# Patient Record
Sex: FEMALE | Race: WHITE | NOT HISPANIC OR LATINO | Employment: OTHER | URBAN - METROPOLITAN AREA
[De-identification: names, ages, dates, MRNs, and addresses within clinical notes are randomized per-mention and may not be internally consistent; named-entity substitution may affect disease eponyms.]

---

## 2017-01-03 ENCOUNTER — LAB (OUTPATIENT)
Dept: LAB | Facility: HOSPITAL | Age: 67
End: 2017-01-03

## 2017-01-03 ENCOUNTER — TRANSCRIBE ORDERS (OUTPATIENT)
Dept: ADMINISTRATIVE | Facility: HOSPITAL | Age: 67
End: 2017-01-03

## 2017-01-03 DIAGNOSIS — E78.00 PURE HYPERCHOLESTEROLEMIA: ICD-10-CM

## 2017-01-03 DIAGNOSIS — E11.9 DIABETES MELLITUS WITHOUT COMPLICATION (HCC): ICD-10-CM

## 2017-01-03 DIAGNOSIS — I10 ESSENTIAL HYPERTENSION, MALIGNANT: ICD-10-CM

## 2017-01-03 DIAGNOSIS — I10 ESSENTIAL HYPERTENSION, MALIGNANT: Primary | ICD-10-CM

## 2017-01-03 LAB
ALBUMIN SERPL-MCNC: 4.1 G/DL (ref 3.5–5.2)
ALBUMIN UR-MCNC: 18.7 MG/L
ALBUMIN/GLOB SERPL: 1.3 G/DL
ALP SERPL-CCNC: 89 U/L (ref 39–117)
ALT SERPL W P-5'-P-CCNC: 15 U/L (ref 1–33)
ANION GAP SERPL CALCULATED.3IONS-SCNC: 14.3 MMOL/L
AST SERPL-CCNC: 12 U/L (ref 1–32)
BACTERIA UR QL AUTO: ABNORMAL /HPF
BILIRUB SERPL-MCNC: 0.3 MG/DL (ref 0.1–1.2)
BILIRUB UR QL STRIP: NEGATIVE
BUN BLD-MCNC: 17 MG/DL (ref 8–23)
BUN/CREAT SERPL: 21.5 (ref 7–25)
CALCIUM SPEC-SCNC: 9.2 MG/DL (ref 8.6–10.5)
CHLORIDE SERPL-SCNC: 101 MMOL/L (ref 98–107)
CHOLEST SERPL-MCNC: 259 MG/DL (ref 0–200)
CLARITY UR: ABNORMAL
CO2 SERPL-SCNC: 22.7 MMOL/L (ref 22–29)
COLOR UR: YELLOW
CREAT BLD-MCNC: 0.79 MG/DL (ref 0.57–1)
GFR SERPL CREATININE-BSD FRML MDRD: 73 ML/MIN/1.73
GLOBULIN UR ELPH-MCNC: 3.2 GM/DL
GLUCOSE BLD-MCNC: 285 MG/DL (ref 65–99)
GLUCOSE UR STRIP-MCNC: ABNORMAL MG/DL
HBA1C MFR BLD: 11.54 % (ref 4.8–5.6)
HDLC SERPL-MCNC: 60 MG/DL (ref 40–60)
HGB UR QL STRIP.AUTO: NEGATIVE
HYALINE CASTS UR QL AUTO: ABNORMAL /LPF
KETONES UR QL STRIP: NEGATIVE
LDLC SERPL CALC-MCNC: 175 MG/DL (ref 0–100)
LDLC/HDLC SERPL: 2.92 {RATIO}
LEUKOCYTE ESTERASE UR QL STRIP.AUTO: ABNORMAL
MUCOUS THREADS URNS QL MICRO: ABNORMAL /HPF
NITRITE UR QL STRIP: NEGATIVE
PH UR STRIP.AUTO: 5.5 [PH] (ref 5–8)
POTASSIUM BLD-SCNC: 4.1 MMOL/L (ref 3.5–5.2)
PROT SERPL-MCNC: 7.3 G/DL (ref 6–8.5)
PROT UR QL STRIP: ABNORMAL
RBC # UR: ABNORMAL /HPF
REF LAB TEST METHOD: ABNORMAL
SODIUM BLD-SCNC: 138 MMOL/L (ref 136–145)
SP GR UR STRIP: >=1.03 (ref 1–1.03)
SQUAMOUS #/AREA URNS HPF: ABNORMAL /HPF
TRIGL SERPL-MCNC: 118 MG/DL (ref 0–150)
UROBILINOGEN UR QL STRIP: ABNORMAL
VLDLC SERPL-MCNC: 23.6 MG/DL (ref 5–40)
WBC UR QL AUTO: ABNORMAL /HPF

## 2017-01-03 PROCEDURE — 36415 COLL VENOUS BLD VENIPUNCTURE: CPT

## 2017-01-03 PROCEDURE — 80061 LIPID PANEL: CPT | Performed by: INTERNAL MEDICINE

## 2017-01-03 PROCEDURE — 82043 UR ALBUMIN QUANTITATIVE: CPT | Performed by: INTERNAL MEDICINE

## 2017-01-03 PROCEDURE — 81001 URINALYSIS AUTO W/SCOPE: CPT | Performed by: INTERNAL MEDICINE

## 2017-01-03 PROCEDURE — 83036 HEMOGLOBIN GLYCOSYLATED A1C: CPT | Performed by: INTERNAL MEDICINE

## 2017-01-03 PROCEDURE — 80053 COMPREHEN METABOLIC PANEL: CPT | Performed by: INTERNAL MEDICINE

## 2017-04-11 ENCOUNTER — LAB (OUTPATIENT)
Dept: LAB | Facility: HOSPITAL | Age: 67
End: 2017-04-11

## 2017-04-11 ENCOUNTER — TRANSCRIBE ORDERS (OUTPATIENT)
Dept: ADMINISTRATIVE | Facility: HOSPITAL | Age: 67
End: 2017-04-11

## 2017-04-11 DIAGNOSIS — Z00.00 ROUTINE GENERAL MEDICAL EXAMINATION AT A HEALTH CARE FACILITY: ICD-10-CM

## 2017-04-11 DIAGNOSIS — E11.9 DIABETES MELLITUS WITHOUT COMPLICATION (HCC): ICD-10-CM

## 2017-04-11 DIAGNOSIS — E78.00 PURE HYPERCHOLESTEROLEMIA: ICD-10-CM

## 2017-04-11 DIAGNOSIS — I10 ESSENTIAL HYPERTENSION, MALIGNANT: ICD-10-CM

## 2017-04-11 DIAGNOSIS — Z00.00 ROUTINE GENERAL MEDICAL EXAMINATION AT A HEALTH CARE FACILITY: Primary | ICD-10-CM

## 2017-04-11 LAB
ALBUMIN SERPL-MCNC: 4 G/DL (ref 3.5–5.2)
ALBUMIN/GLOB SERPL: 1.2 G/DL
ALP SERPL-CCNC: 77 U/L (ref 39–117)
ALT SERPL W P-5'-P-CCNC: 13 U/L (ref 1–33)
ANION GAP SERPL CALCULATED.3IONS-SCNC: 13.7 MMOL/L
AST SERPL-CCNC: 12 U/L (ref 1–32)
BACTERIA UR QL AUTO: ABNORMAL /HPF
BILIRUB SERPL-MCNC: 0.5 MG/DL (ref 0.1–1.2)
BILIRUB UR QL STRIP: NEGATIVE
BUN BLD-MCNC: 12 MG/DL (ref 8–23)
BUN/CREAT SERPL: 15.2 (ref 7–25)
CALCIUM SPEC-SCNC: 9.5 MG/DL (ref 8.6–10.5)
CHLORIDE SERPL-SCNC: 102 MMOL/L (ref 98–107)
CHOLEST SERPL-MCNC: 172 MG/DL (ref 0–200)
CLARITY UR: CLEAR
CO2 SERPL-SCNC: 22.3 MMOL/L (ref 22–29)
COLOR UR: YELLOW
CREAT BLD-MCNC: 0.79 MG/DL (ref 0.57–1)
DEPRECATED RDW RBC AUTO: 49.3 FL (ref 37–54)
EOSINOPHIL # BLD MANUAL: 0.12 10*3/MM3 (ref 0–0.7)
EOSINOPHIL NFR BLD MANUAL: 2 % (ref 0.3–6.2)
ERYTHROCYTE [DISTWIDTH] IN BLOOD BY AUTOMATED COUNT: 14.8 % (ref 11.7–13)
GFR SERPL CREATININE-BSD FRML MDRD: 73 ML/MIN/1.73
GLOBULIN UR ELPH-MCNC: 3.3 GM/DL
GLUCOSE BLD-MCNC: 327 MG/DL (ref 65–99)
GLUCOSE UR STRIP-MCNC: ABNORMAL MG/DL
HBA1C MFR BLD: 12.11 % (ref 4.8–5.6)
HCT VFR BLD AUTO: 42.4 % (ref 35.6–45.5)
HDLC SERPL-MCNC: 61 MG/DL (ref 40–60)
HGB BLD-MCNC: 13.6 G/DL (ref 11.9–15.5)
HGB UR QL STRIP.AUTO: NEGATIVE
HYALINE CASTS UR QL AUTO: ABNORMAL /LPF
KETONES UR QL STRIP: NEGATIVE
LDLC SERPL CALC-MCNC: 90 MG/DL (ref 0–100)
LDLC/HDLC SERPL: 1.47 {RATIO}
LEUKOCYTE ESTERASE UR QL STRIP.AUTO: NEGATIVE
LYMPHOCYTES # BLD MANUAL: 2.13 10*3/MM3 (ref 0.9–4.8)
LYMPHOCYTES NFR BLD MANUAL: 37 % (ref 19.6–45.3)
LYMPHOCYTES NFR BLD MANUAL: 7 % (ref 5–12)
MCH RBC QN AUTO: 29.1 PG (ref 26.9–32)
MCHC RBC AUTO-ENTMCNC: 32.1 G/DL (ref 32.4–36.3)
MCV RBC AUTO: 90.8 FL (ref 80.5–98.2)
MONOCYTES # BLD AUTO: 0.4 10*3/MM3 (ref 0.2–1.2)
NEUTROPHILS # BLD AUTO: 3.11 10*3/MM3 (ref 1.9–8.1)
NEUTROPHILS NFR BLD MANUAL: 54 % (ref 42.7–76)
NITRITE UR QL STRIP: NEGATIVE
PH UR STRIP.AUTO: 5.5 [PH] (ref 5–8)
PLAT MORPH BLD: NORMAL
PLATELET # BLD AUTO: 251 10*3/MM3 (ref 140–500)
PMV BLD AUTO: 11.6 FL (ref 6–12)
POTASSIUM BLD-SCNC: 4.3 MMOL/L (ref 3.5–5.2)
PROT SERPL-MCNC: 7.3 G/DL (ref 6–8.5)
PROT UR QL STRIP: ABNORMAL
RBC # BLD AUTO: 4.67 10*6/MM3 (ref 3.9–5.2)
RBC # UR: ABNORMAL /HPF
RBC MORPH BLD: NORMAL
REF LAB TEST METHOD: ABNORMAL
SODIUM BLD-SCNC: 138 MMOL/L (ref 136–145)
SP GR UR STRIP: >=1.03 (ref 1–1.03)
SQUAMOUS #/AREA URNS HPF: ABNORMAL /HPF
TRIGL SERPL-MCNC: 106 MG/DL (ref 0–150)
UROBILINOGEN UR QL STRIP: ABNORMAL
VLDLC SERPL-MCNC: 21.2 MG/DL (ref 5–40)
WBC MORPH BLD: NORMAL
WBC NRBC COR # BLD: 5.75 10*3/MM3 (ref 4.5–10.7)
WBC UR QL AUTO: ABNORMAL /HPF

## 2017-04-11 PROCEDURE — 81001 URINALYSIS AUTO W/SCOPE: CPT

## 2017-04-11 PROCEDURE — 36415 COLL VENOUS BLD VENIPUNCTURE: CPT

## 2017-04-11 PROCEDURE — 85007 BL SMEAR W/DIFF WBC COUNT: CPT

## 2017-04-11 PROCEDURE — 83036 HEMOGLOBIN GLYCOSYLATED A1C: CPT

## 2017-04-11 PROCEDURE — 80061 LIPID PANEL: CPT

## 2017-04-11 PROCEDURE — 85027 COMPLETE CBC AUTOMATED: CPT

## 2017-04-11 PROCEDURE — 80053 COMPREHEN METABOLIC PANEL: CPT

## 2017-04-28 ENCOUNTER — OFFICE VISIT (OUTPATIENT)
Dept: ENDOCRINOLOGY | Age: 67
End: 2017-04-28

## 2017-04-28 VITALS
SYSTOLIC BLOOD PRESSURE: 130 MMHG | RESPIRATION RATE: 16 BRPM | BODY MASS INDEX: 30.86 KG/M2 | HEIGHT: 67 IN | WEIGHT: 196.6 LBS | DIASTOLIC BLOOD PRESSURE: 84 MMHG

## 2017-04-28 DIAGNOSIS — E11.9 TYPE 2 DIABETES MELLITUS WITHOUT COMPLICATION, WITH LONG-TERM CURRENT USE OF INSULIN (HCC): Primary | ICD-10-CM

## 2017-04-28 DIAGNOSIS — Z79.4 TYPE 2 DIABETES MELLITUS WITHOUT COMPLICATION, WITH LONG-TERM CURRENT USE OF INSULIN (HCC): Primary | ICD-10-CM

## 2017-04-28 DIAGNOSIS — E55.9 VITAMIN D DEFICIENCY: ICD-10-CM

## 2017-04-28 DIAGNOSIS — E55.9 VITAMIN D DEFICIENCY: Primary | ICD-10-CM

## 2017-04-28 DIAGNOSIS — E78.5 DYSLIPIDEMIA: ICD-10-CM

## 2017-04-28 DIAGNOSIS — I10 ESSENTIAL HYPERTENSION: ICD-10-CM

## 2017-04-28 DIAGNOSIS — E03.9 PRIMARY HYPOTHYROIDISM: ICD-10-CM

## 2017-04-28 PROCEDURE — 99205 OFFICE O/P NEW HI 60 MIN: CPT | Performed by: INTERNAL MEDICINE

## 2017-04-28 RX ORDER — LANCETS
EACH MISCELLANEOUS
Qty: 60 EACH | Refills: 5 | Status: SHIPPED | OUTPATIENT
Start: 2017-04-28 | End: 2017-05-02

## 2017-04-28 RX ORDER — INSULIN GLARGINE 300 U/ML
75 INJECTION, SOLUTION SUBCUTANEOUS DAILY
Qty: 6 PEN | Refills: 5 | Status: SHIPPED | OUTPATIENT
Start: 2017-04-28 | End: 2017-06-27 | Stop reason: SDUPTHER

## 2017-04-28 RX ORDER — DAPAGLIFLOZIN 10 MG/1
TABLET, FILM COATED ORAL
Qty: 30 TABLET | Refills: 5 | Status: SHIPPED | OUTPATIENT
Start: 2017-04-28 | End: 2017-06-27 | Stop reason: SDUPTHER

## 2017-04-28 RX ORDER — BLOOD SUGAR DIAGNOSTIC
STRIP MISCELLANEOUS
Qty: 100 EACH | Refills: 12 | Status: SHIPPED | OUTPATIENT
Start: 2017-04-28 | End: 2017-05-02 | Stop reason: SDUPTHER

## 2017-04-28 RX ORDER — ATORVASTATIN CALCIUM 20 MG/1
20 TABLET, FILM COATED ORAL DAILY
COMMUNITY
End: 2017-05-08

## 2017-04-28 RX ORDER — EXENATIDE 2 MG/.65ML
2 INJECTION, SUSPENSION, EXTENDED RELEASE SUBCUTANEOUS WEEKLY
Qty: 4 EACH | Refills: 5 | Status: SHIPPED | OUTPATIENT
Start: 2017-04-28 | End: 2017-06-27 | Stop reason: SDUPTHER

## 2017-04-28 NOTE — PROGRESS NOTES
Subjective   Mayra Hirsch is a 66 y.o. female seen as a new patient for DM2, hypothyroidism. She is not checking her BG. She states that she is not taking any medication for diabetes. She was given metformin but each time she took it she had vomiting. She was on invokana but her insurance would not cover it. She denies any problems or concerns.     History of Present Illness this is a 66-year-old female known patient with type II diabetes hypothyroidism and hypertension with dyslipidemia.  She has been unable to tolerate metformin and couple other choices for her therapy was not under her formulary.  She is not doing any self blood glucose monitoring testing.  She denies having had any serious medical issues for which to go to the emergency room or hospital.       No Known Allergies    Current Outpatient Prescriptions:   •  atorvastatin (LIPITOR) 20 MG tablet, Take 20 mg by mouth Daily., Disp: , Rfl:   •  FLUoxetine HCl (PROZAC PO), Take 40 mg by mouth., Disp: , Rfl:   •  Levothyroxine Sodium (SYNTHROID PO), Take 200 mcg by mouth., Disp: , Rfl:   •  LISINOPRIL PO, Take 20 mg by mouth., Disp: , Rfl:       The following portions of the patient's history were reviewed and updated as appropriate: allergies, current medications, past family history, past medical history, past social history, past surgical history and problem list.    Review of Systems   Constitutional: Positive for fatigue.   HENT: Negative.    Eyes: Negative.    Respiratory: Negative.    Cardiovascular: Negative.    Gastrointestinal: Negative.    Endocrine: Negative.    Genitourinary: Negative.    Musculoskeletal: Negative.    Skin: Negative.    Allergic/Immunologic: Negative.    Neurological: Negative.    Hematological: Negative.    Psychiatric/Behavioral: Negative.        Objective   Physical Exam   Constitutional: She is oriented to person, place, and time. She appears well-developed and well-nourished. No distress.   HENT:   Head: Normocephalic.    Right Ear: External ear normal.   Left Ear: External ear normal.   Nose: Nose normal.   Mouth/Throat: Oropharynx is clear and moist. No oropharyngeal exudate.   Eyes: Conjunctivae and EOM are normal. Pupils are equal, round, and reactive to light. Right eye exhibits no discharge. Left eye exhibits no discharge. No scleral icterus.   Neck: Normal range of motion. Neck supple. No JVD present. No tracheal deviation present. No thyromegaly present.   Cardiovascular: Normal rate, regular rhythm, normal heart sounds and intact distal pulses.  Exam reveals no gallop and no friction rub.    No murmur heard.  Pulmonary/Chest: Effort normal and breath sounds normal. No stridor. No respiratory distress. She has no wheezes. She has no rales. She exhibits no tenderness.   Abdominal: Soft. Bowel sounds are normal. She exhibits no distension and no mass. There is no tenderness. There is no rebound and no guarding. No hernia.   Musculoskeletal: Normal range of motion. She exhibits no edema, tenderness or deformity.   Lymphadenopathy:     She has no cervical adenopathy.   Neurological: She is alert and oriented to person, place, and time. She has normal reflexes. She displays normal reflexes. No cranial nerve deficit. She exhibits normal muscle tone. Coordination normal.   Skin: Skin is warm and dry. No rash noted. She is not diaphoretic. No erythema. No pallor.   Psychiatric: She has a normal mood and affect. Her behavior is normal. Judgment and thought content normal.   Nursing note and vitals reviewed.        Assessment/Plan   Diagnoses and all orders for this visit:    Type 2 diabetes mellitus without complication, with long-term current use of insulin  -     T4 & TSH ([facility])  -     Uric Acid  -     Vitamin D 25 Hydroxy  -     Comprehensive Metabolic Panel  -     C-Peptide  -     Hemoglobin A1c  -     Lipid Panel  -     MicroAlbumin, Urine, Random  -     ACTH  -     Cortisol  -     T3, Free  -     T4, Free  -      Thyroglobulin With Anti-TG  -     T3, Free; Future  -     T4 & TSH ([facility]); Future  -     T4, Free; Future  -     Uric Acid; Future  -     Vitamin D 25 Hydroxy; Future  -     Comprehensive Metabolic Panel; Future  -     C-Peptide; Future  -     Hemoglobin A1c; Future  -     Lipid Panel; Future  -     MicroAlbumin, Urine, Random; Future    Dyslipidemia  -     T4 & TSH ([facility])  -     Uric Acid  -     Vitamin D 25 Hydroxy  -     Comprehensive Metabolic Panel  -     C-Peptide  -     Hemoglobin A1c  -     Lipid Panel  -     MicroAlbumin, Urine, Random  -     ACTH  -     Cortisol  -     T3, Free  -     T4, Free  -     Thyroglobulin With Anti-TG  -     T3, Free; Future  -     T4 & TSH ([facility]); Future  -     T4, Free; Future  -     Uric Acid; Future  -     Vitamin D 25 Hydroxy; Future  -     Comprehensive Metabolic Panel; Future  -     C-Peptide; Future  -     Hemoglobin A1c; Future  -     Lipid Panel; Future  -     MicroAlbumin, Urine, Random; Future    Essential hypertension  -     T4 & TSH ([facility])  -     Uric Acid  -     Vitamin D 25 Hydroxy  -     Comprehensive Metabolic Panel  -     C-Peptide  -     Hemoglobin A1c  -     Lipid Panel  -     MicroAlbumin, Urine, Random  -     ACTH  -     Cortisol  -     T3, Free  -     T4, Free  -     Thyroglobulin With Anti-TG  -     T3, Free; Future  -     T4 & TSH ([facility]); Future  -     T4, Free; Future  -     Uric Acid; Future  -     Vitamin D 25 Hydroxy; Future  -     Comprehensive Metabolic Panel; Future  -     C-Peptide; Future  -     Hemoglobin A1c; Future  -     Lipid Panel; Future  -     MicroAlbumin, Urine, Random; Future    Primary hypothyroidism  -     T4 & TSH ([facility])  -     Uric Acid  -     Vitamin D 25 Hydroxy  -     Comprehensive Metabolic Panel  -     C-Peptide  -     Hemoglobin A1c  -     Lipid Panel  -     MicroAlbumin, Urine, Random  -     ACTH  -     Cortisol  -     T3, Free  -     T4, Free  -     Thyroglobulin With Anti-TG  -     T3, Free; Future  -      T4 & TSH ([facility]); Future  -     T4, Free; Future  -     Uric Acid; Future  -     Vitamin D 25 Hydroxy; Future  -     Comprehensive Metabolic Panel; Future  -     C-Peptide; Future  -     Hemoglobin A1c; Future  -     Lipid Panel; Future  -     MicroAlbumin, Urine, Random; Future    Vitamin D deficiency  -     T4 & TSH ([facility])  -     Uric Acid  -     Vitamin D 25 Hydroxy  -     Comprehensive Metabolic Panel  -     C-Peptide  -     Hemoglobin A1c  -     Lipid Panel  -     MicroAlbumin, Urine, Random  -     ACTH  -     Cortisol  -     T3, Free  -     T4, Free  -     Thyroglobulin With Anti-TG  -     T3, Free; Future  -     T4 & TSH ([facility]); Future  -     T4, Free; Future  -     Uric Acid; Future  -     Vitamin D 25 Hydroxy; Future  -     Comprehensive Metabolic Panel; Future  -     C-Peptide; Future  -     Hemoglobin A1c; Future  -     Lipid Panel; Future  -     MicroAlbumin, Urine, Random; Future    Other orders  -     FARXIGA 10 MG tablet; 1 tablet by mouth every morning  -     TOUJEO SOLOSTAR 300 UNIT/ML solution pen-injector; Inject 75 Units under the skin Daily.  -     BYDUREON 2 MG pen-injector; Inject 2 mg under the skin 1 (One) Time Per Week.  -     ONETOUCH VERIO test strip; Use as instructed  -     Lancets (ONETOUCH ULTRASOFT) lancets; PRN  -     Insulin Pen Needle (CAREFINE PEN NEEDLES) 32G X 6 MM misc; Euros for daily insulin injection             in summary I saw and examined this 66-year-old female for above-mentioned problems.  I reviewed her laboratory evaluation of 04/11/2017 where her fasting blood sugar was 327 and a hemoglobin A1c was 12.11.  At this point am going to go ahead and start her on Bydureon 2 mg injection once weekly as well as Farxiga 10 mg every morning and Toujeo 50 units every morning.  I also instructed her to check her blood glucose every morning and if her blood sugar is greater than 120 at 2 units to her existing dose of Toujeo we will go ahead and obtain a comprehensive  laboratory evaluation and once the results come back we will go ahead and call for any other modification or new medication or additional evaluation.  This office visit including patient counseling which was more than 50% of the time including teaching injections of insulin as well as Bydureon exceeded 60 minutes.  She will see Ms. Nuria Ochoa in 4 months or sooner if needed with laboratory evaluation prior to her office visit..

## 2017-04-29 LAB — 25(OH)D3+25(OH)D2 SERPL-MCNC: 16.8 NG/ML (ref 30–100)

## 2017-05-01 RX ORDER — ERGOCALCIFEROL 1.25 MG/1
50000 CAPSULE ORAL 2 TIMES WEEKLY
Qty: 26 CAPSULE | Refills: 3 | Status: SHIPPED | OUTPATIENT
Start: 2017-05-01 | End: 2018-03-12 | Stop reason: SDUPTHER

## 2017-05-02 DIAGNOSIS — Z79.4 TYPE 2 DIABETES MELLITUS WITHOUT COMPLICATION, WITH LONG-TERM CURRENT USE OF INSULIN (HCC): Primary | ICD-10-CM

## 2017-05-02 DIAGNOSIS — E11.9 TYPE 2 DIABETES MELLITUS WITHOUT COMPLICATION, WITH LONG-TERM CURRENT USE OF INSULIN (HCC): Primary | ICD-10-CM

## 2017-05-02 RX ORDER — BLOOD SUGAR DIAGNOSTIC
STRIP MISCELLANEOUS
Qty: 100 EACH | Refills: 3 | Status: SHIPPED | OUTPATIENT
Start: 2017-05-02 | End: 2018-03-15 | Stop reason: SDUPTHER

## 2017-05-06 LAB
ACTH PLAS-MCNC: 18.4 PG/ML (ref 7.2–63.3)
ALBUMIN SERPL-MCNC: 4.6 G/DL (ref 3.5–5.2)
ALBUMIN/GLOB SERPL: 1.2 G/DL
ALP SERPL-CCNC: 105 U/L (ref 39–117)
ALT SERPL-CCNC: 17 U/L (ref 1–33)
AST SERPL-CCNC: 15 U/L (ref 1–32)
BILIRUB SERPL-MCNC: 0.5 MG/DL (ref 0.1–1.2)
BUN SERPL-MCNC: 18 MG/DL (ref 8–23)
BUN/CREAT SERPL: 22 (ref 7–25)
C PEPTIDE SERPL-MCNC: 4 NG/ML (ref 1.1–4.4)
CALCIUM SERPL-MCNC: 10.6 MG/DL (ref 8.6–10.5)
CHLORIDE SERPL-SCNC: 95 MMOL/L (ref 98–107)
CHOLEST SERPL-MCNC: 240 MG/DL (ref 0–200)
CO2 SERPL-SCNC: 22.6 MMOL/L (ref 22–29)
CORTIS SERPL-MCNC: 18.4 UG/DL
CREAT SERPL-MCNC: 0.82 MG/DL (ref 0.57–1)
GLOBULIN SER CALC-MCNC: 3.7 GM/DL
GLUCOSE SERPL-MCNC: 404 MG/DL (ref 65–99)
HBA1C MFR BLD: 11.9 % (ref 4.8–5.6)
HDLC SERPL-MCNC: 69 MG/DL (ref 40–60)
LDLC SERPL CALC-MCNC: 149 MG/DL (ref 0–100)
MICROALBUMIN UR-MCNC: 92.1 UG/ML
POTASSIUM SERPL-SCNC: 5.2 MMOL/L (ref 3.5–5.2)
PROT SERPL-MCNC: 8.3 G/DL (ref 6–8.5)
SODIUM SERPL-SCNC: 138 MMOL/L (ref 136–145)
T3FREE SERPL-MCNC: 2.3 PG/ML (ref 2–4.4)
T4 FREE SERPL-MCNC: 1.1 NG/DL (ref 0.93–1.7)
T4 SERPL-MCNC: 6.76 MCG/DL (ref 4.5–11.7)
THYROGLOB AB SERPL-ACNC: 1.1 IU/ML (ref 0–0.9)
THYROGLOB SERPL-MCNC: <2 NG/ML
TRIGL SERPL-MCNC: 109 MG/DL (ref 0–150)
TSH SERPL DL<=0.005 MIU/L-ACNC: 15.88 MIU/ML (ref 0.27–4.2)
URATE SERPL-MCNC: 4.6 MG/DL (ref 2.4–5.7)
VLDLC SERPL CALC-MCNC: 21.8 MG/DL (ref 5–40)

## 2017-05-08 RX ORDER — ATORVASTATIN CALCIUM 80 MG/1
80 TABLET, FILM COATED ORAL DAILY
Qty: 30 TABLET | Refills: 11 | Status: SHIPPED | OUTPATIENT
Start: 2017-05-08 | End: 2017-06-27 | Stop reason: SDUPTHER

## 2017-05-08 RX ORDER — EZETIMIBE 10 MG/1
10 TABLET ORAL DAILY
Qty: 30 TABLET | Refills: 11 | Status: SHIPPED | OUTPATIENT
Start: 2017-05-08 | End: 2017-06-27 | Stop reason: SDUPTHER

## 2017-05-08 RX ORDER — LEVOTHYROXINE SODIUM 125 MCG
250 TABLET ORAL DAILY
Qty: 60 TABLET | Refills: 5 | Status: SHIPPED | OUTPATIENT
Start: 2017-05-08 | End: 2017-06-27 | Stop reason: SDUPTHER

## 2017-06-27 RX ORDER — EZETIMIBE 10 MG/1
10 TABLET ORAL DAILY
Qty: 90 TABLET | Refills: 2 | Status: SHIPPED | OUTPATIENT
Start: 2017-06-27 | End: 2018-03-09 | Stop reason: SDUPTHER

## 2017-06-27 RX ORDER — LEVOTHYROXINE SODIUM 0.12 MG/1
250 TABLET ORAL DAILY
Qty: 180 TABLET | Refills: 2 | Status: SHIPPED | OUTPATIENT
Start: 2017-06-27 | End: 2018-03-09 | Stop reason: SDUPTHER

## 2017-06-27 RX ORDER — EXENATIDE 2 MG/.65ML
2 INJECTION, SUSPENSION, EXTENDED RELEASE SUBCUTANEOUS WEEKLY
Qty: 12 EACH | Refills: 2 | Status: SHIPPED | OUTPATIENT
Start: 2017-06-27 | End: 2018-02-18 | Stop reason: SDUPTHER

## 2017-06-27 RX ORDER — INSULIN GLARGINE 300 U/ML
75 INJECTION, SOLUTION SUBCUTANEOUS DAILY
Qty: 15 PEN | Refills: 2 | Status: SHIPPED | OUTPATIENT
Start: 2017-06-27 | End: 2017-09-19 | Stop reason: SDUPTHER

## 2017-06-27 RX ORDER — DAPAGLIFLOZIN 10 MG/1
TABLET, FILM COATED ORAL
Qty: 90 TABLET | Refills: 2 | Status: SHIPPED | OUTPATIENT
Start: 2017-06-27 | End: 2018-03-09 | Stop reason: SDUPTHER

## 2017-06-27 RX ORDER — ATORVASTATIN CALCIUM 80 MG/1
80 TABLET, FILM COATED ORAL DAILY
Qty: 90 TABLET | Refills: 2 | Status: SHIPPED | OUTPATIENT
Start: 2017-06-27 | End: 2018-03-09 | Stop reason: SDUPTHER

## 2017-08-23 ENCOUNTER — RESULTS ENCOUNTER (OUTPATIENT)
Dept: ENDOCRINOLOGY | Age: 67
End: 2017-08-23

## 2017-08-23 DIAGNOSIS — E78.5 DYSLIPIDEMIA: ICD-10-CM

## 2017-08-23 DIAGNOSIS — Z79.4 TYPE 2 DIABETES MELLITUS WITHOUT COMPLICATION, WITH LONG-TERM CURRENT USE OF INSULIN (HCC): ICD-10-CM

## 2017-08-23 DIAGNOSIS — E03.9 PRIMARY HYPOTHYROIDISM: ICD-10-CM

## 2017-08-23 DIAGNOSIS — E55.9 VITAMIN D DEFICIENCY: ICD-10-CM

## 2017-08-23 DIAGNOSIS — E11.9 TYPE 2 DIABETES MELLITUS WITHOUT COMPLICATION, WITH LONG-TERM CURRENT USE OF INSULIN (HCC): ICD-10-CM

## 2017-08-23 DIAGNOSIS — I10 ESSENTIAL HYPERTENSION: ICD-10-CM

## 2017-08-28 DIAGNOSIS — E55.9 VITAMIN D DEFICIENCY: ICD-10-CM

## 2017-08-28 DIAGNOSIS — E03.9 PRIMARY HYPOTHYROIDISM: Primary | ICD-10-CM

## 2017-08-28 DIAGNOSIS — E11.9 TYPE 2 DIABETES MELLITUS WITHOUT COMPLICATION, WITH LONG-TERM CURRENT USE OF INSULIN (HCC): ICD-10-CM

## 2017-08-28 DIAGNOSIS — Z79.4 TYPE 2 DIABETES MELLITUS WITHOUT COMPLICATION, WITH LONG-TERM CURRENT USE OF INSULIN (HCC): ICD-10-CM

## 2017-09-05 ENCOUNTER — LAB (OUTPATIENT)
Dept: ENDOCRINOLOGY | Age: 67
End: 2017-09-05

## 2017-09-05 DIAGNOSIS — Z79.4 TYPE 2 DIABETES MELLITUS WITHOUT COMPLICATION, WITH LONG-TERM CURRENT USE OF INSULIN (HCC): ICD-10-CM

## 2017-09-05 DIAGNOSIS — E78.5 DYSLIPIDEMIA: ICD-10-CM

## 2017-09-05 DIAGNOSIS — E11.9 TYPE 2 DIABETES MELLITUS WITHOUT COMPLICATION, WITH LONG-TERM CURRENT USE OF INSULIN (HCC): ICD-10-CM

## 2017-09-05 DIAGNOSIS — I10 ESSENTIAL HYPERTENSION: ICD-10-CM

## 2017-09-05 DIAGNOSIS — E55.9 VITAMIN D DEFICIENCY: ICD-10-CM

## 2017-09-05 DIAGNOSIS — E03.9 PRIMARY HYPOTHYROIDISM: ICD-10-CM

## 2017-09-07 LAB
25(OH)D3+25(OH)D2 SERPL-MCNC: 30.2 NG/ML (ref 30–100)
ALBUMIN SERPL-MCNC: 4.2 G/DL (ref 3.5–5.2)
ALBUMIN/GLOB SERPL: 1.4 G/DL
ALP SERPL-CCNC: 94 U/L (ref 39–117)
ALT SERPL-CCNC: 14 U/L (ref 1–33)
AST SERPL-CCNC: 15 U/L (ref 1–32)
BILIRUB SERPL-MCNC: 0.3 MG/DL (ref 0.1–1.2)
BUN SERPL-MCNC: 13 MG/DL (ref 8–23)
BUN/CREAT SERPL: 16.7 (ref 7–25)
C PEPTIDE SERPL-MCNC: 4.5 NG/ML (ref 1.1–4.4)
CALCIUM SERPL-MCNC: 10 MG/DL (ref 8.6–10.5)
CHLORIDE SERPL-SCNC: 108 MMOL/L (ref 98–107)
CHOLEST SERPL-MCNC: 140 MG/DL (ref 0–200)
CO2 SERPL-SCNC: 20.4 MMOL/L (ref 22–29)
CREAT SERPL-MCNC: 0.78 MG/DL (ref 0.57–1)
FT4I SERPL CALC-MCNC: 2.8 (ref 1.2–4.9)
GLOBULIN SER CALC-MCNC: 3 GM/DL
GLUCOSE SERPL-MCNC: 141 MG/DL (ref 65–99)
HBA1C MFR BLD: 6.1 % (ref 4.8–5.6)
HDLC SERPL-MCNC: 58 MG/DL (ref 40–60)
LDLC SERPL CALC-MCNC: 58 MG/DL (ref 0–100)
POTASSIUM SERPL-SCNC: 4.1 MMOL/L (ref 3.5–5.2)
PROT SERPL-MCNC: 7.2 G/DL (ref 6–8.5)
SODIUM SERPL-SCNC: 145 MMOL/L (ref 136–145)
T3FREE SERPL-MCNC: 3.4 PG/ML (ref 2–4.4)
T3RU NFR SERPL: 30 % (ref 24–39)
T4 FREE SERPL-MCNC: 1.59 NG/DL (ref 0.93–1.7)
T4 SERPL-MCNC: 9.2 UG/DL (ref 4.5–12)
THYROGLOB AB SERPL-ACNC: <1 IU/ML
THYROGLOB SERPL-MCNC: 0.6 NG/ML
THYROGLOB SERPL-MCNC: NORMAL NG/ML
TRIGL SERPL-MCNC: 120 MG/DL (ref 0–150)
TSH SERPL DL<=0.005 MIU/L-ACNC: 0.32 UIU/ML (ref 0.45–4.5)
UNABLE TO VOID: NORMAL
VLDLC SERPL CALC-MCNC: 24 MG/DL (ref 5–40)

## 2017-09-19 ENCOUNTER — OFFICE VISIT (OUTPATIENT)
Dept: ENDOCRINOLOGY | Age: 67
End: 2017-09-19

## 2017-09-19 VITALS
SYSTOLIC BLOOD PRESSURE: 122 MMHG | WEIGHT: 205.4 LBS | BODY MASS INDEX: 31.13 KG/M2 | HEIGHT: 68 IN | DIASTOLIC BLOOD PRESSURE: 72 MMHG

## 2017-09-19 DIAGNOSIS — E11.9 TYPE 2 DIABETES MELLITUS WITHOUT COMPLICATION, WITH LONG-TERM CURRENT USE OF INSULIN (HCC): Primary | ICD-10-CM

## 2017-09-19 DIAGNOSIS — E78.5 DYSLIPIDEMIA: ICD-10-CM

## 2017-09-19 DIAGNOSIS — E55.9 VITAMIN D DEFICIENCY: ICD-10-CM

## 2017-09-19 DIAGNOSIS — I10 ESSENTIAL HYPERTENSION: ICD-10-CM

## 2017-09-19 DIAGNOSIS — E03.9 PRIMARY HYPOTHYROIDISM: ICD-10-CM

## 2017-09-19 DIAGNOSIS — Z79.4 TYPE 2 DIABETES MELLITUS WITHOUT COMPLICATION, WITH LONG-TERM CURRENT USE OF INSULIN (HCC): Primary | ICD-10-CM

## 2017-09-19 PROCEDURE — 99214 OFFICE O/P EST MOD 30 MIN: CPT | Performed by: NURSE PRACTITIONER

## 2017-09-19 RX ORDER — INSULIN GLARGINE 300 U/ML
40 INJECTION, SOLUTION SUBCUTANEOUS DAILY
Qty: 3 PEN | Refills: 0
Start: 2017-09-19 | End: 2018-03-09 | Stop reason: SDUPTHER

## 2017-09-19 NOTE — PROGRESS NOTES
"Carly Hirsch is a 66 y.o. female is here today for follow-up.  Chief Complaint   Patient presents with   • Diabetes     recent labs, testing BG 1 time daily, pt did not bring meter   • Hyperlipidemia     pt is experiencing diarrhea   • Hypertension   • Hypothyroidism   • Vitamin D Deficiency     /72  Ht 68\" (172.7 cm)  Wt 205 lb 6.4 oz (93.2 kg)  BMI 31.23 kg/m2  Current Outpatient Prescriptions on File Prior to Visit   Medication Sig   • atorvastatin (LIPITOR) 80 MG tablet Take 1 tablet by mouth Daily.   • BYDUREON 2 MG pen-injector Inject 2 mg under the skin 1 (One) Time Per Week.   • ergocalciferol (DRISDOL) 00729 UNITS capsule Take 1 capsule by mouth 2 (Two) Times a Week.   • ezetimibe (ZETIA) 10 MG tablet Take 1 tablet by mouth Daily.   • FARXIGA 10 MG tablet 1 tablet by mouth every morning   • FLUoxetine HCl (PROZAC PO) Take 40 mg by mouth.   • Insulin Pen Needle (CAREFINE PEN NEEDLES) 32G X 6 MM misc Euros for daily insulin injection   • levothyroxine (SYNTHROID) 125 MCG tablet Take 2 tablets by mouth Daily. On empty stomach   • LISINOPRIL PO Take 20 mg by mouth Daily.   • ONETOUCH DELICA LANCETS FINE misc Use once a day   • ONETOUCH VERIO test strip Use once a day   • TOUJEO SOLOSTAR 300 UNIT/ML solution pen-injector Inject 75 Units under the skin Daily. (Patient taking differently: Inject 46 Units under the skin Daily.)     No current facility-administered medications on file prior to visit.      Family History   Problem Relation Age of Onset   • Coronary artery disease Mother    • Alzheimer's disease Mother    • Coronary artery disease Father    • Cancer Father    • Thyroid disease Sister      Social History   Substance Use Topics   • Smoking status: Never Smoker   • Smokeless tobacco: None   • Alcohol use No     No Known Allergies      History of Present Illness  Encounter Diagnoses   Name Primary?   • Type 2 diabetes mellitus without complication, with long-term current use of " insulin Yes   • Primary hypothyroidism    • Dyslipidemia    • Vitamin D deficiency    • Essential hypertension      This is a 66-year-old female patient here today for routine follow-up visit.  She is being seen for the above mentioned problems.  She did not bring her blood glucose meter in to today's visit.  She states she is having some issues with low blood sugars so she has cut back on her insulin to 46 units.  She states you came back she still is having some low blood sugars.  She feels much better since her diabetes is under better control now.  She states she has more energy and is exercising and watching her diet.  She is also avoiding fast food and eating more vegetables.  She is concerned with some weight gain that she has experienced  The following portions of the patient's history were reviewed and updated as appropriate: allergies, current medications, past family history, past medical history, past social history, past surgical history and problem list.    Review of Systems   Constitutional: Negative for fatigue.   HENT: Negative for trouble swallowing.    Eyes: Negative for visual disturbance.   Respiratory: Negative for shortness of breath.    Cardiovascular: Negative for leg swelling.   Endocrine: Negative for polyuria.   Skin: Negative for wound.   Neurological: Negative for numbness.       Objective   Physical Exam   Constitutional: She is oriented to person, place, and time. She appears well-developed and well-nourished. No distress.   HENT:   Head: Normocephalic.   Right Ear: External ear normal.   Left Ear: External ear normal.   Nose: Nose normal.   Mouth/Throat: Oropharynx is clear and moist. No oropharyngeal exudate.   Eyes: Conjunctivae and EOM are normal. Pupils are equal, round, and reactive to light. Right eye exhibits no discharge. Left eye exhibits no discharge. No scleral icterus.   Neck: Normal range of motion. Neck supple. No JVD present. No tracheal deviation present. No  thyromegaly present.   Cardiovascular: Normal rate, regular rhythm, normal heart sounds and intact distal pulses.  Exam reveals no gallop and no friction rub.    No murmur heard.  Pulmonary/Chest: Effort normal and breath sounds normal. No stridor. No respiratory distress. She has no wheezes. She has no rales. She exhibits no tenderness.   Abdominal: Soft. Bowel sounds are normal. She exhibits no distension and no mass. There is no tenderness. There is no rebound and no guarding. No hernia.   Musculoskeletal: Normal range of motion. She exhibits no edema, tenderness or deformity.   Lymphadenopathy:     She has no cervical adenopathy.   Neurological: She is alert and oriented to person, place, and time. She has normal reflexes. She displays normal reflexes. No cranial nerve deficit. She exhibits normal muscle tone. Coordination normal.   Skin: Skin is warm and dry. No rash noted. She is not diaphoretic. No erythema. No pallor.   Psychiatric: She has a normal mood and affect. Her behavior is normal. Judgment and thought content normal.   Nursing note and vitals reviewed.    Results for orders placed or performed in visit on 09/05/17   Comprehensive Metabolic Panel   Result Value Ref Range    Glucose 141 (H) 65 - 99 mg/dL    BUN 13 8 - 23 mg/dL    Creatinine 0.78 0.57 - 1.00 mg/dL    eGFR Non African Am 74 >60 mL/min/1.73    eGFR African Am 90 >60 mL/min/1.73    BUN/Creatinine Ratio 16.7 7.0 - 25.0    Sodium 145 136 - 145 mmol/L    Potassium 4.1 3.5 - 5.2 mmol/L    Chloride 108 (H) 98 - 107 mmol/L    Total CO2 20.4 (L) 22.0 - 29.0 mmol/L    Calcium 10.0 8.6 - 10.5 mg/dL    Total Protein 7.2 6.0 - 8.5 g/dL    Albumin 4.20 3.50 - 5.20 g/dL    Globulin 3.0 gm/dL    A/G Ratio 1.4 g/dL    Total Bilirubin 0.3 0.1 - 1.2 mg/dL    Alkaline Phosphatase 94 39 - 117 U/L    AST (SGOT) 15 1 - 32 U/L    ALT (SGPT) 14 1 - 33 U/L   Lipid Panel   Result Value Ref Range    Total Cholesterol 140 0 - 200 mg/dL    Triglycerides 120 0 - 150  mg/dL    HDL Cholesterol 58 40 - 60 mg/dL    VLDL Cholesterol 24 5 - 40 mg/dL    LDL Cholesterol  58 0 - 100 mg/dL   Vitamin D 25 Hydroxy   Result Value Ref Range    25 Hydroxy, Vitamin D 30.2 30.0 - 100.0 ng/mL   Hemoglobin A1c   Result Value Ref Range    Hemoglobin A1C 6.10 (H) 4.80 - 5.60 %   C-Peptide   Result Value Ref Range    C-Peptide 4.5 (H) 1.1 - 4.4 ng/mL   T3, Free   Result Value Ref Range    T3, Free 3.4 2.0 - 4.4 pg/mL   T4, Free   Result Value Ref Range    Free T4 1.59 0.93 - 1.70 ng/dL   Thyroid Panel With TSH   Result Value Ref Range    TSH 0.321 (L) 0.450 - 4.500 uIU/mL    T4, Total 9.2 4.5 - 12.0 ug/dL    T3 Uptake 30 24 - 39 %    Free Thyroxine Index 2.8 1.2 - 4.9   Comprehensive Thyroglobulin   Result Value Ref Range    Thyroglobulin Ab <1.0 IU/mL    Thyroglobulin 0.6 ng/mL    Thyroglobulin (TG-TAMY) Comment ng/mL   Unable To Void   Result Value Ref Range    Unable to Void Comment          Assessment/Plan   Problems Addressed this Visit        Cardiovascular and Mediastinum    Essential hypertension       Digestive    Vitamin D deficiency       Endocrine    Primary hypothyroidism    Type 2 diabetes mellitus without complication, with long-term current use of insulin - Primary    Relevant Medications    TOUJEO SOLOSTAR 300 UNIT/ML solution pen-injector       Other    Dyslipidemia          In summary, patient was seen and examined.  Her hemoglobin A1c has improved significantly from when it was over 11.  Her thyroid is now under good control and her thyroid hormones are stable.  Her blood pressure is satisfactory.  She has gained some weight is result in her diabetes under better control.  I decreased her insulin to 40 units once daily.  We will continue to decrease her insulin based on blood sugar readings.  She states her goal is to get off these medications however her go needs to be keep her diabetes under control.  She will follow-up in 4 months with labs prior.  Her labs will be faxed over to  her primary care provider because she has a follow-up appointment next month.

## 2018-02-20 RX ORDER — EXENATIDE 2 MG/.65ML
INJECTION, SUSPENSION, EXTENDED RELEASE SUBCUTANEOUS
Qty: 12 EACH | Refills: 0 | Status: SHIPPED | OUTPATIENT
Start: 2018-02-20 | End: 2018-03-12 | Stop reason: SINTOL

## 2018-02-26 ENCOUNTER — LAB (OUTPATIENT)
Dept: ENDOCRINOLOGY | Age: 68
End: 2018-02-26

## 2018-02-26 DIAGNOSIS — Z79.4 TYPE 2 DIABETES MELLITUS WITHOUT COMPLICATION, WITH LONG-TERM CURRENT USE OF INSULIN (HCC): ICD-10-CM

## 2018-02-26 DIAGNOSIS — E55.9 VITAMIN D DEFICIENCY: ICD-10-CM

## 2018-02-26 DIAGNOSIS — E03.9 PRIMARY HYPOTHYROIDISM: Primary | ICD-10-CM

## 2018-02-26 DIAGNOSIS — E11.9 TYPE 2 DIABETES MELLITUS WITHOUT COMPLICATION, WITH LONG-TERM CURRENT USE OF INSULIN (HCC): ICD-10-CM

## 2018-02-26 DIAGNOSIS — E03.9 PRIMARY HYPOTHYROIDISM: ICD-10-CM

## 2018-03-02 LAB
25(OH)D3+25(OH)D2 SERPL-MCNC: 29.3 NG/ML (ref 30–100)
ALBUMIN SERPL-MCNC: 4.1 G/DL (ref 3.5–5.2)
ALBUMIN/GLOB SERPL: 1.5 G/DL
ALP SERPL-CCNC: 118 U/L (ref 39–117)
ALT SERPL-CCNC: 20 U/L (ref 1–33)
AST SERPL-CCNC: 12 U/L (ref 1–32)
BILIRUB SERPL-MCNC: 0.5 MG/DL (ref 0.1–1.2)
BUN SERPL-MCNC: 15 MG/DL (ref 8–23)
BUN/CREAT SERPL: 21.4 (ref 7–25)
C PEPTIDE SERPL-MCNC: 2.8 NG/ML (ref 1.1–4.4)
CALCIUM SERPL-MCNC: 9.2 MG/DL (ref 8.6–10.5)
CHLORIDE SERPL-SCNC: 103 MMOL/L (ref 98–107)
CHOLEST SERPL-MCNC: 120 MG/DL (ref 0–200)
CO2 SERPL-SCNC: 21.2 MMOL/L (ref 22–29)
CREAT SERPL-MCNC: 0.7 MG/DL (ref 0.57–1)
FT4I SERPL CALC-MCNC: 2.3 (ref 1.2–4.9)
GFR SERPLBLD CREATININE-BSD FMLA CKD-EPI: 101 ML/MIN/1.73
GFR SERPLBLD CREATININE-BSD FMLA CKD-EPI: 83 ML/MIN/1.73
GLOBULIN SER CALC-MCNC: 2.7 GM/DL
GLUCOSE SERPL-MCNC: 113 MG/DL (ref 65–99)
HBA1C MFR BLD: 7.03 % (ref 4.8–5.6)
HDLC SERPL-MCNC: 69 MG/DL (ref 40–60)
INTERPRETATION: NORMAL
LDLC SERPL CALC-MCNC: 36 MG/DL (ref 0–100)
Lab: NORMAL
MICROALBUMIN UR-MCNC: 214.6 UG/ML
POTASSIUM SERPL-SCNC: 4.2 MMOL/L (ref 3.5–5.2)
PROT SERPL-MCNC: 6.8 G/DL (ref 6–8.5)
SODIUM SERPL-SCNC: 141 MMOL/L (ref 136–145)
T3FREE SERPL-MCNC: 2.5 PG/ML (ref 2–4.4)
T3RU NFR SERPL: 28 % (ref 24–39)
T4 FREE SERPL-MCNC: 1.42 NG/DL (ref 0.93–1.7)
T4 SERPL-MCNC: 8.3 UG/DL (ref 4.5–12)
THYROGLOB AB SERPL-ACNC: <1 IU/ML
THYROGLOB SERPL-MCNC: 1.4 NG/ML
THYROGLOB SERPL-MCNC: NORMAL NG/ML
TRIGL SERPL-MCNC: 77 MG/DL (ref 0–150)
TSH SERPL DL<=0.005 MIU/L-ACNC: 1.16 UIU/ML (ref 0.45–4.5)
VLDLC SERPL CALC-MCNC: 15.4 MG/DL (ref 5–40)

## 2018-03-08 RX ORDER — DAPAGLIFLOZIN 10 MG/1
TABLET, FILM COATED ORAL
Qty: 90 TABLET | Refills: 2 | Status: CANCELLED | OUTPATIENT
Start: 2018-03-08

## 2018-03-08 RX ORDER — LEVOTHYROXINE SODIUM 0.12 MG/1
TABLET ORAL
Qty: 180 TABLET | Refills: 2 | Status: CANCELLED | OUTPATIENT
Start: 2018-03-08

## 2018-03-08 RX ORDER — ATORVASTATIN CALCIUM 80 MG/1
TABLET, FILM COATED ORAL
Qty: 90 TABLET | Refills: 2 | Status: CANCELLED | OUTPATIENT
Start: 2018-03-08

## 2018-03-08 RX ORDER — INSULIN GLARGINE 300 U/ML
INJECTION, SOLUTION SUBCUTANEOUS
Qty: 22.5 ML | Refills: 2 | Status: CANCELLED | OUTPATIENT
Start: 2018-03-08

## 2018-03-08 RX ORDER — EZETIMIBE 10 MG/1
TABLET ORAL
Qty: 90 TABLET | Refills: 2 | Status: CANCELLED | OUTPATIENT
Start: 2018-03-08

## 2018-03-09 RX ORDER — ATORVASTATIN CALCIUM 80 MG/1
80 TABLET, FILM COATED ORAL DAILY
Qty: 90 TABLET | Refills: 0 | Status: SHIPPED | OUTPATIENT
Start: 2018-03-09 | End: 2018-03-12 | Stop reason: SDUPTHER

## 2018-03-09 RX ORDER — INSULIN GLARGINE 300 U/ML
40 INJECTION, SOLUTION SUBCUTANEOUS DAILY
Qty: 3 PEN | Refills: 0 | Status: SHIPPED | OUTPATIENT
Start: 2018-03-09 | End: 2018-03-12 | Stop reason: SDUPTHER

## 2018-03-09 RX ORDER — LEVOTHYROXINE SODIUM 0.12 MG/1
250 TABLET ORAL DAILY
Qty: 180 TABLET | Refills: 0 | Status: SHIPPED | OUTPATIENT
Start: 2018-03-09 | End: 2018-03-12 | Stop reason: SDUPTHER

## 2018-03-09 RX ORDER — EZETIMIBE 10 MG/1
10 TABLET ORAL DAILY
Qty: 90 TABLET | Refills: 0 | Status: SHIPPED | OUTPATIENT
Start: 2018-03-09 | End: 2018-03-12 | Stop reason: SDUPTHER

## 2018-03-09 RX ORDER — DAPAGLIFLOZIN 10 MG/1
TABLET, FILM COATED ORAL
Qty: 90 TABLET | Refills: 0 | Status: SHIPPED | OUTPATIENT
Start: 2018-03-09 | End: 2018-03-12 | Stop reason: SDUPTHER

## 2018-03-12 ENCOUNTER — OFFICE VISIT (OUTPATIENT)
Dept: ENDOCRINOLOGY | Age: 68
End: 2018-03-12

## 2018-03-12 VITALS
WEIGHT: 213 LBS | HEIGHT: 68 IN | SYSTOLIC BLOOD PRESSURE: 152 MMHG | BODY MASS INDEX: 32.28 KG/M2 | DIASTOLIC BLOOD PRESSURE: 70 MMHG

## 2018-03-12 DIAGNOSIS — I10 ESSENTIAL HYPERTENSION: ICD-10-CM

## 2018-03-12 DIAGNOSIS — Z79.4 TYPE 2 DIABETES MELLITUS WITHOUT COMPLICATION, WITH LONG-TERM CURRENT USE OF INSULIN (HCC): Primary | ICD-10-CM

## 2018-03-12 DIAGNOSIS — E11.9 TYPE 2 DIABETES MELLITUS WITHOUT COMPLICATION, WITH LONG-TERM CURRENT USE OF INSULIN (HCC): Primary | ICD-10-CM

## 2018-03-12 DIAGNOSIS — E78.5 DYSLIPIDEMIA: ICD-10-CM

## 2018-03-12 DIAGNOSIS — E55.9 VITAMIN D DEFICIENCY: ICD-10-CM

## 2018-03-12 DIAGNOSIS — E03.9 PRIMARY HYPOTHYROIDISM: ICD-10-CM

## 2018-03-12 PROCEDURE — 99214 OFFICE O/P EST MOD 30 MIN: CPT | Performed by: NURSE PRACTITIONER

## 2018-03-12 RX ORDER — INSULIN GLARGINE 300 U/ML
40 INJECTION, SOLUTION SUBCUTANEOUS DAILY
Qty: 3 PEN | Refills: 1 | Status: SHIPPED | OUTPATIENT
Start: 2018-03-12 | End: 2019-02-07 | Stop reason: SDUPTHER

## 2018-03-12 RX ORDER — AMLODIPINE BESYLATE 5 MG/1
5 TABLET ORAL DAILY
Qty: 90 TABLET | Refills: 1 | Status: SHIPPED | OUTPATIENT
Start: 2018-03-12 | End: 2019-03-12

## 2018-03-12 RX ORDER — ERGOCALCIFEROL 1.25 MG/1
50000 CAPSULE ORAL 2 TIMES WEEKLY
Qty: 24 CAPSULE | Refills: 1 | Status: SHIPPED | OUTPATIENT
Start: 2018-03-12 | End: 2019-02-07

## 2018-03-12 RX ORDER — EZETIMIBE 10 MG/1
10 TABLET ORAL DAILY
Qty: 90 TABLET | Refills: 1 | Status: SHIPPED | OUTPATIENT
Start: 2018-03-12 | End: 2018-03-15 | Stop reason: SDUPTHER

## 2018-03-12 RX ORDER — ATORVASTATIN CALCIUM 80 MG/1
80 TABLET, FILM COATED ORAL DAILY
Qty: 90 TABLET | Refills: 1 | Status: SHIPPED | OUTPATIENT
Start: 2018-03-12 | End: 2018-03-15 | Stop reason: SDUPTHER

## 2018-03-12 RX ORDER — AMLODIPINE BESYLATE 5 MG/1
5 TABLET ORAL DAILY
Qty: 30 TABLET | Refills: 5 | Status: SHIPPED | OUTPATIENT
Start: 2018-03-12 | End: 2018-03-12 | Stop reason: SDUPTHER

## 2018-03-12 RX ORDER — ERGOCALCIFEROL 1.25 MG/1
50000 CAPSULE ORAL 2 TIMES WEEKLY
Qty: 24 CAPSULE | Refills: 3 | Status: SHIPPED | OUTPATIENT
Start: 2018-03-12 | End: 2018-03-12 | Stop reason: SDUPTHER

## 2018-03-12 RX ORDER — LEVOTHYROXINE SODIUM 0.12 MG/1
250 TABLET ORAL DAILY
Qty: 180 TABLET | Refills: 0 | Status: SHIPPED | OUTPATIENT
Start: 2018-03-12 | End: 2018-03-15 | Stop reason: SDUPTHER

## 2018-03-12 RX ORDER — DAPAGLIFLOZIN 10 MG/1
TABLET, FILM COATED ORAL
Qty: 90 TABLET | Refills: 1 | Status: SHIPPED | OUTPATIENT
Start: 2018-03-12 | End: 2018-11-19 | Stop reason: SDUPTHER

## 2018-03-12 NOTE — PROGRESS NOTES
"Subjective   Mayra Hirsch is a 67 y.o. female is here today for follow-up.  Chief Complaint   Patient presents with   • Diabetes     recent labs, pt tests BG occasionally, pt did not bring meter   • Hyperlipidemia     pt stopped taking they bydureon due to side effects   • Hypertension     pt is concerned with weight gain and is experiencing occasional lows.   • Hypothyroidism   • Vitamin D Deficiency     /70   Ht 172.7 cm (68\")   Wt 96.6 kg (213 lb)   BMI 32.39 kg/m²   Current Outpatient Prescriptions on File Prior to Visit   Medication Sig   • atorvastatin (LIPITOR) 80 MG tablet Take 1 tablet by mouth Daily.   • ergocalciferol (DRISDOL) 80758 UNITS capsule Take 1 capsule by mouth 2 (Two) Times a Week. (Patient taking differently: Take 50,000 Units by mouth 1 (One) Time Per Week.)   • ezetimibe (ZETIA) 10 MG tablet Take 1 tablet by mouth Daily.   • FARXIGA 10 MG tablet 1 tablet by mouth every morning   • FLUoxetine HCl (PROZAC PO) Take 40 mg by mouth.   • Insulin Pen Needle (CAREFINE PEN NEEDLES) 32G X 6 MM misc Euros for daily insulin injection   • levothyroxine (SYNTHROID) 125 MCG tablet Take 2 tablets by mouth Daily. On empty stomach   • LISINOPRIL PO Take 20 mg by mouth Daily.   • ONETOUCH DELICA LANCETS FINE misc Use once a day   • ONETOUCH VERIO test strip Use once a day   • TOUJEO SOLOSTAR 300 UNIT/ML solution pen-injector Inject 40 Units under the skin Daily.   • [DISCONTINUED] BYDUREON 2 MG pen-injector injection INJECT 2 MG UNDER THE SKIN ONE TIME PER WEEK     No current facility-administered medications on file prior to visit.      Family History   Problem Relation Age of Onset   • Coronary artery disease Mother    • Alzheimer's disease Mother    • Coronary artery disease Father    • Cancer Father    • Thyroid disease Sister      Social History   Substance Use Topics   • Smoking status: Never Smoker   • Smokeless tobacco: Not on file   • Alcohol use No     Allergies   Allergen Reactions   • " Bydureon [Exenatide] Diarrhea         History of Present Illness  Encounter Diagnoses   Name Primary?   • Essential hypertension    • Vitamin D deficiency    • Primary hypothyroidism    • Type 2 diabetes mellitus without complication, with long-term current use of insulin Yes   • Dyslipidemia    This is a 67-year-old female patient here today for routine follow-up visit.  She has been seen for the above-mentioned problems.  She states she will occasionally have low blood sugars especially if she is more active than usual.  She states it happens maybe one or 2 times weekly.  She will treat with a lot of juice and lay down.  She was educated today on the rule of 15 and to always follow up with a more complex carbohydrate once her blood sugar improves.  She is complaining of gaining 10 pounds but states she has been better with her diet.  She stopped Bydureon due to chronic diarrhea that was explosive and gave her no warnings.  She is taking the rest of her medications as prescribed.  She is hypertensive at today's visit.  She will monitor her blood pressure outside of our office.  We discussed additional medication to help lower blood pressure.      The following portions of the patient's history were reviewed and updated as appropriate: allergies, current medications, past family history, past medical history, past social history, past surgical history and problem list.    Review of Systems   Constitutional: Negative for fatigue.   HENT: Negative for trouble swallowing.    Eyes: Negative for visual disturbance.   Respiratory: Negative for shortness of breath.    Cardiovascular: Negative for leg swelling.   Endocrine: Negative for polyuria.   Skin: Negative for wound.   Neurological: Negative for numbness.       Objective   Physical Exam   Constitutional: She is oriented to person, place, and time. She appears well-developed and well-nourished. No distress.   HENT:   Head: Normocephalic and atraumatic.   Right Ear:  External ear normal.   Left Ear: External ear normal.   Nose: Nose normal.   Mouth/Throat: Oropharynx is clear and moist. No oropharyngeal exudate.   Eyes: EOM are normal. Pupils are equal, round, and reactive to light. Right eye exhibits no discharge. Left eye exhibits no discharge.   Neck: Trachea normal, normal range of motion and full passive range of motion without pain. Neck supple. No tracheal tenderness present. Carotid bruit is not present. No tracheal deviation, no edema and no erythema present. No thyroid mass and no thyromegaly present.   Cardiovascular: Normal rate, regular rhythm, normal heart sounds and intact distal pulses.  Exam reveals no gallop and no friction rub.    No murmur heard.  Pulmonary/Chest: Effort normal and breath sounds normal. No stridor. No respiratory distress. She has no wheezes. She has no rales.   Abdominal: Soft. Bowel sounds are normal. She exhibits no distension.   Musculoskeletal: Normal range of motion. She exhibits no edema or deformity.   Lymphadenopathy:     She has no cervical adenopathy.   Neurological: She is alert and oriented to person, place, and time.   Skin: Skin is warm and dry. No rash noted. She is not diaphoretic. No erythema. No pallor.   Psychiatric: She has a normal mood and affect. Her behavior is normal. Judgment and thought content normal.   Nursing note and vitals reviewed.    Results for orders placed or performed in visit on 02/26/18   Comprehensive Metabolic Panel   Result Value Ref Range    Glucose 113 (H) 65 - 99 mg/dL    BUN 15 8 - 23 mg/dL    Creatinine 0.70 0.57 - 1.00 mg/dL    eGFR Non African Am 83 >60 mL/min/1.73    eGFR African Am 101 >60 mL/min/1.73    BUN/Creatinine Ratio 21.4 7.0 - 25.0    Sodium 141 136 - 145 mmol/L    Potassium 4.2 3.5 - 5.2 mmol/L    Chloride 103 98 - 107 mmol/L    Total CO2 21.2 (L) 22.0 - 29.0 mmol/L    Calcium 9.2 8.6 - 10.5 mg/dL    Total Protein 6.8 6.0 - 8.5 g/dL    Albumin 4.10 3.50 - 5.20 g/dL    Globulin 2.7  gm/dL    A/G Ratio 1.5 g/dL    Total Bilirubin 0.5 0.1 - 1.2 mg/dL    Alkaline Phosphatase 118 (H) 39 - 117 U/L    AST (SGOT) 12 1 - 32 U/L    ALT (SGPT) 20 1 - 33 U/L   Comprehensive Thyroglobulin   Result Value Ref Range    Thyroglobulin Ab <1.0 IU/mL    Thyroglobulin 1.4 ng/mL    Thyroglobulin (TG-TAMY) Comment ng/mL   C-Peptide   Result Value Ref Range    C-Peptide 2.8 1.1 - 4.4 ng/mL   Hemoglobin A1c   Result Value Ref Range    Hemoglobin A1C 7.03 (H) 4.80 - 5.60 %   Lipid Panel   Result Value Ref Range    Total Cholesterol 120 0 - 200 mg/dL    Triglycerides 77 0 - 150 mg/dL    HDL Cholesterol 69 (H) 40 - 60 mg/dL    VLDL Cholesterol 15.4 5 - 40 mg/dL    LDL Cholesterol  36 0 - 100 mg/dL   Thyroid Panel With TSH   Result Value Ref Range    TSH 1.160 0.450 - 4.500 uIU/mL    T4, Total 8.3 4.5 - 12.0 ug/dL    T3 Uptake 28 24 - 39 %    Free Thyroxine Index 2.3 1.2 - 4.9   Vitamin D 25 Hydroxy   Result Value Ref Range    25 Hydroxy, Vitamin D 29.3 (L) 30.0 - 100.0 ng/ml   T3, Free   Result Value Ref Range    T3, Free 2.5 2.0 - 4.4 pg/mL   T4, Free   Result Value Ref Range    Free T4 1.42 0.93 - 1.70 ng/dL   MicroAlbumin, Urine, Random   Result Value Ref Range    Microalbumin, Urine 214.6 Not Estab. ug/mL   Cardiovascular Risk Assessment   Result Value Ref Range    Interpretation Note    Diabetes Patient Education   Result Value Ref Range    PDF Image Not applicable          Assessment/Plan   Problems Addressed this Visit        Cardiovascular and Mediastinum    Essential hypertension       Digestive    Vitamin D deficiency       Endocrine    Primary hypothyroidism    Type 2 diabetes mellitus without complication, with long-term current use of insulin - Primary       Other    Dyslipidemia      Other Visit Diagnoses    None.         In summary, patient was seen and examined.  She has stopped bye durian due to diarrhea.  This is listed as allergies that we know not to prescribe it again.  Her medication list was  reviewed at today's visit.  Her recent labs show that she is metabolically stable.  Her hemoglobin A1c has gone up slightly above 7.  I have asked that she contact the office with her blood sugar readings especially if she continues to have low blood sugar readings.  She has been started on amlodipine 5 mg once daily for hypertension.  I've asked that she monitor her blood pressure outside of this office and let us know if her blood pressure failed to improve.  She will follow-up in 4 months with Dr. Claros or myself with labs prior.  I've encouraged her to contact the office should she have any questions or concerns prior to then

## 2018-03-12 NOTE — PATIENT INSTRUCTIONS
Bring meter each visit  Monitor bp and let office know if bp does not improve  Start amlodipine 5 mg once daily for blood pressure  Contact office if you have low blood sugars less than 70

## 2018-03-15 ENCOUNTER — TELEPHONE (OUTPATIENT)
Dept: ENDOCRINOLOGY | Age: 68
End: 2018-03-15

## 2018-03-15 RX ORDER — ATORVASTATIN CALCIUM 80 MG/1
80 TABLET, FILM COATED ORAL DAILY
Qty: 90 TABLET | Refills: 1 | Status: SHIPPED | OUTPATIENT
Start: 2018-03-15 | End: 2018-09-11 | Stop reason: SDUPTHER

## 2018-03-15 RX ORDER — LEVOTHYROXINE SODIUM 0.12 MG/1
250 TABLET ORAL DAILY
Qty: 180 TABLET | Refills: 1 | Status: SHIPPED | OUTPATIENT
Start: 2018-03-15 | End: 2018-09-11 | Stop reason: SDUPTHER

## 2018-03-15 RX ORDER — BLOOD SUGAR DIAGNOSTIC
STRIP MISCELLANEOUS
Qty: 100 EACH | Refills: 1 | Status: SHIPPED | OUTPATIENT
Start: 2018-03-15 | End: 2019-02-07 | Stop reason: SDUPTHER

## 2018-03-15 RX ORDER — EZETIMIBE 10 MG/1
10 TABLET ORAL DAILY
Qty: 90 TABLET | Refills: 1 | Status: SHIPPED | OUTPATIENT
Start: 2018-03-15 | End: 2018-09-11 | Stop reason: SDUPTHER

## 2018-03-15 NOTE — TELEPHONE ENCOUNTER
----- Message from Mayra Oneill sent at 3/14/2018 10:15 AM EDT -----  Contact: PHARMACY /[facility]   PATIENT/SHARMAINE FLOREZT TO REFIL MEDICATION:    MEDICATION: levothyroxine (SYNTHROID) 125 MCG tablet   Insulin Pen Needle (CAREFINE PEN NEEDLES) 32G X 6 MM misc   ezetimibe (ZETIA) 10 MG tablet   atorvastatin (LIPITOR) 80 MG tablet  ONETOUCH VERIO test strip   MESSAGE:    PERSONAL FROM [facility] IS STATING HE NEEDS THE FOLLOWING MEDICATION TO BE SENT OVER IN [facility]      [facility]   FAX#: : [phone]   PHONE #: [phone]      [facility]

## 2018-04-18 ENCOUNTER — TRANSCRIBE ORDERS (OUTPATIENT)
Dept: ADMINISTRATIVE | Facility: HOSPITAL | Age: 68
End: 2018-04-18

## 2018-04-18 DIAGNOSIS — Z12.31 SCREENING MAMMOGRAM, ENCOUNTER FOR: Primary | ICD-10-CM

## 2018-04-24 ENCOUNTER — HOSPITAL ENCOUNTER (OUTPATIENT)
Dept: MAMMOGRAPHY | Facility: HOSPITAL | Age: 68
Discharge: HOME | End: 2018-04-24
Admitting: INTERNAL MEDICINE

## 2018-04-24 DIAGNOSIS — Z12.31 SCREENING MAMMOGRAM, ENCOUNTER FOR: ICD-10-CM

## 2018-04-24 PROCEDURE — 77067 SCR MAMMO BI INCL CAD: CPT

## 2018-05-01 ENCOUNTER — TRANSCRIBE ORDERS (OUTPATIENT)
Dept: ADMINISTRATIVE | Facility: HOSPITAL | Age: 68
End: 2018-05-01

## 2018-05-01 DIAGNOSIS — K11.8 PAROTID MASS: Primary | ICD-10-CM

## 2018-05-04 ENCOUNTER — HOSPITAL ENCOUNTER (OUTPATIENT)
Dept: CT IMAGING | Facility: HOSPITAL | Age: 68
Discharge: HOME | End: 2018-05-04
Admitting: INTERNAL MEDICINE

## 2018-05-04 DIAGNOSIS — K11.8 PAROTID MASS: ICD-10-CM

## 2018-05-04 LAB — CREAT BLDA-MCNC: 0.7 MG/DL (ref 0.6–1.3)

## 2018-05-04 PROCEDURE — 70492 CT SFT TSUE NCK W/O & W/DYE: CPT

## 2018-05-04 PROCEDURE — 25010000002 IOPAMIDOL 61 % SOLUTION: Performed by: INTERNAL MEDICINE

## 2018-05-04 PROCEDURE — 82565 ASSAY OF CREATININE: CPT

## 2018-05-04 RX ADMIN — IOPAMIDOL 75 ML: 612 INJECTION, SOLUTION INTRAVENOUS at 09:37

## 2018-05-15 RX ORDER — EXENATIDE 2 MG/.65ML
INJECTION, SUSPENSION, EXTENDED RELEASE SUBCUTANEOUS
Qty: 12 PEN | Refills: 0 | Status: SHIPPED | OUTPATIENT
Start: 2018-05-15 | End: 2018-08-01 | Stop reason: SINTOL

## 2018-06-27 DIAGNOSIS — E55.9 VITAMIN D DEFICIENCY: ICD-10-CM

## 2018-06-27 DIAGNOSIS — E03.9 PRIMARY HYPOTHYROIDISM: Primary | ICD-10-CM

## 2018-06-27 DIAGNOSIS — Z79.4 TYPE 2 DIABETES MELLITUS WITHOUT COMPLICATION, WITH LONG-TERM CURRENT USE OF INSULIN (HCC): ICD-10-CM

## 2018-06-27 DIAGNOSIS — E11.9 TYPE 2 DIABETES MELLITUS WITHOUT COMPLICATION, WITH LONG-TERM CURRENT USE OF INSULIN (HCC): ICD-10-CM

## 2018-07-27 ENCOUNTER — RESULTS ENCOUNTER (OUTPATIENT)
Dept: ENDOCRINOLOGY | Age: 68
End: 2018-07-27

## 2018-07-27 DIAGNOSIS — Z79.4 TYPE 2 DIABETES MELLITUS WITHOUT COMPLICATION, WITH LONG-TERM CURRENT USE OF INSULIN (HCC): ICD-10-CM

## 2018-07-27 DIAGNOSIS — E11.9 TYPE 2 DIABETES MELLITUS WITHOUT COMPLICATION, WITH LONG-TERM CURRENT USE OF INSULIN (HCC): ICD-10-CM

## 2018-07-27 DIAGNOSIS — E03.9 PRIMARY HYPOTHYROIDISM: ICD-10-CM

## 2018-07-27 DIAGNOSIS — E55.9 VITAMIN D DEFICIENCY: ICD-10-CM

## 2018-08-01 ENCOUNTER — OFFICE VISIT (OUTPATIENT)
Dept: ENDOCRINOLOGY | Age: 68
End: 2018-08-01

## 2018-08-01 VITALS
WEIGHT: 214 LBS | HEIGHT: 68 IN | SYSTOLIC BLOOD PRESSURE: 114 MMHG | DIASTOLIC BLOOD PRESSURE: 70 MMHG | BODY MASS INDEX: 32.43 KG/M2

## 2018-08-01 DIAGNOSIS — E03.9 PRIMARY HYPOTHYROIDISM: ICD-10-CM

## 2018-08-01 DIAGNOSIS — E78.5 DYSLIPIDEMIA: ICD-10-CM

## 2018-08-01 DIAGNOSIS — E11.9 TYPE 2 DIABETES MELLITUS WITHOUT COMPLICATION, WITH LONG-TERM CURRENT USE OF INSULIN (HCC): Primary | ICD-10-CM

## 2018-08-01 DIAGNOSIS — E55.9 VITAMIN D DEFICIENCY: ICD-10-CM

## 2018-08-01 DIAGNOSIS — I10 ESSENTIAL HYPERTENSION: ICD-10-CM

## 2018-08-01 DIAGNOSIS — Z79.4 TYPE 2 DIABETES MELLITUS WITHOUT COMPLICATION, WITH LONG-TERM CURRENT USE OF INSULIN (HCC): Primary | ICD-10-CM

## 2018-08-01 PROCEDURE — 99214 OFFICE O/P EST MOD 30 MIN: CPT | Performed by: NURSE PRACTITIONER

## 2018-08-01 NOTE — PROGRESS NOTES
"Subjective   Mayra Hirsch is a 67 y.o. female is here today for follow-up.  Chief Complaint   Patient presents with   • Diabetes     no recent labs, pt has been unable to test BG due to not being able to get blood when she sticks her finger.    • Hypothyroidism   • Hypertension   • Hyperlipidemia   • Vitamin D Deficiency   /70   Ht 172.7 cm (68\")   Wt 97.1 kg (214 lb)   BMI 32.54 kg/m²   Current Outpatient Prescriptions on File Prior to Visit   Medication Sig   • amLODIPine (NORVASC) 5 MG tablet Take 1 tablet by mouth Daily.   • atorvastatin (LIPITOR) 80 MG tablet Take 1 tablet by mouth Daily.   • ergocalciferol (DRISDOL) 23360 units capsule Take 1 capsule by mouth 2 (Two) Times a Week.   • ezetimibe (ZETIA) 10 MG tablet Take 1 tablet by mouth Daily.   • FARXIGA 10 MG tablet 1 tablet by mouth every morning   • FLUoxetine HCl (PROZAC PO) Take 40 mg by mouth.   • Insulin Pen Needle (CAREFINE PEN NEEDLES) 32G X 6 MM misc Use for daily insulin injection   • levothyroxine (SYNTHROID) 125 MCG tablet Take 2 tablets by mouth Daily. On empty stomach   • LISINOPRIL PO Take 20 mg by mouth Daily.   • ONETOUCH DELICA LANCETS FINE misc Use once a day   • ONETOUCH VERIO test strip Use once a day   • TOUJEO SOLOSTAR 300 UNIT/ML solution pen-injector Inject 40 Units under the skin Daily.   • [DISCONTINUED] BYDUREON 2 MG pen-injector injection INJECT 2 MG UNDER THE SKIN ONE TIME A WEEK     No current facility-administered medications on file prior to visit.      Family History   Problem Relation Age of Onset   • Coronary artery disease Mother    • Alzheimer's disease Mother    • Coronary artery disease Father    • Cancer Father    • Thyroid disease Sister      Social History   Substance Use Topics   • Smoking status: Never Smoker   • Smokeless tobacco: Not on file   • Alcohol use No     Allergies   Allergen Reactions   • Bydureon [Exenatide] Diarrhea         History of Present Illness  Encounter Diagnoses   Name Primary? "   • Essential hypertension    • Vitamin D deficiency    • Primary hypothyroidism    • Type 2 diabetes mellitus without complication, with long-term current use of insulin (CMS/[facility]) Yes   • Dyslipidemia      67-year-old female patient here today for a routine follow-up visit.  She is being seen for the above-mentioned diagnoses.  She did not bring her blood glucose meter read and she states she has been having trouble getting blood from her fingersticks.  We discussed options of continuous glucose monitoring sensor using the dexcom and/or the FreeStyle jak.  Patient was given a new blood glucose meter today's visit and shown how to properly do a fingerstick which was done easily with success.  She denies any hypoglycemic events.  She has been on vacation the past 2 weeks and things to her A1c might reflect this.  She states her sister is bothering her about getting off insulin.  I reviewed with her the mechanism of action of all the classes of medications and we discussed the risk versus benefit of keeping her diabetes isn't controlled versus getting off insulin.  The following portions of the patient's history were reviewed and updated as appropriate: allergies, current medications, past family history, past medical history, past social history, past surgical history and problem list.    Review of Systems   Constitutional: Negative for fatigue.   HENT: Negative for trouble swallowing.    Eyes: Negative for visual disturbance.   Respiratory: Negative for shortness of breath.    Cardiovascular: Negative for leg swelling.   Endocrine: Negative for polyuria.   Skin: Negative for wound.   Neurological: Negative for numbness.       Objective   Physical Exam   Constitutional: She is oriented to person, place, and time. She appears well-developed and well-nourished. No distress.   HENT:   Head: Normocephalic and atraumatic.   Right Ear: External ear normal.   Left Ear: External ear normal.   Nose: Nose normal.    Mouth/Throat: Oropharynx is clear and moist. No oropharyngeal exudate.   Eyes: Pupils are equal, round, and reactive to light. EOM are normal. Right eye exhibits no discharge. Left eye exhibits no discharge.   Neck: Trachea normal, normal range of motion and full passive range of motion without pain. Neck supple. No tracheal tenderness present. Carotid bruit is not present. No tracheal deviation, no edema and no erythema present. No thyroid mass and no thyromegaly present.   Cardiovascular: Normal rate, regular rhythm, normal heart sounds and intact distal pulses.  Exam reveals no gallop and no friction rub.    No murmur heard.  Pulmonary/Chest: Effort normal and breath sounds normal. No stridor. No respiratory distress. She has no wheezes. She has no rales.   Abdominal: Soft. Bowel sounds are normal. She exhibits no distension.   Musculoskeletal: Normal range of motion. She exhibits no edema or deformity.   Lymphadenopathy:     She has no cervical adenopathy.   Neurological: She is alert and oriented to person, place, and time.   Skin: Skin is warm and dry. No rash noted. She is not diaphoretic. No erythema. No pallor.   Psychiatric: She has a normal mood and affect. Her behavior is normal. Judgment and thought content normal.   Nursing note and vitals reviewed.    Results for orders placed or performed during the hospital encounter of 05/04/18   POC Creatinine   Result Value Ref Range    Creatinine 0.70 0.60 - 1.30 mg/dL     Lab Results   Component Value Date    HGBA1C 7.03 (H) 02/26/2018     Lab Results   Component Value Date    GLUCOSE 327 (H) 04/11/2017    BUN 15 02/26/2018    CREATININE 0.70 05/04/2018    EGFRIFNONA 83 02/26/2018    EGFRIFAFRI 101 02/26/2018    BCR 21.4 02/26/2018    K 4.2 02/26/2018    CO2 21.2 (L) 02/26/2018    CALCIUM 9.2 02/26/2018    PROTENTOTREF 6.8 02/26/2018    ALBUMIN 4.10 02/26/2018    LABIL2 1.5 02/26/2018    AST 12 02/26/2018    ALT 20 02/26/2018     Lab Results   Component Value  Date    CHOL 172 04/11/2017    CHLPL 120 02/26/2018    TRIG 77 02/26/2018    HDL 69 (H) 02/26/2018    LDL 36 02/26/2018     Lab Results   Component Value Date    TSH 1.160 02/26/2018    W8WWINO 8.3 02/26/2018         Assessment/Plan   Problems Addressed this Visit        Cardiovascular and Mediastinum    Essential hypertension       Digestive    Vitamin D deficiency       Endocrine    Primary hypothyroidism    Type 2 diabetes mellitus without complication, with long-term current use of insulin (CMS/[facility]) - Primary       Other    Dyslipidemia        In summary, patient was seen and examined.  Her medication list was reviewed and updated.  She was educated today on mechanism of action of all her classes of diabetic agents.  She will continue on her insulin as prescribed.  She was shown how to do a fingerstick properly at today's visit and given a new blood glucose meter.  Also reviewed with her the benefits of continuous glucose monitoring system.  She will have extensive labs done at today's visit will be notified of the results along with any further recommendations.  I've encouraged her to contact the office should she have any questions or concerns prior to her next visit.  She will follow-up in 6 months with labs prior.

## 2018-08-02 LAB
25(OH)D3+25(OH)D2 SERPL-MCNC: 24.1 NG/ML (ref 30–100)
ALBUMIN SERPL-MCNC: 4.5 G/DL (ref 3.5–5.2)
ALBUMIN/GLOB SERPL: 1.6 G/DL
ALP SERPL-CCNC: 106 U/L (ref 39–117)
ALT SERPL-CCNC: 24 U/L (ref 1–33)
AST SERPL-CCNC: 13 U/L (ref 1–32)
BILIRUB SERPL-MCNC: 0.5 MG/DL (ref 0.1–1.2)
BUN SERPL-MCNC: 18 MG/DL (ref 8–23)
BUN/CREAT SERPL: 20.5 (ref 7–25)
C PEPTIDE SERPL-MCNC: 3.4 NG/ML (ref 1.1–4.4)
CALCIUM SERPL-MCNC: 9.2 MG/DL (ref 8.6–10.5)
CHLORIDE SERPL-SCNC: 100 MMOL/L (ref 98–107)
CHOLEST SERPL-MCNC: 128 MG/DL (ref 0–200)
CO2 SERPL-SCNC: 21.7 MMOL/L (ref 22–29)
CREAT SERPL-MCNC: 0.88 MG/DL (ref 0.57–1)
FT4I SERPL CALC-MCNC: 2.4 (ref 1.2–4.9)
GLOBULIN SER CALC-MCNC: 2.9 GM/DL
GLUCOSE SERPL-MCNC: 196 MG/DL (ref 65–99)
HBA1C MFR BLD: 8.97 % (ref 4.8–5.6)
HDLC SERPL-MCNC: 66 MG/DL (ref 40–60)
INTERPRETATION: NORMAL
LDLC SERPL CALC-MCNC: 49 MG/DL (ref 0–100)
Lab: NORMAL
MICROALBUMIN UR-MCNC: 101.4 UG/ML
POTASSIUM SERPL-SCNC: 4.7 MMOL/L (ref 3.5–5.2)
PROT SERPL-MCNC: 7.4 G/DL (ref 6–8.5)
SODIUM SERPL-SCNC: 137 MMOL/L (ref 136–145)
T3FREE SERPL-MCNC: 2.6 PG/ML (ref 2–4.4)
T3RU NFR SERPL: 29 % (ref 24–39)
T4 FREE SERPL-MCNC: 1.41 NG/DL (ref 0.93–1.7)
T4 SERPL-MCNC: 8.2 UG/DL (ref 4.5–12)
TRIGL SERPL-MCNC: 66 MG/DL (ref 0–150)
TSH SERPL DL<=0.005 MIU/L-ACNC: 2.64 UIU/ML (ref 0.45–4.5)
VLDLC SERPL CALC-MCNC: 13.2 MG/DL (ref 5–40)

## 2018-08-03 ENCOUNTER — TELEPHONE (OUTPATIENT)
Dept: ENDOCRINOLOGY | Age: 68
End: 2018-08-03

## 2018-08-03 RX ORDER — SITAGLIPTIN 100 MG/1
100 TABLET, FILM COATED ORAL DAILY
Qty: 30 TABLET | Refills: 5 | Status: SHIPPED | OUTPATIENT
Start: 2018-08-03 | End: 2019-02-07 | Stop reason: SDUPTHER

## 2018-08-03 NOTE — TELEPHONE ENCOUNTER
----- Message from GUEVARA Ching sent at 8/3/2018  9:11 AM EDT -----  Med changes        LEFT PATIENT MESSAGE INFORMING HER THAT HER A1c HAS GONE UP SIGNIFICANTLY. TOLD THE PATIENT TO START JANUVIA 100 MG DAILY AND TO CHECK BLOOD SUGAR, CALL OFFICE IF BS'S CONTINUE TO STAY GREATER THAN 150. INCREASE VITAMIN D TO 1 CAPSULE 2X WEEKLY. TOLD PATIENT TO CALL THE OFFICE WITH ANY QUESTIONS OR CONCERNS.

## 2018-08-08 RX ORDER — EXENATIDE 2 MG/.65ML
INJECTION, SUSPENSION, EXTENDED RELEASE SUBCUTANEOUS
Qty: 4 PEN | Refills: 1 | Status: SHIPPED | OUTPATIENT
Start: 2018-08-08 | End: 2019-02-07 | Stop reason: SDDI

## 2018-09-11 RX ORDER — LEVOTHYROXINE SODIUM 0.12 MG/1
TABLET ORAL
Qty: 180 TABLET | Refills: 1 | Status: SHIPPED | OUTPATIENT
Start: 2018-09-11 | End: 2019-02-07 | Stop reason: SDUPTHER

## 2018-09-11 RX ORDER — ATORVASTATIN CALCIUM 80 MG/1
TABLET, FILM COATED ORAL
Qty: 90 TABLET | Refills: 1 | Status: SHIPPED | OUTPATIENT
Start: 2018-09-11 | End: 2019-02-07 | Stop reason: SDUPTHER

## 2018-09-11 RX ORDER — EZETIMIBE 10 MG/1
TABLET ORAL
Qty: 90 TABLET | Refills: 1 | Status: SHIPPED | OUTPATIENT
Start: 2018-09-11 | End: 2019-02-07 | Stop reason: SDUPTHER

## 2018-11-19 ENCOUNTER — TELEPHONE (OUTPATIENT)
Dept: ENDOCRINOLOGY | Age: 68
End: 2018-11-19

## 2018-11-19 RX ORDER — DAPAGLIFLOZIN 10 MG/1
TABLET, FILM COATED ORAL
Qty: 90 TABLET | Refills: 0 | Status: SHIPPED | OUTPATIENT
Start: 2018-11-19 | End: 2019-02-07 | Stop reason: SDUPTHER

## 2018-11-19 NOTE — TELEPHONE ENCOUNTER
----- Message from Mayra Oneill sent at 11/19/2018  2:25 PM EST -----  Contact: patient   Medication:  FARXIGA 10 MG tablet  Message: patient has called in regards to getting a medical refilled.   Patient is currently taking one tablet a day. Please send the medication to the following     [facility] - [address] [phone] [facility] [phone] FX  Patient call back number [phone]          Refill has been sent into [facility].

## 2018-12-04 ENCOUNTER — HOSPITAL ENCOUNTER (OUTPATIENT)
Dept: GENERAL RADIOLOGY | Facility: HOSPITAL | Age: 68
Discharge: HOME | End: 2018-12-04

## 2018-12-04 ENCOUNTER — TRANSCRIBE ORDERS (OUTPATIENT)
Dept: ADMINISTRATIVE | Facility: HOSPITAL | Age: 68
End: 2018-12-04

## 2018-12-04 ENCOUNTER — HOSPITAL ENCOUNTER (OUTPATIENT)
Dept: GENERAL RADIOLOGY | Facility: HOSPITAL | Age: 68
Discharge: HOME | End: 2018-12-04
Admitting: INTERNAL MEDICINE

## 2018-12-04 DIAGNOSIS — R07.9 CHEST PAIN, UNSPECIFIED TYPE: ICD-10-CM

## 2018-12-04 DIAGNOSIS — R07.9 CHEST PAIN, UNSPECIFIED TYPE: Primary | ICD-10-CM

## 2018-12-04 PROCEDURE — 71046 X-RAY EXAM CHEST 2 VIEWS: CPT

## 2018-12-04 PROCEDURE — 71100 X-RAY EXAM RIBS UNI 2 VIEWS: CPT

## 2019-01-24 ENCOUNTER — LAB (OUTPATIENT)
Dept: ENDOCRINOLOGY | Age: 69
End: 2019-01-24

## 2019-01-24 DIAGNOSIS — Z79.4 TYPE 2 DIABETES MELLITUS WITHOUT COMPLICATION, WITH LONG-TERM CURRENT USE OF INSULIN (HCC): ICD-10-CM

## 2019-01-24 DIAGNOSIS — E11.9 TYPE 2 DIABETES MELLITUS WITHOUT COMPLICATION, WITH LONG-TERM CURRENT USE OF INSULIN (HCC): ICD-10-CM

## 2019-01-24 DIAGNOSIS — E55.9 VITAMIN D DEFICIENCY: ICD-10-CM

## 2019-01-24 DIAGNOSIS — E03.9 PRIMARY HYPOTHYROIDISM: ICD-10-CM

## 2019-01-26 LAB
25(OH)D3+25(OH)D2 SERPL-MCNC: 24.7 NG/ML (ref 30–100)
ALBUMIN SERPL-MCNC: 4.3 G/DL (ref 3.5–5.2)
ALBUMIN/GLOB SERPL: 1.4 G/DL
ALP SERPL-CCNC: 95 U/L (ref 39–117)
ALT SERPL-CCNC: 20 U/L (ref 1–33)
AST SERPL-CCNC: 14 U/L (ref 1–32)
BILIRUB SERPL-MCNC: 0.6 MG/DL (ref 0.1–1.2)
BUN SERPL-MCNC: 15 MG/DL (ref 8–23)
BUN/CREAT SERPL: 17 (ref 7–25)
C PEPTIDE SERPL-MCNC: 2.7 NG/ML (ref 1.1–4.4)
CALCIUM SERPL-MCNC: 9.5 MG/DL (ref 8.6–10.5)
CHLORIDE SERPL-SCNC: 103 MMOL/L (ref 98–107)
CHOLEST SERPL-MCNC: 116 MG/DL (ref 0–200)
CO2 SERPL-SCNC: 21 MMOL/L (ref 22–29)
CREAT SERPL-MCNC: 0.88 MG/DL (ref 0.57–1)
FT4I SERPL CALC-MCNC: 3 (ref 1.2–4.9)
GLOBULIN SER CALC-MCNC: 3.1 GM/DL
GLUCOSE SERPL-MCNC: 172 MG/DL (ref 65–99)
HBA1C MFR BLD: 9.07 % (ref 4.8–5.6)
HDLC SERPL-MCNC: 55 MG/DL (ref 40–60)
INTERPRETATION: NORMAL
LDLC SERPL CALC-MCNC: 48 MG/DL (ref 0–100)
Lab: NORMAL
MICROALBUMIN UR-MCNC: 96.2 UG/ML
POTASSIUM SERPL-SCNC: 4.4 MMOL/L (ref 3.5–5.2)
PROT SERPL-MCNC: 7.4 G/DL (ref 6–8.5)
SODIUM SERPL-SCNC: 142 MMOL/L (ref 136–145)
T3FREE SERPL-MCNC: 3.1 PG/ML (ref 2–4.4)
T3RU NFR SERPL: 32 % (ref 24–39)
T4 FREE SERPL-MCNC: 1.76 NG/DL (ref 0.93–1.7)
T4 SERPL-MCNC: 9.4 UG/DL (ref 4.5–12)
THYROGLOB AB SERPL-ACNC: <1 IU/ML
THYROGLOB SERPL-MCNC: 1 NG/ML
THYROGLOB SERPL-MCNC: NORMAL NG/ML
TRIGL SERPL-MCNC: 63 MG/DL (ref 0–150)
TSH SERPL DL<=0.005 MIU/L-ACNC: 0.51 UIU/ML (ref 0.45–4.5)
VLDLC SERPL CALC-MCNC: 12.6 MG/DL (ref 5–40)

## 2019-02-07 ENCOUNTER — OFFICE VISIT (OUTPATIENT)
Dept: ENDOCRINOLOGY | Age: 69
End: 2019-02-07

## 2019-02-07 VITALS
HEIGHT: 68 IN | BODY MASS INDEX: 32.13 KG/M2 | WEIGHT: 212 LBS | SYSTOLIC BLOOD PRESSURE: 128 MMHG | DIASTOLIC BLOOD PRESSURE: 78 MMHG

## 2019-02-07 DIAGNOSIS — E03.9 PRIMARY HYPOTHYROIDISM: ICD-10-CM

## 2019-02-07 DIAGNOSIS — E11.9 TYPE 2 DIABETES MELLITUS WITHOUT COMPLICATION, WITH LONG-TERM CURRENT USE OF INSULIN (HCC): Primary | ICD-10-CM

## 2019-02-07 DIAGNOSIS — E78.5 DYSLIPIDEMIA: ICD-10-CM

## 2019-02-07 DIAGNOSIS — Z79.4 TYPE 2 DIABETES MELLITUS WITHOUT COMPLICATION, WITH LONG-TERM CURRENT USE OF INSULIN (HCC): Primary | ICD-10-CM

## 2019-02-07 DIAGNOSIS — I10 ESSENTIAL HYPERTENSION: ICD-10-CM

## 2019-02-07 DIAGNOSIS — Z91.199 NONCOMPLIANCE WITH DIABETES TREATMENT: ICD-10-CM

## 2019-02-07 DIAGNOSIS — E55.9 VITAMIN D DEFICIENCY: ICD-10-CM

## 2019-02-07 PROCEDURE — 99214 OFFICE O/P EST MOD 30 MIN: CPT | Performed by: NURSE PRACTITIONER

## 2019-02-07 RX ORDER — FLASH GLUCOSE SENSOR
1 KIT MISCELLANEOUS ONCE
Qty: 1 DEVICE | Refills: 0 | Status: SHIPPED | OUTPATIENT
Start: 2019-02-07 | End: 2019-02-07

## 2019-02-07 RX ORDER — FLASH GLUCOSE SENSOR
1 KIT MISCELLANEOUS
Qty: 2 EACH | Refills: 5 | Status: SHIPPED | OUTPATIENT
Start: 2019-02-07 | End: 2019-08-13 | Stop reason: SDUPTHER

## 2019-02-07 RX ORDER — EZETIMIBE 10 MG/1
10 TABLET ORAL DAILY
Qty: 90 TABLET | Refills: 1 | Status: SHIPPED | OUTPATIENT
Start: 2019-02-07 | End: 2019-08-13 | Stop reason: SDUPTHER

## 2019-02-07 RX ORDER — FLASH GLUCOSE SENSOR
1 KIT MISCELLANEOUS
Qty: 2 EACH | Refills: 5 | Status: SHIPPED | OUTPATIENT
Start: 2019-02-07 | End: 2019-02-07 | Stop reason: SDUPTHER

## 2019-02-07 RX ORDER — ATORVASTATIN CALCIUM 80 MG/1
80 TABLET, FILM COATED ORAL DAILY
Qty: 90 TABLET | Refills: 1 | Status: SHIPPED | OUTPATIENT
Start: 2019-02-07 | End: 2019-08-13 | Stop reason: SDUPTHER

## 2019-02-07 RX ORDER — SITAGLIPTIN 100 MG/1
100 TABLET, FILM COATED ORAL DAILY
Qty: 90 TABLET | Refills: 1 | Status: SHIPPED | OUTPATIENT
Start: 2019-02-07 | End: 2019-08-13 | Stop reason: SDUPTHER

## 2019-02-07 RX ORDER — LEVOTHYROXINE SODIUM 0.12 MG/1
125 TABLET ORAL 2 TIMES DAILY
Qty: 180 TABLET | Refills: 1 | Status: SHIPPED | OUTPATIENT
Start: 2019-02-07 | End: 2019-08-13 | Stop reason: SDUPTHER

## 2019-02-07 RX ORDER — DAPAGLIFLOZIN 10 MG/1
TABLET, FILM COATED ORAL
Qty: 90 TABLET | Refills: 1 | Status: SHIPPED | OUTPATIENT
Start: 2019-02-07 | End: 2019-08-13 | Stop reason: SDUPTHER

## 2019-02-07 RX ORDER — BLOOD SUGAR DIAGNOSTIC
STRIP MISCELLANEOUS
Qty: 100 EACH | Refills: 1 | Status: SHIPPED | OUTPATIENT
Start: 2019-02-07 | End: 2020-12-18

## 2019-02-07 RX ORDER — FLASH GLUCOSE SENSOR
1 KIT MISCELLANEOUS ONCE
Qty: 1 DEVICE | Refills: 0 | Status: SHIPPED | OUTPATIENT
Start: 2019-02-07 | End: 2019-02-07 | Stop reason: SDUPTHER

## 2019-02-07 RX ORDER — INSULIN GLARGINE 300 U/ML
40 INJECTION, SOLUTION SUBCUTANEOUS DAILY
Qty: 13.5 ML | Refills: 1 | Status: SHIPPED | OUTPATIENT
Start: 2019-02-07 | End: 2019-08-13 | Stop reason: SDUPTHER

## 2019-02-07 NOTE — PROGRESS NOTES
"Subjective   Mayra Hirsch is a 68 y.o. female is here today for follow-up.  Chief Complaint   Patient presents with   • Diabetes     recent labs, pt is not testing BG due to dexterity issues, pt did not bring meter   • Hyperlipidemia     pt is not taking amlodopine, bydureon ( had a reaction), vit D ( insurance would not pay for it)   • Hypertension   • Hypothyroidism   • Vitamin D Deficiency     /78   Ht 172.7 cm (68\")   Wt 96.2 kg (212 lb)   BMI 32.23 kg/m²   Current Outpatient Medications on File Prior to Visit   Medication Sig   • atorvastatin (LIPITOR) 80 MG tablet TAKE 1 TABLET DAILY   • ezetimibe (ZETIA) 10 MG tablet TAKE 1 TABLET DAILY   • FARXIGA 10 MG tablet 1 tablet by mouth every morning   • FLUoxetine HCl (PROZAC PO) Take 40 mg by mouth.   • Insulin Pen Needle (CAREFINE PEN NEEDLES) 32G X 6 MM misc Use for daily insulin injection   • JANUVIA 100 MG tablet Take 1 tablet by mouth Daily.   • levothyroxine (SYNTHROID, LEVOTHROID) 125 MCG tablet TAKE 2 TABLETS DAILY ON AN EMPTY STOMACH   • LISINOPRIL PO Take 20 mg by mouth Daily.   • ONETOUCH DELICA LANCETS FINE misc Use once a day   • ONETOUCH VERIO test strip Use once a day   • amLODIPine (NORVASC) 5 MG tablet Take 1 tablet by mouth Daily.   • TOUJEO SOLOSTAR 300 UNIT/ML solution pen-injector Inject 40 Units under the skin Daily. (Patient taking differently: Inject 32 Units under the skin into the appropriate area as directed Daily.)   • [DISCONTINUED] BYDUREON 2 MG pen-injector injection INJECT 2 MG UNDER THE SKIN ONE TIME A WEEK   • [DISCONTINUED] ergocalciferol (DRISDOL) 76562 units capsule Take 1 capsule by mouth 2 (Two) Times a Week.     No current facility-administered medications on file prior to visit.      Family History   Problem Relation Age of Onset   • Coronary artery disease Mother    • Alzheimer's disease Mother    • Coronary artery disease Father    • Cancer Father    • Thyroid disease Sister      Social History     Tobacco Use "   • Smoking status: Never Smoker   Substance Use Topics   • Alcohol use: No   • Drug use: Not on file     Allergies   Allergen Reactions   • Bydureon [Exenatide] Diarrhea   • Metformin And Related Nausea And Vomiting         History of Present Illness  Encounter Diagnoses   Name Primary?   • Essential hypertension    • Vitamin D deficiency    • Primary hypothyroidism    • Type 2 diabetes mellitus without complication, with long-term current use of insulin (CMS/[facility]) Yes   • Dyslipidemia    68-year-old female patient here today for a routine follow-up visit.  She is being seen for the above-mentioned problems.  She had recent labs which were reviewed and she was provided a copy.  She has had no incidence of going to the hospital emergency room.  She denies any hypoglycemic events.  She states she's been under a lot of stress at home.  She is also been dealing with illness in September.  She states she had the flu, infection, and rib pain which all impacted her blood sugars.  She has not been checking her blood sugars because she has poor dexterity problems he cannot get blood from her fingertips.  We discussed other options and she was shown how to use the FreeStyle Gene.  Her medication list was reviewed and updated.  She was educated regarding changes to her medications      The following portions of the patient's history were reviewed and updated as appropriate: allergies, current medications, past family history, past medical history, past social history, past surgical history and problem list.    Review of Systems   Constitutional: Negative for fatigue.   HENT: Negative for trouble swallowing.    Eyes: Negative for visual disturbance.   Cardiovascular: Negative for leg swelling.   Endocrine: Negative for polyuria.   Skin: Negative for wound.   Neurological: Negative for numbness.       Objective   Physical Exam   Constitutional: She is oriented to person, place, and time. She appears well-developed and  well-nourished. No distress.   HENT:   Head: Normocephalic and atraumatic.   Right Ear: External ear normal.   Left Ear: External ear normal.   Nose: Nose normal.   Mouth/Throat: Oropharynx is clear and moist. No oropharyngeal exudate.   Eyes: EOM are normal. Pupils are equal, round, and reactive to light. Right eye exhibits no discharge. Left eye exhibits no discharge.   Neck: Trachea normal, normal range of motion and full passive range of motion without pain. Neck supple. No tracheal tenderness present. Carotid bruit is not present. No tracheal deviation, no edema and no erythema present. No thyroid mass and no thyromegaly present.   Cardiovascular: Normal rate, regular rhythm, normal heart sounds and intact distal pulses. Exam reveals no gallop and no friction rub.   No murmur heard.  Pulmonary/Chest: Effort normal and breath sounds normal. No stridor. No respiratory distress. She has no wheezes. She has no rales.   Abdominal: Soft. Bowel sounds are normal. She exhibits no distension.   Musculoskeletal: Normal range of motion. She exhibits no edema or deformity.   Lymphadenopathy:     She has no cervical adenopathy.   Neurological: She is alert and oriented to person, place, and time.   Skin: Skin is warm and dry. No rash noted. She is not diaphoretic. No erythema. No pallor.   Psychiatric: She has a normal mood and affect. Her behavior is normal. Judgment and thought content normal.   Nursing note and vitals reviewed.    Results for orders placed or performed in visit on 08/01/18   Comprehensive Metabolic Panel   Result Value Ref Range    Glucose 196 (H) 65 - 99 mg/dL    BUN 18 8 - 23 mg/dL    Creatinine 0.88 0.57 - 1.00 mg/dL    eGFR Non African Am 64 >60 mL/min/1.73    eGFR African Am 78 >60 mL/min/1.73    BUN/Creatinine Ratio 20.5 7.0 - 25.0    Sodium 137 136 - 145 mmol/L    Potassium 4.7 3.5 - 5.2 mmol/L    Chloride 100 98 - 107 mmol/L    Total CO2 21.7 (L) 22.0 - 29.0 mmol/L    Calcium 9.2 8.6 - 10.5 mg/dL     Total Protein 7.4 6.0 - 8.5 g/dL    Albumin 4.50 3.50 - 5.20 g/dL    Globulin 2.9 gm/dL    A/G Ratio 1.6 g/dL    Total Bilirubin 0.5 0.1 - 1.2 mg/dL    Alkaline Phosphatase 106 39 - 117 U/L    AST (SGOT) 13 1 - 32 U/L    ALT (SGPT) 24 1 - 33 U/L   C-Peptide   Result Value Ref Range    C-Peptide 3.4 1.1 - 4.4 ng/mL   Hemoglobin A1c   Result Value Ref Range    Hemoglobin A1C 8.97 (H) 4.80 - 5.60 %   Lipid Panel   Result Value Ref Range    Total Cholesterol 128 0 - 200 mg/dL    Triglycerides 66 0 - 150 mg/dL    HDL Cholesterol 66 (H) 40 - 60 mg/dL    VLDL Cholesterol 13.2 5 - 40 mg/dL    LDL Cholesterol  49 0 - 100 mg/dL   MicroAlbumin, Urine, Random - Urine, Clean Catch   Result Value Ref Range    Microalbumin, Urine 101.4 Not Estab. ug/mL   T3, Free   Result Value Ref Range    T3, Free 2.6 2.0 - 4.4 pg/mL   T4, Free   Result Value Ref Range    Free T4 1.41 0.93 - 1.70 ng/dL   Thyroid Panel With TSH   Result Value Ref Range    TSH 2.640 0.450 - 4.500 uIU/mL    T4, Total 8.2 4.5 - 12.0 ug/dL    T3 Uptake 29 24 - 39 %    Free Thyroxine Index 2.4 1.2 - 4.9   Vitamin D 25 Hydroxy   Result Value Ref Range    25 Hydroxy, Vitamin D 24.1 (L) 30.0 - 100.0 ng/ml   Cardiovascular Risk Assessment   Result Value Ref Range    Interpretation Note    Diabetes Patient Education   Result Value Ref Range    PDF Image Not applicable        Assessment/Plan   Encounter Diagnoses   Name Primary?   • Essential hypertension    • Vitamin D deficiency    • Primary hypothyroidism    • Type 2 diabetes mellitus without complication, with long-term current use of insulin (CMS/[facility]) Yes   • Dyslipidemia    • Noncompliance with diabetes treatment      In Summary, patient was seen and examined.  She is not able to tolerate Bydureon has taken herself off.  She does not get vitamin D from her insurance so she has been taking daily over-the-counter.  I've asked that she increase it to 2 pills daily.  She's been advised to increase her toujeo insulin to  40 units once daily to target morning blood sugars to less than 110.  I've asked that she contact the office if she starts having blood sugars of less than 70.  She was given a prescription to go to a local pharmacy for the Freestyle continuous glucose monitoring sensor.  She has been advised that we really need her to check her blood sugars in order to make adjustments to get her diabetes under better control.  Her most recent hemoglobin A1c reflects poor glycemic control.  Her cholesterol is well-controlled.  Her vitamin D level is low.    Increase otc vit d to 2 pills daily  Increase toujeo to 40 units once daily  Freestyle jak to monitor blood sugars  conitnue all current meds the same  Goal for morning blood sugars is less than 110 if less than 70 contact office

## 2019-02-07 NOTE — PATIENT INSTRUCTIONS
Increase otc vit d to 2 pills daily  Increase toujeo to 40 units once daily  Freestyle jak to monitor blood sugars  conitnue all current meds the same  Goal for morning blood sugars is less than 110 if less than 70 contact office

## 2019-02-18 RX ORDER — DAPAGLIFLOZIN 10 MG/1
TABLET, FILM COATED ORAL
Qty: 90 TABLET | Refills: 0 | Status: SHIPPED | OUTPATIENT
Start: 2019-02-18 | End: 2019-05-19 | Stop reason: SDUPTHER

## 2019-03-11 RX ORDER — ATORVASTATIN CALCIUM 80 MG/1
TABLET, FILM COATED ORAL
Qty: 90 TABLET | Refills: 1 | Status: SHIPPED | OUTPATIENT
Start: 2019-03-11 | End: 2019-08-13 | Stop reason: SDUPTHER

## 2019-03-11 RX ORDER — EZETIMIBE 10 MG/1
TABLET ORAL
Qty: 90 TABLET | Refills: 1 | Status: SHIPPED | OUTPATIENT
Start: 2019-03-11 | End: 2019-08-13 | Stop reason: SDUPTHER

## 2019-03-11 RX ORDER — LEVOTHYROXINE SODIUM 0.12 MG/1
TABLET ORAL
Qty: 180 TABLET | Refills: 1 | Status: SHIPPED | OUTPATIENT
Start: 2019-03-11 | End: 2019-08-13 | Stop reason: SDUPTHER

## 2019-05-20 RX ORDER — DAPAGLIFLOZIN 10 MG/1
TABLET, FILM COATED ORAL
Qty: 90 TABLET | Refills: 0 | Status: SHIPPED | OUTPATIENT
Start: 2019-05-20 | End: 2019-08-13 | Stop reason: SDUPTHER

## 2019-07-26 DIAGNOSIS — E78.5 DYSLIPIDEMIA: ICD-10-CM

## 2019-07-26 DIAGNOSIS — E55.9 VITAMIN D DEFICIENCY: Primary | ICD-10-CM

## 2019-07-26 DIAGNOSIS — Z79.4 TYPE 2 DIABETES MELLITUS WITHOUT COMPLICATION, WITH LONG-TERM CURRENT USE OF INSULIN (HCC): ICD-10-CM

## 2019-07-26 DIAGNOSIS — E03.9 PRIMARY HYPOTHYROIDISM: ICD-10-CM

## 2019-07-26 DIAGNOSIS — E11.9 TYPE 2 DIABETES MELLITUS WITHOUT COMPLICATION, WITH LONG-TERM CURRENT USE OF INSULIN (HCC): ICD-10-CM

## 2019-07-30 ENCOUNTER — LAB (OUTPATIENT)
Dept: ENDOCRINOLOGY | Age: 69
End: 2019-07-30

## 2019-07-30 DIAGNOSIS — E78.5 DYSLIPIDEMIA: ICD-10-CM

## 2019-07-30 DIAGNOSIS — E55.9 VITAMIN D DEFICIENCY: ICD-10-CM

## 2019-07-30 DIAGNOSIS — E11.9 TYPE 2 DIABETES MELLITUS WITHOUT COMPLICATION, WITH LONG-TERM CURRENT USE OF INSULIN (HCC): ICD-10-CM

## 2019-07-30 DIAGNOSIS — E03.9 PRIMARY HYPOTHYROIDISM: ICD-10-CM

## 2019-07-30 DIAGNOSIS — Z79.4 TYPE 2 DIABETES MELLITUS WITHOUT COMPLICATION, WITH LONG-TERM CURRENT USE OF INSULIN (HCC): ICD-10-CM

## 2019-08-01 LAB
25(OH)D3+25(OH)D2 SERPL-MCNC: 29.7 NG/ML (ref 30–100)
ALBUMIN SERPL-MCNC: 4.2 G/DL (ref 3.5–5.2)
ALBUMIN/GLOB SERPL: 1.4 G/DL
ALP SERPL-CCNC: 94 U/L (ref 39–117)
ALT SERPL-CCNC: 25 U/L (ref 1–33)
AST SERPL-CCNC: 17 U/L (ref 1–32)
BILIRUB SERPL-MCNC: 0.6 MG/DL (ref 0.2–1.2)
BUN SERPL-MCNC: 16 MG/DL (ref 8–23)
BUN/CREAT SERPL: 18.8 (ref 7–25)
C PEPTIDE SERPL-MCNC: 4 NG/ML (ref 1.1–4.4)
CALCIUM SERPL-MCNC: 9.8 MG/DL (ref 8.6–10.5)
CHLORIDE SERPL-SCNC: 101 MMOL/L (ref 98–107)
CHOLEST SERPL-MCNC: 133 MG/DL (ref 0–200)
CO2 SERPL-SCNC: 21.9 MMOL/L (ref 22–29)
CREAT SERPL-MCNC: 0.85 MG/DL (ref 0.57–1)
FT4I SERPL CALC-MCNC: 4 (ref 1.2–4.9)
GLOBULIN SER CALC-MCNC: 3.1 GM/DL
GLUCOSE SERPL-MCNC: 277 MG/DL (ref 65–99)
HBA1C MFR BLD: 10.8 % (ref 4.8–5.6)
HDLC SERPL-MCNC: 60 MG/DL (ref 40–60)
INTERPRETATION: NORMAL
LDLC SERPL CALC-MCNC: 57 MG/DL (ref 0–100)
Lab: NORMAL
POTASSIUM SERPL-SCNC: 4.4 MMOL/L (ref 3.5–5.2)
PROT SERPL-MCNC: 7.3 G/DL (ref 6–8.5)
SODIUM SERPL-SCNC: 138 MMOL/L (ref 136–145)
T3FREE SERPL-MCNC: 2.8 PG/ML (ref 2–4.4)
T3RU NFR SERPL: 35 % (ref 24–39)
T4 FREE SERPL-MCNC: 2.09 NG/DL (ref 0.93–1.7)
T4 SERPL-MCNC: 11.4 UG/DL (ref 4.5–12)
THYROGLOB AB SERPL-ACNC: <1 IU/ML
THYROGLOB SERPL-MCNC: 2 NG/ML
THYROGLOB SERPL-MCNC: NORMAL NG/ML
TRIGL SERPL-MCNC: 78 MG/DL (ref 0–150)
TSH SERPL DL<=0.005 MIU/L-ACNC: 2.8 UIU/ML (ref 0.45–4.5)
VLDLC SERPL CALC-MCNC: 15.6 MG/DL

## 2019-08-08 ENCOUNTER — LAB (OUTPATIENT)
Dept: LAB | Facility: HOSPITAL | Age: 69
End: 2019-08-08

## 2019-08-08 ENCOUNTER — TRANSCRIBE ORDERS (OUTPATIENT)
Dept: ADMINISTRATIVE | Facility: HOSPITAL | Age: 69
End: 2019-08-08

## 2019-08-08 DIAGNOSIS — I10 ESSENTIAL HYPERTENSION, MALIGNANT: ICD-10-CM

## 2019-08-08 DIAGNOSIS — Z00.00 ROUTINE GENERAL MEDICAL EXAMINATION AT A HEALTH CARE FACILITY: ICD-10-CM

## 2019-08-08 DIAGNOSIS — Z00.00 ROUTINE GENERAL MEDICAL EXAMINATION AT A HEALTH CARE FACILITY: Primary | ICD-10-CM

## 2019-08-08 LAB
ALBUMIN SERPL-MCNC: 4.4 G/DL (ref 3.5–5.2)
ALBUMIN/GLOB SERPL: 1.3 G/DL
ALP SERPL-CCNC: 80 U/L (ref 39–117)
ALT SERPL W P-5'-P-CCNC: 25 U/L (ref 1–33)
ANION GAP SERPL CALCULATED.3IONS-SCNC: 13.8 MMOL/L (ref 5–15)
AST SERPL-CCNC: 19 U/L (ref 1–32)
BACTERIA UR QL AUTO: ABNORMAL /HPF
BASOPHILS # BLD AUTO: 0.13 10*3/MM3 (ref 0–0.2)
BASOPHILS NFR BLD AUTO: 2.3 % (ref 0–1.5)
BILIRUB SERPL-MCNC: 0.5 MG/DL (ref 0.2–1.2)
BILIRUB UR QL STRIP: NEGATIVE
BUN BLD-MCNC: 20 MG/DL (ref 8–23)
BUN/CREAT SERPL: 26.7 (ref 7–25)
CALCIUM SPEC-SCNC: 9.9 MG/DL (ref 8.6–10.5)
CHLORIDE SERPL-SCNC: 103 MMOL/L (ref 98–107)
CHOLEST SERPL-MCNC: 110 MG/DL (ref 0–200)
CLARITY UR: CLEAR
CO2 SERPL-SCNC: 20.2 MMOL/L (ref 22–29)
COLOR UR: YELLOW
CREAT BLD-MCNC: 0.75 MG/DL (ref 0.57–1)
DEPRECATED RDW RBC AUTO: 54.1 FL (ref 37–54)
EOSINOPHIL # BLD AUTO: 0.11 10*3/MM3 (ref 0–0.4)
EOSINOPHIL NFR BLD AUTO: 1.9 % (ref 0.3–6.2)
ERYTHROCYTE [DISTWIDTH] IN BLOOD BY AUTOMATED COUNT: 15.4 % (ref 12.3–15.4)
GFR SERPL CREATININE-BSD FRML MDRD: 77 ML/MIN/1.73
GLOBULIN UR ELPH-MCNC: 3.3 GM/DL
GLUCOSE BLD-MCNC: 245 MG/DL (ref 65–99)
GLUCOSE UR STRIP-MCNC: ABNORMAL MG/DL
HCT VFR BLD AUTO: 46 % (ref 34–46.6)
HDLC SERPL-MCNC: 50 MG/DL (ref 40–60)
HGB BLD-MCNC: 14 G/DL (ref 12–15.9)
HGB UR QL STRIP.AUTO: NEGATIVE
HYALINE CASTS UR QL AUTO: ABNORMAL /LPF
IMM GRANULOCYTES # BLD AUTO: 0.01 10*3/MM3 (ref 0–0.05)
IMM GRANULOCYTES NFR BLD AUTO: 0.2 % (ref 0–0.5)
KETONES UR QL STRIP: ABNORMAL
LDLC SERPL CALC-MCNC: 44 MG/DL (ref 0–100)
LDLC/HDLC SERPL: 0.88 {RATIO}
LEUKOCYTE ESTERASE UR QL STRIP.AUTO: NEGATIVE
LYMPHOCYTES # BLD AUTO: 1.98 10*3/MM3 (ref 0.7–3.1)
LYMPHOCYTES NFR BLD AUTO: 34.4 % (ref 19.6–45.3)
MCH RBC QN AUTO: 29.2 PG (ref 26.6–33)
MCHC RBC AUTO-ENTMCNC: 30.4 G/DL (ref 31.5–35.7)
MCV RBC AUTO: 96 FL (ref 79–97)
MONOCYTES # BLD AUTO: 0.41 10*3/MM3 (ref 0.1–0.9)
MONOCYTES NFR BLD AUTO: 7.1 % (ref 5–12)
NEUTROPHILS # BLD AUTO: 3.11 10*3/MM3 (ref 1.7–7)
NEUTROPHILS NFR BLD AUTO: 54.1 % (ref 42.7–76)
NITRITE UR QL STRIP: NEGATIVE
NRBC BLD AUTO-RTO: 0 /100 WBC (ref 0–0.2)
PH UR STRIP.AUTO: 5.5 [PH] (ref 5–8)
PLATELET # BLD AUTO: 226 10*3/MM3 (ref 140–450)
PMV BLD AUTO: 11.8 FL (ref 6–12)
POTASSIUM BLD-SCNC: 4.9 MMOL/L (ref 3.5–5.2)
PROT SERPL-MCNC: 7.7 G/DL (ref 6–8.5)
PROT UR QL STRIP: ABNORMAL
RBC # BLD AUTO: 4.79 10*6/MM3 (ref 3.77–5.28)
RBC # UR: ABNORMAL /HPF
REF LAB TEST METHOD: ABNORMAL
SODIUM BLD-SCNC: 137 MMOL/L (ref 136–145)
SP GR UR STRIP: >=1.03 (ref 1–1.03)
SQUAMOUS #/AREA URNS HPF: ABNORMAL /HPF
TRIGL SERPL-MCNC: 79 MG/DL (ref 0–150)
UROBILINOGEN UR QL STRIP: ABNORMAL
VLDLC SERPL-MCNC: 15.8 MG/DL (ref 5–40)
WBC NRBC COR # BLD: 5.75 10*3/MM3 (ref 3.4–10.8)
WBC UR QL AUTO: ABNORMAL /HPF

## 2019-08-08 PROCEDURE — 80053 COMPREHEN METABOLIC PANEL: CPT | Performed by: INTERNAL MEDICINE

## 2019-08-08 PROCEDURE — 81001 URINALYSIS AUTO W/SCOPE: CPT | Performed by: INTERNAL MEDICINE

## 2019-08-08 PROCEDURE — 36415 COLL VENOUS BLD VENIPUNCTURE: CPT

## 2019-08-08 PROCEDURE — 80061 LIPID PANEL: CPT | Performed by: INTERNAL MEDICINE

## 2019-08-08 PROCEDURE — 85025 COMPLETE CBC W/AUTO DIFF WBC: CPT | Performed by: INTERNAL MEDICINE

## 2019-08-13 ENCOUNTER — OFFICE VISIT (OUTPATIENT)
Dept: ENDOCRINOLOGY | Age: 69
End: 2019-08-13

## 2019-08-13 VITALS
DIASTOLIC BLOOD PRESSURE: 76 MMHG | HEIGHT: 68 IN | WEIGHT: 203 LBS | BODY MASS INDEX: 30.77 KG/M2 | SYSTOLIC BLOOD PRESSURE: 136 MMHG

## 2019-08-13 DIAGNOSIS — E55.9 VITAMIN D DEFICIENCY: ICD-10-CM

## 2019-08-13 DIAGNOSIS — E03.9 PRIMARY HYPOTHYROIDISM: ICD-10-CM

## 2019-08-13 DIAGNOSIS — Z91.199 NONCOMPLIANCE WITH DIABETES TREATMENT: ICD-10-CM

## 2019-08-13 DIAGNOSIS — Z79.4 TYPE 2 DIABETES MELLITUS WITHOUT COMPLICATION, WITH LONG-TERM CURRENT USE OF INSULIN (HCC): Primary | ICD-10-CM

## 2019-08-13 DIAGNOSIS — E78.5 DYSLIPIDEMIA: ICD-10-CM

## 2019-08-13 DIAGNOSIS — E11.9 TYPE 2 DIABETES MELLITUS WITHOUT COMPLICATION, WITH LONG-TERM CURRENT USE OF INSULIN (HCC): Primary | ICD-10-CM

## 2019-08-13 DIAGNOSIS — I10 ESSENTIAL HYPERTENSION: ICD-10-CM

## 2019-08-13 PROCEDURE — 99214 OFFICE O/P EST MOD 30 MIN: CPT | Performed by: NURSE PRACTITIONER

## 2019-08-13 RX ORDER — DAPAGLIFLOZIN 10 MG/1
TABLET, FILM COATED ORAL
Qty: 90 TABLET | Refills: 1 | Status: SHIPPED | OUTPATIENT
Start: 2019-08-13 | End: 2020-02-10

## 2019-08-13 RX ORDER — ATORVASTATIN CALCIUM 80 MG/1
80 TABLET, FILM COATED ORAL DAILY
Qty: 90 TABLET | Refills: 1 | Status: SHIPPED | OUTPATIENT
Start: 2019-08-13 | End: 2020-01-06

## 2019-08-13 RX ORDER — INSULIN GLARGINE 300 U/ML
40 INJECTION, SOLUTION SUBCUTANEOUS DAILY
Qty: 12 ML | Refills: 1 | Status: SHIPPED | OUTPATIENT
Start: 2019-08-13 | End: 2019-08-13 | Stop reason: SDUPTHER

## 2019-08-13 RX ORDER — INSULIN GLARGINE 300 U/ML
40 INJECTION, SOLUTION SUBCUTANEOUS DAILY
Qty: 12 ML | Refills: 1 | Status: SHIPPED | OUTPATIENT
Start: 2019-08-13 | End: 2020-12-18

## 2019-08-13 RX ORDER — SITAGLIPTIN 100 MG/1
100 TABLET, FILM COATED ORAL DAILY
Qty: 90 TABLET | Refills: 1 | Status: SHIPPED | OUTPATIENT
Start: 2019-08-13 | End: 2020-01-06

## 2019-08-13 RX ORDER — LEVOTHYROXINE SODIUM 0.12 MG/1
125 TABLET ORAL 2 TIMES DAILY
Qty: 180 TABLET | Refills: 1 | Status: SHIPPED | OUTPATIENT
Start: 2019-08-13 | End: 2020-02-10

## 2019-08-13 RX ORDER — FLASH GLUCOSE SENSOR
1 KIT MISCELLANEOUS
Qty: 2 EACH | Refills: 5 | Status: SHIPPED | OUTPATIENT
Start: 2019-08-13 | End: 2020-12-18

## 2019-08-13 RX ORDER — ERGOCALCIFEROL 1.25 MG/1
CAPSULE ORAL
Qty: 12 CAPSULE | Refills: 1 | Status: SHIPPED | OUTPATIENT
Start: 2019-08-13 | End: 2019-08-13 | Stop reason: SDUPTHER

## 2019-08-13 RX ORDER — LISINOPRIL 20 MG/1
20 TABLET ORAL DAILY
Qty: 90 TABLET | Refills: 1 | Status: SHIPPED | OUTPATIENT
Start: 2019-08-13 | End: 2020-01-06

## 2019-08-13 RX ORDER — ERGOCALCIFEROL 1.25 MG/1
CAPSULE ORAL
Qty: 12 CAPSULE | Refills: 1 | Status: SHIPPED | OUTPATIENT
Start: 2019-08-13 | End: 2019-08-23 | Stop reason: SDUPTHER

## 2019-08-13 RX ORDER — EZETIMIBE 10 MG/1
10 TABLET ORAL DAILY
Qty: 90 TABLET | Refills: 1 | Status: SHIPPED | OUTPATIENT
Start: 2019-08-13 | End: 2020-01-06

## 2019-08-13 NOTE — PROGRESS NOTES
"Subjective   Mayra Hirsch is a 68 y.o. female is here today for follow-up.  Chief Complaint   Patient presents with   • Diabetes     recent labs, testing BG zero times daily, pt did not bring meter, pt is not taking insulin because she states it was never refilled   • Hyperlipidemia   • Hypertension   • Hypothyroidism     /76   Ht 172.7 cm (68\")   Wt 92.1 kg (203 lb)   BMI 30.87 kg/m²   Current Outpatient Medications on File Prior to Visit   Medication Sig   • atorvastatin (LIPITOR) 80 MG tablet Take 1 tablet by mouth Daily.   • ezetimibe (ZETIA) 10 MG tablet Take 1 tablet by mouth Daily.   • FARXIGA 10 MG tablet 1 tablet by mouth every morning   • FLUoxetine HCl (PROZAC PO) Take 40 mg by mouth.   • Insulin Pen Needle (CAREFINE PEN NEEDLES) 32G X 6 MM misc Use for daily insulin injection   • levothyroxine (SYNTHROID, LEVOTHROID) 125 MCG tablet Take 1 tablet by mouth 2 (Two) Times a Day.   • LISINOPRIL PO Take 20 mg by mouth Daily.   • ONETOUCH DELICA LANCETS FINE misc Use once a day   • ONETOUCH VERIO test strip Use once a day   • atorvastatin (LIPITOR) 80 MG tablet TAKE 1 TABLET DAILY   • Continuous Blood Gluc Sensor (FREESTYLE SABRA 14 DAY SENSOR) misc 1 application Every 14 (Fourteen) Days.   • JANUVIA 100 MG tablet Take 1 tablet by mouth Daily.   • TOUJEO SOLOSTAR 300 UNIT/ML solution pen-injector Inject 40 Units under the skin into the appropriate area as directed Daily.   • [DISCONTINUED] ezetimibe (ZETIA) 10 MG tablet TAKE 1 TABLET DAILY   • [DISCONTINUED] FARXIGA 10 MG tablet TAKE 1 TABLET EVERY MORNING   • [DISCONTINUED] levothyroxine (SYNTHROID, LEVOTHROID) 125 MCG tablet TAKE 2 TABLETS DAILY ON AN EMPTY STOMACH     No current facility-administered medications on file prior to visit.      Family History   Problem Relation Age of Onset   • Coronary artery disease Mother    • Alzheimer's disease Mother    • Coronary artery disease Father    • Cancer Father    • Thyroid disease Sister      Social " History     Tobacco Use   • Smoking status: Never Smoker   Substance Use Topics   • Alcohol use: No   • Drug use: Not on file     Allergies   Allergen Reactions   • Bydureon [Exenatide] Diarrhea   • Metformin And Related Nausea And Vomiting         History of Present Illness  Encounter Diagnoses   Name Primary?   • Dyslipidemia    • Essential hypertension    • Noncompliance with diabetes treatment    • Primary hypothyroidism    • Type 2 diabetes mellitus without complication, with long-term current use of insulin (CMS/[facility]) Yes   • Vitamin D deficiency    68-year-old female patient here today for a follow-up visit.  She has been seen for the above diagnosis.  She had recent labs which were reviewed and she was provided a copy.  Her hemoglobin A1c reflects uncontrolled type 2 diabetes.  She has not been taking her medications as prescribed.  She states she is out of her insulin has not been taking it.  She did not request a refill.  She was prescribed a continuous glucose monitoring sensor at her last visit however she states the pharmacy would not approve it without a prior authorization.  I requested staff to get prior authorization for the Freestyle Gene sensor.  She will be given a sample at today's visit.  She denies any signs and symptoms of hyper or hypothyroidism.  She has not had any low blood sugars.  She was given a sample of Toujeo insulin and instructed to get back on her insulin and her Januvia at today's visit.  Her medication refills will be sent to her mail order pharmacy per her request.  She admits to poor compliance with her diabetes management.  She has a wound on her right toe that is being treated by podiatrist.  The following portions of the patient's history were reviewed and updated as appropriate: allergies, current medications, past family history, past medical history, past social history, past surgical history and problem list.    Review of Systems   Constitutional: Negative.    Eyes:  Negative.    Cardiovascular: Negative.    Skin: Positive for wound.   Neurological: Negative.        Objective   Physical Exam   Constitutional: She is oriented to person, place, and time. She appears well-developed and well-nourished. No distress.   HENT:   Head: Normocephalic and atraumatic.   Right Ear: External ear normal.   Left Ear: External ear normal.   Nose: Nose normal.   Mouth/Throat: Oropharynx is clear and moist. No oropharyngeal exudate.   Eyes: EOM are normal. Pupils are equal, round, and reactive to light. Right eye exhibits no discharge. Left eye exhibits no discharge.   Neck: Trachea normal, normal range of motion and full passive range of motion without pain. Neck supple. No tracheal tenderness present. Carotid bruit is not present. No tracheal deviation, no edema and no erythema present. No thyroid mass and no thyromegaly present.   Cardiovascular: Normal rate, regular rhythm, normal heart sounds and intact distal pulses. Exam reveals no gallop and no friction rub.   No murmur heard.  Pulmonary/Chest: Effort normal and breath sounds normal. No stridor. No respiratory distress. She has no wheezes. She has no rales.   Abdominal: Soft. Bowel sounds are normal. She exhibits no distension.   Musculoskeletal: Normal range of motion. She exhibits no edema or deformity.   Lymphadenopathy:     She has no cervical adenopathy.   Neurological: She is alert and oriented to person, place, and time.   Skin: Skin is warm and dry. No rash noted. She is not diaphoretic. No erythema. No pallor.   Rt great toe wound being treated for infection by podiatrist dr dos santos   Psychiatric: She has a normal mood and affect. Her behavior is normal. Judgment and thought content normal.   Nursing note and vitals reviewed.    Results for orders placed or performed in visit on 08/08/19   Lipid Panel   Result Value Ref Range    Total Cholesterol 110 0 - 200 mg/dL    Triglycerides 79 0 - 150 mg/dL    HDL Cholesterol 50 40 - 60  mg/dL    LDL Cholesterol  44 0 - 100 mg/dL    VLDL Cholesterol 15.8 5 - 40 mg/dL    LDL/HDL Ratio 0.88    Urinalysis With Microscopic If Indicated (No Culture) - Urine, Clean Catch   Result Value Ref Range    Color, UA Yellow Yellow, Straw    Appearance, UA Clear Clear    pH, UA 5.5 5.0 - 8.0    Specific Gravity, UA >=1.030 1.005 - 1.030    Glucose, UA >=1000 mg/dL (3+) (A) Negative    Ketones, UA Trace (A) Negative    Bilirubin, UA Negative Negative    Blood, UA Negative Negative    Protein,  mg/dL (2+) (A) Negative    Leuk Esterase, UA Negative Negative    Nitrite, UA Negative Negative    Urobilinogen, UA 0.2 E.U./dL 0.2 - 1.0 E.U./dL   Comprehensive Metabolic Panel   Result Value Ref Range    Glucose 245 (H) 65 - 99 mg/dL    BUN 20 8 - 23 mg/dL    Creatinine 0.75 0.57 - 1.00 mg/dL    Sodium 137 136 - 145 mmol/L    Potassium 4.9 3.5 - 5.2 mmol/L    Chloride 103 98 - 107 mmol/L    CO2 20.2 (L) 22.0 - 29.0 mmol/L    Calcium 9.9 8.6 - 10.5 mg/dL    Total Protein 7.7 6.0 - 8.5 g/dL    Albumin 4.40 3.50 - 5.20 g/dL    ALT (SGPT) 25 1 - 33 U/L    AST (SGOT) 19 1 - 32 U/L    Alkaline Phosphatase 80 39 - 117 U/L    Total Bilirubin 0.5 0.2 - 1.2 mg/dL    eGFR Non African Amer 77 >60 mL/min/1.73    Globulin 3.3 gm/dL    A/G Ratio 1.3 g/dL    BUN/Creatinine Ratio 26.7 (H) 7.0 - 25.0    Anion Gap 13.8 5.0 - 15.0 mmol/L   CBC Auto Differential   Result Value Ref Range    WBC 5.75 3.40 - 10.80 10*3/mm3    RBC 4.79 3.77 - 5.28 10*6/mm3    Hemoglobin 14.0 12.0 - 15.9 g/dL    Hematocrit 46.0 34.0 - 46.6 %    MCV 96.0 79.0 - 97.0 fL    MCH 29.2 26.6 - 33.0 pg    MCHC 30.4 (L) 31.5 - 35.7 g/dL    RDW 15.4 12.3 - 15.4 %    RDW-SD 54.1 (H) 37.0 - 54.0 fl    MPV 11.8 6.0 - 12.0 fL    Platelets 226 140 - 450 10*3/mm3    Neutrophil % 54.1 42.7 - 76.0 %    Lymphocyte % 34.4 19.6 - 45.3 %    Monocyte % 7.1 5.0 - 12.0 %    Eosinophil % 1.9 0.3 - 6.2 %    Basophil % 2.3 (H) 0.0 - 1.5 %    Immature Grans % 0.2 0.0 - 0.5 %     Neutrophils, Absolute 3.11 1.70 - 7.00 10*3/mm3    Lymphocytes, Absolute 1.98 0.70 - 3.10 10*3/mm3    Monocytes, Absolute 0.41 0.10 - 0.90 10*3/mm3    Eosinophils, Absolute 0.11 0.00 - 0.40 10*3/mm3    Basophils, Absolute 0.13 0.00 - 0.20 10*3/mm3    Immature Grans, Absolute 0.01 0.00 - 0.05 10*3/mm3    nRBC 0.0 0.0 - 0.2 /100 WBC   Urinalysis, Microscopic Only - Urine, Clean Catch   Result Value Ref Range    RBC, UA 0-2 None Seen, 0-2 /HPF    WBC, UA 3-5 (A) None Seen, 0-2 /HPF    Bacteria, UA None Seen None Seen /HPF    Squamous Epithelial Cells, UA 0-2 None Seen, 0-2 /HPF    Hyaline Casts, UA 0-2 None Seen /LPF    Methodology Automated Microscopy          Assessment/Plan   Problems Addressed this Visit        Cardiovascular and Mediastinum    Essential hypertension       Digestive    Vitamin D deficiency       Endocrine    Primary hypothyroidism    Type 2 diabetes mellitus without complication, with long-term current use of insulin (CMS/[facility]) - Primary    Relevant Medications    TOUJEO SOLOSTAR 300 UNIT/ML solution pen-injector       Other    Dyslipidemia    Noncompliance with diabetes treatment        In summary, patient was seen and examined.  She has not been taking her medications as prescribed.  She is failed to request refills for any medication she is been out of.  She has not been taking her insulin or her Januvia.  Her medication refills will be sent to her pharmacy.  Her thyroid hormones are in satisfactory range.  Her hemoglobin A1c reflects uncontrolled type 2 diabetes.  She will be given a FreeStyle Gene sensor sample at today's visit.  I requested staff do a prior authorization to get this approved on her insurance.  Her medication list has been reviewed and updated.  She will follow-up in 4 months with dr wadsworth or myself with labs prior.  Her cholesterol is controlled.  Blood pressure overall is in satisfactory range.  She has been advised to monitor her blood sugars and contact the office if her  blood sugars fail to improve.

## 2019-08-19 ENCOUNTER — TELEPHONE (OUTPATIENT)
Dept: ENDOCRINOLOGY | Age: 69
End: 2019-08-19

## 2019-08-19 NOTE — TELEPHONE ENCOUNTER
----- Message from Jennifer Morel sent at 8/19/2019 10:03 AM EDT -----  Contact: patient  Patient said she had a patch put on her arm last Tuesday. She said the monitor would not work today and it said to replace and its only been a week. She said she does not have a monitor and it has not been approved and our office is working on a prior authorization and she has not heard back on it.     She said she received an email last Wednesday from [employer] saying her plan does not cover  prescription # [id number] and said to contact our office for an alternative medication that plan would cover. She said the email did not tell her what the alternatives were.    She said she received everything except Toujeo.     I asked her to please contact her insurance and find out what alternatives her insurance will cover and to give them the information she received in email advising prescription # [id number].     Pt - [phone]  FYI: As per my conversation with you, I called patient and gave her Freestyle # [phone] and asked her to call them and see what to do.

## 2019-08-23 RX ORDER — ERGOCALCIFEROL 1.25 MG/1
CAPSULE ORAL
Qty: 12 CAPSULE | Refills: 1 | Status: SHIPPED | OUTPATIENT
Start: 2019-08-23 | End: 2020-12-18 | Stop reason: SDUPTHER

## 2019-11-22 RX ORDER — INSULIN GLARGINE 300 U/ML
INJECTION, SOLUTION SUBCUTANEOUS
Qty: 9 ML | Refills: 5 | Status: SHIPPED | OUTPATIENT
Start: 2019-11-22 | End: 2020-12-18 | Stop reason: SDUPTHER

## 2019-12-20 DIAGNOSIS — Z79.4 TYPE 2 DIABETES MELLITUS WITHOUT COMPLICATION, WITH LONG-TERM CURRENT USE OF INSULIN (HCC): ICD-10-CM

## 2019-12-20 DIAGNOSIS — E11.9 TYPE 2 DIABETES MELLITUS WITHOUT COMPLICATION, WITH LONG-TERM CURRENT USE OF INSULIN (HCC): ICD-10-CM

## 2019-12-20 DIAGNOSIS — E55.9 VITAMIN D DEFICIENCY: ICD-10-CM

## 2019-12-20 DIAGNOSIS — E78.5 DYSLIPIDEMIA: Primary | ICD-10-CM

## 2019-12-20 DIAGNOSIS — E03.9 PRIMARY HYPOTHYROIDISM: ICD-10-CM

## 2020-01-06 RX ORDER — ATORVASTATIN CALCIUM 80 MG/1
TABLET, FILM COATED ORAL
Qty: 90 TABLET | Refills: 0 | Status: SHIPPED | OUTPATIENT
Start: 2020-01-06 | End: 2020-04-06

## 2020-01-06 RX ORDER — LISINOPRIL 20 MG/1
TABLET ORAL
Qty: 90 TABLET | Refills: 0 | Status: SHIPPED | OUTPATIENT
Start: 2020-01-06 | End: 2020-04-06

## 2020-01-06 RX ORDER — EZETIMIBE 10 MG/1
TABLET ORAL
Qty: 90 TABLET | Refills: 0 | Status: SHIPPED | OUTPATIENT
Start: 2020-01-06 | End: 2020-04-06

## 2020-01-06 RX ORDER — SITAGLIPTIN 100 MG/1
TABLET, FILM COATED ORAL
Qty: 90 TABLET | Refills: 0 | Status: SHIPPED | OUTPATIENT
Start: 2020-01-06 | End: 2020-04-06

## 2020-01-20 ENCOUNTER — RESULTS ENCOUNTER (OUTPATIENT)
Dept: ENDOCRINOLOGY | Age: 70
End: 2020-01-20

## 2020-01-20 DIAGNOSIS — Z79.4 TYPE 2 DIABETES MELLITUS WITHOUT COMPLICATION, WITH LONG-TERM CURRENT USE OF INSULIN (HCC): ICD-10-CM

## 2020-01-20 DIAGNOSIS — E11.9 TYPE 2 DIABETES MELLITUS WITHOUT COMPLICATION, WITH LONG-TERM CURRENT USE OF INSULIN (HCC): ICD-10-CM

## 2020-01-20 DIAGNOSIS — E03.9 PRIMARY HYPOTHYROIDISM: ICD-10-CM

## 2020-01-20 DIAGNOSIS — E78.5 DYSLIPIDEMIA: ICD-10-CM

## 2020-01-20 DIAGNOSIS — E55.9 VITAMIN D DEFICIENCY: ICD-10-CM

## 2020-02-10 RX ORDER — LEVOTHYROXINE SODIUM 0.12 MG/1
TABLET ORAL
Qty: 180 TABLET | Refills: 4 | Status: SHIPPED | OUTPATIENT
Start: 2020-02-10 | End: 2021-01-22 | Stop reason: SDUPTHER

## 2020-02-10 RX ORDER — DAPAGLIFLOZIN 10 MG/1
TABLET, FILM COATED ORAL
Qty: 90 TABLET | Refills: 4 | Status: SHIPPED | OUTPATIENT
Start: 2020-02-10 | End: 2020-12-18 | Stop reason: SDUPTHER

## 2020-02-11 ENCOUNTER — TRANSCRIBE ORDERS (OUTPATIENT)
Dept: ADMINISTRATIVE | Facility: HOSPITAL | Age: 70
End: 2020-02-11

## 2020-02-11 ENCOUNTER — LAB (OUTPATIENT)
Dept: LAB | Facility: HOSPITAL | Age: 70
End: 2020-02-11

## 2020-02-11 DIAGNOSIS — E78.5 HYPERLIPIDEMIA, UNSPECIFIED HYPERLIPIDEMIA TYPE: ICD-10-CM

## 2020-02-11 DIAGNOSIS — I10 ESSENTIAL HYPERTENSION, MALIGNANT: ICD-10-CM

## 2020-02-11 DIAGNOSIS — I10 ESSENTIAL HYPERTENSION, MALIGNANT: Primary | ICD-10-CM

## 2020-02-11 LAB
ALBUMIN SERPL-MCNC: 3.9 G/DL (ref 3.5–5.2)
ALBUMIN/GLOB SERPL: 1.3 G/DL
ALP SERPL-CCNC: 84 U/L (ref 39–117)
ALT SERPL W P-5'-P-CCNC: 19 U/L (ref 1–33)
ANION GAP SERPL CALCULATED.3IONS-SCNC: 14.1 MMOL/L (ref 5–15)
AST SERPL-CCNC: 17 U/L (ref 1–32)
BILIRUB SERPL-MCNC: 0.3 MG/DL (ref 0.2–1.2)
BUN BLD-MCNC: 17 MG/DL (ref 8–23)
BUN/CREAT SERPL: 20.5 (ref 7–25)
CALCIUM SPEC-SCNC: 9.3 MG/DL (ref 8.6–10.5)
CHLORIDE SERPL-SCNC: 104 MMOL/L (ref 98–107)
CHOLEST SERPL-MCNC: 118 MG/DL (ref 0–200)
CO2 SERPL-SCNC: 21.9 MMOL/L (ref 22–29)
CREAT BLD-MCNC: 0.83 MG/DL (ref 0.57–1)
GFR SERPL CREATININE-BSD FRML MDRD: 68 ML/MIN/1.73
GLOBULIN UR ELPH-MCNC: 2.9 GM/DL
GLUCOSE BLD-MCNC: 140 MG/DL (ref 65–99)
HDLC SERPL-MCNC: 66 MG/DL (ref 40–60)
LDLC SERPL CALC-MCNC: 38 MG/DL (ref 0–100)
LDLC/HDLC SERPL: 0.58 {RATIO}
POTASSIUM BLD-SCNC: 4.5 MMOL/L (ref 3.5–5.2)
PROT SERPL-MCNC: 6.8 G/DL (ref 6–8.5)
SODIUM BLD-SCNC: 140 MMOL/L (ref 136–145)
TRIGL SERPL-MCNC: 69 MG/DL (ref 0–150)
VLDLC SERPL-MCNC: 13.8 MG/DL (ref 5–40)

## 2020-02-11 PROCEDURE — 36415 COLL VENOUS BLD VENIPUNCTURE: CPT

## 2020-02-11 PROCEDURE — 80053 COMPREHEN METABOLIC PANEL: CPT | Performed by: INTERNAL MEDICINE

## 2020-02-11 PROCEDURE — 80061 LIPID PANEL: CPT | Performed by: INTERNAL MEDICINE

## 2020-04-06 RX ORDER — SITAGLIPTIN 100 MG/1
TABLET, FILM COATED ORAL
Qty: 90 TABLET | Refills: 0 | Status: SHIPPED | OUTPATIENT
Start: 2020-04-06 | End: 2020-12-18 | Stop reason: SDUPTHER

## 2020-04-06 RX ORDER — ATORVASTATIN CALCIUM 80 MG/1
TABLET, FILM COATED ORAL
Qty: 90 TABLET | Refills: 0 | Status: SHIPPED | OUTPATIENT
Start: 2020-04-06 | End: 2020-12-18 | Stop reason: SDUPTHER

## 2020-04-06 RX ORDER — EZETIMIBE 10 MG/1
TABLET ORAL
Qty: 90 TABLET | Refills: 0 | Status: SHIPPED | OUTPATIENT
Start: 2020-04-06

## 2020-04-06 RX ORDER — LISINOPRIL 20 MG/1
TABLET ORAL
Qty: 90 TABLET | Refills: 0 | Status: SHIPPED | OUTPATIENT
Start: 2020-04-06 | End: 2020-12-18 | Stop reason: SDUPTHER

## 2020-07-06 RX ORDER — ATORVASTATIN CALCIUM 80 MG/1
TABLET, FILM COATED ORAL
Qty: 90 TABLET | Refills: 3 | OUTPATIENT
Start: 2020-07-06

## 2020-07-06 RX ORDER — LISINOPRIL 20 MG/1
TABLET ORAL
Qty: 90 TABLET | Refills: 3 | OUTPATIENT
Start: 2020-07-06

## 2020-07-06 RX ORDER — SITAGLIPTIN 100 MG/1
TABLET, FILM COATED ORAL
Qty: 90 TABLET | Refills: 3 | OUTPATIENT
Start: 2020-07-06

## 2020-07-06 RX ORDER — EZETIMIBE 10 MG/1
TABLET ORAL
Qty: 90 TABLET | Refills: 3 | OUTPATIENT
Start: 2020-07-06

## 2020-08-11 ENCOUNTER — LAB (OUTPATIENT)
Dept: LAB | Facility: HOSPITAL | Age: 70
End: 2020-08-11

## 2020-08-11 ENCOUNTER — TRANSCRIBE ORDERS (OUTPATIENT)
Dept: ADMINISTRATIVE | Facility: HOSPITAL | Age: 70
End: 2020-08-11

## 2020-08-11 DIAGNOSIS — E78.5 HYPERLIPIDEMIA, UNSPECIFIED HYPERLIPIDEMIA TYPE: ICD-10-CM

## 2020-08-11 DIAGNOSIS — Z00.00 ROUTINE GENERAL MEDICAL EXAMINATION AT A HEALTH CARE FACILITY: Primary | ICD-10-CM

## 2020-08-11 DIAGNOSIS — I10 ESSENTIAL HYPERTENSION, MALIGNANT: ICD-10-CM

## 2020-08-11 DIAGNOSIS — Z00.00 ROUTINE GENERAL MEDICAL EXAMINATION AT A HEALTH CARE FACILITY: ICD-10-CM

## 2020-08-11 LAB
ALBUMIN SERPL-MCNC: 3.9 G/DL (ref 3.5–5.2)
ALBUMIN/GLOB SERPL: 1.4 G/DL
ALP SERPL-CCNC: 87 U/L (ref 39–117)
ALT SERPL W P-5'-P-CCNC: 19 U/L (ref 1–33)
ANION GAP SERPL CALCULATED.3IONS-SCNC: 13.6 MMOL/L (ref 5–15)
AST SERPL-CCNC: 10 U/L (ref 1–32)
BACTERIA UR QL AUTO: NORMAL /HPF
BASOPHILS # BLD AUTO: 0.11 10*3/MM3 (ref 0–0.2)
BASOPHILS NFR BLD AUTO: 1.6 % (ref 0–1.5)
BILIRUB SERPL-MCNC: 0.4 MG/DL (ref 0–1.2)
BILIRUB UR QL STRIP: NEGATIVE
BUN SERPL-MCNC: 14 MG/DL (ref 8–23)
BUN/CREAT SERPL: 17.5 (ref 7–25)
CALCIUM SPEC-SCNC: 9.3 MG/DL (ref 8.6–10.5)
CHLORIDE SERPL-SCNC: 105 MMOL/L (ref 98–107)
CHOLEST SERPL-MCNC: 131 MG/DL (ref 0–200)
CLARITY UR: CLEAR
CO2 SERPL-SCNC: 20.4 MMOL/L (ref 22–29)
COLOR UR: YELLOW
CREAT SERPL-MCNC: 0.8 MG/DL (ref 0.57–1)
DEPRECATED RDW RBC AUTO: 48.8 FL (ref 37–54)
EOSINOPHIL # BLD AUTO: 0.15 10*3/MM3 (ref 0–0.4)
EOSINOPHIL NFR BLD AUTO: 2.2 % (ref 0.3–6.2)
ERYTHROCYTE [DISTWIDTH] IN BLOOD BY AUTOMATED COUNT: 14.3 % (ref 12.3–15.4)
GFR SERPL CREATININE-BSD FRML MDRD: 71 ML/MIN/1.73
GLOBULIN UR ELPH-MCNC: 2.8 GM/DL
GLUCOSE SERPL-MCNC: 143 MG/DL (ref 65–99)
GLUCOSE UR STRIP-MCNC: ABNORMAL MG/DL
HCT VFR BLD AUTO: 41.2 % (ref 34–46.6)
HDLC SERPL-MCNC: 63 MG/DL (ref 40–60)
HGB BLD-MCNC: 13.2 G/DL (ref 12–15.9)
HGB UR QL STRIP.AUTO: NEGATIVE
HYALINE CASTS UR QL AUTO: NORMAL /LPF
IMM GRANULOCYTES # BLD AUTO: 0.03 10*3/MM3 (ref 0–0.05)
IMM GRANULOCYTES NFR BLD AUTO: 0.4 % (ref 0–0.5)
KETONES UR QL STRIP: NEGATIVE
LDLC SERPL CALC-MCNC: 51 MG/DL (ref 0–100)
LDLC/HDLC SERPL: 0.81 {RATIO}
LEUKOCYTE ESTERASE UR QL STRIP.AUTO: NEGATIVE
LYMPHOCYTES # BLD AUTO: 2.23 10*3/MM3 (ref 0.7–3.1)
LYMPHOCYTES NFR BLD AUTO: 32.1 % (ref 19.6–45.3)
MCH RBC QN AUTO: 29.5 PG (ref 26.6–33)
MCHC RBC AUTO-ENTMCNC: 32 G/DL (ref 31.5–35.7)
MCV RBC AUTO: 92 FL (ref 79–97)
MONOCYTES # BLD AUTO: 0.65 10*3/MM3 (ref 0.1–0.9)
MONOCYTES NFR BLD AUTO: 9.4 % (ref 5–12)
NEUTROPHILS NFR BLD AUTO: 3.77 10*3/MM3 (ref 1.7–7)
NEUTROPHILS NFR BLD AUTO: 54.3 % (ref 42.7–76)
NITRITE UR QL STRIP: NEGATIVE
NRBC BLD AUTO-RTO: 0 /100 WBC (ref 0–0.2)
PH UR STRIP.AUTO: 5.5 [PH] (ref 5–8)
PLATELET # BLD AUTO: 222 10*3/MM3 (ref 140–450)
PMV BLD AUTO: 10.8 FL (ref 6–12)
POTASSIUM SERPL-SCNC: 4.2 MMOL/L (ref 3.5–5.2)
PROT SERPL-MCNC: 6.7 G/DL (ref 6–8.5)
PROT UR QL STRIP: ABNORMAL
RBC # BLD AUTO: 4.48 10*6/MM3 (ref 3.77–5.28)
RBC # UR: NORMAL /HPF
REF LAB TEST METHOD: NORMAL
SODIUM SERPL-SCNC: 139 MMOL/L (ref 136–145)
SP GR UR STRIP: 1.02 (ref 1–1.03)
SQUAMOUS #/AREA URNS HPF: NORMAL /HPF
TRIGL SERPL-MCNC: 86 MG/DL (ref 0–150)
UROBILINOGEN UR QL STRIP: ABNORMAL
VLDLC SERPL-MCNC: 17.2 MG/DL (ref 5–40)
WBC # BLD AUTO: 6.94 10*3/MM3 (ref 3.4–10.8)
WBC UR QL AUTO: NORMAL /HPF

## 2020-08-11 PROCEDURE — 80061 LIPID PANEL: CPT | Performed by: INTERNAL MEDICINE

## 2020-08-11 PROCEDURE — 81001 URINALYSIS AUTO W/SCOPE: CPT | Performed by: INTERNAL MEDICINE

## 2020-08-11 PROCEDURE — 80053 COMPREHEN METABOLIC PANEL: CPT | Performed by: INTERNAL MEDICINE

## 2020-08-11 PROCEDURE — 85025 COMPLETE CBC W/AUTO DIFF WBC: CPT | Performed by: INTERNAL MEDICINE

## 2020-08-11 PROCEDURE — 36415 COLL VENOUS BLD VENIPUNCTURE: CPT

## 2020-09-09 ENCOUNTER — TELEPHONE (OUTPATIENT)
Dept: ENDOCRINOLOGY | Age: 70
End: 2020-09-09

## 2020-09-09 NOTE — TELEPHONE ENCOUNTER
Patient was a [employer] patient   Needs script for ammy fine 32 g   [facility] [place]   [phone]  90 day supply

## 2020-12-18 ENCOUNTER — OFFICE VISIT (OUTPATIENT)
Dept: ENDOCRINOLOGY | Age: 70
End: 2020-12-18

## 2020-12-18 VITALS
HEIGHT: 68 IN | DIASTOLIC BLOOD PRESSURE: 72 MMHG | SYSTOLIC BLOOD PRESSURE: 126 MMHG | BODY MASS INDEX: 33.07 KG/M2 | WEIGHT: 218.2 LBS

## 2020-12-18 DIAGNOSIS — E78.5 DYSLIPIDEMIA: ICD-10-CM

## 2020-12-18 DIAGNOSIS — E03.9 PRIMARY HYPOTHYROIDISM: ICD-10-CM

## 2020-12-18 DIAGNOSIS — IMO0002 DM (DIABETES MELLITUS), TYPE 2, UNCONTROLLED W/NEUROLOGIC COMPLICATION: Primary | ICD-10-CM

## 2020-12-18 PROCEDURE — 99214 OFFICE O/P EST MOD 30 MIN: CPT | Performed by: INTERNAL MEDICINE

## 2020-12-18 RX ORDER — LISINOPRIL 20 MG/1
20 TABLET ORAL DAILY
Qty: 90 TABLET | Refills: 3 | Status: SHIPPED | OUTPATIENT
Start: 2020-12-18 | End: 2022-11-19 | Stop reason: HOSPADM

## 2020-12-18 RX ORDER — BLOOD SUGAR DIAGNOSTIC
STRIP MISCELLANEOUS
Qty: 100 EACH | Refills: 2 | Status: SHIPPED | OUTPATIENT
Start: 2020-12-18 | End: 2022-07-21

## 2020-12-18 RX ORDER — SITAGLIPTIN 100 MG/1
100 TABLET, FILM COATED ORAL DAILY
Qty: 90 TABLET | Refills: 3 | Status: SHIPPED | OUTPATIENT
Start: 2020-12-18 | End: 2021-01-22 | Stop reason: SDUPTHER

## 2020-12-18 RX ORDER — ERGOCALCIFEROL 1.25 MG/1
CAPSULE ORAL
Qty: 12 CAPSULE | Refills: 1 | Status: SHIPPED | OUTPATIENT
Start: 2020-12-18 | End: 2021-08-16

## 2020-12-18 RX ORDER — LANCING DEVICE/LANCETS
KIT MISCELLANEOUS
Qty: 1 KIT | Refills: 3 | Status: SHIPPED | OUTPATIENT
Start: 2020-12-18 | End: 2022-07-21

## 2020-12-18 RX ORDER — DAPAGLIFLOZIN 10 MG/1
TABLET, FILM COATED ORAL
Qty: 90 TABLET | Refills: 4 | Status: SHIPPED | OUTPATIENT
Start: 2020-12-18 | End: 2021-01-22 | Stop reason: SDUPTHER

## 2020-12-18 RX ORDER — INSULIN GLARGINE 300 U/ML
40 INJECTION, SOLUTION SUBCUTANEOUS DAILY
Qty: 9 ML | Refills: 5 | Status: SHIPPED | OUTPATIENT
Start: 2020-12-18 | End: 2021-01-22 | Stop reason: SDUPTHER

## 2020-12-18 RX ORDER — BLOOD-GLUCOSE METER
EACH MISCELLANEOUS
Qty: 1 KIT | Refills: 0 | Status: SHIPPED | OUTPATIENT
Start: 2020-12-18 | End: 2022-07-21

## 2020-12-18 RX ORDER — ATORVASTATIN CALCIUM 80 MG/1
80 TABLET, FILM COATED ORAL DAILY
Qty: 90 TABLET | Refills: 3 | Status: SHIPPED | OUTPATIENT
Start: 2020-12-18 | End: 2022-11-19 | Stop reason: HOSPADM

## 2020-12-18 NOTE — PROGRESS NOTES
70 y.o.    Patient Care Team:  Spencer Hua MD as PCP - General    Chief Complaint:    FOLLOW UP/ TYPE 2 DM FORMER [facility] PT  Subjective     HPI  70-year-old white female is here for the follow-up of type 2 diabetes mellitus.  Patient used to see Dr. Claros in the past, transferred her care to me on 12/18/2020.    Type 2 dm - Diagnosed about 5- 6 years ago.   Today in clinic pt reports being on farxiga 10 mg po daily, januvia 100 mg po daily, toujeo 40 units daily, but forgets to take 2 -3 times a week.   FBG - not checking   Pre meals - not checking  Checks BG - doesn't check as she has RA and its difficulty to prick  Dm retinopathy - no,Last eye exam - 1 year ago  Dm nephropathy - no  Dm neuropathy - no,Dm neuropathy meds - no  CAD -no  CVA -no  Episodes of hypoglycemia - no  Pt is physically active. weight has been stable.   Pt tries to follow DM diet for most part.   On Ace inb.    Hypothyroidism - on levothyroxine 125 mcg oral daily.     Hyperlipidemia - on lipitor 80 mg po daily.     Reviewed primary care physician's/consulting physician documentation and lab results       Interval History      The following portions of the patient's history were reviewed and updated as appropriate: allergies, current medications, past family history, past medical history, past social history, past surgical history and problem list.    Past Medical History:   Diagnosis Date   • Depression    • Diabetes mellitus (CMS/HCC)    • Disease of thyroid gland    • Fibrocystic breast    • Hypothyroidism    • Type 2 diabetes mellitus (CMS/HCC)      Family History   Problem Relation Age of Onset   • Coronary artery disease Mother    • Alzheimer's disease Mother    • Coronary artery disease Father    • Cancer Father    • Thyroid disease Sister      Social History     Socioeconomic History   • Marital status:      Spouse name: Not on file   • Number of children: Not on file   • Years of education: Not on file   • Highest  education level: Not on file   Tobacco Use   • Smoking status: Never Smoker   Substance and Sexual Activity   • Alcohol use: No     Allergies   Allergen Reactions   • Bydureon [Exenatide] Diarrhea   • Metformin And Related Nausea And Vomiting       Current Outpatient Medications:   •  atorvastatin (LIPITOR) 80 MG tablet, Take 1 tablet by mouth Daily., Disp: 90 tablet, Rfl: 3  •  ergocalciferol (Drisdol) 1.25 MG (44165 UT) capsule, Take 1 po q week, Disp: 12 capsule, Rfl: 1  •  ezetimibe (ZETIA) 10 MG tablet, TAKE 1 TABLET DAILY, Disp: 90 tablet, Rfl: 0  •  Farxiga 10 MG tablet, TAKE 1 TABLET EVERY MORNING, Disp: 90 tablet, Rfl: 4  •  FLUoxetine HCl (PROZAC PO), Take 40 mg by mouth., Disp: , Rfl:   •  Insulin Pen Needle (CareFine Pen Needles) 32G X 6 MM misc, Use for daily insulin injection, Disp: 100 each, Rfl: 3  •  Januvia 100 MG tablet, Take 1 tablet by mouth Daily., Disp: 90 tablet, Rfl: 3  •  levothyroxine (SYNTHROID, LEVOTHROID) 125 MCG tablet, TAKE 1 TABLET TWICE A DAY, Disp: 180 tablet, Rfl: 4  •  lisinopril (PRINIVIL,ZESTRIL) 20 MG tablet, Take 1 tablet by mouth Daily., Disp: 90 tablet, Rfl: 3  •  Toujeo SoloStar 300 UNIT/ML solution pen-injector injection, Inject 40 Units under the skin into the appropriate area as directed Daily., Disp: 9 mL, Rfl: 5  •  Blood Glucose Monitoring Suppl (Accu-Chek Hayley Plus) w/Device kit, 1 device, Disp: 1 kit, Rfl: 0  •  Continuous Blood Gluc  (FreeStyle Gene 2 [place] Systm) device, 1 Device Daily., Disp: 1 Device, Rfl: 0  •  Continuous Blood Gluc Sensor (FreeStyle Gene 2 Sensor Systm) misc, 1 Device Every 14 (Fourteen) Days., Disp: 2 each, Rfl: 3  •  glucose blood (Accu-Chek Hayley Plus) test strip, To check 3 - 4 times a day, Disp: 100 each, Rfl: 2  •  Lancets Misc. (Accu-Chek FastClix Lancet) kit, To check 2-3 times a day, Disp: 1 kit, Rfl: 3        Review of Systems   Constitutional: Negative for appetite change, fatigue and fever.   Eyes: Negative for visual  "disturbance.   Respiratory: Negative for shortness of breath.    Cardiovascular: Negative for palpitations and leg swelling.   Gastrointestinal: Negative for abdominal pain and vomiting.   Endocrine: Negative for polydipsia and polyuria.   Musculoskeletal: Negative for joint swelling and neck pain.   Skin: Negative for rash.   Neurological: Negative for weakness and numbness.   Psychiatric/Behavioral: Negative for behavioral problems.     I have reviewed and confirmed the accuracy of the ROS as documented by the MA/LPN/RN Asif Rodriguez MD      Objective       Vitals:    12/18/20 1423   BP: 126/72   Weight: 99 kg (218 lb 3.2 oz)   Height: 172.7 cm (68\")     Body mass index is 33.18 kg/m².      Physical Exam  Vitals signs reviewed.   Constitutional:       Appearance: She is not diaphoretic.      Comments: Obese     HENT:      Head: Normocephalic and atraumatic.   Eyes:      General: No scleral icterus.     Conjunctiva/sclera: Conjunctivae normal.   Neck:      Musculoskeletal: Normal range of motion and neck supple.      Thyroid: No thyromegaly.      Comments: Acanthosis nigricans  Cardiovascular:      Rate and Rhythm: Normal rate.      Heart sounds: Normal heart sounds.   Pulmonary:      Effort: Pulmonary effort is normal.      Breath sounds: Normal breath sounds. No stridor. No wheezing.   Abdominal:      General: Bowel sounds are normal. There is no distension.      Palpations: Abdomen is soft.      Tenderness: There is no abdominal tenderness.      Comments: Central obesity   Musculoskeletal:         General: No tenderness.   Skin:     General: Skin is warm and dry.   Neurological:      Mental Status: She is alert and oriented to person, place, and time.         Results Review:     I reviewed the patient's new clinical results and mentioned them above in HPI and in plan as well.    Medical records reviewed  Summary:Done      Lab on 08/11/2020   Component Date Value Ref Range Status   • Glucose 08/11/2020 143* 65 " - 99 mg/dL Final   • BUN 08/11/2020 14  8 - 23 mg/dL Final   • Creatinine 08/11/2020 0.80  0.57 - 1.00 mg/dL Final   • Sodium 08/11/2020 139  136 - 145 mmol/L Final   • Potassium 08/11/2020 4.2  3.5 - 5.2 mmol/L Final   • Chloride 08/11/2020 105  98 - 107 mmol/L Final   • CO2 08/11/2020 20.4* 22.0 - 29.0 mmol/L Final   • Calcium 08/11/2020 9.3  8.6 - 10.5 mg/dL Final   • Total Protein 08/11/2020 6.7  6.0 - 8.5 g/dL Final   • Albumin 08/11/2020 3.90  3.50 - 5.20 g/dL Final   • ALT (SGPT) 08/11/2020 19  1 - 33 U/L Final   • AST (SGOT) 08/11/2020 10  1 - 32 U/L Final   • Alkaline Phosphatase 08/11/2020 87  39 - 117 U/L Final   • Total Bilirubin 08/11/2020 0.4  0.0 - 1.2 mg/dL Final   • eGFR Non African Amer 08/11/2020 71  >60 mL/min/1.73 Final   • Globulin 08/11/2020 2.8  gm/dL Final   • A/G Ratio 08/11/2020 1.4  g/dL Final   • BUN/Creatinine Ratio 08/11/2020 17.5  7.0 - 25.0 Final   • Anion Gap 08/11/2020 13.6  5.0 - 15.0 mmol/L Final   • Total Cholesterol 08/11/2020 131  0 - 200 mg/dL Final   • Triglycerides 08/11/2020 86  0 - 150 mg/dL Final   • HDL Cholesterol 08/11/2020 63* 40 - 60 mg/dL Final   • LDL Cholesterol  08/11/2020 51  0 - 100 mg/dL Final   • VLDL Cholesterol 08/11/2020 17.2  5 - 40 mg/dL Final   • LDL/HDL Ratio 08/11/2020 0.81   Final   • Color, UA 08/11/2020 Yellow  Yellow, Straw Final   • Appearance, UA 08/11/2020 Clear  Clear Final   • pH, UA 08/11/2020 5.5  5.0 - 8.0 Final   • Specific Gravity, UA 08/11/2020 1.022  1.005 - 1.030 Final   • Glucose, UA 08/11/2020 >=1000 mg/dL (3+)* Negative Final   • Ketones, UA 08/11/2020 Negative  Negative Final   • Bilirubin, UA 08/11/2020 Negative  Negative Final   • Blood, UA 08/11/2020 Negative  Negative Final   • Protein, UA 08/11/2020 100 mg/dL (2+)* Negative Final   • Leuk Esterase, UA 08/11/2020 Negative  Negative Final   • Nitrite, UA 08/11/2020 Negative  Negative Final   • Urobilinogen, UA 08/11/2020 0.2 E.U./dL  0.2 - 1.0 E.U./dL Final   • WBC 08/11/2020  6.94  3.40 - 10.80 10*3/mm3 Final   • RBC 08/11/2020 4.48  3.77 - 5.28 10*6/mm3 Final   • Hemoglobin 08/11/2020 13.2  12.0 - 15.9 g/dL Final   • Hematocrit 08/11/2020 41.2  34.0 - 46.6 % Final   • MCV 08/11/2020 92.0  79.0 - 97.0 fL Final   • MCH 08/11/2020 29.5  26.6 - 33.0 pg Final   • MCHC 08/11/2020 32.0  31.5 - 35.7 g/dL Final   • RDW 08/11/2020 14.3  12.3 - 15.4 % Final   • RDW-SD 08/11/2020 48.8  37.0 - 54.0 fl Final   • MPV 08/11/2020 10.8  6.0 - 12.0 fL Final   • Platelets 08/11/2020 222  140 - 450 10*3/mm3 Final   • Neutrophil % 08/11/2020 54.3  42.7 - 76.0 % Final   • Lymphocyte % 08/11/2020 32.1  19.6 - 45.3 % Final   • Monocyte % 08/11/2020 9.4  5.0 - 12.0 % Final   • Eosinophil % 08/11/2020 2.2  0.3 - 6.2 % Final   • Basophil % 08/11/2020 1.6* 0.0 - 1.5 % Final   • Immature Grans % 08/11/2020 0.4  0.0 - 0.5 % Final   • Neutrophils, Absolute 08/11/2020 3.77  1.70 - 7.00 10*3/mm3 Final   • Lymphocytes, Absolute 08/11/2020 2.23  0.70 - 3.10 10*3/mm3 Final   • Monocytes, Absolute 08/11/2020 0.65  0.10 - 0.90 10*3/mm3 Final   • Eosinophils, Absolute 08/11/2020 0.15  0.00 - 0.40 10*3/mm3 Final   • Basophils, Absolute 08/11/2020 0.11  0.00 - 0.20 10*3/mm3 Final   • Immature Grans, Absolute 08/11/2020 0.03  0.00 - 0.05 10*3/mm3 Final   • nRBC 08/11/2020 0.0  0.0 - 0.2 /100 WBC Final   • RBC, UA 08/11/2020 0-2  None Seen, 0-2 /HPF Final   • WBC, UA 08/11/2020 0-2  None Seen, 0-2 /HPF Final   • Bacteria, UA 08/11/2020 None Seen  None Seen /HPF Final   • Squamous Epithelial Cells, UA 08/11/2020 0-2  None Seen, 0-2 /HPF Final   • Hyaline Casts, UA 08/11/2020 None Seen  None Seen /LPF Final   • Methodology 08/11/2020 Automated Microscopy   Final     Lab Results   Component Value Date    HGBA1C 10.80 (H) 07/30/2019    HGBA1C 9.07 (H) 01/24/2019    HGBA1C 8.97 (H) 08/01/2018     Lab Results   Component Value Date    MICROALBUR 96.2 01/24/2019    CREATININE 0.80 08/11/2020     Imaging Results (Most Recent)     None                 Assessment and Plan:    Diagnoses and all orders for this visit:    1. DM (diabetes mellitus), type 2, uncontrolled w/neurologic complication (CMS/HCC) (Primary)  -     TSH  -     T4, Free  -     Hemoglobin A1c  -     Basic Metabolic Panel  -     Hemoglobin A1c; Future  -     Creatinine, Serum; Future  -     eGFR-Glomerular Filtration; Future  -     Lipid Panel; Future  -     Microalbumin / Creatinine Urine Ratio - Urine, Clean Catch; Future  -     TSH; Future  -     Vitamin B12 & Folate; Future  -     T4, Free; Future    2. Dyslipidemia  -     TSH  -     T4, Free  -     Hemoglobin A1c  -     Basic Metabolic Panel  -     Hemoglobin A1c; Future  -     Creatinine, Serum; Future  -     eGFR-Glomerular Filtration; Future  -     Lipid Panel; Future  -     Microalbumin / Creatinine Urine Ratio - Urine, Clean Catch; Future  -     TSH; Future  -     Vitamin B12 & Folate; Future  -     T4, Free; Future    3. Primary hypothyroidism  -     TSH  -     T4, Free  -     Hemoglobin A1c  -     Basic Metabolic Panel  -     Hemoglobin A1c; Future  -     Creatinine, Serum; Future  -     eGFR-Glomerular Filtration; Future  -     Lipid Panel; Future  -     Microalbumin / Creatinine Urine Ratio - Urine, Clean Catch; Future  -     TSH; Future  -     Vitamin B12 & Folate; Future  -     T4, Free; Future    Other orders  -     Continuous Blood Gluc  (FreeStyle Gene 2 [place] Systm) device; 1 Device Daily.  Dispense: 1 Device; Refill: 0  -     Continuous Blood Gluc Sensor (FreeStyle Gene 2 Sensor Systm) misc; 1 Device Every 14 (Fourteen) Days.  Dispense: 2 each; Refill: 3  -     Toujeo SoloStar 300 UNIT/ML solution pen-injector injection; Inject 40 Units under the skin into the appropriate area as directed Daily.  Dispense: 9 mL; Refill: 5  -     lisinopril (PRINIVIL,ZESTRIL) 20 MG tablet; Take 1 tablet by mouth Daily.  Dispense: 90 tablet; Refill: 3  -     Januvia 100 MG tablet; Take 1 tablet by mouth Daily.  Dispense: 90  "tablet; Refill: 3  -     Farxiga 10 MG tablet; TAKE 1 TABLET EVERY MORNING  Dispense: 90 tablet; Refill: 4  -     ergocalciferol (Drisdol) 1.25 MG (78867 UT) capsule; Take 1 po q week  Dispense: 12 capsule; Refill: 1  -     atorvastatin (LIPITOR) 80 MG tablet; Take 1 tablet by mouth Daily.  Dispense: 90 tablet; Refill: 3  -     glucose blood (Accu-Chek Hayley Plus) test strip; To check 3 - 4 times a day  Dispense: 100 each; Refill: 2  -     Blood Glucose Monitoring Suppl (Accu-Chek Hayley Plus) w/Device kit; 1 device  Dispense: 1 kit; Refill: 0  -     Lancets Misc. (Accu-Chek FastClix Lancet) kit; To check 2-3 times a day  Dispense: 1 kit; Refill: 3      Type 2 diabetes mellitus-poorly controlled  Patient's blood sugars are suspected to be high as she has compliance issues  She also has difficulty in checking her blood sugars due to the rheumatoid arthritis  Discussed with the patient benefits of CGM, will try to get that for the patient.  Continue Januvia, Farxiga  Continue Toujeo  Explained to the patient on how she could be compliant with the Toujeo.    Would add GLP-1 analogs or glimepiride based on her work-up results next para hyperlipidemia  Continue the statin    Reviewed Lab results with the patient.               Asif Rodriguez MD  12/18/20    EMR Dragon / transcription disclaimer:     \"Dictated utilizing Dragon dictation\".      "

## 2020-12-19 LAB
BUN SERPL-MCNC: 19 MG/DL (ref 8–23)
BUN/CREAT SERPL: 19.8 (ref 7–25)
CALCIUM SERPL-MCNC: 9.6 MG/DL (ref 8.6–10.5)
CHLORIDE SERPL-SCNC: 105 MMOL/L (ref 98–107)
CO2 SERPL-SCNC: 23.4 MMOL/L (ref 22–29)
CREAT SERPL-MCNC: 0.96 MG/DL (ref 0.57–1)
GLUCOSE SERPL-MCNC: 181 MG/DL (ref 65–99)
HBA1C MFR BLD: 9.4 % (ref 4.8–5.6)
POTASSIUM SERPL-SCNC: 4.8 MMOL/L (ref 3.5–5.2)
SODIUM SERPL-SCNC: 139 MMOL/L (ref 136–145)
T4 FREE SERPL-MCNC: 1.62 NG/DL (ref 0.93–1.7)
TSH SERPL DL<=0.005 MIU/L-ACNC: 1.8 UIU/ML (ref 0.27–4.2)

## 2020-12-21 RX ORDER — DULAGLUTIDE 1.5 MG/.5ML
1.5 INJECTION, SOLUTION SUBCUTANEOUS WEEKLY
Qty: 2 ML | Refills: 11 | Status: SHIPPED | OUTPATIENT
Start: 2020-12-21 | End: 2021-01-22 | Stop reason: SDUPTHER

## 2021-01-03 RX ORDER — INSULIN GLARGINE 300 U/ML
INJECTION, SOLUTION SUBCUTANEOUS
Qty: 9 ML | Refills: 4 | OUTPATIENT
Start: 2021-01-03

## 2021-01-18 ENCOUNTER — TREATMENT (OUTPATIENT)
Dept: ENDOCRINOLOGY | Age: 71
End: 2021-01-18

## 2021-01-18 DIAGNOSIS — E11.9 TYPE 2 DIABETES MELLITUS WITHOUT COMPLICATION, WITH LONG-TERM CURRENT USE OF INSULIN (HCC): ICD-10-CM

## 2021-01-18 DIAGNOSIS — Z79.4 TYPE 2 DIABETES MELLITUS WITHOUT COMPLICATION, WITH LONG-TERM CURRENT USE OF INSULIN (HCC): ICD-10-CM

## 2021-01-18 PROCEDURE — 95250 CONT GLUC MNTR PHYS/QHP EQP: CPT | Performed by: INTERNAL MEDICINE

## 2021-01-18 PROCEDURE — 95251 CONT GLUC MNTR ANALYSIS I&R: CPT | Performed by: INTERNAL MEDICINE

## 2021-01-18 NOTE — PROGRESS NOTES
Continues glucose monitoring data    Continuous glucose monitoring was placed on December 18, 2020 by Judit Bryson  Patient has been trained/educated by the MA regarding the CGM    Patient wore continuous glucose monitoring-free style jak Pro from December 18, 2020-January 1, 2021  Average blood glucose reading was 193 mg/dL range  Estimated HbA1c 7.9%  Likelihood of low blood glucose levels was low  Likelihood of high blood glucose levels was moderate   blood glucose trends showed high BG around afternoon and evening times    Current treatment regimen  On Januvia, Farxiga and Toujeo    Changes to the treatment regimen  Continue Januvia, Farxiga, Toujeo.  Add glimepiride 2 mg with lunch and dinner    Please send in the prescription after discussing with the patient.

## 2021-01-22 NOTE — PROGRESS NOTES
Called and spoke with patient about the freestyle jak pro cgm results and medication change  Patient voice understanding

## 2021-01-25 RX ORDER — GLIMEPIRIDE 2 MG/1
TABLET ORAL
Qty: 180 TABLET | Refills: 3 | Status: SHIPPED | OUTPATIENT
Start: 2021-01-25 | End: 2022-03-21

## 2021-01-25 RX ORDER — LEVOTHYROXINE SODIUM 0.12 MG/1
TABLET ORAL
Qty: 180 TABLET | Refills: 4 | Status: SHIPPED | OUTPATIENT
Start: 2021-01-25 | End: 2022-04-20

## 2021-01-25 RX ORDER — INSULIN GLARGINE 300 U/ML
40 INJECTION, SOLUTION SUBCUTANEOUS DAILY
Qty: 9 ML | Refills: 5 | Status: SHIPPED | OUTPATIENT
Start: 2021-01-25 | End: 2022-02-21

## 2021-01-25 RX ORDER — SITAGLIPTIN 100 MG/1
100 TABLET, FILM COATED ORAL DAILY
Qty: 90 TABLET | Refills: 3 | Status: SHIPPED | OUTPATIENT
Start: 2021-01-25 | End: 2022-01-20

## 2021-01-25 RX ORDER — DAPAGLIFLOZIN 10 MG/1
TABLET, FILM COATED ORAL
Qty: 90 TABLET | Refills: 4 | Status: SHIPPED | OUTPATIENT
Start: 2021-01-25 | End: 2022-04-20

## 2021-01-25 RX ORDER — DULAGLUTIDE 1.5 MG/.5ML
1.5 INJECTION, SOLUTION SUBCUTANEOUS WEEKLY
Qty: 2 ML | Refills: 11 | Status: SHIPPED | OUTPATIENT
Start: 2021-01-25 | End: 2022-01-25

## 2021-01-25 RX ORDER — PEN NEEDLE, DIABETIC 32 GX 1/4"
NEEDLE, DISPOSABLE MISCELLANEOUS
Qty: 100 EACH | Refills: 3 | Status: SHIPPED | OUTPATIENT
Start: 2021-01-25 | End: 2021-09-15

## 2021-02-12 ENCOUNTER — TRANSCRIBE ORDERS (OUTPATIENT)
Dept: ADMINISTRATIVE | Facility: HOSPITAL | Age: 71
End: 2021-02-12

## 2021-02-12 ENCOUNTER — LAB (OUTPATIENT)
Dept: LAB | Facility: HOSPITAL | Age: 71
End: 2021-02-12

## 2021-02-12 DIAGNOSIS — I10 ESSENTIAL HYPERTENSION, MALIGNANT: Primary | ICD-10-CM

## 2021-02-12 DIAGNOSIS — E78.5 HYPERLIPIDEMIA, UNSPECIFIED HYPERLIPIDEMIA TYPE: ICD-10-CM

## 2021-02-12 DIAGNOSIS — I10 ESSENTIAL HYPERTENSION, MALIGNANT: ICD-10-CM

## 2021-02-12 LAB
ALBUMIN SERPL-MCNC: 4 G/DL (ref 3.5–5.2)
ALBUMIN/GLOB SERPL: 1.4 G/DL
ALP SERPL-CCNC: 89 U/L (ref 39–117)
ALT SERPL W P-5'-P-CCNC: 27 U/L (ref 1–33)
ANION GAP SERPL CALCULATED.3IONS-SCNC: 11.5 MMOL/L (ref 5–15)
AST SERPL-CCNC: 24 U/L (ref 1–32)
BILIRUB SERPL-MCNC: 0.5 MG/DL (ref 0–1.2)
BUN SERPL-MCNC: 17 MG/DL (ref 8–23)
BUN/CREAT SERPL: 18.1 (ref 7–25)
CALCIUM SPEC-SCNC: 9.9 MG/DL (ref 8.6–10.5)
CHLORIDE SERPL-SCNC: 104 MMOL/L (ref 98–107)
CHOLEST SERPL-MCNC: 109 MG/DL (ref 0–200)
CO2 SERPL-SCNC: 22.5 MMOL/L (ref 22–29)
CREAT SERPL-MCNC: 0.94 MG/DL (ref 0.57–1)
GFR SERPL CREATININE-BSD FRML MDRD: 59 ML/MIN/1.73
GLOBULIN UR ELPH-MCNC: 2.9 GM/DL
GLUCOSE SERPL-MCNC: 156 MG/DL (ref 65–99)
HDLC SERPL-MCNC: 57 MG/DL (ref 40–60)
LDLC SERPL CALC-MCNC: 36 MG/DL (ref 0–100)
LDLC/HDLC SERPL: 0.65 {RATIO}
POTASSIUM SERPL-SCNC: 4.4 MMOL/L (ref 3.5–5.2)
PROT SERPL-MCNC: 6.9 G/DL (ref 6–8.5)
SODIUM SERPL-SCNC: 138 MMOL/L (ref 136–145)
TRIGL SERPL-MCNC: 76 MG/DL (ref 0–150)
VLDLC SERPL-MCNC: 16 MG/DL (ref 5–40)

## 2021-02-12 PROCEDURE — 80053 COMPREHEN METABOLIC PANEL: CPT

## 2021-02-12 PROCEDURE — 80061 LIPID PANEL: CPT

## 2021-02-12 PROCEDURE — 36415 COLL VENOUS BLD VENIPUNCTURE: CPT

## 2021-08-16 ENCOUNTER — TRANSCRIBE ORDERS (OUTPATIENT)
Dept: ADMINISTRATIVE | Facility: HOSPITAL | Age: 71
End: 2021-08-16

## 2021-08-16 ENCOUNTER — LAB (OUTPATIENT)
Dept: LAB | Facility: HOSPITAL | Age: 71
End: 2021-08-16

## 2021-08-16 DIAGNOSIS — Z00.00 GENERAL MEDICAL EXAM: ICD-10-CM

## 2021-08-16 DIAGNOSIS — I10 HYPERTENSION, UNSPECIFIED TYPE: ICD-10-CM

## 2021-08-16 DIAGNOSIS — E78.5 HYPERLIPIDEMIA, UNSPECIFIED HYPERLIPIDEMIA TYPE: ICD-10-CM

## 2021-08-16 DIAGNOSIS — Z00.00 GENERAL MEDICAL EXAM: Primary | ICD-10-CM

## 2021-08-16 LAB
ALBUMIN SERPL-MCNC: 3.9 G/DL (ref 3.5–5.2)
ALBUMIN/GLOB SERPL: 1.2 G/DL
ALP SERPL-CCNC: 85 U/L (ref 39–117)
ALT SERPL W P-5'-P-CCNC: 25 U/L (ref 1–33)
ANION GAP SERPL CALCULATED.3IONS-SCNC: 11.2 MMOL/L (ref 5–15)
AST SERPL-CCNC: 17 U/L (ref 1–32)
BACTERIA UR QL AUTO: ABNORMAL /HPF
BASOPHILS # BLD AUTO: 0.11 10*3/MM3 (ref 0–0.2)
BASOPHILS NFR BLD AUTO: 1.6 % (ref 0–1.5)
BILIRUB SERPL-MCNC: 0.3 MG/DL (ref 0–1.2)
BILIRUB UR QL STRIP: NEGATIVE
BUN SERPL-MCNC: 15 MG/DL (ref 8–23)
BUN/CREAT SERPL: 19.2 (ref 7–25)
CALCIUM SPEC-SCNC: 9.2 MG/DL (ref 8.6–10.5)
CHLORIDE SERPL-SCNC: 107 MMOL/L (ref 98–107)
CHOLEST SERPL-MCNC: 127 MG/DL (ref 0–200)
CLARITY UR: CLEAR
CO2 SERPL-SCNC: 21.8 MMOL/L (ref 22–29)
COLOR UR: YELLOW
CREAT SERPL-MCNC: 0.78 MG/DL (ref 0.57–1)
DEPRECATED RDW RBC AUTO: 48.1 FL (ref 37–54)
EOSINOPHIL # BLD AUTO: 0.16 10*3/MM3 (ref 0–0.4)
EOSINOPHIL NFR BLD AUTO: 2.3 % (ref 0.3–6.2)
ERYTHROCYTE [DISTWIDTH] IN BLOOD BY AUTOMATED COUNT: 14.5 % (ref 12.3–15.4)
GFR SERPL CREATININE-BSD FRML MDRD: 73 ML/MIN/1.73
GLOBULIN UR ELPH-MCNC: 3.2 GM/DL
GLUCOSE SERPL-MCNC: 112 MG/DL (ref 65–99)
GLUCOSE UR STRIP-MCNC: ABNORMAL MG/DL
HCT VFR BLD AUTO: 44.6 % (ref 34–46.6)
HDLC SERPL-MCNC: 73 MG/DL (ref 40–60)
HGB BLD-MCNC: 13.9 G/DL (ref 12–15.9)
HGB UR QL STRIP.AUTO: ABNORMAL
HYALINE CASTS UR QL AUTO: ABNORMAL /LPF
IMM GRANULOCYTES # BLD AUTO: 0.02 10*3/MM3 (ref 0–0.05)
IMM GRANULOCYTES NFR BLD AUTO: 0.3 % (ref 0–0.5)
KETONES UR QL STRIP: NEGATIVE
LDLC SERPL CALC-MCNC: 41 MG/DL (ref 0–100)
LDLC/HDLC SERPL: 0.57 {RATIO}
LEUKOCYTE ESTERASE UR QL STRIP.AUTO: NEGATIVE
LYMPHOCYTES # BLD AUTO: 2.6 10*3/MM3 (ref 0.7–3.1)
LYMPHOCYTES NFR BLD AUTO: 36.7 % (ref 19.6–45.3)
MCH RBC QN AUTO: 28.2 PG (ref 26.6–33)
MCHC RBC AUTO-ENTMCNC: 31.2 G/DL (ref 31.5–35.7)
MCV RBC AUTO: 90.5 FL (ref 79–97)
MONOCYTES # BLD AUTO: 0.55 10*3/MM3 (ref 0.1–0.9)
MONOCYTES NFR BLD AUTO: 7.8 % (ref 5–12)
NEUTROPHILS NFR BLD AUTO: 3.64 10*3/MM3 (ref 1.7–7)
NEUTROPHILS NFR BLD AUTO: 51.3 % (ref 42.7–76)
NITRITE UR QL STRIP: NEGATIVE
NRBC BLD AUTO-RTO: 0 /100 WBC (ref 0–0.2)
PH UR STRIP.AUTO: 5.5 [PH] (ref 5–8)
PLATELET # BLD AUTO: 231 10*3/MM3 (ref 140–450)
PMV BLD AUTO: 11.2 FL (ref 6–12)
POTASSIUM SERPL-SCNC: 4.1 MMOL/L (ref 3.5–5.2)
PROT SERPL-MCNC: 7.1 G/DL (ref 6–8.5)
PROT UR QL STRIP: ABNORMAL
RBC # BLD AUTO: 4.93 10*6/MM3 (ref 3.77–5.28)
RBC # UR: ABNORMAL /HPF
REF LAB TEST METHOD: ABNORMAL
SODIUM SERPL-SCNC: 140 MMOL/L (ref 136–145)
SP GR UR STRIP: 1.03 (ref 1–1.03)
SQUAMOUS #/AREA URNS HPF: ABNORMAL /HPF
TRIGL SERPL-MCNC: 61 MG/DL (ref 0–150)
UROBILINOGEN UR QL STRIP: ABNORMAL
VLDLC SERPL-MCNC: 13 MG/DL (ref 5–40)
WBC # BLD AUTO: 7.08 10*3/MM3 (ref 3.4–10.8)
WBC UR QL AUTO: ABNORMAL /HPF

## 2021-08-16 PROCEDURE — 36415 COLL VENOUS BLD VENIPUNCTURE: CPT

## 2021-08-16 PROCEDURE — 80053 COMPREHEN METABOLIC PANEL: CPT

## 2021-08-16 PROCEDURE — 80061 LIPID PANEL: CPT

## 2021-08-16 PROCEDURE — 85025 COMPLETE CBC W/AUTO DIFF WBC: CPT

## 2021-08-16 PROCEDURE — 81001 URINALYSIS AUTO W/SCOPE: CPT

## 2021-08-16 RX ORDER — ERGOCALCIFEROL 1.25 MG/1
CAPSULE ORAL
Qty: 12 CAPSULE | Refills: 1 | Status: SHIPPED | OUTPATIENT
Start: 2021-08-16 | End: 2022-07-21 | Stop reason: SDUPTHER

## 2022-01-20 RX ORDER — SITAGLIPTIN 100 MG/1
TABLET, FILM COATED ORAL
Qty: 90 TABLET | Refills: 3 | Status: SHIPPED | OUTPATIENT
Start: 2022-01-20 | End: 2023-01-19

## 2022-02-21 RX ORDER — INSULIN GLARGINE 300 U/ML
40 INJECTION, SOLUTION SUBCUTANEOUS DAILY
Qty: 9 ML | Refills: 4 | Status: SHIPPED | OUTPATIENT
Start: 2022-02-21 | End: 2022-07-21

## 2022-02-23 ENCOUNTER — TRANSCRIBE ORDERS (OUTPATIENT)
Dept: ADMINISTRATIVE | Facility: HOSPITAL | Age: 72
End: 2022-02-23

## 2022-02-23 ENCOUNTER — LAB (OUTPATIENT)
Dept: LAB | Facility: HOSPITAL | Age: 72
End: 2022-02-23

## 2022-02-23 DIAGNOSIS — I10 ESSENTIAL HYPERTENSION, MALIGNANT: ICD-10-CM

## 2022-02-23 DIAGNOSIS — E78.5 HYPERLIPIDEMIA, UNSPECIFIED HYPERLIPIDEMIA TYPE: Primary | ICD-10-CM

## 2022-02-23 DIAGNOSIS — E78.5 HYPERLIPIDEMIA, UNSPECIFIED HYPERLIPIDEMIA TYPE: ICD-10-CM

## 2022-02-23 LAB
ALBUMIN SERPL-MCNC: 3.9 G/DL (ref 3.5–5.2)
ALBUMIN/GLOB SERPL: 1.2 G/DL
ALP SERPL-CCNC: 94 U/L (ref 39–117)
ALT SERPL W P-5'-P-CCNC: 29 U/L (ref 1–33)
ANION GAP SERPL CALCULATED.3IONS-SCNC: 10 MMOL/L (ref 5–15)
AST SERPL-CCNC: 24 U/L (ref 1–32)
BILIRUB SERPL-MCNC: 0.3 MG/DL (ref 0–1.2)
BUN SERPL-MCNC: 17 MG/DL (ref 8–23)
BUN/CREAT SERPL: 22.1 (ref 7–25)
CALCIUM SPEC-SCNC: 9 MG/DL (ref 8.6–10.5)
CHLORIDE SERPL-SCNC: 107 MMOL/L (ref 98–107)
CHOLEST SERPL-MCNC: 171 MG/DL (ref 0–200)
CO2 SERPL-SCNC: 24 MMOL/L (ref 22–29)
CREAT SERPL-MCNC: 0.77 MG/DL (ref 0.57–1)
GFR SERPL CREATININE-BSD FRML MDRD: 74 ML/MIN/1.73
GLOBULIN UR ELPH-MCNC: 3.2 GM/DL
GLUCOSE SERPL-MCNC: 111 MG/DL (ref 65–99)
HDLC SERPL-MCNC: 67 MG/DL (ref 40–60)
LDLC SERPL CALC-MCNC: 90 MG/DL (ref 0–100)
LDLC/HDLC SERPL: 1.33 {RATIO}
POTASSIUM SERPL-SCNC: 4.4 MMOL/L (ref 3.5–5.2)
PROT SERPL-MCNC: 7.1 G/DL (ref 6–8.5)
SODIUM SERPL-SCNC: 141 MMOL/L (ref 136–145)
TRIGL SERPL-MCNC: 74 MG/DL (ref 0–150)
VLDLC SERPL-MCNC: 14 MG/DL (ref 5–40)

## 2022-02-23 PROCEDURE — 80061 LIPID PANEL: CPT

## 2022-02-23 PROCEDURE — 80053 COMPREHEN METABOLIC PANEL: CPT

## 2022-02-23 PROCEDURE — 36415 COLL VENOUS BLD VENIPUNCTURE: CPT

## 2022-03-21 ENCOUNTER — OFFICE VISIT (OUTPATIENT)
Dept: ENDOCRINOLOGY | Age: 72
End: 2022-03-21

## 2022-03-21 VITALS
HEIGHT: 68 IN | BODY MASS INDEX: 33.65 KG/M2 | HEART RATE: 89 BPM | WEIGHT: 222 LBS | SYSTOLIC BLOOD PRESSURE: 138 MMHG | OXYGEN SATURATION: 97 % | DIASTOLIC BLOOD PRESSURE: 73 MMHG

## 2022-03-21 DIAGNOSIS — E03.9 ACQUIRED HYPOTHYROIDISM: ICD-10-CM

## 2022-03-21 DIAGNOSIS — Z79.4 TYPE 2 DIABETES MELLITUS WITH HYPERGLYCEMIA, WITH LONG-TERM CURRENT USE OF INSULIN: Primary | ICD-10-CM

## 2022-03-21 DIAGNOSIS — E11.65 TYPE 2 DIABETES MELLITUS WITH HYPERGLYCEMIA, WITH LONG-TERM CURRENT USE OF INSULIN: Primary | ICD-10-CM

## 2022-03-21 DIAGNOSIS — Z79.4 LONG-TERM INSULIN USE: ICD-10-CM

## 2022-03-21 PROCEDURE — 95251 CONT GLUC MNTR ANALYSIS I&R: CPT | Performed by: INTERNAL MEDICINE

## 2022-03-21 PROCEDURE — 99214 OFFICE O/P EST MOD 30 MIN: CPT | Performed by: INTERNAL MEDICINE

## 2022-03-21 NOTE — PROGRESS NOTES
"Chief Complaint  Chief Complaint   Patient presents with   • Diabetes     TYPE 2        Subjective          History of Present Illness    Mayra Hirsch 71 y.o. presents with Type 2 dm as a f/u    Type 2 dm - Diagnosed about few years ago.   Today in clinic pt reports being on Toujeo 40 units at bed time, januvia 100 mg po daily, farxiga 10 mg po daily, not taking the glimepiride   FBG - 100 - 120  Pre meals - 100 - 130  Checks BG - 4 - 5 times a day  Sensor - yes, freestyle  Dm retinopathy - yes ,Last eye exam - 3 weeks ago  Dm nephropathy - x  Dm neuropathy -x ,Dm neuropathy meds -   CAD - x  CVA -x  Episodes of hypoglycemia - yes, she started watching   Pt is physically active. weight has been stable.   Pt tries to follow DM diet for most part.   On Ace inb.      Reviewed primary care physician's/consulting physician documentation and lab results         I have reviewed the patient's allergies, medicines, past medical hx, family hx and social hx in detail.    Objective   Vital Signs:   /73   Pulse 89   Ht 172.7 cm (68\")   Wt 101 kg (222 lb)   SpO2 97%   BMI 33.75 kg/m²   Physical Exam   General appearance - no distress  Eyes- anicteric sclera  Ear nose and throat-external ears and nose normal.    Respiratory-normal chest on inspection.  No respiratory distress noted.  Skin-no rashes.  Neuro-alert and oriented x3            Result Review :   The following data was reviewed by: Asif Rodriguez MD on 03/21/2022:  Lab on 02/23/2022   Component Date Value Ref Range Status   • Glucose 02/23/2022 111 (A) 65 - 99 mg/dL Final   • BUN 02/23/2022 17  8 - 23 mg/dL Final   • Creatinine 02/23/2022 0.77  0.57 - 1.00 mg/dL Final   • Sodium 02/23/2022 141  136 - 145 mmol/L Final   • Potassium 02/23/2022 4.4  3.5 - 5.2 mmol/L Final   • Chloride 02/23/2022 107  98 - 107 mmol/L Final   • CO2 02/23/2022 24.0  22.0 - 29.0 mmol/L Final   • Calcium 02/23/2022 9.0  8.6 - 10.5 mg/dL Final   • Total Protein 02/23/2022 7.1  6.0 - " 8.5 g/dL Final   • Albumin 02/23/2022 3.90  3.50 - 5.20 g/dL Final   • ALT (SGPT) 02/23/2022 29  1 - 33 U/L Final   • AST (SGOT) 02/23/2022 24  1 - 32 U/L Final   • Alkaline Phosphatase 02/23/2022 94  39 - 117 U/L Final   • Total Bilirubin 02/23/2022 0.3  0.0 - 1.2 mg/dL Final   • eGFR Non  Amer 02/23/2022 74  >60 mL/min/1.73 Final   • Globulin 02/23/2022 3.2  gm/dL Final   • A/G Ratio 02/23/2022 1.2  g/dL Final   • BUN/Creatinine Ratio 02/23/2022 22.1  7.0 - 25.0 Final   • Anion Gap 02/23/2022 10.0  5.0 - 15.0 mmol/L Final   • Total Cholesterol 02/23/2022 171  0 - 200 mg/dL Final   • Triglycerides 02/23/2022 74  0 - 150 mg/dL Final   • HDL Cholesterol 02/23/2022 67 (A) 40 - 60 mg/dL Final   • LDL Cholesterol  02/23/2022 90  0 - 100 mg/dL Final   • VLDL Cholesterol 02/23/2022 14  5 - 40 mg/dL Final   • LDL/HDL Ratio 02/23/2022 1.33   Final     Data reviewed: PCP notes       Results Review:    I reviewed the patient's new clinical results.     Assessment and Plan    Problem List Items Addressed This Visit    None     Visit Diagnoses     Type 2 diabetes mellitus with hyperglycemia, with long-term current use of insulin (HCC)    -  Primary    Relevant Orders    Basic Metabolic Panel    Hemoglobin A1c    Lipid Panel    TSH    T4, Free    Long-term insulin use (HCC)        Relevant Orders    Basic Metabolic Panel    Hemoglobin A1c    Lipid Panel    TSH    T4, Free    Acquired hypothyroidism        Relevant Orders    Basic Metabolic Panel    Hemoglobin A1c    Lipid Panel    TSH    T4, Free        Type 2 diabetes mellitus-uncontrolled with hypoglycemia  Take toujeo 35 units at bed time, if continuing to have low bg in the next 2 weeks then decrease to 32 units.   If still having lows on the 32 units of the toujeo then alert us.     On the toujeo 35 units if having high bg alerts often then please let us know.   Continue Januvia, continue Farxiga.    Hypothyroidism  Continue levothyroxine 125 mcg x 2 tablets  "daily    Interpreted the information of the CGM after downloading it and made the current insulin changes.  Avg bg -120-130  Trends noted -decent control      Interpreted the blood work-up/imaging results performed by the primary care/consulting physician -    Refills sent to pharmacy    Follow Up     Patient was given instructions and counseling regarding her condition or for health maintenance advice. Please see specific information pulled into the AVS if appropriate.       Thank you for asking me to see your patient, Mayra Hirsch in consultation.         Asif Rodriguez MD  03/21/22      EMR Dragon / transcription disclaimer:     \"Dictated utilizing Dragon dictation\".         "

## 2022-04-20 RX ORDER — DAPAGLIFLOZIN 10 MG/1
TABLET, FILM COATED ORAL
Qty: 90 TABLET | Refills: 3 | Status: SHIPPED | OUTPATIENT
Start: 2022-04-20 | End: 2023-03-23 | Stop reason: SDUPTHER

## 2022-04-20 RX ORDER — LEVOTHYROXINE SODIUM 0.12 MG/1
TABLET ORAL
Qty: 180 TABLET | Refills: 3 | Status: SHIPPED | OUTPATIENT
Start: 2022-04-20

## 2022-05-31 DIAGNOSIS — Z79.4 TYPE 2 DIABETES MELLITUS WITH HYPERGLYCEMIA, WITH LONG-TERM CURRENT USE OF INSULIN: Primary | ICD-10-CM

## 2022-05-31 DIAGNOSIS — E11.65 TYPE 2 DIABETES MELLITUS WITH HYPERGLYCEMIA, WITH LONG-TERM CURRENT USE OF INSULIN: Primary | ICD-10-CM

## 2022-07-13 ENCOUNTER — DOCUMENTATION (OUTPATIENT)
Dept: ENDOCRINOLOGY | Age: 72
End: 2022-07-13

## 2022-07-13 NOTE — PROGRESS NOTES
Benefits Investigation Summary    Prescription: Renewal     Dispensing [facility]    Copay amount: farxiga $106, toujeo 8/22    Pharmacy Payor: RX [facility]  Pharmacy Plan: [facility]  BIN: [id number]  PCN: [id number]  GROUP: [id number]    Prior Auth and Med Assistance notes:

## 2022-07-21 ENCOUNTER — OFFICE VISIT (OUTPATIENT)
Dept: ENDOCRINOLOGY | Age: 72
End: 2022-07-21

## 2022-07-21 VITALS
WEIGHT: 221.8 LBS | HEIGHT: 68 IN | SYSTOLIC BLOOD PRESSURE: 134 MMHG | BODY MASS INDEX: 33.62 KG/M2 | TEMPERATURE: 97.3 F | OXYGEN SATURATION: 98 % | DIASTOLIC BLOOD PRESSURE: 76 MMHG | HEART RATE: 85 BPM

## 2022-07-21 DIAGNOSIS — E11.65 TYPE 2 DIABETES MELLITUS WITH HYPERGLYCEMIA, WITH LONG-TERM CURRENT USE OF INSULIN: Primary | ICD-10-CM

## 2022-07-21 DIAGNOSIS — E78.5 DYSLIPIDEMIA: ICD-10-CM

## 2022-07-21 DIAGNOSIS — E03.9 ACQUIRED HYPOTHYROIDISM: ICD-10-CM

## 2022-07-21 DIAGNOSIS — Z79.4 TYPE 2 DIABETES MELLITUS WITH HYPERGLYCEMIA, WITH LONG-TERM CURRENT USE OF INSULIN: Primary | ICD-10-CM

## 2022-07-21 PROCEDURE — 99214 OFFICE O/P EST MOD 30 MIN: CPT | Performed by: NURSE PRACTITIONER

## 2022-07-21 PROCEDURE — 95251 CONT GLUC MNTR ANALYSIS I&R: CPT | Performed by: NURSE PRACTITIONER

## 2022-07-21 RX ORDER — ERGOCALCIFEROL 1.25 MG/1
CAPSULE ORAL
Qty: 12 CAPSULE | Refills: 1 | Status: ON HOLD | OUTPATIENT
Start: 2022-07-21 | End: 2022-11-10

## 2022-07-21 RX ORDER — INSULIN GLARGINE 300 U/ML
36 INJECTION, SOLUTION SUBCUTANEOUS DAILY
Qty: 9 ML | Refills: 4 | Status: SHIPPED | OUTPATIENT
Start: 2022-07-21

## 2022-07-21 NOTE — PROGRESS NOTES
"Chief Complaint  Diabetes    Subjective        Mayra Hirsch presents to [facility]  History of Present Illness     Only checking BS once a day with jak     Type 2 dm    Diagnosed about a few years ago.   Today in clinic pt reports being on Toujeo 32 units at bed time, januvia 100 mg po daily, farxiga 10 mg po daily  Checks BG - 1x/day  Sensor - yes, freestyle, only scanning once a day  CGM is only active 21% of the time  Average glucose 133  Variability 21%  Very high 0%  High 9%  Target range 91%  Low 0%  Data is only available from 11 PM till 7 AM when blood sugars trend within normal limits  Dm retinopathy - yes ,Last eye exam -  UTD 2022  Dm nephropathy - x  Dm neuropathy -x   CAD - x  CVA -x  Episodes of hypoglycemia - denies  Failed keto diet   On Ace inb and statin       Objective   Vital Signs:  /76   Pulse 85   Temp 97.3 °F (36.3 °C)   Ht 172.7 cm (68\")   Wt 101 kg (221 lb 12.8 oz)   SpO2 98%   BMI 33.72 kg/m²   Estimated body mass index is 33.72 kg/m² as calculated from the following:    Height as of this encounter: 172.7 cm (68\").    Weight as of this encounter: 101 kg (221 lb 12.8 oz).          Physical Exam  Vitals reviewed.   Constitutional:       General: She is not in acute distress.  HENT:      Head: Normocephalic and atraumatic.   Cardiovascular:      Rate and Rhythm: Normal rate and regular rhythm.   Pulmonary:      Effort: Pulmonary effort is normal. No respiratory distress.   Musculoskeletal:         General: No signs of injury. Normal range of motion.      Cervical back: Normal range of motion and neck supple.   Skin:     General: Skin is warm and dry.   Neurological:      Mental Status: She is alert and oriented to person, place, and time. Mental status is at baseline.   Psychiatric:         Mood and Affect: Mood normal.         Behavior: Behavior normal.         Thought Content: Thought content normal.         Judgment: Judgment normal.      "     Result Review :  The following data was reviewed by: GUEVARA Carvajal on 07/21/2022:  Common labs    Common Labsle 2/23/22 2/23/22 3/7/22 3/7/22 3/7/22 3/7/22 7/7/22 7/7/22 7/7/22    1041 1041 0945 0945 0945 0945 1039 1039 1039   Glucose  111 (A)     207 (A)     BUN  17     23     Creatinine  0.77  0.73   0.86     eGFR Non African Am  74          Sodium  141     140     Potassium  4.4     4.5     Chloride  107     106     Calcium  9.0     9.5     Albumin  3.90          Total Bilirubin  0.3          Alkaline Phosphatase  94          AST (SGOT)  24          ALT (SGPT)  29          Total Cholesterol 171           Total Cholesterol     138    137   Triglycerides 74    66    71   HDL Cholesterol 67 (A)    69    64   LDL Cholesterol  90    56    59   Hemoglobin A1C   7.1 (A)     8.1 (A)    Microalbumin, Urine      311.0      (A) Abnormal value       Comments are available for some flowsheets but are not being displayed.                     Assessment and Plan   Diagnoses and all orders for this visit:    1. Type 2 diabetes mellitus with hyperglycemia, with long-term current use of insulin (HCC) (Primary)  -     Ambulatory Referral to Diabetic Education  -     Hemoglobin A1c; Future  -     Microalbumin / Creatinine Urine Ratio - Urine, Clean Catch; Future    2. Acquired hypothyroidism  -     Hemoglobin A1c; Future  -     Microalbumin / Creatinine Urine Ratio - Urine, Clean Catch; Future    3. Dyslipidemia    Other orders  -     vitamin D (ERGOCALCIFEROL) 1.25 MG (45363 UT) capsule capsule; TAKE ONE CAPSULE BY MOUTH ONCE WEEKLY  Dispense: 12 capsule; Refill: 1  -     Toujeo SoloStar 300 UNIT/ML solution pen-injector injection; Inject 36 Units under the skin into the appropriate area as directed Daily.  Dispense: 9 mL; Refill: 4             Follow Up   No follow-ups on file.   Based on jak view with patient and worsening a1c,  Check Bs 4x/day with jak-discussed her ability to monitor her blood sugar throughout  the day to make treatment decisions and lifestyle decisions to improve her blood sugar control if she is aware of her blood sugar trends, what affects her blood sugar and her lifestyle choices in relationship to her blood sugar control  Increase toujeo to 36u daily   Continue januvia and farxiga  Continue statin, ldl at goal  tsh favorable, levothyroxine 125mcg daily    Patient was given instructions and counseling regarding her condition or for health maintenance advice. Please see specific information pulled into the AVS if appropriate.     Eva Thompson APRN

## 2022-08-17 ENCOUNTER — LAB (OUTPATIENT)
Dept: LAB | Facility: HOSPITAL | Age: 72
End: 2022-08-17

## 2022-08-17 ENCOUNTER — TRANSCRIBE ORDERS (OUTPATIENT)
Dept: ADMINISTRATIVE | Facility: HOSPITAL | Age: 72
End: 2022-08-17

## 2022-08-17 DIAGNOSIS — Z00.00 ROUTINE GENERAL MEDICAL EXAMINATION AT A HEALTH CARE FACILITY: ICD-10-CM

## 2022-08-17 DIAGNOSIS — Z00.00 ROUTINE GENERAL MEDICAL EXAMINATION AT A HEALTH CARE FACILITY: Primary | ICD-10-CM

## 2022-08-17 DIAGNOSIS — I10 ESSENTIAL HYPERTENSION, MALIGNANT: ICD-10-CM

## 2022-08-17 DIAGNOSIS — E78.5 HYPERLIPIDEMIA, UNSPECIFIED HYPERLIPIDEMIA TYPE: ICD-10-CM

## 2022-08-17 LAB
ALBUMIN SERPL-MCNC: 3.8 G/DL (ref 3.5–5.2)
ALBUMIN/GLOB SERPL: 1.5 G/DL
ALP SERPL-CCNC: 90 U/L (ref 39–117)
ALT SERPL W P-5'-P-CCNC: 22 U/L (ref 1–33)
ANION GAP SERPL CALCULATED.3IONS-SCNC: 12 MMOL/L (ref 5–15)
AST SERPL-CCNC: 16 U/L (ref 1–32)
BACTERIA UR QL AUTO: ABNORMAL /HPF
BASOPHILS # BLD AUTO: 0.1 10*3/MM3 (ref 0–0.2)
BASOPHILS NFR BLD AUTO: 1.5 % (ref 0–1.5)
BILIRUB SERPL-MCNC: 0.3 MG/DL (ref 0–1.2)
BILIRUB UR QL STRIP: NEGATIVE
BUN SERPL-MCNC: 14 MG/DL (ref 8–23)
BUN/CREAT SERPL: 20.6 (ref 7–25)
CALCIUM SPEC-SCNC: 9.1 MG/DL (ref 8.6–10.5)
CHLORIDE SERPL-SCNC: 109 MMOL/L (ref 98–107)
CHOLEST SERPL-MCNC: 139 MG/DL (ref 0–200)
CLARITY UR: CLEAR
CO2 SERPL-SCNC: 22 MMOL/L (ref 22–29)
COLOR UR: YELLOW
CREAT SERPL-MCNC: 0.68 MG/DL (ref 0.57–1)
DEPRECATED RDW RBC AUTO: 45.8 FL (ref 37–54)
EGFRCR SERPLBLD CKD-EPI 2021: 93.2 ML/MIN/1.73
EOSINOPHIL # BLD AUTO: 0.14 10*3/MM3 (ref 0–0.4)
EOSINOPHIL NFR BLD AUTO: 2.2 % (ref 0.3–6.2)
ERYTHROCYTE [DISTWIDTH] IN BLOOD BY AUTOMATED COUNT: 14 % (ref 12.3–15.4)
GLOBULIN UR ELPH-MCNC: 2.6 GM/DL
GLUCOSE SERPL-MCNC: 123 MG/DL (ref 65–99)
GLUCOSE UR STRIP-MCNC: ABNORMAL MG/DL
HCT VFR BLD AUTO: 40.7 % (ref 34–46.6)
HDLC SERPL-MCNC: 63 MG/DL (ref 40–60)
HGB BLD-MCNC: 13 G/DL (ref 12–15.9)
HGB UR QL STRIP.AUTO: ABNORMAL
HYALINE CASTS UR QL AUTO: ABNORMAL /LPF
IMM GRANULOCYTES # BLD AUTO: 0.02 10*3/MM3 (ref 0–0.05)
IMM GRANULOCYTES NFR BLD AUTO: 0.3 % (ref 0–0.5)
KETONES UR QL STRIP: NEGATIVE
LDLC SERPL CALC-MCNC: 61 MG/DL (ref 0–100)
LDLC/HDLC SERPL: 0.97 {RATIO}
LEUKOCYTE ESTERASE UR QL STRIP.AUTO: NEGATIVE
LYMPHOCYTES # BLD AUTO: 2.04 10*3/MM3 (ref 0.7–3.1)
LYMPHOCYTES NFR BLD AUTO: 31.5 % (ref 19.6–45.3)
MCH RBC QN AUTO: 28.8 PG (ref 26.6–33)
MCHC RBC AUTO-ENTMCNC: 31.9 G/DL (ref 31.5–35.7)
MCV RBC AUTO: 90.2 FL (ref 79–97)
MONOCYTES # BLD AUTO: 0.62 10*3/MM3 (ref 0.1–0.9)
MONOCYTES NFR BLD AUTO: 9.6 % (ref 5–12)
NEUTROPHILS NFR BLD AUTO: 3.55 10*3/MM3 (ref 1.7–7)
NEUTROPHILS NFR BLD AUTO: 54.9 % (ref 42.7–76)
NITRITE UR QL STRIP: POSITIVE
NRBC BLD AUTO-RTO: 0 /100 WBC (ref 0–0.2)
PH UR STRIP.AUTO: 5.5 [PH] (ref 5–8)
PLATELET # BLD AUTO: 225 10*3/MM3 (ref 140–450)
PMV BLD AUTO: 10.8 FL (ref 6–12)
POTASSIUM SERPL-SCNC: 4.1 MMOL/L (ref 3.5–5.2)
PROT SERPL-MCNC: 6.4 G/DL (ref 6–8.5)
PROT UR QL STRIP: ABNORMAL
RBC # BLD AUTO: 4.51 10*6/MM3 (ref 3.77–5.28)
RBC # UR STRIP: ABNORMAL /HPF
REF LAB TEST METHOD: ABNORMAL
SODIUM SERPL-SCNC: 143 MMOL/L (ref 136–145)
SP GR UR STRIP: >=1.03 (ref 1–1.03)
SQUAMOUS #/AREA URNS HPF: ABNORMAL /HPF
TRIGL SERPL-MCNC: 75 MG/DL (ref 0–150)
UROBILINOGEN UR QL STRIP: ABNORMAL
VLDLC SERPL-MCNC: 15 MG/DL (ref 5–40)
WBC # UR STRIP: ABNORMAL /HPF
WBC NRBC COR # BLD: 6.47 10*3/MM3 (ref 3.4–10.8)

## 2022-08-17 PROCEDURE — 80061 LIPID PANEL: CPT

## 2022-08-17 PROCEDURE — 36415 COLL VENOUS BLD VENIPUNCTURE: CPT

## 2022-08-17 PROCEDURE — 80053 COMPREHEN METABOLIC PANEL: CPT

## 2022-08-17 PROCEDURE — 85025 COMPLETE CBC W/AUTO DIFF WBC: CPT

## 2022-08-17 PROCEDURE — 81001 URINALYSIS AUTO W/SCOPE: CPT

## 2022-09-08 ENCOUNTER — HOSPITAL ENCOUNTER (OUTPATIENT)
Dept: DIABETES SERVICES | Facility: HOSPITAL | Age: 72
Discharge: HOME | End: 2022-09-08
Admitting: NURSE PRACTITIONER

## 2022-09-08 PROCEDURE — 97802 MEDICAL NUTRITION INDIV IN: CPT

## 2022-09-08 NOTE — CONSULTS
Ms. Hirsch met with MACY BROWN for MNT for Type 2 diabetes. Consistent with the ADA’s standards of care, a comprehensive assessment/training record has been sent to medical records (see media tab) to associate with this encounter.      Patient’s most recent Hemoglobin A1c = 8.1% Reports that she has a Freestyle Gene. Reports that her fasting blood glucose readings had ranged from 100 to 110 mg/dl. Reports that her post prandial readings had been in the 200’s up to 250 mg/dl - states she had been eating chips and candy. Reports that she has cut those out of her diet and now her two hour post prandial readings have been less than 180 mg/dl. Reports that she has been trying to limit her carb intake, but feels like she continues to eat large portions of carbohydrates. We discussed the importance of consistent carb intake - we reviewed the importance of spreading her carb intake evenly throughout the day for optimal blood glucose control. She was advised to consume 45 of carbs at meals and to eat 15 grams of carb at snacks. We discussed the importance of including protein with all meals and snacks. We discussed meal planning - she was advised to follow “MyPlate” method to plan meals. We reviewed reading food labels. Patient verbalized understanding of all the information we discussed at today’s visit.      Ms. Hirsch has been encouraged to call our office with questions or additional education needs. Please place referral for additional services or follow-up should need arise.     Thank you for the referral     Joana Hinojosa RD, TAMIKA, KEVIN

## 2022-09-17 DIAGNOSIS — Z79.4 TYPE 2 DIABETES MELLITUS WITH HYPERGLYCEMIA, WITH LONG-TERM CURRENT USE OF INSULIN: Primary | ICD-10-CM

## 2022-09-17 DIAGNOSIS — E11.65 TYPE 2 DIABETES MELLITUS WITH HYPERGLYCEMIA, WITH LONG-TERM CURRENT USE OF INSULIN: Primary | ICD-10-CM

## 2022-10-28 DIAGNOSIS — E03.9 ACQUIRED HYPOTHYROIDISM: ICD-10-CM

## 2022-10-28 DIAGNOSIS — Z79.4 TYPE 2 DIABETES MELLITUS WITH HYPERGLYCEMIA, WITH LONG-TERM CURRENT USE OF INSULIN: ICD-10-CM

## 2022-10-28 DIAGNOSIS — E11.65 TYPE 2 DIABETES MELLITUS WITH HYPERGLYCEMIA, WITH LONG-TERM CURRENT USE OF INSULIN: ICD-10-CM

## 2022-10-28 NOTE — TELEPHONE ENCOUNTER
Rx Refill Note  Requested Prescriptions     Pending Prescriptions Disp Refills   • Continuous Blood Gluc Sensor (FreeStyle Gene 2 Sensor) misc 1 each 0     Si each.      Last office visit with prescribing clinician: 2022      Next office visit with prescribing clinician: Visit date not found            Karla Bender MA  10/28/22, 11:12 EDT

## 2022-10-29 LAB
ALBUMIN/CREAT UR: 391 MG/G CREAT (ref 0–29)
CREAT UR-MCNC: 118.8 MG/DL
HBA1C MFR BLD: 7.6 % (ref 4.8–5.6)
MICROALBUMIN UR-MCNC: 465.1 UG/ML

## 2022-11-07 ENCOUNTER — APPOINTMENT (OUTPATIENT)
Dept: GENERAL RADIOLOGY | Facility: HOSPITAL | Age: 72
End: 2022-11-07

## 2022-11-07 ENCOUNTER — HOSPITAL ENCOUNTER (INPATIENT)
Facility: HOSPITAL | Age: 72
LOS: 12 days | Discharge: HOME HEALTH | End: 2022-11-19
Attending: EMERGENCY MEDICINE | Admitting: INTERNAL MEDICINE

## 2022-11-07 ENCOUNTER — APPOINTMENT (OUTPATIENT)
Dept: CARDIOLOGY | Facility: HOSPITAL | Age: 72
End: 2022-11-07

## 2022-11-07 DIAGNOSIS — R93.89 ABNORMAL FINDINGS ON DIAGNOSTIC IMAGING OF OTHER SPECIFIED BODY STRUCTURES: ICD-10-CM

## 2022-11-07 DIAGNOSIS — I21.29 ST ELEVATION MYOCARDIAL INFARCTION (STEMI) INVOLVING OTHER CORONARY ARTERY: ICD-10-CM

## 2022-11-07 DIAGNOSIS — I21.3 ST ELEVATION MYOCARDIAL INFARCTION (STEMI), UNSPECIFIED ARTERY: Primary | ICD-10-CM

## 2022-11-07 DIAGNOSIS — I25.10 CORONARY ARTERY DISEASE INVOLVING NATIVE HEART, UNSPECIFIED VESSEL OR LESION TYPE, UNSPECIFIED WHETHER ANGINA PRESENT: ICD-10-CM

## 2022-11-07 DIAGNOSIS — Z95.1 S/P CABG (CORONARY ARTERY BYPASS GRAFT): ICD-10-CM

## 2022-11-07 LAB
ACT BLD: 451 SECONDS (ref 82–152)
ALBUMIN SERPL-MCNC: 3.7 G/DL (ref 3.5–5.2)
ALBUMIN/GLOB SERPL: 1.1 G/DL
ALP SERPL-CCNC: 94 U/L (ref 39–117)
ALT SERPL W P-5'-P-CCNC: 21 U/L (ref 1–33)
ANION GAP SERPL CALCULATED.3IONS-SCNC: 10.9 MMOL/L (ref 5–15)
ANION GAP SERPL CALCULATED.3IONS-SCNC: 11.2 MMOL/L (ref 5–15)
APTT PPP: 27 SECONDS (ref 22.7–35.4)
AST SERPL-CCNC: 23 U/L (ref 1–32)
BASOPHILS # BLD AUTO: 0.15 10*3/MM3 (ref 0–0.2)
BASOPHILS NFR BLD AUTO: 1.3 % (ref 0–1.5)
BH CV XLRA MEAS - DIST GSV CALF DIST LEFT: 0.25 CM
BH CV XLRA MEAS - DIST GSV CALF DIST RIGHT: 0.24 CM
BH CV XLRA MEAS - DIST GSV THIGH DIST LEFT: 0.27 CM
BH CV XLRA MEAS - DIST GSV THIGH DIST RIGHT: 0.31 CM
BH CV XLRA MEAS - DIST LSV CALF DIST LEFT: 0.13 CM
BH CV XLRA MEAS - DIST LSV CALF DIST RIGHT: 0.17 CM
BH CV XLRA MEAS - GSV ANKLE DIST LEFT: 0.25 CM
BH CV XLRA MEAS - GSV ANKLE DIST RIGHT: 0.36 CM
BH CV XLRA MEAS - GSV KNEE DIST LEFT: 0.21 CM
BH CV XLRA MEAS - GSV KNEE DIST RIGHT: 0.31 CM
BH CV XLRA MEAS - GSV ORIGIN DIST LEFT: 0.66 CM
BH CV XLRA MEAS - GSV ORIGIN DIST RIGHT: 0.81 CM
BH CV XLRA MEAS - MID GSV CALF LEFT: 0.3 CM
BH CV XLRA MEAS - MID GSV CALF RIGHT: 0.25 CM
BH CV XLRA MEAS - MID GSV THIGH  LEFT: 0.29 CM
BH CV XLRA MEAS - MID GSV THIGH  RIGHT: 0.34 CM
BH CV XLRA MEAS - MID LSV CALF DIST LEFT: 0.11 CM
BH CV XLRA MEAS - MID LSV CALF DIST RIGHT: 0.18 CM
BH CV XLRA MEAS - PROX GSV CALF DIST LEFT: 0.24 CM
BH CV XLRA MEAS - PROX GSV CALF DIST RIGHT: 0.24 CM
BH CV XLRA MEAS - PROX GSV THIGH  LEFT: 0.43 CM
BH CV XLRA MEAS - PROX GSV THIGH  RIGHT: 0.49 CM
BH CV XLRA MEAS - PROX LSV CALF DIST LEFT: 0.22 CM
BH CV XLRA MEAS - PROX LSV CALF DIST RIGHT: 0.27 CM
BH CV XLRA MEAS LEFT DIST CCA EDV: -11.4 CM/SEC
BH CV XLRA MEAS LEFT DIST CCA PSV: -67 CM/SEC
BH CV XLRA MEAS LEFT DIST ICA EDV: -11.8 CM/SEC
BH CV XLRA MEAS LEFT DIST ICA PSV: -38 CM/SEC
BH CV XLRA MEAS LEFT ICA/CCA RATIO: 0.94
BH CV XLRA MEAS LEFT MID ICA EDV: -22 CM/SEC
BH CV XLRA MEAS LEFT MID ICA PSV: -63.3 CM/SEC
BH CV XLRA MEAS LEFT PROX CCA EDV: 13.2 CM/SEC
BH CV XLRA MEAS LEFT PROX CCA PSV: 98.2 CM/SEC
BH CV XLRA MEAS LEFT PROX ECA EDV: -6 CM/SEC
BH CV XLRA MEAS LEFT PROX ECA PSV: -107.6 CM/SEC
BH CV XLRA MEAS LEFT PROX ICA EDV: -13.3 CM/SEC
BH CV XLRA MEAS LEFT PROX ICA PSV: -60.9 CM/SEC
BH CV XLRA MEAS LEFT PROX SCLA PSV: 147.2 CM/SEC
BH CV XLRA MEAS LEFT VERTEBRAL A EDV: -14.1 CM/SEC
BH CV XLRA MEAS LEFT VERTEBRAL A PSV: -40 CM/SEC
BH CV XLRA MEAS RIGHT DIST CCA EDV: -17.1 CM/SEC
BH CV XLRA MEAS RIGHT DIST CCA PSV: -64.1 CM/SEC
BH CV XLRA MEAS RIGHT DIST ICA EDV: -12 CM/SEC
BH CV XLRA MEAS RIGHT DIST ICA PSV: -41.8 CM/SEC
BH CV XLRA MEAS RIGHT ICA/CCA RATIO: 1.32
BH CV XLRA MEAS RIGHT MID ICA EDV: -25.9 CM/SEC
BH CV XLRA MEAS RIGHT MID ICA PSV: -84.9 CM/SEC
BH CV XLRA MEAS RIGHT PROX CCA EDV: 13.7 CM/SEC
BH CV XLRA MEAS RIGHT PROX CCA PSV: 85.7 CM/SEC
BH CV XLRA MEAS RIGHT PROX ECA EDV: -8.8 CM/SEC
BH CV XLRA MEAS RIGHT PROX ECA PSV: -86.7 CM/SEC
BH CV XLRA MEAS RIGHT PROX ICA EDV: -7.2 CM/SEC
BH CV XLRA MEAS RIGHT PROX ICA PSV: -80.3 CM/SEC
BH CV XLRA MEAS RIGHT PROX SCLA PSV: 115.2 CM/SEC
BH CV XLRA MEAS RIGHT VERTEBRAL A EDV: 4 CM/SEC
BH CV XLRA MEAS RIGHT VERTEBRAL A PSV: 31.2 CM/SEC
BILIRUB SERPL-MCNC: 0.3 MG/DL (ref 0–1.2)
BUN SERPL-MCNC: 18 MG/DL (ref 8–23)
BUN SERPL-MCNC: 19 MG/DL (ref 8–23)
BUN/CREAT SERPL: 17.4 (ref 7–25)
BUN/CREAT SERPL: 20.9 (ref 7–25)
CALCIUM SPEC-SCNC: 8.6 MG/DL (ref 8.6–10.5)
CALCIUM SPEC-SCNC: 9 MG/DL (ref 8.6–10.5)
CHLORIDE SERPL-SCNC: 105 MMOL/L (ref 98–107)
CHLORIDE SERPL-SCNC: 109 MMOL/L (ref 98–107)
CHOLEST SERPL-MCNC: 137 MG/DL (ref 0–200)
CLOSE TME COLL+ADP + EPINEP PNL BLD: 44 % (ref 86–100)
CO2 SERPL-SCNC: 20.1 MMOL/L (ref 22–29)
CO2 SERPL-SCNC: 21.8 MMOL/L (ref 22–29)
CREAT SERPL-MCNC: 0.86 MG/DL (ref 0.57–1)
CREAT SERPL-MCNC: 1.09 MG/DL (ref 0.57–1)
DEPRECATED RDW RBC AUTO: 49.4 FL (ref 37–54)
DEPRECATED RDW RBC AUTO: 49.4 FL (ref 37–54)
DEPRECATED RDW RBC AUTO: 50.3 FL (ref 37–54)
EGFRCR SERPLBLD CKD-EPI 2021: 54.4 ML/MIN/1.73
EGFRCR SERPLBLD CKD-EPI 2021: 72.3 ML/MIN/1.73
EOSINOPHIL # BLD AUTO: 0.19 10*3/MM3 (ref 0–0.4)
EOSINOPHIL NFR BLD AUTO: 1.7 % (ref 0.3–6.2)
ERYTHROCYTE [DISTWIDTH] IN BLOOD BY AUTOMATED COUNT: 14.5 % (ref 12.3–15.4)
ERYTHROCYTE [DISTWIDTH] IN BLOOD BY AUTOMATED COUNT: 14.6 % (ref 12.3–15.4)
ERYTHROCYTE [DISTWIDTH] IN BLOOD BY AUTOMATED COUNT: 14.7 % (ref 12.3–15.4)
GLOBULIN UR ELPH-MCNC: 3.4 GM/DL
GLUCOSE BLDC GLUCOMTR-MCNC: 109 MG/DL (ref 70–130)
GLUCOSE BLDC GLUCOMTR-MCNC: 116 MG/DL (ref 70–130)
GLUCOSE BLDC GLUCOMTR-MCNC: 118 MG/DL (ref 70–130)
GLUCOSE BLDC GLUCOMTR-MCNC: 195 MG/DL (ref 70–130)
GLUCOSE BLDC GLUCOMTR-MCNC: 230 MG/DL (ref 70–130)
GLUCOSE SERPL-MCNC: 174 MG/DL (ref 65–99)
GLUCOSE SERPL-MCNC: 250 MG/DL (ref 65–99)
HBA1C MFR BLD: 7.6 % (ref 4.8–5.6)
HCT VFR BLD AUTO: 37.1 % (ref 34–46.6)
HCT VFR BLD AUTO: 38.2 % (ref 34–46.6)
HCT VFR BLD AUTO: 42.3 % (ref 34–46.6)
HDLC SERPL-MCNC: 64 MG/DL (ref 40–60)
HGB BLD-MCNC: 11.7 G/DL (ref 12–15.9)
HGB BLD-MCNC: 12 G/DL (ref 12–15.9)
HGB BLD-MCNC: 13.2 G/DL (ref 12–15.9)
IMM GRANULOCYTES # BLD AUTO: 0.04 10*3/MM3 (ref 0–0.05)
IMM GRANULOCYTES NFR BLD AUTO: 0.3 % (ref 0–0.5)
INR PPP: 1.08 (ref 0.9–1.1)
LDLC SERPL CALC-MCNC: 64 MG/DL (ref 0–100)
LDLC/HDLC SERPL: 1.03 {RATIO}
LYMPHOCYTES # BLD AUTO: 4.78 10*3/MM3 (ref 0.7–3.1)
LYMPHOCYTES NFR BLD AUTO: 41.8 % (ref 19.6–45.3)
MAXIMAL PREDICTED HEART RATE: 149 BPM
MAXIMAL PREDICTED HEART RATE: 149 BPM
MCH RBC QN AUTO: 28.8 PG (ref 26.6–33)
MCH RBC QN AUTO: 29 PG (ref 26.6–33)
MCH RBC QN AUTO: 29.2 PG (ref 26.6–33)
MCHC RBC AUTO-ENTMCNC: 31.2 G/DL (ref 31.5–35.7)
MCHC RBC AUTO-ENTMCNC: 31.4 G/DL (ref 31.5–35.7)
MCHC RBC AUTO-ENTMCNC: 31.5 G/DL (ref 31.5–35.7)
MCV RBC AUTO: 91.6 FL (ref 79–97)
MCV RBC AUTO: 91.8 FL (ref 79–97)
MCV RBC AUTO: 93.6 FL (ref 79–97)
MONOCYTES # BLD AUTO: 0.95 10*3/MM3 (ref 0.1–0.9)
MONOCYTES NFR BLD AUTO: 8.3 % (ref 5–12)
NEUTROPHILS NFR BLD AUTO: 46.6 % (ref 42.7–76)
NEUTROPHILS NFR BLD AUTO: 5.33 10*3/MM3 (ref 1.7–7)
NRBC BLD AUTO-RTO: 0.1 /100 WBC (ref 0–0.2)
NT-PROBNP SERPL-MCNC: 3564 PG/ML (ref 0–900)
PLATELET # BLD AUTO: 231 10*3/MM3 (ref 140–450)
PLATELET # BLD AUTO: 233 10*3/MM3 (ref 140–450)
PLATELET # BLD AUTO: 294 10*3/MM3 (ref 140–450)
PMV BLD AUTO: 10.9 FL (ref 6–12)
PMV BLD AUTO: 11 FL (ref 6–12)
PMV BLD AUTO: 11.1 FL (ref 6–12)
POTASSIUM SERPL-SCNC: 4 MMOL/L (ref 3.5–5.2)
POTASSIUM SERPL-SCNC: 4.3 MMOL/L (ref 3.5–5.2)
PROT SERPL-MCNC: 7.1 G/DL (ref 6–8.5)
PROTHROMBIN TIME: 14.1 SECONDS (ref 11.7–14.2)
QT INTERVAL: 358 MS
RBC # BLD AUTO: 4.04 10*6/MM3 (ref 3.77–5.28)
RBC # BLD AUTO: 4.17 10*6/MM3 (ref 3.77–5.28)
RBC # BLD AUTO: 4.52 10*6/MM3 (ref 3.77–5.28)
RIGHT ARM BP: NORMAL MMHG
SODIUM SERPL-SCNC: 138 MMOL/L (ref 136–145)
SODIUM SERPL-SCNC: 140 MMOL/L (ref 136–145)
STRESS TARGET HR: 127 BPM
STRESS TARGET HR: 127 BPM
TRIGL SERPL-MCNC: 36 MG/DL (ref 0–150)
TROPONIN T SERPL-MCNC: 0.04 NG/ML (ref 0–0.03)
VLDLC SERPL-MCNC: 9 MG/DL (ref 5–40)
WBC NRBC COR # BLD: 10.37 10*3/MM3 (ref 3.4–10.8)
WBC NRBC COR # BLD: 11.44 10*3/MM3 (ref 3.4–10.8)
WBC NRBC COR # BLD: 8.21 10*3/MM3 (ref 3.4–10.8)

## 2022-11-07 PROCEDURE — 85576 BLOOD PLATELET AGGREGATION: CPT | Performed by: NURSE PRACTITIONER

## 2022-11-07 PROCEDURE — 25010000002 PHENYLEPHRINE 10 MG/ML SOLUTION: Performed by: INTERNAL MEDICINE

## 2022-11-07 PROCEDURE — 25010000002 MIDAZOLAM PER 1 MG: Performed by: INTERNAL MEDICINE

## 2022-11-07 PROCEDURE — C1887 CATHETER, GUIDING: HCPCS | Performed by: INTERNAL MEDICINE

## 2022-11-07 PROCEDURE — C9606 PERC D-E COR REVASC W AMI S: HCPCS | Performed by: INTERNAL MEDICINE

## 2022-11-07 PROCEDURE — 85610 PROTHROMBIN TIME: CPT | Performed by: EMERGENCY MEDICINE

## 2022-11-07 PROCEDURE — 80053 COMPREHEN METABOLIC PANEL: CPT | Performed by: EMERGENCY MEDICINE

## 2022-11-07 PROCEDURE — 84484 ASSAY OF TROPONIN QUANT: CPT | Performed by: EMERGENCY MEDICINE

## 2022-11-07 PROCEDURE — C1725 CATH, TRANSLUMIN NON-LASER: HCPCS | Performed by: INTERNAL MEDICINE

## 2022-11-07 PROCEDURE — C1894 INTRO/SHEATH, NON-LASER: HCPCS | Performed by: INTERNAL MEDICINE

## 2022-11-07 PROCEDURE — B2111ZZ FLUOROSCOPY OF MULTIPLE CORONARY ARTERIES USING LOW OSMOLAR CONTRAST: ICD-10-PCS | Performed by: EMERGENCY MEDICINE

## 2022-11-07 PROCEDURE — 93306 TTE W/DOPPLER COMPLETE: CPT | Performed by: INTERNAL MEDICINE

## 2022-11-07 PROCEDURE — 99291 CRITICAL CARE FIRST HOUR: CPT | Performed by: INTERNAL MEDICINE

## 2022-11-07 PROCEDURE — B2151ZZ FLUOROSCOPY OF LEFT HEART USING LOW OSMOLAR CONTRAST: ICD-10-PCS | Performed by: THORACIC SURGERY (CARDIOTHORACIC VASCULAR SURGERY)

## 2022-11-07 PROCEDURE — 93005 ELECTROCARDIOGRAM TRACING: CPT | Performed by: INTERNAL MEDICINE

## 2022-11-07 PROCEDURE — 85730 THROMBOPLASTIN TIME PARTIAL: CPT | Performed by: EMERGENCY MEDICINE

## 2022-11-07 PROCEDURE — 99152 MOD SED SAME PHYS/QHP 5/>YRS: CPT | Performed by: INTERNAL MEDICINE

## 2022-11-07 PROCEDURE — 93880 EXTRACRANIAL BILAT STUDY: CPT

## 2022-11-07 PROCEDURE — 25010000002 EPTIFIBATIDE PER 5 MG: Performed by: NURSE PRACTITIONER

## 2022-11-07 PROCEDURE — 25010000002 PERFLUTREN (DEFINITY) 8.476 MG IN SODIUM CHLORIDE (PF) 0.9 % 10 ML INJECTION: Performed by: NURSE PRACTITIONER

## 2022-11-07 PROCEDURE — 99285 EMERGENCY DEPT VISIT HI MDM: CPT

## 2022-11-07 PROCEDURE — 027034Z DILATION OF CORONARY ARTERY, ONE ARTERY WITH DRUG-ELUTING INTRALUMINAL DEVICE, PERCUTANEOUS APPROACH: ICD-10-PCS | Performed by: EMERGENCY MEDICINE

## 2022-11-07 PROCEDURE — 4A023N7 MEASUREMENT OF CARDIAC SAMPLING AND PRESSURE, LEFT HEART, PERCUTANEOUS APPROACH: ICD-10-PCS | Performed by: EMERGENCY MEDICINE

## 2022-11-07 PROCEDURE — 63710000001 INSULIN LISPRO (HUMAN) PER 5 UNITS: Performed by: HOSPITALIST

## 2022-11-07 PROCEDURE — 83880 ASSAY OF NATRIURETIC PEPTIDE: CPT | Performed by: EMERGENCY MEDICINE

## 2022-11-07 PROCEDURE — 83036 HEMOGLOBIN GLYCOSYLATED A1C: CPT | Performed by: INTERNAL MEDICINE

## 2022-11-07 PROCEDURE — 85027 COMPLETE CBC AUTOMATED: CPT | Performed by: INTERNAL MEDICINE

## 2022-11-07 PROCEDURE — 25010000002 HEPARIN (PORCINE) PER 1000 UNITS: Performed by: INTERNAL MEDICINE

## 2022-11-07 PROCEDURE — 93306 TTE W/DOPPLER COMPLETE: CPT

## 2022-11-07 PROCEDURE — C1874 STENT, COATED/COV W/DEL SYS: HCPCS | Performed by: INTERNAL MEDICINE

## 2022-11-07 PROCEDURE — 25010000002 HEPARIN (PORCINE) PER 1000 UNITS: Performed by: EMERGENCY MEDICINE

## 2022-11-07 PROCEDURE — 85025 COMPLETE CBC W/AUTO DIFF WBC: CPT | Performed by: EMERGENCY MEDICINE

## 2022-11-07 PROCEDURE — 71045 X-RAY EXAM CHEST 1 VIEW: CPT

## 2022-11-07 PROCEDURE — 93458 L HRT ARTERY/VENTRICLE ANGIO: CPT | Performed by: INTERNAL MEDICINE

## 2022-11-07 PROCEDURE — 25010000002 EPTIFIBATIDE 20 MG/10ML SOLUTION: Performed by: NURSE PRACTITIONER

## 2022-11-07 PROCEDURE — 93010 ELECTROCARDIOGRAM REPORT: CPT | Performed by: STUDENT IN AN ORGANIZED HEALTH CARE EDUCATION/TRAINING PROGRAM

## 2022-11-07 PROCEDURE — 93005 ELECTROCARDIOGRAM TRACING: CPT | Performed by: EMERGENCY MEDICINE

## 2022-11-07 PROCEDURE — 25010000002 FENTANYL CITRATE (PF) 50 MCG/ML SOLUTION: Performed by: INTERNAL MEDICINE

## 2022-11-07 PROCEDURE — 80061 LIPID PANEL: CPT | Performed by: INTERNAL MEDICINE

## 2022-11-07 PROCEDURE — 93970 EXTREMITY STUDY: CPT

## 2022-11-07 PROCEDURE — 85347 COAGULATION TIME ACTIVATED: CPT

## 2022-11-07 PROCEDURE — 92941 PRQ TRLML REVSC TOT OCCL AMI: CPT | Performed by: INTERNAL MEDICINE

## 2022-11-07 PROCEDURE — 82962 GLUCOSE BLOOD TEST: CPT

## 2022-11-07 PROCEDURE — C1769 GUIDE WIRE: HCPCS | Performed by: INTERNAL MEDICINE

## 2022-11-07 PROCEDURE — 0 IOPAMIDOL PER 1 ML: Performed by: INTERNAL MEDICINE

## 2022-11-07 DEVICE — XIENCE SKYPOINT™ EVEROLIMUS ELUTING CORONARY STENT SYSTEM 2.50 MM X 12 MM / RAPID-EXCHANGE
Type: IMPLANTABLE DEVICE | Status: FUNCTIONAL
Brand: XIENCE SKYPOINT™

## 2022-11-07 RX ORDER — SODIUM CHLORIDE 0.9 % (FLUSH) 0.9 %
10 SYRINGE (ML) INJECTION AS NEEDED
Status: DISCONTINUED | OUTPATIENT
Start: 2022-11-07 | End: 2022-11-10

## 2022-11-07 RX ORDER — EPTIFIBATIDE 20 MG/10ML
180 INJECTION INTRAVENOUS ONCE
Status: COMPLETED | OUTPATIENT
Start: 2022-11-07 | End: 2022-11-07

## 2022-11-07 RX ORDER — METOPROLOL SUCCINATE 25 MG/1
25 TABLET, EXTENDED RELEASE ORAL
Status: DISCONTINUED | OUTPATIENT
Start: 2022-11-08 | End: 2022-11-10

## 2022-11-07 RX ORDER — LEVOTHYROXINE SODIUM 0.03 MG/1
25 TABLET ORAL
Status: DISCONTINUED | OUTPATIENT
Start: 2022-11-07 | End: 2022-11-19 | Stop reason: HOSPADM

## 2022-11-07 RX ORDER — ATORVASTATIN CALCIUM 80 MG/1
80 TABLET, FILM COATED ORAL DAILY
Status: DISCONTINUED | OUTPATIENT
Start: 2022-11-07 | End: 2022-11-10

## 2022-11-07 RX ORDER — LIDOCAINE HYDROCHLORIDE 20 MG/ML
INJECTION, SOLUTION INFILTRATION; PERINEURAL
Status: DISCONTINUED | OUTPATIENT
Start: 2022-11-07 | End: 2022-11-07 | Stop reason: HOSPADM

## 2022-11-07 RX ORDER — ASPIRIN 81 MG/1
81 TABLET ORAL DAILY
Status: DISCONTINUED | OUTPATIENT
Start: 2022-11-07 | End: 2022-11-10

## 2022-11-07 RX ORDER — MIDAZOLAM HYDROCHLORIDE 1 MG/ML
INJECTION INTRAMUSCULAR; INTRAVENOUS
Status: DISCONTINUED | OUTPATIENT
Start: 2022-11-07 | End: 2022-11-07 | Stop reason: HOSPADM

## 2022-11-07 RX ORDER — ACETAMINOPHEN 325 MG/1
650 TABLET ORAL EVERY 4 HOURS PRN
Status: DISCONTINUED | OUTPATIENT
Start: 2022-11-07 | End: 2022-11-10

## 2022-11-07 RX ORDER — PHENYLEPHRINE HYDROCHLORIDE 10 MG/ML
INJECTION INTRAVENOUS
Status: DISCONTINUED | OUTPATIENT
Start: 2022-11-07 | End: 2022-11-07 | Stop reason: HOSPADM

## 2022-11-07 RX ORDER — HEPARIN SODIUM 5000 [USP'U]/ML
INJECTION, SOLUTION INTRAVENOUS; SUBCUTANEOUS
Status: COMPLETED | OUTPATIENT
Start: 2022-11-07 | End: 2022-11-07

## 2022-11-07 RX ORDER — SODIUM CHLORIDE 9 MG/ML
INJECTION, SOLUTION INTRAVENOUS
Status: COMPLETED | OUTPATIENT
Start: 2022-11-07 | End: 2022-11-07

## 2022-11-07 RX ORDER — HEPARIN SODIUM 1000 [USP'U]/ML
INJECTION, SOLUTION INTRAVENOUS; SUBCUTANEOUS
Status: DISCONTINUED | OUTPATIENT
Start: 2022-11-07 | End: 2022-11-07 | Stop reason: HOSPADM

## 2022-11-07 RX ORDER — LISINOPRIL 10 MG/1
10 TABLET ORAL DAILY
Status: DISCONTINUED | OUTPATIENT
Start: 2022-11-08 | End: 2022-11-08

## 2022-11-07 RX ORDER — INSULIN LISPRO 100 [IU]/ML
0-7 INJECTION, SOLUTION INTRAVENOUS; SUBCUTANEOUS
Status: DISCONTINUED | OUTPATIENT
Start: 2022-11-07 | End: 2022-11-07

## 2022-11-07 RX ORDER — CLOPIDOGREL BISULFATE 75 MG/1
TABLET ORAL
Status: COMPLETED | OUTPATIENT
Start: 2022-11-07 | End: 2022-11-07

## 2022-11-07 RX ORDER — INSULIN LISPRO 100 [IU]/ML
0-7 INJECTION, SOLUTION INTRAVENOUS; SUBCUTANEOUS
Status: DISCONTINUED | OUTPATIENT
Start: 2022-11-07 | End: 2022-11-10

## 2022-11-07 RX ORDER — CLOPIDOGREL BISULFATE 75 MG/1
75 TABLET ORAL DAILY
Status: DISCONTINUED | OUTPATIENT
Start: 2022-11-07 | End: 2022-11-07

## 2022-11-07 RX ORDER — FENTANYL CITRATE 50 UG/ML
INJECTION, SOLUTION INTRAMUSCULAR; INTRAVENOUS
Status: DISCONTINUED | OUTPATIENT
Start: 2022-11-07 | End: 2022-11-07 | Stop reason: HOSPADM

## 2022-11-07 RX ORDER — PANTOPRAZOLE SODIUM 40 MG/1
40 TABLET, DELAYED RELEASE ORAL
Status: DISCONTINUED | OUTPATIENT
Start: 2022-11-08 | End: 2022-11-10

## 2022-11-07 RX ORDER — LISINOPRIL 5 MG/1
5 TABLET ORAL DAILY
Status: DISCONTINUED | OUTPATIENT
Start: 2022-11-08 | End: 2022-11-07

## 2022-11-07 RX ORDER — EPTIFIBATIDE 0.75 MG/ML
2 INJECTION, SOLUTION INTRAVENOUS CONTINUOUS
Status: DISCONTINUED | OUTPATIENT
Start: 2022-11-07 | End: 2022-11-09

## 2022-11-07 RX ORDER — VERAPAMIL HYDROCHLORIDE 2.5 MG/ML
INJECTION, SOLUTION INTRAVENOUS
Status: DISCONTINUED | OUTPATIENT
Start: 2022-11-07 | End: 2022-11-07 | Stop reason: HOSPADM

## 2022-11-07 RX ORDER — LISINOPRIL 20 MG/1
20 TABLET ORAL DAILY
Status: DISCONTINUED | OUTPATIENT
Start: 2022-11-07 | End: 2022-11-07

## 2022-11-07 RX ADMIN — PERFLUTREN 3 ML: 6.52 INJECTION, SUSPENSION INTRAVENOUS at 13:41

## 2022-11-07 RX ADMIN — LISINOPRIL 20 MG: 20 TABLET ORAL at 08:13

## 2022-11-07 RX ADMIN — EPTIFIBATIDE 2 MCG/KG/MIN: 0.75 INJECTION INTRAVENOUS at 15:47

## 2022-11-07 RX ADMIN — HEPARIN SODIUM 4000 UNITS: 5000 INJECTION INTRAVENOUS; SUBCUTANEOUS at 01:50

## 2022-11-07 RX ADMIN — EPTIFIBATIDE 2 MCG/KG/MIN: 0.75 INJECTION INTRAVENOUS at 20:30

## 2022-11-07 RX ADMIN — EMPAGLIFLOZIN 10 MG: 10 TABLET, FILM COATED ORAL at 11:12

## 2022-11-07 RX ADMIN — LEVOTHYROXINE SODIUM 25 MCG: 0.03 TABLET ORAL at 06:16

## 2022-11-07 RX ADMIN — INSULIN LISPRO 3 UNITS: 100 INJECTION, SOLUTION INTRAVENOUS; SUBCUTANEOUS at 20:40

## 2022-11-07 RX ADMIN — CLOPIDOGREL 75 MG: 75 TABLET, FILM COATED ORAL at 08:14

## 2022-11-07 RX ADMIN — CLOPIDOGREL 600 MG: 75 TABLET, FILM COATED ORAL at 01:48

## 2022-11-07 RX ADMIN — ACETAMINOPHEN 650 MG: 325 TABLET, FILM COATED ORAL at 21:44

## 2022-11-07 RX ADMIN — ATORVASTATIN CALCIUM 80 MG: 80 TABLET, FILM COATED ORAL at 08:13

## 2022-11-07 RX ADMIN — ASPIRIN 81 MG: 81 TABLET, COATED ORAL at 08:13

## 2022-11-07 RX ADMIN — EPTIFIBATIDE 18360 MCG: 2 INJECTION, SOLUTION INTRAVENOUS at 15:34

## 2022-11-07 NOTE — CONSULTS
Met with patient, discussed benefits of cardiac rehab. Provided phase II information along with the contact information for cardiac rehab here at [facility]. Will call after discharge to schedule.

## 2022-11-07 NOTE — PROGRESS NOTES
Discharge Planning Assessment  [facility]     Patient Name: Mayra Hirsch  MRN: [id number]  Today's Date: 11/7/2022    Admit Date: 11/7/2022    Plan: Home  Pt denies needs   Discharge Needs Assessment     Row Name 11/07/22 1430       Living Environment    People in Home alone    Current Living Arrangements condominium    Potentially Unsafe Housing Conditions unable to assess    Primary Care Provided by self    Provides Primary Care For no one    Family Caregiver if Needed none    Quality of Family Relationships supportive    Able to Return to Prior Arrangements yes       Resource/Environmental Concerns    Resource/Environmental Concerns none       Transition Planning    Patient/Family Anticipates Transition to home with family    Patient/Family Anticipated Services at Transition none    Transportation Anticipated family or friend will provide       Discharge Needs Assessment    Equipment Currently Used at Home none    Concerns to be Addressed discharge planning    Equipment Needed After Discharge none               Discharge Plan     Row Name 11/07/22 1431       Plan    Plan Home  Pt denies needs    Plan Comments IMM noted.  CCP spoke to patient at bedside.  CCP role explained.  Face sheet verified.  Discharge planning discussed.  Pt emergency contact is her son Bello, [phone].   Pt obtains her medications from [facility] pharmacy in [place] and [facility] mail order.  Pt PCP is Dr. Spencer Hua.  Pt lives in a two [facility] alone.   She is independent with ADL’s.  She uses no DME to ambulate.  She has no HH history.  She has no history of rehab.  Plan is Home with no needs.  CCP following              Continued Care and Services - Admitted Since 11/7/2022    Coordination has not been started for this encounter.       Expected Discharge Date and Time     Expected Discharge Date Expected Discharge Time    Nov 8, 2022          Demographic Summary    No documentation.                Functional Status     No documentation.                Psychosocial    No documentation.                Abuse/Neglect    No documentation.                Legal    No documentation.                Substance Abuse    No documentation.                Patient Forms    No documentation.                   Gris Toledo RN

## 2022-11-07 NOTE — CONSULTS
Patient Care Team:  Spencer Hua MD as PCP - General    Chief complaint: dyspnea    Subjective     History of Present Illness  Patient is a 71 y.o. female with a medical history including hypertension, diabetes type 2, and hypothyroidism who presented to [facility] with acute onset shortness of breath which woke her from sleep.  She was noted to have a STEMI, she was taken to the cardiac cath lab and had angioplasty and stent placed to the diagonal branch. We were consulted for cardiac surgery evaluation due to her significant diffuse disease and left main stenosis.      Review of Systems   Constitutional: Positive for activity change and fatigue.   HENT: Negative.    Eyes: Negative.    Respiratory: Positive for cough and shortness of breath.    Cardiovascular: Positive for chest pain.   Gastrointestinal: Negative.    Endocrine: Negative.    Genitourinary: Negative.    Musculoskeletal: Negative.    Skin: Negative.    Allergic/Immunologic: Negative.    Neurological: Negative.    Hematological: Negative.    Psychiatric/Behavioral: Negative.         Past Medical History:   Diagnosis Date   • Depression    • Diabetes mellitus (HCC)    • Disease of thyroid gland    • Fibrocystic breast    • Hypothyroidism    • Type 2 diabetes mellitus (HCC)      Past Surgical History:   Procedure Laterality Date   • BREAST EXCISIONAL BIOPSY Right     at age 22; benign.   •  SECTION     • HAND SURGERY     • KNEE SURGERY     • OOPHORECTOMY      1 ovary removed due to ectopic pregnancy     Family History   Problem Relation Age of Onset   • Coronary artery disease Mother    • Alzheimer's disease Mother    • Coronary artery disease Father    • Cancer Father    • Thyroid disease Sister      Social History     Tobacco Use   • Smoking status: Never   • Smokeless tobacco: Never   Substance Use Topics   • Alcohol use: No     Medications Prior to Admission   Medication Sig Dispense Refill Last Dose   • atorvastatin  "(LIPITOR) 80 MG tablet Take 1 tablet by mouth Daily. 90 tablet 3    • Continuous Blood Gluc  (FreeStyle Gene 2 [place] Systm) device 1 Device Daily. 1 Device 0    • Continuous Blood Gluc Sensor (FreeStyle Gene 2 Sensor) misc Inject 1 each under the skin into the appropriate area as directed Daily for 14 days. 1 each 2    • ezetimibe (ZETIA) 10 MG tablet TAKE 1 TABLET DAILY 90 tablet 0    • Farxiga 10 MG tablet TAKE 1 TABLET EVERY MORNING 90 tablet 3    • FLUoxetine HCl (PROZAC PO) Take 40 mg by mouth.      • Insulin Pen Needle (NovoFine) 32G X 6 MM misc USE FOR DAILY INSULIN INJECTION 100 each 3    • Januvia 100 MG tablet TAKE 1 TABLET DAILY 90 tablet 3    • levothyroxine (SYNTHROID, LEVOTHROID) 125 MCG tablet TAKE 2 TABLETS DAILY 180 tablet 3    • lisinopril (PRINIVIL,ZESTRIL) 20 MG tablet Take 1 tablet by mouth Daily. 90 tablet 3    • Toujeo SoloStar 300 UNIT/ML solution pen-injector injection Inject 36 Units under the skin into the appropriate area as directed Daily. 9 mL 4    • vitamin D (ERGOCALCIFEROL) 1.25 MG (03653 UT) capsule capsule TAKE ONE CAPSULE BY MOUTH ONCE WEEKLY 12 capsule 1      aspirin, 81 mg, Oral, Daily  atorvastatin, 80 mg, Oral, Daily  clopidogrel, 75 mg, Oral, Daily  empagliflozin, 10 mg, Oral, Daily  levothyroxine, 25 mcg, Oral, Q AM  [START ON 11/8/2022] lisinopril, 5 mg, Oral, Daily  [START ON 11/8/2022] metoprolol succinate XL, 25 mg, Oral, Q24H      Allergies:  Bydureon [exenatide] and Metformin and related    Objective      Vital Signs  Temp:  [97.9 °F (36.6 °C)-98.3 °F (36.8 °C)] 98.3 °F (36.8 °C)  Heart Rate:  [] 86  Resp:  [10-24] 12  BP: (120-172)/() 125/75    Flowsheet Rows    Flowsheet Row First Filed Value   Admission Height 172.7 cm (68\") Documented at 11/07/2022 0146   Admission Weight 102 kg (225 lb 14.4 oz) Documented at 11/07/2022 0146        172.7 cm (67.99\")    Physical Exam  Constitutional:       Appearance: Normal appearance. She is obese. She is " not ill-appearing.   HENT:      Head: Normocephalic and atraumatic.   Eyes:      General: No scleral icterus.  Cardiovascular:      Rate and Rhythm: Normal rate and regular rhythm.      Pulses: Normal pulses.      Heart sounds: Normal heart sounds. No murmur heard.  Pulmonary:      Effort: Pulmonary effort is normal. No respiratory distress.      Breath sounds: Normal breath sounds.   Abdominal:      General: There is no distension.   Musculoskeletal:         General: No swelling. Normal range of motion.   Skin:     General: Skin is warm and dry.      Coloration: Skin is not jaundiced.   Neurological:      Mental Status: She is alert.         Results Review:   Lab Results (last 24 hours)     Procedure Component Value Units Date/Time    Basic Metabolic Panel [[id number]]  (Abnormal) Collected: 11/07/22 0413    Specimen: Blood Updated: 11/07/22 0456     Glucose 174 mg/dL      BUN 18 mg/dL      Creatinine 0.86 mg/dL      Sodium 140 mmol/L      Potassium 4.3 mmol/L      Chloride 109 mmol/L      CO2 20.1 mmol/L      Calcium 8.6 mg/dL      BUN/Creatinine Ratio 20.9     Anion Gap 10.9 mmol/L      eGFR 72.3 mL/min/1.73      Comment: National Kidney Foundation and American Society of Nephrology (ASN) Task Force recommended calculation based on the Chronic Kidney Disease Epidemiology Collaboration (CKD-EPI) equation refit without adjustment for race.       Narrative:      GFR Normal >60  Chronic Kidney Disease <60  Kidney Failure <15    The GFR formula is only valid for adults with stable renal function between ages 18 and 70.    Lipid Panel [[id number]]  (Abnormal) Collected: 11/07/22 0413    Specimen: Blood Updated: 11/07/22 0456     Total Cholesterol 137 mg/dL      Triglycerides 36 mg/dL      HDL Cholesterol 64 mg/dL      LDL Cholesterol  64 mg/dL      VLDL Cholesterol 9 mg/dL      LDL/HDL Ratio 1.03    Narrative:      Cholesterol Reference Ranges  (U.S. Department of Health and Human Services ATP III  Classifications)    Desirable          <200 mg/dL  Borderline High    200-239 mg/dL  High Risk          >240 mg/dL      Triglyceride Reference Ranges  (U.S. Department of Health and Human Services ATP III Classifications)    Normal           <150 mg/dL  Borderline High  150-199 mg/dL  High             200-499 mg/dL  Very High        >500 mg/dL    HDL Reference Ranges  (U.S. Department of Health and Human Services ATP III Classifications)    Low     <40 mg/dl (major risk factor for CHD)  High    >60 mg/dl ('negative' risk factor for CHD)        LDL Reference Ranges  (U.S. Department of Health and Human Services ATP III Classifications)    Optimal          <100 mg/dL  Near Optimal     100-129 mg/dL  Borderline High  130-159 mg/dL  High             160-189 mg/dL  Very High        >189 mg/dL    Hemoglobin A1c [[id number]]  (Abnormal) Collected: 11/07/22 0413    Specimen: Blood Updated: 11/07/22 0449     Hemoglobin A1C 7.60 %     Narrative:      Hemoglobin A1C Ranges:    Increased Risk for Diabetes  5.7% to 6.4%  Diabetes                     >= 6.5%  Diabetic Goal                < 7.0%    CBC (No Diff) [[id number]]  (Abnormal) Collected: 11/07/22 0413    Specimen: Blood Updated: 11/07/22 0447     WBC 10.37 10*3/mm3      RBC 4.17 10*6/mm3      Hemoglobin 12.0 g/dL      Hematocrit 38.2 %      MCV 91.6 fL      MCH 28.8 pg      MCHC 31.4 g/dL      RDW 14.6 %      RDW-SD 49.4 fl      MPV 10.9 fL      Platelets 233 10*3/mm3     POC Activated Clotting Time [[id number]]  (Abnormal) Collected: 11/07/22 0241    Specimen: Blood Updated: 11/07/22 0310     Activated Clotting Time  451 Seconds      Comment: Serial Number: [id number]:  [id number]       POC Glucose Once [[id number]]  (Abnormal) Collected: 11/07/22 0300    Specimen: Blood Updated: 11/07/22 0301     Glucose 195 mg/dL      Comment: Meter: [id number] : [id number] Gabehart Emilee NA       Troponin [[id number]]  (Abnormal) Collected: 11/07/22 0154    Specimen: Blood Updated:  11/07/22 0245     Troponin T 0.044 ng/mL     Narrative:      Troponin T Reference Range:  <= 0.03 ng/mL-   Negative for AMI  >0.03 ng/mL-     Abnormal for myocardial necrosis.  Clinicians would have to utilize clinical acumen, EKG, Troponin and serial changes to determine if it is an Acute Myocardial Infarction or myocardial injury due to an underlying chronic condition.       Results may be falsely decreased if patient taking Biotin.      aPTT [[id number]]  (Normal) Collected: 11/07/22 0154    Specimen: Blood Updated: 11/07/22 0239     PTT 27.0 seconds     Protime-INR [[id number]]  (Normal) Collected: 11/07/22 0154    Specimen: Blood Updated: 11/07/22 0239     Protime 14.1 Seconds      INR 1.08    Comprehensive Metabolic Panel [[id number]]  (Abnormal) Collected: 11/07/22 0154    Specimen: Blood Updated: 11/07/22 0237     Glucose 250 mg/dL      BUN 19 mg/dL      Creatinine 1.09 mg/dL      Sodium 138 mmol/L      Potassium 4.0 mmol/L      Chloride 105 mmol/L      CO2 21.8 mmol/L      Calcium 9.0 mg/dL      Total Protein 7.1 g/dL      Albumin 3.70 g/dL      ALT (SGPT) 21 U/L      AST (SGOT) 23 U/L      Alkaline Phosphatase 94 U/L      Total Bilirubin 0.3 mg/dL      Globulin 3.4 gm/dL      A/G Ratio 1.1 g/dL      BUN/Creatinine Ratio 17.4     Anion Gap 11.2 mmol/L      eGFR 54.4 mL/min/1.73      Comment: National Kidney Foundation and American Society of Nephrology (ASN) Task Force recommended calculation based on the Chronic Kidney Disease Epidemiology Collaboration (CKD-EPI) equation refit without adjustment for race.       Narrative:      GFR Normal >60  Chronic Kidney Disease <60  Kidney Failure <15    The GFR formula is only valid for adults with stable renal function between ages 18 and 70.    BNP [[id number]]  (Abnormal) Collected: 11/07/22 0154    Specimen: Blood Updated: 11/07/22 0235     proBNP 3,564.0 pg/mL     Narrative:      Among patients with dyspnea, NT-proBNP is highly sensitive for the detection of acute  congestive heart failure. In addition NT-proBNP of <300 pg/ml effectively rules out acute congestive heart failure with 99% negative predictive value.    Results may be falsely decreased if patient taking Biotin.      CBC & Differential [[id number]]  (Abnormal) Collected: 11/07/22 0154    Specimen: Blood Updated: 11/07/22 0220    Narrative:      The following orders were created for panel order CBC & Differential.  Procedure                               Abnormality         Status                     ---------                               -----------         ------                     CBC Auto Differential[[id number]]        Abnormal            Final result                 Please view results for these tests on the individual orders.    CBC Auto Differential [[id number]]  (Abnormal) Collected: 11/07/22 0154    Specimen: Blood Updated: 11/07/22 0220     WBC 11.44 10*3/mm3      RBC 4.52 10*6/mm3      Hemoglobin 13.2 g/dL      Hematocrit 42.3 %      MCV 93.6 fL      MCH 29.2 pg      MCHC 31.2 g/dL      RDW 14.5 %      RDW-SD 50.3 fl      MPV 11.1 fL      Platelets 294 10*3/mm3      Neutrophil % 46.6 %      Lymphocyte % 41.8 %      Monocyte % 8.3 %      Eosinophil % 1.7 %      Basophil % 1.3 %      Immature Grans % 0.3 %      Neutrophils, Absolute 5.33 10*3/mm3      Lymphocytes, Absolute 4.78 10*3/mm3      Monocytes, Absolute 0.95 10*3/mm3      Eosinophils, Absolute 0.19 10*3/mm3      Basophils, Absolute 0.15 10*3/mm3      Immature Grans, Absolute 0.04 10*3/mm3      nRBC 0.1 /100 WBC               Assessment & Plan       ST elevation myocardial infarction (STEMI) ([facility])    ST elevation myocardial infarction (STEMI), unspecified artery ([facility])      Assessment & Plan    -STEMI, Multivessel CAD- angioplasty and stent placed diagonal branch  -Hypertension  -Obesity  -Diabetes type 2- A1C 7    Dr. Isabel reviewed and recommends cardiac surgery revascularization.  Will order preoperative studies.  She did get loaded with plavix  and a dose today. Will discontinue and check a platelet agg.  Discussed with cardiology about starting integrelin.    Thank you for allowing us to participate in the care of this patient.      GUEVARA Booth  11/07/22  10:17 EST

## 2022-11-07 NOTE — PROGRESS NOTES
Angioplasty and stent to the diagonal branch has been done  Patient feels much better  Patient also has significant diffuse distal disease as well as a left main disease

## 2022-11-07 NOTE — H&P
[facility]  History & Physical Note                                                                                    Patient Identification:  Mayra Hirsch:   71 y.o.  female  1950  [id number]            Chief Complaint   Patient presents with    Shortness of Breath       Date of Admission: 22    Admitting Physician: Dr. Joanna Marie    Reason for Admission:ST elevation myocardial infarction (STEMI), unspecified artery (HCC) [I21.3],   Chief Complaint   Patient presents with    Shortness of Breath       History of Present Illness:     This is a 71-year-old female, who is new to our service.  She presented to [facility] with shortness of breath and productive cough she has a history to include hypertension, diabetes mellitus, depression, and hypothyroidism.  She was noted to have an STEMI.  She underwent a angioplasty and stent to the diagonal branch.  She has significant diffuse distal disease and left main disease.    On admission chest x-ray showed mild vascular congestion.  Troponin 0.044, BNP 3564, creatinine 1.09 and A1c 7.60.    Cardiac Risk Factors: Age, hypertension    Past Medical History:  Past Medical History:   Diagnosis Date    Depression     Diabetes mellitus (HCC)     Disease of thyroid gland     Fibrocystic breast     Hypothyroidism     Type 2 diabetes mellitus (HCC)     at least 3 stable    Past Surgical History:  Past Surgical History:   Procedure Laterality Date    BREAST EXCISIONAL BIOPSY Right     at age 22; benign.     SECTION      HAND SURGERY      KNEE SURGERY      OOPHORECTOMY      1 ovary removed due to ectopic pregnancy        Social History:   Social History     Tobacco Use    Smoking status: Never    Smokeless tobacco: Never   Substance Use Topics    Alcohol use: No        Family History:  Family History   Problem Relation Age of Onset    Coronary artery disease Mother     Alzheimer's disease Mother     Coronary artery  disease Father     Cancer Father     Thyroid disease Sister           Allergies:  Allergies   Allergen Reactions    Bydureon [Exenatide] Diarrhea    Metformin And Related Nausea And Vomiting       Home Meds:  No current facility-administered medications on file prior to encounter.     Current Outpatient Medications on File Prior to Encounter   Medication Sig Dispense Refill    atorvastatin (LIPITOR) 80 MG tablet Take 1 tablet by mouth Daily. 90 tablet 3    Continuous Blood Gluc  (FreeStyle Gene 2 [place] Systm) device 1 Device Daily. 1 Device 0    Continuous Blood Gluc Sensor (FreeStyle Gene 2 Sensor) misc Inject 1 each under the skin into the appropriate area as directed Daily for 14 days. 1 each 2    ezetimibe (ZETIA) 10 MG tablet TAKE 1 TABLET DAILY 90 tablet 0    Farxiga 10 MG tablet TAKE 1 TABLET EVERY MORNING 90 tablet 3    FLUoxetine HCl (PROZAC PO) Take 40 mg by mouth.      Insulin Pen Needle (NovoFine) 32G X 6 MM misc USE FOR DAILY INSULIN INJECTION 100 each 3    Januvia 100 MG tablet TAKE 1 TABLET DAILY 90 tablet 3    levothyroxine (SYNTHROID, LEVOTHROID) 125 MCG tablet TAKE 2 TABLETS DAILY 180 tablet 3    lisinopril (PRINIVIL,ZESTRIL) 20 MG tablet Take 1 tablet by mouth Daily. 90 tablet 3    Toujeo SoloStar 300 UNIT/ML solution pen-injector injection Inject 36 Units under the skin into the appropriate area as directed Daily. 9 mL 4    vitamin D (ERGOCALCIFEROL) 1.25 MG (90078 UT) capsule capsule TAKE ONE CAPSULE BY MOUTH ONCE WEEKLY 12 capsule 1         Scheduled Meds:  aspirin, 81 mg, Daily  atorvastatin, 80 mg, Daily  clopidogrel, 75 mg, Daily  empagliflozin, 10 mg, Daily  levothyroxine, 25 mcg, Q AM  lisinopril, 20 mg, Daily            INTAKE AND OUTPUT:    Intake/Output Summary (Last 24 hours) at 11/7/2022 0902  Last data filed at 11/7/2022 0652  Gross per 24 hour   Intake 210 ml   Output 250 ml   Net -40 ml           Review of Systems:  Unable to obtain due to the acuteness of the  "care          Constitutional:  Temp:  [97.9 °F (36.6 °C)-98.3 °F (36.8 °C)] 98.3 °F (36.8 °C)  Heart Rate:  [] 86  Resp:  [10-24] 12  BP: (120-172)/() 125/75           Physical Exam  /59   Pulse 68   Temp 99 °F (37.2 °C) (Oral)   Resp 16   Ht 170.2 cm (67\")   Wt 102 kg (225 lb)   SpO2 96%   BMI 35.24 kg/m²     General appearance: Moderate distress  Eyes: Sclerae conjunctivae normal, pupils reactive   HENT: Atraumatic; oropharynx clear with moist mucous membranes and no mucosal ulcerations;  Neck: Trachea midline; NECK, supple, no thyromegaly or lymphadenopathy   Lungs: Normal size and shape, normal breath sounds, equal distribution of air, no rales and rhonchi   CV: S1-S2 regular, no murmurs, no rub, no gallop   Abdomen: Soft, nontender; no masses , no abnormal abdominal sounds   Extremities: No deformity , normal color , no peripheral edema   Skin: Normal temperature, turgor and texture; no rash, ulcers  Psych: Appropriate affect, alert and oriented to person, place and time             Cardiographics  ECG          Telemetry:      ECHO: pending      Lab Review:  Results from last 7 days   Lab Units 11/07/22  0154   TROPONIN T ng/mL 0.044*         Results from last 7 days   Lab Units 11/07/22  0413   SODIUM mmol/L 140   POTASSIUM mmol/L 4.3   BUN mg/dL 18   CREATININE mg/dL 0.86   CALCIUM mg/dL 8.6        Results from last 7 days   Lab Units 11/07/22  0413 11/07/22  0154   WBC 10*3/mm3 10.37 11.44*   HEMOGLOBIN g/dL 12.0 13.2   HEMATOCRIT % 38.2 42.3   PLATELETS 10*3/mm3 233 294     Results from last 7 days   Lab Units 11/07/22  0154   INR  1.08   APTT seconds 27.0       IMPRESSION:         Mild vascular congestion.       IMPRESSION:           The following medical decision was discussed in detail with Dr. Marie       Assessment / Plan:  STEMI   Volume overload as shown on chest x-ray  Hypertension   diabetes mellitus: Uncontrolled   hypothyroidism        Recommendations:  This is a " "71-year-old female, who is new to our service.  She presented to [facility] with productive cough and shortness of breath.  In the emergency room she was noted to have a STEMI and she was taken to the cardiac Cath Lab to undergo an angioplasty and stent to the diagonal branch.  She does have significant diffuse distal disease and left main disease.  Further recommendations once echo has been read.  Medication adjustments and labs in AM.       Labs/tests ordered in am: CTS surgery consult, cbc, cmp, echo, diet, internal medicine consult  Joanna Marie MD    Critical care time spent 45 minutes     11/7/2022  09:02 EST    EMR Dragon/Transcription:   \"Dictated utilizing Dragon dictation\".     " Normal for race

## 2022-11-07 NOTE — CONSULTS
Internal medicine consult    Referring physician  Dr. BECKFORD    Chief complaint  Shortness of breath    Reason for consult  Follow medical problems    History of present illness  71-year-old white female with history of diabetes hypertension hyperlipidemia hypothyroidism presented to [facility] emergency room with sudden onset of shortness of breath as she woke up with it.  Patient has no chest pain palpitation but does have nonproductive cough but no fever chills.  Patient found to be hypoxic while EMS arrived and brought to the emergency room which she found to have acute ST elevation MI and taken to the Cath Lab which showed multivessel coronary disease  angioplasty and stent placed to diagonal branch and I am asked to follow patient for medical problem.  At the time of review she is feeling better and has no more shortness of breath and also denies any chest pain.  Patient feels weak but feeling much better after angioplasty.    PAST MEDICAL HISTORY  • Depression     • Diabetes mellitus      • Hypertension     • Hyperlipidemia     • Hypothyroidism       PAST SURGICAL HISTORY              Procedure Laterality Date   • BREAST EXCISIONAL BIOPSY Right       at age 22; benign.   •  SECTION       • HAND SURGERY       • KNEE SURGERY       • OOPHORECTOMY         1 ovary removed due to ectopic pregnancy         FAMILY HISTORY           Problem Relation Age of Onset   • Coronary artery disease Mother     • Alzheimer's disease Mother     • Coronary artery disease Father     • Cancer Father     • Thyroid disease Sister        SOCIAL HISTORY                Socioeconomic History   • Marital status:    Tobacco Use   • Smoking status: Never   • Smokeless tobacco: Never   Substance and Sexual Activity   • Alcohol use: No         ALLERGIES  Bydureon [exenatide] and Metformin and related  Home medications reviewed     REVIEW OF SYSTEMS  Constitutional: Negative for fever.   HENT: Negative for sore throat.   "  Eyes: Negative.    Respiratory: Positive  shortness of breath.    Cardiovascular: Negative for chest pain.   Gastrointestinal: Negative for abdominal pain, diarrhea and vomiting.   Genitourinary: Negative for dysuria.   Musculoskeletal: Negative for neck pain.   Skin: Negative for rash.   Allergic/Immunologic: Negative.    Neurological: Negative for weakness, numbness and headaches.   Hematological: Negative.    Psychiatric/Behavioral: Negative.      PHYSICAL EXAM  Blood pressure 151/89, pulse 90, temperature 97.9 °F (36.6 °C), temperature source Oral, resp. rate 16, height 170.2 cm (67\"), weight 102 kg (225 lb), SpO2 99 %, not currently breastfeeding.    Constitutional:       General: She is not in acute distress.  HENT:      Head: Normocephalic and atraumatic.   Eyes:      Extraocular Movements: EOM normal.      Pupils: Pupils are equal, round, and reactive to light.   Cardiovascular:      Rate and Rhythm: Normal rate and regular rhythm.      Heart sounds: Normal heart sounds.   Pulmonary:      Effort: Pulmonary effort is normal. No respiratory distress.      Breath sounds: Examination of the right-middle field reveals rhonchi. Examination of the left-middle field reveals rhonchi. Examination of the right-lower field reveals rhonchi. Examination of the left-lower field reveals rhonchi. Rhonchi present.   Abdominal:      Palpations: Abdomen is soft.      Tenderness: There is no abdominal tenderness. There is no guarding or rebound.   Musculoskeletal:         General: No edema. Normal range of motion.      Cervical back: Normal range of motion and neck supple.   Skin:     General: Skin is warm and dry.      Findings: No rash.   Neurological:      Mental Status: She is alert and oriented to person, place, and time.      Sensory: Sensation is intact.      Motor: Motor strength is normal.   Psychiatric:         Mood and Affect: Mood and affect normal.      LAB RESULTS  Lab Results (last 24 hours)     Procedure " Component Value Units Date/Time    POC Glucose Once [[id number]]  (Normal) Collected: 11/07/22 1131    Specimen: Blood Updated: 11/07/22 1154     Glucose 118 mg/dL      Comment: Meter: [id number] : [id number] Uli MENARD       Basic Metabolic Panel [[id number]]  (Abnormal) Collected: 11/07/22 0413    Specimen: Blood Updated: 11/07/22 0456     Glucose 174 mg/dL      BUN 18 mg/dL      Creatinine 0.86 mg/dL      Sodium 140 mmol/L      Potassium 4.3 mmol/L      Chloride 109 mmol/L      CO2 20.1 mmol/L      Calcium 8.6 mg/dL      BUN/Creatinine Ratio 20.9     Anion Gap 10.9 mmol/L      eGFR 72.3 mL/min/1.73      Comment: National Kidney Foundation and American Society of Nephrology (ASN) Task Force recommended calculation based on the Chronic Kidney Disease Epidemiology Collaboration (CKD-EPI) equation refit without adjustment for race.       Narrative:      GFR Normal >60  Chronic Kidney Disease <60  Kidney Failure <15    The GFR formula is only valid for adults with stable renal function between ages 18 and 70.    Lipid Panel [[id number]]  (Abnormal) Collected: 11/07/22 0413    Specimen: Blood Updated: 11/07/22 0456     Total Cholesterol 137 mg/dL      Triglycerides 36 mg/dL      HDL Cholesterol 64 mg/dL      LDL Cholesterol  64 mg/dL      VLDL Cholesterol 9 mg/dL      LDL/HDL Ratio 1.03    Narrative:      Cholesterol Reference Ranges  (U.S. Department of Health and Human Services ATP III Classifications)    Desirable          <200 mg/dL  Borderline High    200-239 mg/dL  High Risk          >240 mg/dL      Triglyceride Reference Ranges  (U.S. Department of Health and Human Services ATP III Classifications)    Normal           <150 mg/dL  Borderline High  150-199 mg/dL  High             200-499 mg/dL  Very High        >500 mg/dL    HDL Reference Ranges  (U.S. Department of Health and Human Services ATP III Classifications)    Low     <40 mg/dl (major risk factor for CHD)  High    >60 mg/dl ('negative' risk factor  for CHD)        LDL Reference Ranges  (U.S. Department of Health and Human Services ATP III Classifications)    Optimal          <100 mg/dL  Near Optimal     100-129 mg/dL  Borderline High  130-159 mg/dL  High             160-189 mg/dL  Very High        >189 mg/dL    Hemoglobin A1c [[id number]]  (Abnormal) Collected: 11/07/22 0413    Specimen: Blood Updated: 11/07/22 0449     Hemoglobin A1C 7.60 %     Narrative:      Hemoglobin A1C Ranges:    Increased Risk for Diabetes  5.7% to 6.4%  Diabetes                     >= 6.5%  Diabetic Goal                < 7.0%    CBC (No Diff) [[id number]]  (Abnormal) Collected: 11/07/22 0413    Specimen: Blood Updated: 11/07/22 0447     WBC 10.37 10*3/mm3      RBC 4.17 10*6/mm3      Hemoglobin 12.0 g/dL      Hematocrit 38.2 %      MCV 91.6 fL      MCH 28.8 pg      MCHC 31.4 g/dL      RDW 14.6 %      RDW-SD 49.4 fl      MPV 10.9 fL      Platelets 233 10*3/mm3     POC Activated Clotting Time [[id number]]  (Abnormal) Collected: 11/07/22 0241    Specimen: Blood Updated: 11/07/22 0310     Activated Clotting Time  451 Seconds      Comment: Serial Number: [id number]:  [id number]       POC Glucose Once [[id number]]  (Abnormal) Collected: 11/07/22 0300    Specimen: Blood Updated: 11/07/22 0301     Glucose 195 mg/dL      Comment: Meter: [id number] : [id number] Gabehart Emilee NA       Troponin [[id number]]  (Abnormal) Collected: 11/07/22 0154    Specimen: Blood Updated: 11/07/22 0245     Troponin T 0.044 ng/mL     Narrative:      Troponin T Reference Range:  <= 0.03 ng/mL-   Negative for AMI  >0.03 ng/mL-     Abnormal for myocardial necrosis.  Clinicians would have to utilize clinical acumen, EKG, Troponin and serial changes to determine if it is an Acute Myocardial Infarction or myocardial injury due to an underlying chronic condition.       Results may be falsely decreased if patient taking Biotin.      aPTT [[id number]]  (Normal) Collected: 11/07/22 0154    Specimen: Blood Updated: 11/07/22  0239     PTT 27.0 seconds     Protime-INR [[id number]]  (Normal) Collected: 11/07/22 0154    Specimen: Blood Updated: 11/07/22 0239     Protime 14.1 Seconds      INR 1.08    Comprehensive Metabolic Panel [[id number]]  (Abnormal) Collected: 11/07/22 0154    Specimen: Blood Updated: 11/07/22 0237     Glucose 250 mg/dL      BUN 19 mg/dL      Creatinine 1.09 mg/dL      Sodium 138 mmol/L      Potassium 4.0 mmol/L      Chloride 105 mmol/L      CO2 21.8 mmol/L      Calcium 9.0 mg/dL      Total Protein 7.1 g/dL      Albumin 3.70 g/dL      ALT (SGPT) 21 U/L      AST (SGOT) 23 U/L      Alkaline Phosphatase 94 U/L      Total Bilirubin 0.3 mg/dL      Globulin 3.4 gm/dL      A/G Ratio 1.1 g/dL      BUN/Creatinine Ratio 17.4     Anion Gap 11.2 mmol/L      eGFR 54.4 mL/min/1.73      Comment: National Kidney Foundation and American Society of Nephrology (ASN) Task Force recommended calculation based on the Chronic Kidney Disease Epidemiology Collaboration (CKD-EPI) equation refit without adjustment for race.       Narrative:      GFR Normal >60  Chronic Kidney Disease <60  Kidney Failure <15    The GFR formula is only valid for adults with stable renal function between ages 18 and 70.    BNP [[id number]]  (Abnormal) Collected: 11/07/22 0154    Specimen: Blood Updated: 11/07/22 0235     proBNP 3,564.0 pg/mL     Narrative:      Among patients with dyspnea, NT-proBNP is highly sensitive for the detection of acute congestive heart failure. In addition NT-proBNP of <300 pg/ml effectively rules out acute congestive heart failure with 99% negative predictive value.    Results may be falsely decreased if patient taking Biotin.      CBC & Differential [[id number]]  (Abnormal) Collected: 11/07/22 0154    Specimen: Blood Updated: 11/07/22 0220    Narrative:      The following orders were created for panel order CBC & Differential.  Procedure                               Abnormality         Status                     ---------                                -----------         ------                     CBC Auto Differential[[id number]]        Abnormal            Final result                 Please view results for these tests on the individual orders.    CBC Auto Differential [[id number]]  (Abnormal) Collected: 11/07/22 0154    Specimen: Blood Updated: 11/07/22 0220     WBC 11.44 10*3/mm3      RBC 4.52 10*6/mm3      Hemoglobin 13.2 g/dL      Hematocrit 42.3 %      MCV 93.6 fL      MCH 29.2 pg      MCHC 31.2 g/dL      RDW 14.5 %      RDW-SD 50.3 fl      MPV 11.1 fL      Platelets 294 10*3/mm3      Neutrophil % 46.6 %      Lymphocyte % 41.8 %      Monocyte % 8.3 %      Eosinophil % 1.7 %      Basophil % 1.3 %      Immature Grans % 0.3 %      Neutrophils, Absolute 5.33 10*3/mm3      Lymphocytes, Absolute 4.78 10*3/mm3      Monocytes, Absolute 0.95 10*3/mm3      Eosinophils, Absolute 0.19 10*3/mm3      Basophils, Absolute 0.15 10*3/mm3      Immature Grans, Absolute 0.04 10*3/mm3      nRBC 0.1 /100 WBC         Imaging Results (Last 24 Hours)     Procedure Component Value Units Date/Time    XR Chest 1 View [[id number]] Collected: 11/07/22 0225     Updated: 11/07/22 0225    Narrative:        Patient: AUDRA MARSHALL  Time Out: 02:24  Exam(s): FILM CXR 1 VIEW     EXAM:    XR Chest, 1 View    CLINICAL HISTORY:     Reason for exam: soa.    TECHNIQUE:    Frontal view of the chest.    COMPARISON:    No relevant prior studies available.    FINDINGS:    Lungs:  No consolidation or mass. Mild vascular congestion.      Pleural space:  No acute findings    Heart:  cardiomegaly.    Bones joints:  No acute findings.    IMPRESSION:         Mild vascular congestion.        Impression:          Electronically signed by Hang Romo MD on 11-07-22 at 0224           ECG 12 Lead          Component Ref Range & Units 01:44    QT Interval ms 358 P    Resulting Agency   ECG             HEART RATE= 116  bpm  RR Interval= 517  ms  TX Interval= 110  ms  P Horizontal Axis= -49  deg  P Front  Axis= 64  deg  QRSD Interval= 97  ms  QT Interval= 358  ms  QRS Axis= 18  deg  T Wave Axis= 130  deg  - ABNORMAL ECG -  Sinus tachycardia  Probable left atrial enlargement  Lateral infarct, acute (LAD)  Probable anteroseptal infarct, recent             Current Facility-Administered Medications:   •  acetaminophen (TYLENOL) tablet 650 mg, 650 mg, Oral, Q4H PRN, Joanna Marie MD  •  aspirin EC tablet 81 mg, 81 mg, Oral, Daily, Joanna Marie MD, 81 mg at 11/07/22 0813  •  atorvastatin (LIPITOR) tablet 80 mg, 80 mg, Oral, Daily, Joanna Marie MD, 80 mg at 11/07/22 0813  •  empagliflozin (JARDIANCE) tablet 10 mg, 10 mg, Oral, Daily, Joanna Marie MD, 10 mg at 11/07/22 1112  •  Eptifibatide (INTEGRILIN) injection 18,360 mcg, 180 mcg/kg, Intravenous, Once **FOLLOWED BY** eptifibatide (INTEGRILIN) 75 mg in 100 mL solution, 2 mcg/kg/min, Intravenous, Continuous, Sharron Bolivar APRN  •  levothyroxine (SYNTHROID, LEVOTHROID) tablet 25 mcg, 25 mcg, Oral, Q AM, Joanna Marie MD, 25 mcg at 11/07/22 0616  •  [START ON 11/8/2022] lisinopril (PRINIVIL,ZESTRIL) tablet 5 mg, 5 mg, Oral, Daily, Sharron Bolivar APRN  •  [START ON 11/8/2022] metoprolol succinate XL (TOPROL-XL) 24 hr tablet 25 mg, 25 mg, Oral, Q24H, Sharron Bolivar APRN  •  [COMPLETED] Insert peripheral IV, , , Once **AND** sodium chloride 0.9 % flush 10 mL, 10 mL, Intravenous, PRN, Joanna Marie MD     ASSESSMENT  Acute ST relation MI  Multivessel coronary artery disease  S/p angioplasty and stent to diagonal branch  Diabetes mellitus  Hypertension  Hyperlipidemia  Hypothyroidism  Gastroesophageal reflux disease    PLAN  Agree with current care  Post PCI care  Cardiothoracic consult pending  Continue home medications  Stress ulcer DVT prophylaxis  Accu-Cheks sliding scale insulin  Supportive care  Patient is full code  Discussed with nursing staff  We will follow with Dr. BECKFORD and further recommendation  current hospital course    DEE FRENCH MD

## 2022-11-07 NOTE — PLAN OF CARE
Goal Outcome Evaluation:   New Admit from cardiac cath came into ER for SOB and stent one placed. Patient stable and states feels better, T band right wrist slowing removing 1 cc  at a time due to all blood thinner and . No bleeding, no hematoma, no chest pain VSS

## 2022-11-07 NOTE — ED TRIAGE NOTES
Pt to ED via [facility] from home. EMS reports they were called for shortness of breath. On arrival, pt's RA sat in the 80s with audible rales on auscultation. Pt's EKG found to show ST Elevation and cath lab activated by EMS in route.     This RN in appropriate PPE for all patient care interactions. Pt masked and redirected for proper mask use when necessary. Hand hygiene performed before and after all patient care interactions.

## 2022-11-07 NOTE — ED PROVIDER NOTES
EMERGENCY DEPARTMENT ENCOUNTER    CHIEF COMPLAINT  Chief Complaint: Shortness of breath  History given by: Patient  History limited by: None  Room Number: N329/1  PMD: Spencer Hua MD      HPI:  Pt is a 71 y.o. female who presents complaining of sudden onset of shortness of breath that woke her from sleep just prior to ED arrival today.  The patient reports a productive cough associated with this.  She denies chest pain, fever/chills, nausea/vomiting, headache, or dizziness.  She reports a history of hypertension as well as diabetes but no known cardiac history.  EMS reports that her oxygen saturations were in the low 80s.  She was given an aspirin as well as oxygen prior to ED arrival.    Duration: Just prior to ED arrival  Onset: Sudden  Location: Cardio/pulmonary  Radiation: None  Quality: Shortness of breath  Intensity/Severity: Moderate to severe  Progression: Worsening  Associated Symptoms: Cough  Aggravating Factors: None  Alleviating Factors: None  Previous Episodes: None  Treatment before arrival: Aspirin, oxygen    PAST MEDICAL HISTORY  Active Ambulatory Problems     Diagnosis Date Noted   • Vitamin D deficiency 2017   • Primary hypothyroidism 2017   • Dyslipidemia 2017   • Essential hypertension 2017   • Type 2 diabetes mellitus without complication, with long-term current use of insulin (HCC) 2017   • Noncompliance with diabetes treatment 2019     Resolved Ambulatory Problems     Diagnosis Date Noted   • No Resolved Ambulatory Problems     Past Medical History:   Diagnosis Date   • Depression    • Diabetes mellitus (HCC)    • Disease of thyroid gland    • Fibrocystic breast    • Hypothyroidism    • Type 2 diabetes mellitus (HCC)        PAST SURGICAL HISTORY  Past Surgical History:   Procedure Laterality Date   • BREAST EXCISIONAL BIOPSY Right     at age 22; benign.   •  SECTION     • HAND SURGERY     • KNEE SURGERY     • OOPHORECTOMY      1 ovary removed  due to ectopic pregnancy       FAMILY HISTORY  Family History   Problem Relation Age of Onset   • Coronary artery disease Mother    • Alzheimer's disease Mother    • Coronary artery disease Father    • Cancer Father    • Thyroid disease Sister        SOCIAL HISTORY  Social History     Socioeconomic History   • Marital status:    Tobacco Use   • Smoking status: Never   • Smokeless tobacco: Never   Substance and Sexual Activity   • Alcohol use: No       ALLERGIES  Bydureon [exenatide] and Metformin and related    REVIEW OF SYSTEMS  Review of Systems   Constitutional: Negative for fever.   HENT: Negative for sore throat.    Eyes: Negative.    Respiratory: Positive for cough and shortness of breath.    Cardiovascular: Negative for chest pain.   Gastrointestinal: Negative for abdominal pain, diarrhea and vomiting.   Genitourinary: Negative for dysuria.   Musculoskeletal: Negative for neck pain.   Skin: Negative for rash.   Allergic/Immunologic: Negative.    Neurological: Negative for weakness, numbness and headaches.   Hematological: Negative.    Psychiatric/Behavioral: Negative.    All other systems reviewed and are negative.      PHYSICAL EXAM  ED Triage Vitals   Temp Pulse Resp BP SpO2   -- -- -- -- --      Temp src Heart Rate Source Patient Position BP Location FiO2 (%)   -- -- -- -- --       Physical Exam  Vitals and nursing note reviewed.   Constitutional:       General: She is not in acute distress.  HENT:      Head: Normocephalic and atraumatic.   Eyes:      Extraocular Movements: EOM normal.      Pupils: Pupils are equal, round, and reactive to light.   Cardiovascular:      Rate and Rhythm: Normal rate and regular rhythm.      Heart sounds: Normal heart sounds.   Pulmonary:      Effort: Pulmonary effort is normal. No respiratory distress.      Breath sounds: Examination of the right-middle field reveals rhonchi. Examination of the left-middle field reveals rhonchi. Examination of the right-lower field  reveals rhonchi. Examination of the left-lower field reveals rhonchi. Rhonchi present.   Abdominal:      Palpations: Abdomen is soft.      Tenderness: There is no abdominal tenderness. There is no guarding or rebound.   Musculoskeletal:         General: No edema. Normal range of motion.      Cervical back: Normal range of motion and neck supple.   Skin:     General: Skin is warm and dry.      Findings: No rash.   Neurological:      Mental Status: She is alert and oriented to person, place, and time.      Sensory: Sensation is intact.      Motor: Motor strength is normal.   Psychiatric:         Mood and Affect: Mood and affect normal.         LAB RESULTS  Lab Results (last 24 hours)     Procedure Component Value Units Date/Time    CBC & Differential [[id number]]  (Abnormal) Collected: 11/07/22 0154    Specimen: Blood Updated: 11/07/22 0220    Narrative:      The following orders were created for panel order CBC & Differential.  Procedure                               Abnormality         Status                     ---------                               -----------         ------                     CBC Auto Differential[[id number]]        Abnormal            Final result                 Please view results for these tests on the individual orders.    Comprehensive Metabolic Panel [[id number]]  (Abnormal) Collected: 11/07/22 0154    Specimen: Blood Updated: 11/07/22 0237     Glucose 250 mg/dL      BUN 19 mg/dL      Creatinine 1.09 mg/dL      Sodium 138 mmol/L      Potassium 4.0 mmol/L      Chloride 105 mmol/L      CO2 21.8 mmol/L      Calcium 9.0 mg/dL      Total Protein 7.1 g/dL      Albumin 3.70 g/dL      ALT (SGPT) 21 U/L      AST (SGOT) 23 U/L      Alkaline Phosphatase 94 U/L      Total Bilirubin 0.3 mg/dL      Globulin 3.4 gm/dL      A/G Ratio 1.1 g/dL      BUN/Creatinine Ratio 17.4     Anion Gap 11.2 mmol/L      eGFR 54.4 mL/min/1.73      Comment: National Kidney Foundation and American Society of Nephrology (ASN)  Task Force recommended calculation based on the Chronic Kidney Disease Epidemiology Collaboration (CKD-EPI) equation refit without adjustment for race.       Narrative:      GFR Normal >60  Chronic Kidney Disease <60  Kidney Failure <15    The GFR formula is only valid for adults with stable renal function between ages 18 and 70.    Protime-INR [[id number]]  (Normal) Collected: 11/07/22 0154    Specimen: Blood Updated: 11/07/22 0239     Protime 14.1 Seconds      INR 1.08    aPTT [[id number]]  (Normal) Collected: 11/07/22 0154    Specimen: Blood Updated: 11/07/22 0239     PTT 27.0 seconds     BNP [[id number]]  (Abnormal) Collected: 11/07/22 0154    Specimen: Blood Updated: 11/07/22 0235     proBNP 3,564.0 pg/mL     Narrative:      Among patients with dyspnea, NT-proBNP is highly sensitive for the detection of acute congestive heart failure. In addition NT-proBNP of <300 pg/ml effectively rules out acute congestive heart failure with 99% negative predictive value.    Results may be falsely decreased if patient taking Biotin.      Troponin [[id number]]  (Abnormal) Collected: 11/07/22 0154    Specimen: Blood Updated: 11/07/22 0245     Troponin T 0.044 ng/mL     Narrative:      Troponin T Reference Range:  <= 0.03 ng/mL-   Negative for AMI  >0.03 ng/mL-     Abnormal for myocardial necrosis.  Clinicians would have to utilize clinical acumen, EKG, Troponin and serial changes to determine if it is an Acute Myocardial Infarction or myocardial injury due to an underlying chronic condition.       Results may be falsely decreased if patient taking Biotin.      CBC Auto Differential [[id number]]  (Abnormal) Collected: 11/07/22 0154    Specimen: Blood Updated: 11/07/22 0220     WBC 11.44 10*3/mm3      RBC 4.52 10*6/mm3      Hemoglobin 13.2 g/dL      Hematocrit 42.3 %      MCV 93.6 fL      MCH 29.2 pg      MCHC 31.2 g/dL      RDW 14.5 %      RDW-SD 50.3 fl      MPV 11.1 fL      Platelets 294 10*3/mm3      Neutrophil % 46.6 %       Lymphocyte % 41.8 %      Monocyte % 8.3 %      Eosinophil % 1.7 %      Basophil % 1.3 %      Immature Grans % 0.3 %      Neutrophils, Absolute 5.33 10*3/mm3      Lymphocytes, Absolute 4.78 10*3/mm3      Monocytes, Absolute 0.95 10*3/mm3      Eosinophils, Absolute 0.19 10*3/mm3      Basophils, Absolute 0.15 10*3/mm3      Immature Grans, Absolute 0.04 10*3/mm3      nRBC 0.1 /100 WBC     POC Activated Clotting Time [[id number]]  (Abnormal) Collected: 11/07/22 0241    Specimen: Blood Updated: 11/07/22 0310     Activated Clotting Time  451 Seconds      Comment: Serial Number: [id number]:  [id number]       POC Glucose Once [[id number]]  (Abnormal) Collected: 11/07/22 0300    Specimen: Blood Updated: 11/07/22 0301     Glucose 195 mg/dL      Comment: Meter: [id number] : [id number] Gabehart Emilee NA       CBC (No Diff) [[id number]]  (Abnormal) Collected: 11/07/22 0413    Specimen: Blood Updated: 11/07/22 0447     WBC 10.37 10*3/mm3      RBC 4.17 10*6/mm3      Hemoglobin 12.0 g/dL      Hematocrit 38.2 %      MCV 91.6 fL      MCH 28.8 pg      MCHC 31.4 g/dL      RDW 14.6 %      RDW-SD 49.4 fl      MPV 10.9 fL      Platelets 233 10*3/mm3     Basic Metabolic Panel [[id number]]  (Abnormal) Collected: 11/07/22 0413    Specimen: Blood Updated: 11/07/22 0456     Glucose 174 mg/dL      BUN 18 mg/dL      Creatinine 0.86 mg/dL      Sodium 140 mmol/L      Potassium 4.3 mmol/L      Chloride 109 mmol/L      CO2 20.1 mmol/L      Calcium 8.6 mg/dL      BUN/Creatinine Ratio 20.9     Anion Gap 10.9 mmol/L      eGFR 72.3 mL/min/1.73      Comment: National Kidney Foundation and American Society of Nephrology (ASN) Task Force recommended calculation based on the Chronic Kidney Disease Epidemiology Collaboration (CKD-EPI) equation refit without adjustment for race.       Narrative:      GFR Normal >60  Chronic Kidney Disease <60  Kidney Failure <15    The GFR formula is only valid for adults with stable renal function between ages 18 and 70.     Hemoglobin A1c [[id number]]  (Abnormal) Collected: 11/07/22 0413    Specimen: Blood Updated: 11/07/22 0449     Hemoglobin A1C 7.60 %     Narrative:      Hemoglobin A1C Ranges:    Increased Risk for Diabetes  5.7% to 6.4%  Diabetes                     >= 6.5%  Diabetic Goal                < 7.0%    Lipid Panel [[id number]]  (Abnormal) Collected: 11/07/22 0413    Specimen: Blood Updated: 11/07/22 0456     Total Cholesterol 137 mg/dL      Triglycerides 36 mg/dL      HDL Cholesterol 64 mg/dL      LDL Cholesterol  64 mg/dL      VLDL Cholesterol 9 mg/dL      LDL/HDL Ratio 1.03    Narrative:      Cholesterol Reference Ranges  (U.S. Department of Health and Human Services ATP III Classifications)    Desirable          <200 mg/dL  Borderline High    200-239 mg/dL  High Risk          >240 mg/dL      Triglyceride Reference Ranges  (U.S. Department of Health and Human Services ATP III Classifications)    Normal           <150 mg/dL  Borderline High  150-199 mg/dL  High             200-499 mg/dL  Very High        >500 mg/dL    HDL Reference Ranges  (U.S. Department of Health and Human Services ATP III Classifications)    Low     <40 mg/dl (major risk factor for CHD)  High    >60 mg/dl ('negative' risk factor for CHD)        LDL Reference Ranges  (U.S. Department of Health and Human Services ATP III Classifications)    Optimal          <100 mg/dL  Near Optimal     100-129 mg/dL  Borderline High  130-159 mg/dL  High             160-189 mg/dL  Very High        >189 mg/dL          I ordered the above labs and reviewed the results    RADIOLOGY  XR Chest 1 View   Final Result         Electronically signed by Hang Romo MD on 11-07-22 at 0224           I ordered the above noted radiological studies. Interpreted by radiologist.  Reviewed by me in PACS.       PROCEDURES  Critical Care  Performed by: Peter Goss MD  Authorized by: Peter Goss MD     Critical care provider statement:     Critical care time (minutes):   30    Critical care time was exclusive of:  Separately billable procedures and treating other patients    Critical care was necessary to treat or prevent imminent or life-threatening deterioration of the following conditions:  Cardiac failure    Critical care was time spent personally by me on the following activities:  Blood draw for specimens, development of treatment plan with patient or surrogate, discussions with consultants, evaluation of patient's response to treatment, examination of patient, obtaining history from patient or surrogate, ordering and performing treatments and interventions, ordering and review of laboratory studies, ordering and review of radiographic studies, pulse oximetry, re-evaluation of patient's condition and review of old charts      EKG          EKG time: 0144  Rhythm/Rate: sinus tachycardia, 116  P waves and MS: nml  QRS, axis: nml, nml   ST and T waves: ST elevation aVL, V2 with reciprocal ST depression inferiorly    Interpreted Contemporaneously by me, independently viewed  No previous EKG available for comparison      PROGRESS AND CONSULTS    The patient was wearing a facemask upon entrance into the room and remained in such throughout their visit.  I was wearing PPE including a facemask, eye protection, as well as gloves at any point entering the room and throughout the visit    ED Course as of 11/07/22 0616   Mon Nov 07, 2022   0128 EMS transmitted EKG shows concern for an ST elevation MI.  A team C was called and cardiology was contacted. [BM]   0133 Case discussed with Dr. Marie, interventional cardiology, who will come and assess the patient in the ED and take to the Cath Lab. [BM]   0146 The patient's EKG certainly is concerning for an acute ST elevation MI.  As we await cardiology's arrival, we will give the patient heparin bolus as well as 600 of oral Plavix.  She has already received aspirin in route. [BM]   0203 On reevaluation, the patient does continue to be  fairly short of breath, but is stable on high flow oxygen by nasal cannula.  She continues to deny any associated chest pain.  At this point, the patient will be transported emergently for cardiac catheterization.  All questions have been answered. [BM]      ED Course User Index  [BM] Peter Goss MD           MEDICAL DECISION MAKING  Results were reviewed/discussed with the patient and they were also made aware of online access. Pt also made aware that some labs, such as cultures, will not be resulted during ER visit and follow up with PMD is necessary.     MDM  Number of Diagnoses or Management Options  ST elevation myocardial infarction (STEMI), unspecified artery (HCC)  Diagnosis management comments: Differential diagnosis includes but is not limited to acute coronary syndrome/STEMI, acute CHF, pneumonia, acute bronchitis, COPD, hypertensive urgency, or electrolyte imbalance.       Amount and/or Complexity of Data Reviewed  Clinical lab tests: ordered and reviewed  Tests in the radiology section of CPT®: ordered and reviewed  Tests in the medicine section of CPT®: reviewed and ordered  Obtain history from someone other than the patient: yes (EMS report)  Discuss the patient with other providers: yes (Dr. Marie, cardiology, who will take the patient for cardiac catheterization)  Independent visualization of images, tracings, or specimens: yes (Chest x-ray showing volume overload)           DIAGNOSIS  Final diagnoses:   ST elevation myocardial infarction (STEMI), unspecified artery (HCC)       DISPOSITION  ADMISSION    Discussed treatment plan and reason for admission with pt/family and admitting physician.  Pt/family voiced understanding of the plan for admission for further testing/treatment as needed.         Latest Documented Vital Signs:  As of 06:16 EST  BP- 122/72 HR- 85 Temp- 98.3 °F (36.8 °C) (Oral) O2 sat- 96%       Peter Goss MD  11/07/22 0616

## 2022-11-08 PROBLEM — R93.89 ABNORMAL FINDINGS ON DIAGNOSTIC IMAGING OF OTHER SPECIFIED BODY STRUCTURES: Status: ACTIVE | Noted: 2022-11-07

## 2022-11-08 PROBLEM — I25.10 CORONARY ARTERY DISEASE INVOLVING NATIVE HEART: Status: ACTIVE | Noted: 2022-11-07

## 2022-11-08 LAB
ALBUMIN SERPL-MCNC: 3.5 G/DL (ref 3.5–5.2)
ALBUMIN/GLOB SERPL: 1.2 G/DL
ALP SERPL-CCNC: 82 U/L (ref 39–117)
ALT SERPL W P-5'-P-CCNC: 16 U/L (ref 1–33)
ANION GAP SERPL CALCULATED.3IONS-SCNC: 9 MMOL/L (ref 5–15)
ARTERIAL PATENCY WRIST A: POSITIVE
AST SERPL-CCNC: 29 U/L (ref 1–32)
ATMOSPHERIC PRESS: 761 MMHG
BACTERIA UR QL AUTO: ABNORMAL /HPF
BASE EXCESS BLDA CALC-SCNC: -1.7 MMOL/L (ref 0–2)
BASOPHILS # BLD AUTO: 0.09 10*3/MM3 (ref 0–0.2)
BASOPHILS NFR BLD AUTO: 1.4 % (ref 0–1.5)
BDY SITE: ABNORMAL
BH CV ECHO MEAS - ACS: 1.38 CM
BH CV ECHO MEAS - AO MAX PG: 15.5 MMHG
BH CV ECHO MEAS - AO MEAN PG: 9 MMHG
BH CV ECHO MEAS - AO ROOT DIAM: 2.17 CM
BH CV ECHO MEAS - AO V2 MAX: 197 CM/SEC
BH CV ECHO MEAS - AO V2 VTI: 36.9 CM
BH CV ECHO MEAS - AVA(I,D): 1.41 CM2
BH CV ECHO MEAS - EDV(CUBED): 175.6 ML
BH CV ECHO MEAS - EDV(MOD-SP2): 120 ML
BH CV ECHO MEAS - EDV(MOD-SP4): 83 ML
BH CV ECHO MEAS - EF(MOD-BP): 24.4 %
BH CV ECHO MEAS - EF(MOD-SP2): 22.5 %
BH CV ECHO MEAS - EF(MOD-SP4): 26.5 %
BH CV ECHO MEAS - ESV(CUBED): 68.6 ML
BH CV ECHO MEAS - ESV(MOD-SP2): 93 ML
BH CV ECHO MEAS - ESV(MOD-SP4): 61 ML
BH CV ECHO MEAS - FS: 26.9 %
BH CV ECHO MEAS - IVS/LVPW: 1.22 CM
BH CV ECHO MEAS - IVSD: 1.1 CM
BH CV ECHO MEAS - LA DIMENSION: 3.4 CM
BH CV ECHO MEAS - LAT PEAK E' VEL: 7.8 CM/SEC
BH CV ECHO MEAS - LV DIASTOLIC VOL/BSA (35-75): 39 CM2
BH CV ECHO MEAS - LV MASS(C)D: 219.7 GRAMS
BH CV ECHO MEAS - LV MAX PG: 2.9 MMHG
BH CV ECHO MEAS - LV MEAN PG: 2 MMHG
BH CV ECHO MEAS - LV SYSTOLIC VOL/BSA (12-30): 28.7 CM2
BH CV ECHO MEAS - LV V1 MAX: 85.7 CM/SEC
BH CV ECHO MEAS - LV V1 VTI: 16.1 CM
BH CV ECHO MEAS - LVIDD: 5.6 CM
BH CV ECHO MEAS - LVIDS: 4.1 CM
BH CV ECHO MEAS - LVOT AREA: 3.2 CM2
BH CV ECHO MEAS - LVOT DIAM: 2.03 CM
BH CV ECHO MEAS - LVPWD: 0.9 CM
BH CV ECHO MEAS - MED PEAK E' VEL: 5.4 CM/SEC
BH CV ECHO MEAS - MR MAX PG: 78.9 MMHG
BH CV ECHO MEAS - MR MAX VEL: 444.2 CM/SEC
BH CV ECHO MEAS - MV A DUR: 0.1 SEC
BH CV ECHO MEAS - MV A MAX VEL: 128 CM/SEC
BH CV ECHO MEAS - MV DEC SLOPE: 1009 CM/SEC2
BH CV ECHO MEAS - MV DEC TIME: 151 MSEC
BH CV ECHO MEAS - MV E MAX VEL: 120 CM/SEC
BH CV ECHO MEAS - MV E/A: 0.94
BH CV ECHO MEAS - MV MAX PG: 10.6 MMHG
BH CV ECHO MEAS - MV MEAN PG: 6.3 MMHG
BH CV ECHO MEAS - MV P1/2T: 50.1 MSEC
BH CV ECHO MEAS - MV V2 VTI: 36.5 CM
BH CV ECHO MEAS - MVA(P1/2T): 4.4 CM2
BH CV ECHO MEAS - MVA(VTI): 1.43 CM2
BH CV ECHO MEAS - PULM A REVS DUR: 0.17 SEC
BH CV ECHO MEAS - PULM A REVS VEL: 39.9 CM/SEC
BH CV ECHO MEAS - PULM DIAS VEL: 59.4 CM/SEC
BH CV ECHO MEAS - PULM S/D: 1.07
BH CV ECHO MEAS - PULM SYS VEL: 63.4 CM/SEC
BH CV ECHO MEAS - RAP SYSTOLE: 3 MMHG
BH CV ECHO MEAS - RVSP: 44.6 MMHG
BH CV ECHO MEAS - SI(MOD-SP2): 12.7 ML/M2
BH CV ECHO MEAS - SI(MOD-SP4): 10.3 ML/M2
BH CV ECHO MEAS - SV(LVOT): 52.1 ML
BH CV ECHO MEAS - SV(MOD-SP2): 27 ML
BH CV ECHO MEAS - SV(MOD-SP4): 22 ML
BH CV ECHO MEAS - TAPSE (>1.6): 1.98 CM
BH CV ECHO MEAS - TR MAX PG: 41.6 MMHG
BH CV ECHO MEAS - TR MAX VEL: 322.4 CM/SEC
BH CV ECHO MEASUREMENTS AVERAGE E/E' RATIO: 18.18
BH CV XLRA - RV BASE: 2.7 CM
BH CV XLRA - RV LENGTH: 6.4 CM
BH CV XLRA - RV MID: 2.17 CM
BH CV XLRA - TDI S': 9.1 CM/SEC
BILIRUB SERPL-MCNC: 0.6 MG/DL (ref 0–1.2)
BILIRUB UR QL STRIP: NEGATIVE
BUN SERPL-MCNC: 13 MG/DL (ref 8–23)
BUN/CREAT SERPL: 16.9 (ref 7–25)
CALCIUM SPEC-SCNC: 8.9 MG/DL (ref 8.6–10.5)
CHLORIDE SERPL-SCNC: 110 MMOL/L (ref 98–107)
CHOLEST SERPL-MCNC: 137 MG/DL (ref 0–200)
CLARITY UR: ABNORMAL
CO2 SERPL-SCNC: 22 MMOL/L (ref 22–29)
COLOR UR: YELLOW
CREAT SERPL-MCNC: 0.77 MG/DL (ref 0.57–1)
DEPRECATED RDW RBC AUTO: 45.3 FL (ref 37–54)
EGFRCR SERPLBLD CKD-EPI 2021: 82.6 ML/MIN/1.73
EOSINOPHIL # BLD AUTO: 0.1 10*3/MM3 (ref 0–0.4)
EOSINOPHIL NFR BLD AUTO: 1.6 % (ref 0.3–6.2)
ERYTHROCYTE [DISTWIDTH] IN BLOOD BY AUTOMATED COUNT: 14.3 % (ref 12.3–15.4)
GLOBULIN UR ELPH-MCNC: 2.9 GM/DL
GLUCOSE BLDC GLUCOMTR-MCNC: 136 MG/DL (ref 70–130)
GLUCOSE BLDC GLUCOMTR-MCNC: 143 MG/DL (ref 70–130)
GLUCOSE BLDC GLUCOMTR-MCNC: 91 MG/DL (ref 70–130)
GLUCOSE SERPL-MCNC: 117 MG/DL (ref 65–99)
GLUCOSE UR STRIP-MCNC: ABNORMAL MG/DL
HCO3 BLDA-SCNC: 21.6 MMOL/L (ref 22–28)
HCT VFR BLD AUTO: 37.1 % (ref 34–46.6)
HDLC SERPL-MCNC: 57 MG/DL (ref 40–60)
HGB BLD-MCNC: 12.4 G/DL (ref 12–15.9)
HGB UR QL STRIP.AUTO: ABNORMAL
HYALINE CASTS UR QL AUTO: ABNORMAL /LPF
IMM GRANULOCYTES # BLD AUTO: 0.02 10*3/MM3 (ref 0–0.05)
IMM GRANULOCYTES NFR BLD AUTO: 0.3 % (ref 0–0.5)
KETONES UR QL STRIP: NEGATIVE
LDLC SERPL CALC-MCNC: 62 MG/DL (ref 0–100)
LDLC/HDLC SERPL: 1.06 {RATIO}
LEFT ATRIUM VOLUME INDEX: 27.7 ML/M2
LEUKOCYTE ESTERASE UR QL STRIP.AUTO: ABNORMAL
LYMPHOCYTES # BLD AUTO: 1.97 10*3/MM3 (ref 0.7–3.1)
LYMPHOCYTES NFR BLD AUTO: 31.6 % (ref 19.6–45.3)
MAXIMAL PREDICTED HEART RATE: 149 BPM
MCH RBC QN AUTO: 29 PG (ref 26.6–33)
MCHC RBC AUTO-ENTMCNC: 33.4 G/DL (ref 31.5–35.7)
MCV RBC AUTO: 86.9 FL (ref 79–97)
MODALITY: ABNORMAL
MONOCYTES # BLD AUTO: 0.55 10*3/MM3 (ref 0.1–0.9)
MONOCYTES NFR BLD AUTO: 8.8 % (ref 5–12)
NEUTROPHILS NFR BLD AUTO: 3.5 10*3/MM3 (ref 1.7–7)
NEUTROPHILS NFR BLD AUTO: 56.3 % (ref 42.7–76)
NITRITE UR QL STRIP: POSITIVE
NRBC BLD AUTO-RTO: 0 /100 WBC (ref 0–0.2)
PCO2 BLDA: 31.6 MM HG (ref 35–45)
PH BLDA: 7.44 PH UNITS (ref 7.35–7.45)
PH UR STRIP.AUTO: 5.5 [PH] (ref 5–8)
PLATELET # BLD AUTO: 220 10*3/MM3 (ref 140–450)
PMV BLD AUTO: 10.4 FL (ref 6–12)
PO2 BLDA: 96.6 MM HG (ref 80–100)
POTASSIUM SERPL-SCNC: 3.7 MMOL/L (ref 3.5–5.2)
PROT SERPL-MCNC: 6.4 G/DL (ref 6–8.5)
PROT UR QL STRIP: ABNORMAL
RBC # BLD AUTO: 4.27 10*6/MM3 (ref 3.77–5.28)
RBC # UR STRIP: ABNORMAL /HPF
REF LAB TEST METHOD: ABNORMAL
SAO2 % BLDCOA: 97.9 % (ref 92–99)
SINUS: 2.27 CM
SODIUM SERPL-SCNC: 141 MMOL/L (ref 136–145)
SP GR UR STRIP: >=1.03 (ref 1–1.03)
SQUAMOUS #/AREA URNS HPF: ABNORMAL /HPF
STJ: 2.31 CM
STRESS TARGET HR: 127 BPM
TOTAL RATE: 16 BREATHS/MINUTE
TRIGL SERPL-MCNC: 99 MG/DL (ref 0–150)
TSH SERPL DL<=0.05 MIU/L-ACNC: 2.91 UIU/ML (ref 0.27–4.2)
UROBILINOGEN UR QL STRIP: ABNORMAL
VLDLC SERPL-MCNC: 18 MG/DL (ref 5–40)
WBC # UR STRIP: ABNORMAL /HPF
WBC NRBC COR # BLD: 6.23 10*3/MM3 (ref 3.4–10.8)

## 2022-11-08 PROCEDURE — 87086 URINE CULTURE/COLONY COUNT: CPT | Performed by: NURSE PRACTITIONER

## 2022-11-08 PROCEDURE — 93005 ELECTROCARDIOGRAM TRACING: CPT | Performed by: INTERNAL MEDICINE

## 2022-11-08 PROCEDURE — 80053 COMPREHEN METABOLIC PANEL: CPT | Performed by: NURSE PRACTITIONER

## 2022-11-08 PROCEDURE — 80061 LIPID PANEL: CPT | Performed by: HOSPITALIST

## 2022-11-08 PROCEDURE — 81001 URINALYSIS AUTO W/SCOPE: CPT | Performed by: NURSE PRACTITIONER

## 2022-11-08 PROCEDURE — 94761 N-INVAS EAR/PLS OXIMETRY MLT: CPT

## 2022-11-08 PROCEDURE — 82803 BLOOD GASES ANY COMBINATION: CPT

## 2022-11-08 PROCEDURE — 99232 SBSQ HOSP IP/OBS MODERATE 35: CPT | Performed by: INTERNAL MEDICINE

## 2022-11-08 PROCEDURE — 87186 SC STD MICRODIL/AGAR DIL: CPT | Performed by: NURSE PRACTITIONER

## 2022-11-08 PROCEDURE — 36600 WITHDRAWAL OF ARTERIAL BLOOD: CPT

## 2022-11-08 PROCEDURE — 84443 ASSAY THYROID STIM HORMONE: CPT | Performed by: HOSPITALIST

## 2022-11-08 PROCEDURE — 93010 ELECTROCARDIOGRAM REPORT: CPT | Performed by: INTERNAL MEDICINE

## 2022-11-08 PROCEDURE — 25010000002 EPTIFIBATIDE PER 5 MG: Performed by: NURSE PRACTITIONER

## 2022-11-08 PROCEDURE — 82962 GLUCOSE BLOOD TEST: CPT

## 2022-11-08 PROCEDURE — 87077 CULTURE AEROBIC IDENTIFY: CPT | Performed by: NURSE PRACTITIONER

## 2022-11-08 PROCEDURE — 86901 BLOOD TYPING SEROLOGIC RH(D): CPT

## 2022-11-08 PROCEDURE — 85025 COMPLETE CBC W/AUTO DIFF WBC: CPT | Performed by: NURSE PRACTITIONER

## 2022-11-08 PROCEDURE — 99232 SBSQ HOSP IP/OBS MODERATE 35: CPT | Performed by: NURSE PRACTITIONER

## 2022-11-08 PROCEDURE — 86900 BLOOD TYPING SEROLOGIC ABO: CPT

## 2022-11-08 PROCEDURE — 94799 UNLISTED PULMONARY SVC/PX: CPT

## 2022-11-08 RX ORDER — ALPRAZOLAM 0.25 MG/1
0.25 TABLET ORAL EVERY 8 HOURS PRN
Status: DISCONTINUED | OUTPATIENT
Start: 2022-11-08 | End: 2022-11-10

## 2022-11-08 RX ORDER — TEMAZEPAM 7.5 MG/1
7.5 CAPSULE ORAL NIGHTLY PRN
Status: DISCONTINUED | OUTPATIENT
Start: 2022-11-08 | End: 2022-11-10

## 2022-11-08 RX ORDER — LISINOPRIL 5 MG/1
5 TABLET ORAL DAILY
Status: DISCONTINUED | OUTPATIENT
Start: 2022-11-09 | End: 2022-11-10

## 2022-11-08 RX ORDER — CHLORHEXIDINE GLUCONATE 0.12 MG/ML
15 RINSE ORAL EVERY 12 HOURS SCHEDULED
Status: COMPLETED | OUTPATIENT
Start: 2022-11-09 | End: 2022-11-10

## 2022-11-08 RX ORDER — CEFAZOLIN SODIUM 2 G/100ML
2 INJECTION, SOLUTION INTRAVENOUS ONCE
Status: COMPLETED | OUTPATIENT
Start: 2022-11-10 | End: 2022-11-10

## 2022-11-08 RX ORDER — CHLORHEXIDINE GLUCONATE 500 MG/1
1 CLOTH TOPICAL EVERY 12 HOURS
Status: DISCONTINUED | OUTPATIENT
Start: 2022-11-09 | End: 2022-11-10

## 2022-11-08 RX ADMIN — PANTOPRAZOLE SODIUM 40 MG: 40 TABLET, DELAYED RELEASE ORAL at 06:13

## 2022-11-08 RX ADMIN — LISINOPRIL 10 MG: 10 TABLET ORAL at 08:19

## 2022-11-08 RX ADMIN — ASPIRIN 81 MG: 81 TABLET, COATED ORAL at 08:19

## 2022-11-08 RX ADMIN — EPTIFIBATIDE 2 MCG/KG/MIN: 0.75 INJECTION INTRAVENOUS at 04:13

## 2022-11-08 RX ADMIN — EPTIFIBATIDE 2 MCG/KG/MIN: 0.75 INJECTION INTRAVENOUS at 10:13

## 2022-11-08 RX ADMIN — EPTIFIBATIDE 2 MCG/KG/MIN: 0.75 INJECTION INTRAVENOUS at 16:53

## 2022-11-08 RX ADMIN — EMPAGLIFLOZIN 10 MG: 10 TABLET, FILM COATED ORAL at 08:19

## 2022-11-08 RX ADMIN — EPTIFIBATIDE 2 MCG/KG/MIN: 0.75 INJECTION INTRAVENOUS at 22:39

## 2022-11-08 RX ADMIN — LEVOTHYROXINE SODIUM 25 MCG: 0.03 TABLET ORAL at 06:13

## 2022-11-08 RX ADMIN — Medication 10 ML: at 08:19

## 2022-11-08 RX ADMIN — METOPROLOL SUCCINATE 25 MG: 25 TABLET, EXTENDED RELEASE ORAL at 08:19

## 2022-11-08 RX ADMIN — ATORVASTATIN CALCIUM 80 MG: 80 TABLET, FILM COATED ORAL at 08:19

## 2022-11-08 NOTE — PLAN OF CARE
Problem: Adult Inpatient Plan of Care  Goal: Plan of Care Review  Outcome: Ongoing, Progressing  Goal: Patient-Specific Goal (Individualized)  Outcome: Ongoing, Progressing  Goal: Absence of Hospital-Acquired Illness or Injury  Outcome: Ongoing, Progressing  Intervention: Identify and Manage Fall Risk  Recent Flowsheet Documentation  Taken 11/7/2022 1800 by Sonya Watson RN  Safety Promotion/Fall Prevention:   safety round/check completed   clutter free environment maintained  Taken 11/7/2022 1700 by Sonya Watson RN  Safety Promotion/Fall Prevention:   safety round/check completed   clutter free environment maintained  Taken 11/7/2022 1600 by Sonya Watson RN  Safety Promotion/Fall Prevention:   safety round/check completed   clutter free environment maintained  Taken 11/7/2022 1500 by Sonya Watson RN  Safety Promotion/Fall Prevention:   safety round/check completed   clutter free environment maintained  Taken 11/7/2022 1400 by Sonya Watson RN  Safety Promotion/Fall Prevention:   safety round/check completed   clutter free environment maintained  Taken 11/7/2022 1300 by Sonya Watson RN  Safety Promotion/Fall Prevention:   safety round/check completed   clutter free environment maintained  Taken 11/7/2022 1200 by Sonya Watson RN  Safety Promotion/Fall Prevention:   safety round/check completed   clutter free environment maintained  Taken 11/7/2022 1100 by Sonya Watson RN  Safety Promotion/Fall Prevention:   safety round/check completed   clutter free environment maintained  Taken 11/7/2022 1000 by Sonya Watson RN  Safety Promotion/Fall Prevention:   safety round/check completed   clutter free environment maintained  Taken 11/7/2022 0900 by Sonya Watson RN  Safety Promotion/Fall Prevention:   safety round/check completed   clutter free environment maintained  Taken 11/7/2022 0800 by Sonya Watson RN  Safety Promotion/Fall Prevention:   safety round/check completed   clutter free environment  maintained  Intervention: Prevent Skin Injury  Recent Flowsheet Documentation  Taken 11/7/2022 1800 by Sonya Watson RN  Body Position:   tilted   left   position changed independently  Taken 11/7/2022 1600 by Sonya Watson RN  Body Position:   tilted   right   position changed independently  Taken 11/7/2022 1400 by Sonya Watson RN  Body Position:   tilted   left   position changed independently  Taken 11/7/2022 1200 by Sonya Watson RN  Body Position:   tilted   right   position changed independently  Taken 11/7/2022 1000 by Sonya Watson RN  Body Position:   tilted   left   position changed independently  Taken 11/7/2022 0800 by Sonya Watson RN  Body Position:   tilted   right   position changed independently  Skin Protection:   tubing/devices free from skin contact   skin-to-device areas padded  Intervention: Prevent and Manage VTE (Venous Thromboembolism) Risk  Recent Flowsheet Documentation  Taken 11/7/2022 1600 by Sonya Watson RN  VTE Prevention/Management: other (see comments)  Taken 11/7/2022 0800 by Sonya Watson RN  Activity Management: bedrest  Goal: Optimal Comfort and Wellbeing  Outcome: Ongoing, Progressing  Intervention: Provide Person-Centered Care  Recent Flowsheet Documentation  Taken 11/7/2022 1600 by Sonya Watson RN  Trust Relationship/Rapport:   care explained   thoughts/feelings acknowledged  Taken 11/7/2022 1200 by Sonya Watson RN  Trust Relationship/Rapport:   care explained   thoughts/feelings acknowledged  Taken 11/7/2022 0800 by Sonya Watson RN  Trust Relationship/Rapport:   care explained   thoughts/feelings acknowledged  Goal: Readiness for Transition of Care  Outcome: Ongoing, Progressing   Goal Outcome Evaluation:   Pt remain in [facility] on RA. Pt A & O. Follows command. On Integrilin drip. Cardiac diet. Vital stable. Will keep monitoring.

## 2022-11-08 NOTE — PROGRESS NOTES
[facility]  Cardiology Progress Note    Patient Identification:  Name: Mayra Hirsch  Age: 71 y.o.  Sex: female  :  1950  MRN: [id number]                 Follow Up / Chief Complaint: follow up for CAD    Interval History: Tentatively plan for CABG on Thursday by CTS     Subjective:  No chest pains or shortness of breath    Objective:    Past Medical History:  Past Medical History:   Diagnosis Date    Depression     Diabetes mellitus (HCC)     Disease of thyroid gland     Fibrocystic breast     Hypothyroidism     Type 2 diabetes mellitus (HCC)      Past Surgical History:  Past Surgical History:   Procedure Laterality Date    BREAST EXCISIONAL BIOPSY Right     at age 22; benign.     SECTION      HAND SURGERY      KNEE SURGERY      OOPHORECTOMY      1 ovary removed due to ectopic pregnancy        Social History:   Social History     Tobacco Use    Smoking status: Never    Smokeless tobacco: Never   Substance Use Topics    Alcohol use: No      Family History:  Family History   Problem Relation Age of Onset    Coronary artery disease Mother     Alzheimer's disease Mother     Coronary artery disease Father     Cancer Father     Thyroid disease Sister           Allergies:  Allergies   Allergen Reactions    Bydureon [Exenatide] Diarrhea    Metformin And Related Nausea And Vomiting     Scheduled Meds:  aspirin, 81 mg, Daily  atorvastatin, 80 mg, Daily  [START ON 11/10/2022] ceFAZolin, 2 g, Once  [START ON 2022] chlorhexidine, 15 mL, Q12H  [START ON 2022] Chlorhexidine Gluconate Cloth, 1 application, Q12H  empagliflozin, 10 mg, Daily  insulin lispro, 0-7 Units, 4x Daily With Meals & Nightly  levothyroxine, 25 mcg, Q AM  lisinopril, 10 mg, Daily  metoprolol succinate XL, 25 mg, Q24H  [START ON 11/10/2022] metoprolol tartrate, 12.5 mg, On Call to OR  [START ON 2022] mupirocin, 1 application, Q12H  pantoprazole, 40 mg, Q AM            INTAKE AND OUTPUT:    Intake/Output Summary  "(Last 24 hours) at 11/8/2022 1411  Last data filed at 11/8/2022 0400  Gross per 24 hour   Intake 450 ml   Output --   Net 450 ml   ROS  Constitutional: Awake and alert, no fever. No nosebleeds  Abdomen           no abdominal pain   Cardiac              no chest pain  Pulmonary          no shortness of breath      /73   Pulse 72   Temp 97.9 °F (36.6 °C) (Oral)   Resp 14   Ht 170.2 cm (67\")   Wt 103 kg (227 lb 11.8 oz)   SpO2 94%   BMI 35.67 kg/m²   General appearance: No acute changes   Neck: Trachea midline; NECK, supple, no thyromegaly or lymphadenopathy   Lungs: Normal size and shape, normal breath sounds, equal distribution of air, no rales or rhonchi   CV: S1-S2 regular, no murmurs, no rub, no gallop   Abdomen: Soft, nontender; no masses , no abnormal abdominal sounds   Extremities: No deformity , normal color , no peripheral edema   Skin: Normal temperature, turgor and texture; no rash, ulcers            I reviewed the patient's new clinical results, and personally reviewed and interpreted the patient's ECG and telemetry data from the last 24 hours      Cardiographics            Telemetry:                 Interpretation Summary         The left ventricular cavity is mildly dilated.    The following left ventricular wall segments are hypokinetic: apical anterior, apical lateral, apical inferior, apical septal, apex hypokinetic and mid anteroseptal.    There is calcification of the aortic valve.    Estimated right ventricular systolic pressure from tricuspid regurgitation is mildly elevated (35-45 mmHg).         Lab Review   Results from last 7 days   Lab Units 11/07/22  0154   TROPONIN T ng/mL 0.044*         Results from last 7 days   Lab Units 11/08/22  0623   SODIUM mmol/L 141   POTASSIUM mmol/L 3.7   BUN mg/dL 13   CREATININE mg/dL 0.77   CALCIUM mg/dL 8.9     @[id number]@  Results from last 7 days   Lab Units 11/08/22  0623 11/07/22  2141 11/07/22  0413   WBC 10*3/mm3 6.23 8.21 10.37 " "  HEMOGLOBIN g/dL 12.4 11.7* 12.0   HEMATOCRIT % 37.1 37.1 38.2   PLATELETS 10*3/mm3 220 231 233     Results from last 7 days   Lab Units 11/07/22  0154   INR  1.08   APTT seconds 27.0     The following medical decision was discussed in detail with Dr. Marie      Assessment:    STEMI   Hypertension   diabetes mellitus: Internal medicine following  hypothyroidism       Plan:  Blood pressure and heart rate are stable.  On Integrilin drip.  Tentatively plan for CABG with CTS on Thursday.    Labs/tests ordered for am: CBC, mag BMP in AM.  Joanna Marie MD    )11/8/2022       EMR Dragon/Transcription:   \"Dictated utilizing Dragon dictation\".     "

## 2022-11-08 NOTE — PROGRESS NOTES
"Daily progress note    Referring physician  Dr. BECKFORD    Chief complaint  Doing much better with no chest pain shortness of breath palpitation.  Patient agreeable for CABG on Thursday.    History of present illness  71-year-old white female with history of diabetes hypertension hyperlipidemia hypothyroidism presented to [facility] emergency room with sudden onset of shortness of breath as she woke up with it.  Patient has no chest pain palpitation but does have nonproductive cough but no fever chills.  Patient found to be hypoxic while EMS arrived and brought to the emergency room which she found to have acute ST elevation MI and taken to the Cath Lab which showed multivessel coronary disease  angioplasty and stent placed to diagonal branch and I am asked to follow patient for medical problem.  At the time of review she is feeling better and has no more shortness of breath and also denies any chest pain.  Patient feels weak but feeling much better after angioplasty.     REVIEW OF SYSTEMS  Constitutional: Negative for fever.   HENT: Negative for sore throat.    Eyes: Negative.    Respiratory: Positive  shortness of breath.    Cardiovascular: Negative for chest pain.   Gastrointestinal: Negative for abdominal pain, diarrhea and vomiting.   Genitourinary: Negative for dysuria.   Musculoskeletal: Negative for neck pain.   Skin: Negative for rash.   Allergic/Immunologic: Negative.    Neurological: Negative for weakness, numbness and headaches.   Hematological: Negative.    Psychiatric/Behavioral: Negative.      PHYSICAL EXAM         Blood pressure 103/56, pulse 69, temperature 98.2 °F (36.8 °C), temperature source Oral, resp. rate 16, height 170.2 cm (67\"), weight 102 kg (225 lb), SpO2 93 %, not currently breastfeeding.    Constitutional:       General: She is not in acute distress.  HENT:      Head: Normocephalic and atraumatic.   Eyes:      Extraocular Movements: EOM normal.      Pupils: Pupils are equal, round, " and reactive to light.   Cardiovascular:      Rate and Rhythm: Normal rate and regular rhythm.      Heart sounds: Normal heart sounds.   Pulmonary:      Effort: Pulmonary effort is normal. No respiratory distress.      Breath sounds: Examination of the right-middle field reveals rhonchi. Examination of the left-middle field reveals rhonchi. Examination of the right-lower field reveals rhonchi. Examination of the left-lower field reveals rhonchi. Rhonchi present.   Abdominal:      Palpations: Abdomen is soft.      Tenderness: There is no abdominal tenderness. There is no guarding or rebound.   Musculoskeletal:         General: No edema. Normal range of motion.      Cervical back: Normal range of motion and neck supple.   Skin:     General: Skin is warm and dry.      Findings: No rash.   Neurological:      Mental Status: She is alert and oriented to person, place, and time.      Sensory: Sensation is intact.      Motor: Motor strength is normal.   Psychiatric:         Mood and Affect: Mood and affect normal.      LAB RESULTS  Lab Results (last 24 hours)     Procedure Component Value Units Date/Time    CBC & Differential [[id number]]  (Normal) Collected: 11/08/22 0623    Specimen: Blood Updated: 11/08/22 0725    Narrative:      The following orders were created for panel order CBC & Differential.  Procedure                               Abnormality         Status                     ---------                               -----------         ------                     CBC Auto Differential[[id number]]        Normal              Final result                 Please view results for these tests on the individual orders.    CBC Auto Differential [[id number]]  (Normal) Collected: 11/08/22 0623    Specimen: Blood Updated: 11/08/22 0725     WBC 6.23 10*3/mm3      RBC 4.27 10*6/mm3      Hemoglobin 12.4 g/dL      Hematocrit 37.1 %      MCV 86.9 fL      MCH 29.0 pg      MCHC 33.4 g/dL      RDW 14.3 %      RDW-SD 45.3 fl      MPV  10.4 fL      Platelets 220 10*3/mm3      Neutrophil % 56.3 %      Lymphocyte % 31.6 %      Monocyte % 8.8 %      Eosinophil % 1.6 %      Basophil % 1.4 %      Immature Grans % 0.3 %      Neutrophils, Absolute 3.50 10*3/mm3      Lymphocytes, Absolute 1.97 10*3/mm3      Monocytes, Absolute 0.55 10*3/mm3      Eosinophils, Absolute 0.10 10*3/mm3      Basophils, Absolute 0.09 10*3/mm3      Immature Grans, Absolute 0.02 10*3/mm3      nRBC 0.0 /100 WBC     TSH [[id number]]  (Normal) Collected: 11/08/22 0623    Specimen: Blood Updated: 11/08/22 0702     TSH 2.910 uIU/mL     Comprehensive Metabolic Panel [[id number]]  (Abnormal) Collected: 11/08/22 0623    Specimen: Blood Updated: 11/08/22 0652     Glucose 117 mg/dL      BUN 13 mg/dL      Creatinine 0.77 mg/dL      Sodium 141 mmol/L      Potassium 3.7 mmol/L      Chloride 110 mmol/L      CO2 22.0 mmol/L      Calcium 8.9 mg/dL      Total Protein 6.4 g/dL      Albumin 3.50 g/dL      ALT (SGPT) 16 U/L      AST (SGOT) 29 U/L      Alkaline Phosphatase 82 U/L      Total Bilirubin 0.6 mg/dL      Globulin 2.9 gm/dL      A/G Ratio 1.2 g/dL      BUN/Creatinine Ratio 16.9     Anion Gap 9.0 mmol/L      eGFR 82.6 mL/min/1.73      Comment: National Kidney Foundation and American Society of Nephrology (ASN) Task Force recommended calculation based on the Chronic Kidney Disease Epidemiology Collaboration (CKD-EPI) equation refit without adjustment for race.       Narrative:      GFR Normal >60  Chronic Kidney Disease <60  Kidney Failure <15    The GFR formula is only valid for adults with stable renal function between ages 18 and 70.    Lipid Panel [[id number]] Collected: 11/08/22 0623    Specimen: Blood Updated: 11/08/22 0652     Total Cholesterol 137 mg/dL      Triglycerides 99 mg/dL      HDL Cholesterol 57 mg/dL      LDL Cholesterol  62 mg/dL      VLDL Cholesterol 18 mg/dL      LDL/HDL Ratio 1.06    Narrative:      Cholesterol Reference Ranges  (U.S. Department of Health and Human Services  ATP III Classifications)    Desirable          <200 mg/dL  Borderline High    200-239 mg/dL  High Risk          >240 mg/dL      Triglyceride Reference Ranges  (U.S. Department of Health and Human Services ATP III Classifications)    Normal           <150 mg/dL  Borderline High  150-199 mg/dL  High             200-499 mg/dL  Very High        >500 mg/dL    HDL Reference Ranges  (U.S. Department of Health and Human Services ATP III Classifications)    Low     <40 mg/dl (major risk factor for CHD)  High    >60 mg/dl ('negative' risk factor for CHD)        LDL Reference Ranges  (U.S. Department of Health and Human Services ATP III Classifications)    Optimal          <100 mg/dL  Near Optimal     100-129 mg/dL  Borderline High  130-159 mg/dL  High             160-189 mg/dL  Very High        >189 mg/dL    POC Glucose Once [[id number]]  (Normal) Collected: 11/08/22 0324    Specimen: Blood Updated: 11/08/22 0325     Glucose 91 mg/dL      Comment: Meter: [id number] : [id number] [place] Gail NA       POC Glucose Once [[id number]]  (Normal) Collected: 11/07/22 2247    Specimen: Blood Updated: 11/07/22 2248     Glucose 116 mg/dL      Comment: Meter: [id number] : [id number] White Gail NA       CBC (No Diff) [[id number]]  (Abnormal) Collected: 11/07/22 2141    Specimen: Blood Updated: 11/07/22 2201     WBC 8.21 10*3/mm3      RBC 4.04 10*6/mm3      Hemoglobin 11.7 g/dL      Hematocrit 37.1 %      MCV 91.8 fL      MCH 29.0 pg      MCHC 31.5 g/dL      RDW 14.7 %      RDW-SD 49.4 fl      MPV 11.0 fL      Platelets 231 10*3/mm3     POC Glucose Once [[id number]]  (Abnormal) Collected: 11/07/22 2032    Specimen: Blood Updated: 11/07/22 2033     Glucose 230 mg/dL      Comment: Meter: [id number] : [id number] White Gail NA       POC Glucose Once [[id number]]  (Normal) Collected: 11/07/22 1742    Specimen: Blood Updated: 11/07/22 1811     Glucose 109 mg/dL      Comment: Meter: [id number] : [id number] Uli MENARD            Imaging Results (Last 24 Hours)     ** No results found for the last 24 hours. **           ECG 12 Lead          Component Ref Range & Units 01:44    QT Interval ms 358 P    Resulting Agency   ECG             HEART RATE= 116  bpm  RR Interval= 517  ms  NM Interval= 110  ms  P Horizontal Axis= -49  deg  P Front Axis= 64  deg  QRSD Interval= 97  ms  QT Interval= 358  ms  QRS Axis= 18  deg  T Wave Axis= 130  deg  - ABNORMAL ECG -  Sinus tachycardia  Probable left atrial enlargement  Lateral infarct, acute (LAD)  Probable anteroseptal infarct, recent             Current Facility-Administered Medications:   •  acetaminophen (TYLENOL) tablet 650 mg, 650 mg, Oral, Q4H PRN, Joanna Marie MD, 650 mg at 11/07/22 2144  •  ALPRAZolam (XANAX) tablet 0.25 mg, 0.25 mg, Oral, Q8H PRN, Martha Golden APRN  •  aspirin EC tablet 81 mg, 81 mg, Oral, Daily, Joanna Marie MD, 81 mg at 11/08/22 0819  •  atorvastatin (LIPITOR) tablet 80 mg, 80 mg, Oral, Daily, Joanna Marie MD, 80 mg at 11/08/22 0819  •  [START ON 11/10/2022] ceFAZolin in dextrose (ANCEF) IVPB solution 2 g, 2 g, Intravenous, Once, Martha Golden APRN  •  [START ON 11/9/2022] chlorhexidine (PERIDEX) 0.12 % solution 15 mL, 15 mL, Mouth/Throat, Q12H, Martha Golden APRN  •  [START ON 11/9/2022] Chlorhexidine Gluconate Cloth 2 % pads 1 application, 1 application, Topical, Q12H, Martha Golden APRN  •  empagliflozin (JARDIANCE) tablet 10 mg, 10 mg, Oral, Daily, Joanna Marie MD, 10 mg at 11/08/22 0819  •  [COMPLETED] Eptifibatide (INTEGRILIN) injection 18,360 mcg, 180 mcg/kg, Intravenous, Once, 18,360 mcg at 11/07/22 1534 **FOLLOWED BY** eptifibatide (INTEGRILIN) 75 mg in 100 mL solution, 2 mcg/kg/min, Intravenous, Continuous, Sharron Bolivar APRN, Last Rate: 16.32 mL/hr at 11/08/22 1013, 2 mcg/kg/min at 11/08/22 1013  •  insulin lispro (ADMELOG) injection 0-7 Units, 0-7 Units, Subcutaneous, 4x Daily With Meals &  Nightly, Dee Koch MD, 3 Units at 11/07/22 2040  •  levothyroxine (SYNTHROID, LEVOTHROID) tablet 25 mcg, 25 mcg, Oral, Q AM, Joanna Marie MD, 25 mcg at 11/08/22 0613  •  lisinopril (PRINIVIL,ZESTRIL) tablet 10 mg, 10 mg, Oral, Daily, Dee Koch MD, 10 mg at 11/08/22 0819  •  metoprolol succinate XL (TOPROL-XL) 24 hr tablet 25 mg, 25 mg, Oral, Q24H, Sharron Bolivar APRN, 25 mg at 11/08/22 0819  •  [START ON 11/10/2022] metoprolol tartrate (LOPRESSOR) tablet 12.5 mg, 12.5 mg, Oral, On Call to OR, Martha Golden APRN  •  [START ON 11/9/2022] mupirocin (BACTROBAN) 2 % nasal ointment 1 application, 1 application, Each Nare, Q12H, Martha Golden APRN  •  pantoprazole (PROTONIX) EC tablet 40 mg, 40 mg, Oral, Q AM, Dee Koch MD, 40 mg at 11/08/22 0613  •  [COMPLETED] Insert peripheral IV, , , Once **AND** sodium chloride 0.9 % flush 10 mL, 10 mL, Intravenous, PRN, Joanna Marie MD, 10 mL at 11/08/22 0819  •  temazepam (RESTORIL) capsule 7.5 mg, 7.5 mg, Oral, Nightly PRN, Martha Golden APRN     ASSESSMENT  Multivessel coronary artery disease need CABG  Acute ST relation MI s/p angioplasty and stent   Diabetes mellitus  Hypertension  Hyperlipidemia  Hypothyroidism  Gastroesophageal reflux disease    PLAN  CPM  Post PCI care  CABG on Thursday  Continue home medications  Stress ulcer DVT prophylaxis  Accu-Cheks sliding scale insulin  Supportive care  Discussed with nursing staff  We will follow with Dr. BECKFORD and further recommendation current hospital course    DEE KOCH MD

## 2022-11-08 NOTE — PLAN OF CARE
Problem: Adult Inpatient Plan of Care  Goal: Plan of Care Review  Outcome: Ongoing, Progressing  Goal: Patient-Specific Goal (Individualized)  Outcome: Ongoing, Progressing  Goal: Absence of Hospital-Acquired Illness or Injury  Outcome: Ongoing, Progressing  Intervention: Identify and Manage Fall Risk  Recent Flowsheet Documentation  Taken 11/8/2022 1800 by Sonya Watson RN  Safety Promotion/Fall Prevention:   safety round/check completed   clutter free environment maintained  Taken 11/8/2022 1700 by Sonya Watson RN  Safety Promotion/Fall Prevention:   safety round/check completed   clutter free environment maintained  Taken 11/8/2022 1600 by Sonya Watson RN  Safety Promotion/Fall Prevention:   safety round/check completed   clutter free environment maintained  Taken 11/8/2022 1500 by Sonya Watson RN  Safety Promotion/Fall Prevention:   safety round/check completed   clutter free environment maintained  Taken 11/8/2022 1215 by Sonya Watson RN  Safety Promotion/Fall Prevention:   safety round/check completed   clutter free environment maintained  Taken 11/8/2022 1000 by Sonya Watson RN  Safety Promotion/Fall Prevention:   safety round/check completed   clutter free environment maintained  Taken 11/8/2022 0900 by Sonya Watson RN  Safety Promotion/Fall Prevention:   safety round/check completed   clutter free environment maintained  Taken 11/8/2022 0800 by Sonya Watson RN  Safety Promotion/Fall Prevention:   safety round/check completed   clutter free environment maintained  Intervention: Prevent Skin Injury  Recent Flowsheet Documentation  Taken 11/8/2022 1600 by Sonya Watson RN  Body Position: position changed independently  Skin Protection:   skin-to-device areas padded   tubing/devices free from skin contact  Taken 11/8/2022 1215 by Sonya Watson RN  Body Position: position changed independently  Skin Protection:   skin-to-device areas padded   tubing/devices free from skin contact  Taken  11/8/2022 1000 by Sonya Watson RN  Body Position: position changed independently  Taken 11/8/2022 0800 by Sonya Watson RN  Body Position: position changed independently  Skin Protection:   skin-to-device areas padded   tubing/devices free from skin contact  Intervention: Prevent and Manage VTE (Venous Thromboembolism) Risk  Recent Flowsheet Documentation  Taken 11/8/2022 1600 by Soyna Watson RN  Range of Motion: ROM (range of motion) performed  Taken 11/8/2022 1215 by Sonya Watson RN  VTE Prevention/Management: (on integrelin) --  Taken 11/8/2022 0800 by Sonya Watson RN  Activity Management: activity adjusted per tolerance  VTE Prevention/Management: (on integrelin) --  Intervention: Prevent Infection  Recent Flowsheet Documentation  Taken 11/8/2022 1215 by Sonya Watson RN  Infection Prevention: single patient room provided  Taken 11/8/2022 0800 by Sonya Watson RN  Infection Prevention: single patient room provided  Goal: Optimal Comfort and Wellbeing  Outcome: Ongoing, Progressing  Intervention: Provide Person-Centered Care  Recent Flowsheet Documentation  Taken 11/8/2022 1600 by Sonya Watson RN  Trust Relationship/Rapport:   care explained   thoughts/feelings acknowledged  Taken 11/8/2022 1215 by oSnya Watson RN  Trust Relationship/Rapport:   care explained   thoughts/feelings acknowledged  Taken 11/8/2022 0800 by Sonya Watson RN  Trust Relationship/Rapport:   care explained   thoughts/feelings acknowledged  Goal: Readiness for Transition of Care  Outcome: Ongoing, Progressing   Goal Outcome Evaluation:  Pt remain in CCU on room air sating 96-97%. A & O follows command. On Integrilin drip. On cardiac diet. Uses bed side commode. Possible CABG on Thursday. Consent signed already. Vital stable. Will keep monitoring.

## 2022-11-08 NOTE — PROGRESS NOTES
LOS: 1 day   Patient Care Team:  Spencer Hua MD as PCP - General    Chief Complaint: STEMI  Subjective  Denies chest pain    Vital Signs  Temp:  [97.9 °F (36.6 °C)-99 °F (37.2 °C)] 99 °F (37.2 °C)  Heart Rate:  [68-96] 68  Resp:  [16] 16  BP: ()/(55-92) 112/59  Body mass index is 35.24 kg/m².    Intake/Output Summary (Last 24 hours) at 11/8/2022 1140  Last data filed at 11/8/2022 0400  Gross per 24 hour   Intake 450 ml   Output --   Net 450 ml     No intake/output data recorded.        11/07/22  0146 11/07/22  0305 11/07/22  1301   Weight: 102 kg (225 lb 14.4 oz) 102 kg (225 lb 1.4 oz) 102 kg (225 lb)         Objective    Results Review:        WBC WBC   Date Value Ref Range Status   11/08/2022 6.23 3.40 - 10.80 10*3/mm3 Final   11/07/2022 8.21 3.40 - 10.80 10*3/mm3 Final   11/07/2022 10.37 3.40 - 10.80 10*3/mm3 Final   11/07/2022 11.44 (H) 3.40 - 10.80 10*3/mm3 Final      HGB Hemoglobin   Date Value Ref Range Status   11/08/2022 12.4 12.0 - 15.9 g/dL Final   11/07/2022 11.7 (L) 12.0 - 15.9 g/dL Final   11/07/2022 12.0 12.0 - 15.9 g/dL Final   11/07/2022 13.2 12.0 - 15.9 g/dL Final      HCT Hematocrit   Date Value Ref Range Status   11/08/2022 37.1 34.0 - 46.6 % Final   11/07/2022 37.1 34.0 - 46.6 % Final   11/07/2022 38.2 34.0 - 46.6 % Final   11/07/2022 42.3 34.0 - 46.6 % Final      Platelets Platelets   Date Value Ref Range Status   11/08/2022 220 140 - 450 10*3/mm3 Final   11/07/2022 231 140 - 450 10*3/mm3 Final   11/07/2022 233 140 - 450 10*3/mm3 Final   11/07/2022 294 140 - 450 10*3/mm3 Final        PT/INR:    Protime   Date Value Ref Range Status   11/07/2022 14.1 11.7 - 14.2 Seconds Final   /  INR   Date Value Ref Range Status   11/07/2022 1.08 0.90 - 1.10 Final       Sodium Sodium   Date Value Ref Range Status   11/08/2022 141 136 - 145 mmol/L Final   11/07/2022 140 136 - 145 mmol/L Final   11/07/2022 138 136 - 145 mmol/L Final      Potassium Potassium   Date Value Ref Range Status   11/08/2022  3.7 3.5 - 5.2 mmol/L Final   11/07/2022 4.3 3.5 - 5.2 mmol/L Final   11/07/2022 4.0 3.5 - 5.2 mmol/L Final      Chloride Chloride   Date Value Ref Range Status   11/08/2022 110 (H) 98 - 107 mmol/L Final   11/07/2022 109 (H) 98 - 107 mmol/L Final   11/07/2022 105 98 - 107 mmol/L Final      Bicarbonate CO2   Date Value Ref Range Status   11/08/2022 22.0 22.0 - 29.0 mmol/L Final   11/07/2022 20.1 (L) 22.0 - 29.0 mmol/L Final   11/07/2022 21.8 (L) 22.0 - 29.0 mmol/L Final      BUN BUN   Date Value Ref Range Status   11/08/2022 13 8 - 23 mg/dL Final   11/07/2022 18 8 - 23 mg/dL Final   11/07/2022 19 8 - 23 mg/dL Final      Creatinine Creatinine   Date Value Ref Range Status   11/08/2022 0.77 0.57 - 1.00 mg/dL Final   11/07/2022 0.86 0.57 - 1.00 mg/dL Final   11/07/2022 1.09 (H) 0.57 - 1.00 mg/dL Final      Calcium Calcium   Date Value Ref Range Status   11/08/2022 8.9 8.6 - 10.5 mg/dL Final   11/07/2022 8.6 8.6 - 10.5 mg/dL Final   11/07/2022 9.0 8.6 - 10.5 mg/dL Final      Magnesium No results found for: MG       aspirin, 81 mg, Oral, Daily  atorvastatin, 80 mg, Oral, Daily  empagliflozin, 10 mg, Oral, Daily  insulin lispro, 0-7 Units, Subcutaneous, 4x Daily With Meals & Nightly  levothyroxine, 25 mcg, Oral, Q AM  lisinopril, 10 mg, Oral, Daily  metoprolol succinate XL, 25 mg, Oral, Q24H  pantoprazole, 40 mg, Oral, Q AM      eptifibatide, 2 mcg/kg/min, Last Rate: 2 mcg/kg/min (11/08/22 1013)            Patient Active Problem List   Diagnosis Code   • Vitamin D deficiency E55.9   • Primary hypothyroidism E03.9   • Dyslipidemia E78.5   • Essential hypertension I10   • Type 2 diabetes mellitus without complication, with long-term current use of insulin ([facility]) E11.9, Z79.4   • Noncompliance with diabetes treatment Z91.199   • ST elevation myocardial infarction (STEMI) ([facility]) I21.3   • ST elevation myocardial infarction (STEMI), unspecified artery ([facility]) I21.3       Assessment & Plan    -STEMI, Multivessel CAD- angioplasty and  stent placed diagonal branch  -Hypertension  -Obesity  -Diabetes type 2- A1C 7    preop studies pending  Plan for OR Thursday     GUEVARA Booth  11/08/22  11:40 EST

## 2022-11-09 LAB
ABO GROUP BLD: NORMAL
ANION GAP SERPL CALCULATED.3IONS-SCNC: 10.9 MMOL/L (ref 5–15)
BASOPHILS # BLD AUTO: 0.09 10*3/MM3 (ref 0–0.2)
BASOPHILS NFR BLD AUTO: 1.2 % (ref 0–1.5)
BLD GP AB SCN SERPL QL: NEGATIVE
BUN SERPL-MCNC: 15 MG/DL (ref 8–23)
BUN/CREAT SERPL: 19.2 (ref 7–25)
CALCIUM SPEC-SCNC: 9.2 MG/DL (ref 8.6–10.5)
CHLORIDE SERPL-SCNC: 104 MMOL/L (ref 98–107)
CO2 SERPL-SCNC: 22.1 MMOL/L (ref 22–29)
CREAT SERPL-MCNC: 0.78 MG/DL (ref 0.57–1)
DEPRECATED RDW RBC AUTO: 47.4 FL (ref 37–54)
EGFRCR SERPLBLD CKD-EPI 2021: 81.3 ML/MIN/1.73
EOSINOPHIL # BLD AUTO: 0.18 10*3/MM3 (ref 0–0.4)
EOSINOPHIL NFR BLD AUTO: 2.4 % (ref 0.3–6.2)
ERYTHROCYTE [DISTWIDTH] IN BLOOD BY AUTOMATED COUNT: 14.6 % (ref 12.3–15.4)
GLUCOSE BLDC GLUCOMTR-MCNC: 112 MG/DL (ref 70–130)
GLUCOSE BLDC GLUCOMTR-MCNC: 117 MG/DL (ref 70–130)
GLUCOSE BLDC GLUCOMTR-MCNC: 138 MG/DL (ref 70–130)
GLUCOSE BLDC GLUCOMTR-MCNC: 178 MG/DL (ref 70–130)
GLUCOSE SERPL-MCNC: 128 MG/DL (ref 65–99)
HCT VFR BLD AUTO: 38 % (ref 34–46.6)
HGB BLD-MCNC: 12.3 G/DL (ref 12–15.9)
IMM GRANULOCYTES # BLD AUTO: 0.01 10*3/MM3 (ref 0–0.05)
IMM GRANULOCYTES NFR BLD AUTO: 0.1 % (ref 0–0.5)
LYMPHOCYTES # BLD AUTO: 2.22 10*3/MM3 (ref 0.7–3.1)
LYMPHOCYTES NFR BLD AUTO: 29.3 % (ref 19.6–45.3)
MAGNESIUM SERPL-MCNC: 2 MG/DL (ref 1.6–2.4)
MCH RBC QN AUTO: 29.1 PG (ref 26.6–33)
MCHC RBC AUTO-ENTMCNC: 32.4 G/DL (ref 31.5–35.7)
MCV RBC AUTO: 89.8 FL (ref 79–97)
MONOCYTES # BLD AUTO: 0.73 10*3/MM3 (ref 0.1–0.9)
MONOCYTES NFR BLD AUTO: 9.6 % (ref 5–12)
NEUTROPHILS NFR BLD AUTO: 4.34 10*3/MM3 (ref 1.7–7)
NEUTROPHILS NFR BLD AUTO: 57.4 % (ref 42.7–76)
NRBC BLD AUTO-RTO: 0.1 /100 WBC (ref 0–0.2)
PLATELET # BLD AUTO: 235 10*3/MM3 (ref 140–450)
PMV BLD AUTO: 11.4 FL (ref 6–12)
POTASSIUM SERPL-SCNC: 3.9 MMOL/L (ref 3.5–5.2)
RBC # BLD AUTO: 4.23 10*6/MM3 (ref 3.77–5.28)
RH BLD: NEGATIVE
SARS-COV-2 ORF1AB RESP QL NAA+PROBE: NOT DETECTED
SODIUM SERPL-SCNC: 137 MMOL/L (ref 136–145)
T&S EXPIRATION DATE: NORMAL
WBC NRBC COR # BLD: 7.57 10*3/MM3 (ref 3.4–10.8)

## 2022-11-09 PROCEDURE — 86850 RBC ANTIBODY SCREEN: CPT | Performed by: NURSE PRACTITIONER

## 2022-11-09 PROCEDURE — 82962 GLUCOSE BLOOD TEST: CPT

## 2022-11-09 PROCEDURE — 80048 BASIC METABOLIC PNL TOTAL CA: CPT | Performed by: NURSE PRACTITIONER

## 2022-11-09 PROCEDURE — 85025 COMPLETE CBC W/AUTO DIFF WBC: CPT | Performed by: NURSE PRACTITIONER

## 2022-11-09 PROCEDURE — 25010000002 EPTIFIBATIDE PER 5 MG: Performed by: NURSE PRACTITIONER

## 2022-11-09 PROCEDURE — 25010000002 CEFTRIAXONE PER 250 MG: Performed by: HOSPITALIST

## 2022-11-09 PROCEDURE — 86923 COMPATIBILITY TEST ELECTRIC: CPT

## 2022-11-09 PROCEDURE — 99232 SBSQ HOSP IP/OBS MODERATE 35: CPT | Performed by: INTERNAL MEDICINE

## 2022-11-09 PROCEDURE — 86901 BLOOD TYPING SEROLOGIC RH(D): CPT | Performed by: NURSE PRACTITIONER

## 2022-11-09 PROCEDURE — 86900 BLOOD TYPING SEROLOGIC ABO: CPT | Performed by: NURSE PRACTITIONER

## 2022-11-09 PROCEDURE — U0004 COV-19 TEST NON-CDC HGH THRU: HCPCS | Performed by: NURSE PRACTITIONER

## 2022-11-09 PROCEDURE — 83735 ASSAY OF MAGNESIUM: CPT | Performed by: NURSE PRACTITIONER

## 2022-11-09 PROCEDURE — 63710000001 INSULIN LISPRO (HUMAN) PER 5 UNITS: Performed by: HOSPITALIST

## 2022-11-09 RX ORDER — EPTIFIBATIDE 0.75 MG/ML
2 INJECTION, SOLUTION INTRAVENOUS CONTINUOUS
Status: DISCONTINUED | OUTPATIENT
Start: 2022-11-09 | End: 2022-11-10

## 2022-11-09 RX ORDER — SODIUM CHLORIDE 0.9 % (FLUSH) 0.9 %
10 SYRINGE (ML) INJECTION EVERY 12 HOURS SCHEDULED
Status: DISCONTINUED | OUTPATIENT
Start: 2022-11-09 | End: 2022-11-10 | Stop reason: HOSPADM

## 2022-11-09 RX ORDER — SODIUM CHLORIDE 0.9 % (FLUSH) 0.9 %
10 SYRINGE (ML) INJECTION AS NEEDED
Status: DISCONTINUED | OUTPATIENT
Start: 2022-11-09 | End: 2022-11-10 | Stop reason: HOSPADM

## 2022-11-09 RX ADMIN — CHLORHEXIDINE GLUCONATE 15 ML: 1.2 SOLUTION ORAL at 20:43

## 2022-11-09 RX ADMIN — Medication 10 ML: at 13:51

## 2022-11-09 RX ADMIN — Medication 10 ML: at 20:38

## 2022-11-09 RX ADMIN — LISINOPRIL 5 MG: 5 TABLET ORAL at 08:19

## 2022-11-09 RX ADMIN — EPTIFIBATIDE 2 MCG/KG/MIN: 0.75 INJECTION INTRAVENOUS at 05:02

## 2022-11-09 RX ADMIN — CHLORHEXIDINE GLUCONATE 15 ML: 1.2 SOLUTION ORAL at 08:20

## 2022-11-09 RX ADMIN — METOPROLOL SUCCINATE 25 MG: 25 TABLET, EXTENDED RELEASE ORAL at 08:20

## 2022-11-09 RX ADMIN — EMPAGLIFLOZIN 10 MG: 10 TABLET, FILM COATED ORAL at 08:20

## 2022-11-09 RX ADMIN — CHLORHEXIDINE GLUCONATE 1 APPLICATION: 500 CLOTH TOPICAL at 12:50

## 2022-11-09 RX ADMIN — ATORVASTATIN CALCIUM 80 MG: 80 TABLET, FILM COATED ORAL at 08:19

## 2022-11-09 RX ADMIN — EPTIFIBATIDE 2 MCG/KG/MIN: 0.75 INJECTION INTRAVENOUS at 13:49

## 2022-11-09 RX ADMIN — PANTOPRAZOLE SODIUM 40 MG: 40 TABLET, DELAYED RELEASE ORAL at 05:02

## 2022-11-09 RX ADMIN — INSULIN LISPRO 2 UNITS: 100 INJECTION, SOLUTION INTRAVENOUS; SUBCUTANEOUS at 20:38

## 2022-11-09 RX ADMIN — CEFAZOLIN SODIUM 2 G: 2 INJECTION, SOLUTION INTRAVENOUS at 23:59

## 2022-11-09 RX ADMIN — ASPIRIN 81 MG: 81 TABLET, COATED ORAL at 08:19

## 2022-11-09 RX ADMIN — TEMAZEPAM 7.5 MG: 7.5 CAPSULE ORAL at 20:38

## 2022-11-09 RX ADMIN — LEVOTHYROXINE SODIUM 25 MCG: 0.03 TABLET ORAL at 05:02

## 2022-11-09 RX ADMIN — MUPIROCIN 1 APPLICATION: 20 OINTMENT TOPICAL at 08:20

## 2022-11-09 RX ADMIN — MUPIROCIN 1 APPLICATION: 20 OINTMENT TOPICAL at 20:38

## 2022-11-09 RX ADMIN — CEFTRIAXONE SODIUM 1 G: 1 INJECTION, POWDER, FOR SOLUTION INTRAMUSCULAR; INTRAVENOUS at 17:17

## 2022-11-09 NOTE — PLAN OF CARE
Goal Outcome Evaluation:      VSS. Integrilin gtt at fixed rate. Surgery scheduled for 11/10.

## 2022-11-09 NOTE — PROGRESS NOTES
LOS: 2 days   Patient Care Team:  Spencer Hua MD as PCP - General    Chief Complaint: STEMI    Subjective  Denies pain.  A little anxious about tomorrow  Vital Signs  Temp:  [97.9 °F (36.6 °C)-98.1 °F (36.7 °C)] 97.9 °F (36.6 °C)  Heart Rate:  [64-92] 72  Resp:  [12-18] 14  BP: (102-146)/() 127/73  Body mass index is 35.67 kg/m².    Intake/Output Summary (Last 24 hours) at 11/9/2022 1148  Last data filed at 11/9/2022 0800  Gross per 24 hour   Intake 852 ml   Output --   Net 852 ml     I/O this shift:  In: 250 [P.O.:250]  Out: -     Chest tube drainage last 8 hour        11/07/22  0305 11/07/22  1301 11/09/22  0400   Weight: 102 kg (225 lb 1.4 oz) 102 kg (225 lb) 103 kg (227 lb 11.8 oz)         Objective    Results Review:        WBC WBC   Date Value Ref Range Status   11/09/2022 7.57 3.40 - 10.80 10*3/mm3 Final   11/08/2022 6.23 3.40 - 10.80 10*3/mm3 Final   11/07/2022 8.21 3.40 - 10.80 10*3/mm3 Final   11/07/2022 10.37 3.40 - 10.80 10*3/mm3 Final   11/07/2022 11.44 (H) 3.40 - 10.80 10*3/mm3 Final      HGB Hemoglobin   Date Value Ref Range Status   11/09/2022 12.3 12.0 - 15.9 g/dL Final   11/08/2022 12.4 12.0 - 15.9 g/dL Final   11/07/2022 11.7 (L) 12.0 - 15.9 g/dL Final   11/07/2022 12.0 12.0 - 15.9 g/dL Final   11/07/2022 13.2 12.0 - 15.9 g/dL Final      HCT Hematocrit   Date Value Ref Range Status   11/09/2022 38.0 34.0 - 46.6 % Final   11/08/2022 37.1 34.0 - 46.6 % Final   11/07/2022 37.1 34.0 - 46.6 % Final   11/07/2022 38.2 34.0 - 46.6 % Final   11/07/2022 42.3 34.0 - 46.6 % Final      Platelets Platelets   Date Value Ref Range Status   11/09/2022 235 140 - 450 10*3/mm3 Final   11/08/2022 220 140 - 450 10*3/mm3 Final   11/07/2022 231 140 - 450 10*3/mm3 Final   11/07/2022 233 140 - 450 10*3/mm3 Final   11/07/2022 294 140 - 450 10*3/mm3 Final        PT/INR:    Protime   Date Value Ref Range Status   11/07/2022 14.1 11.7 - 14.2 Seconds Final   /  INR   Date Value Ref Range Status   11/07/2022 1.08  0.90 - 1.10 Final       Sodium Sodium   Date Value Ref Range Status   11/09/2022 137 136 - 145 mmol/L Final   11/08/2022 141 136 - 145 mmol/L Final   11/07/2022 140 136 - 145 mmol/L Final   11/07/2022 138 136 - 145 mmol/L Final      Potassium Potassium   Date Value Ref Range Status   11/09/2022 3.9 3.5 - 5.2 mmol/L Final     Comment:     Slight hemolysis detected by analyzer. Results may be affected.   11/08/2022 3.7 3.5 - 5.2 mmol/L Final   11/07/2022 4.3 3.5 - 5.2 mmol/L Final   11/07/2022 4.0 3.5 - 5.2 mmol/L Final      Chloride Chloride   Date Value Ref Range Status   11/09/2022 104 98 - 107 mmol/L Final   11/08/2022 110 (H) 98 - 107 mmol/L Final   11/07/2022 109 (H) 98 - 107 mmol/L Final   11/07/2022 105 98 - 107 mmol/L Final      Bicarbonate CO2   Date Value Ref Range Status   11/09/2022 22.1 22.0 - 29.0 mmol/L Final   11/08/2022 22.0 22.0 - 29.0 mmol/L Final   11/07/2022 20.1 (L) 22.0 - 29.0 mmol/L Final   11/07/2022 21.8 (L) 22.0 - 29.0 mmol/L Final      BUN BUN   Date Value Ref Range Status   11/09/2022 15 8 - 23 mg/dL Final   11/08/2022 13 8 - 23 mg/dL Final   11/07/2022 18 8 - 23 mg/dL Final   11/07/2022 19 8 - 23 mg/dL Final      Creatinine Creatinine   Date Value Ref Range Status   11/09/2022 0.78 0.57 - 1.00 mg/dL Final   11/08/2022 0.77 0.57 - 1.00 mg/dL Final   11/07/2022 0.86 0.57 - 1.00 mg/dL Final   11/07/2022 1.09 (H) 0.57 - 1.00 mg/dL Final      Calcium Calcium   Date Value Ref Range Status   11/09/2022 9.2 8.6 - 10.5 mg/dL Final   11/08/2022 8.9 8.6 - 10.5 mg/dL Final   11/07/2022 8.6 8.6 - 10.5 mg/dL Final   11/07/2022 9.0 8.6 - 10.5 mg/dL Final      Magnesium Magnesium   Date Value Ref Range Status   11/09/2022 2.0 1.6 - 2.4 mg/dL Final          aspirin, 81 mg, Oral, Daily  atorvastatin, 80 mg, Oral, Daily  [START ON 11/10/2022] ceFAZolin, 2 g, Intravenous, Once  chlorhexidine, 15 mL, Mouth/Throat, Q12H  Chlorhexidine Gluconate Cloth, 1 application, Topical, Q12H  empagliflozin, 10 mg, Oral,  Daily  insulin lispro, 0-7 Units, Subcutaneous, 4x Daily With Meals & Nightly  levothyroxine, 25 mcg, Oral, Q AM  lisinopril, 5 mg, Oral, Daily  metoprolol succinate XL, 25 mg, Oral, Q24H  [START ON 11/10/2022] metoprolol tartrate, 12.5 mg, Oral, On Call to OR  mupirocin, 1 application, Each Nare, Q12H  pantoprazole, 40 mg, Oral, Q AM      eptifibatide, 2 mcg/kg/min, Last Rate: 2 mcg/kg/min (11/09/22 0903)            Patient Active Problem List   Diagnosis Code   • Vitamin D deficiency E55.9   • Primary hypothyroidism E03.9   • Dyslipidemia E78.5   • Essential hypertension I10   • Type 2 diabetes mellitus without complication, with long-term current use of insulin ([facility]) E11.9, Z79.4   • Noncompliance with diabetes treatment Z91.199   • ST elevation myocardial infarction (STEMI) ([facility]) I21.3   • ST elevation myocardial infarction (STEMI), unspecified artery ([facility]) I21.3   • Coronary artery disease involving native heart I25.10   • Abnormal findings on diagnostic imaging of other specified body structures R93.89       Assessment & Plan    -STEMI, Multivessel CAD- angioplasty and stent placed diagonal branch- on integrilin  -Hypertension  -Obesity  -Diabetes type 2- A1C 7    Plan for OR tomorrow  Discontinue Integrilin at midnight- orders placed  U/a with positive nitrites and bacteria-- no complaints of symptoms  Discontinue Integrilin at midnight    GUEVARA Booth  11/09/22  11:48 EST

## 2022-11-09 NOTE — PROGRESS NOTES
[facility]  Cardiology Progress Note    Patient Identification:  Name: Mayra Hirsch  Age: 71 y.o.  Sex: female  :  1950  MRN: [id number]                 Follow Up / Chief Complaint: follow up for CAD    Interval History:  Nosebleed this am, Integrilin held 2 hours, resumed at 9. No chest pain, bleeding improved. Tentative plans for CABG tomorrow     Subjective:no chest pain      Objective:    Past Medical History:  Past Medical History:   Diagnosis Date    Depression     Diabetes mellitus (HCC)     Disease of thyroid gland     Fibrocystic breast     Hypothyroidism     Type 2 diabetes mellitus (HCC)      Past Surgical History:  Past Surgical History:   Procedure Laterality Date    BREAST EXCISIONAL BIOPSY Right     at age 22; benign.     SECTION      HAND SURGERY      KNEE SURGERY      OOPHORECTOMY      1 ovary removed due to ectopic pregnancy        Social History:   Social History     Tobacco Use    Smoking status: Never    Smokeless tobacco: Never   Substance Use Topics    Alcohol use: No      Family History:  Family History   Problem Relation Age of Onset    Coronary artery disease Mother     Alzheimer's disease Mother     Coronary artery disease Father     Cancer Father     Thyroid disease Sister           Allergies:  Allergies   Allergen Reactions    Bydureon [Exenatide] Diarrhea    Metformin And Related Nausea And Vomiting     Scheduled Meds:  aspirin, 81 mg, Daily  atorvastatin, 80 mg, Daily  [START ON 11/10/2022] ceFAZolin, 2 g, Once  chlorhexidine, 15 mL, Q12H  Chlorhexidine Gluconate Cloth, 1 application, Q12H  empagliflozin, 10 mg, Daily  insulin lispro, 0-7 Units, 4x Daily With Meals & Nightly  levothyroxine, 25 mcg, Q AM  lisinopril, 5 mg, Daily  metoprolol succinate XL, 25 mg, Q24H  [START ON 11/10/2022] metoprolol tartrate, 12.5 mg, On Call to OR  mupirocin, 1 application, Q12H  pantoprazole, 40 mg, Q AM            INTAKE AND OUTPUT:    Intake/Output Summary (Last  "24 hours) at 11/9/2022 1020  Last data filed at 11/9/2022 0800  Gross per 24 hour   Intake 852 ml   Output --   Net 852 ml     ROS  Constitutional: Awake and alert, no fever. No nosebleeds  Abdomen           no abdominal pain   Cardiac              no chest pain  Pulmonary          no shortness of breath      /73   Pulse 72   Temp 97.9 °F (36.6 °C) (Oral)   Resp 14   Ht 170.2 cm (67\")   Wt 103 kg (227 lb 11.8 oz)   SpO2 94%   BMI 35.67 kg/m²   General appearance: No acute changes   Neck: Trachea midline; NECK, supple, no thyromegaly or lymphadenopathy   Lungs: Normal size and shape, normal breath sounds, equal distribution of air, no rales or rhonchi   CV: S1-S2 regular, no murmurs, no rub, no gallop   Abdomen: Soft, nontender; no masses , no abnormal abdominal sounds   Extremities: No deformity , normal color , no peripheral edema   Skin: Normal temperature, turgor and texture; no rash, ulcers        I reviewed the patient's new clinical results, and personally reviewed and interpreted the patient's ECG and telemetry data from the last 24 hours      Cardiographics                Telemetry:    SR         Interpretation Summary         The left ventricular cavity is mildly dilated.    The following left ventricular wall segments are hypokinetic: apical anterior, apical lateral, apical inferior, apical septal, apex hypokinetic and mid anteroseptal.    There is calcification of the aortic valve.    Estimated right ventricular systolic pressure from tricuspid regurgitation is mildly elevated (35-45 mmHg).         Lab Review   Results from last 7 days   Lab Units 11/07/22  0154   TROPONIN T ng/mL 0.044*     Results from last 7 days   Lab Units 11/09/22  0238   MAGNESIUM mg/dL 2.0     Results from last 7 days   Lab Units 11/09/22  0238   SODIUM mmol/L 137   POTASSIUM mmol/L 3.9   BUN mg/dL 15   CREATININE mg/dL 0.78   CALCIUM mg/dL 9.2     @[id number]@  Results from last 7 days   Lab Units 11/09/22  0238 " "11/08/22  0623 11/07/22  2141   WBC 10*3/mm3 7.57 6.23 8.21   HEMOGLOBIN g/dL 12.3 12.4 11.7*   HEMATOCRIT % 38.0 37.1 37.1   PLATELETS 10*3/mm3 235 220 231     Results from last 7 days   Lab Units 11/07/22  0154   INR  1.08   APTT seconds 27.0     The following medical decision was discussed in detail with Dr. Marie      Assessment:    STEMI   Hypertension   diabetes mellitus: Internal medicine following  hypothyroidism       Plan:  Nosebleed overnight and this morning, with some bleeding at IV site. Integrilin held for 2 hours and was resumed at 9am. improved. Hgb 12.3, plt 235  BP and HR stable, tele reviewed, sr without events overnight  Tentatively planned for CABG Thursday-stop Integrilin 8 hours prior to surgery    Cbc, bmp    Joanna Marie MD    )11/9/2022       EMR Dragon/Transcription:   \"Dictated utilizing Dragon dictation\".     "

## 2022-11-09 NOTE — PLAN OF CARE
Goal Outcome Evaluation:  Plan of Care Reviewed With: patient        Progress: improving   Remains in CCU prepairing for CABG in AM.  No complaint of chest pain or distress.

## 2022-11-09 NOTE — PROGRESS NOTES
"Daily progress note    Referring physician  Dr. BECKFORD    Chief complaint  Awake and alert feeling seen with no new complaints and denies any chest pain shortness of breath palpitation.    History of present illness  71-year-old white female with history of diabetes hypertension hyperlipidemia hypothyroidism presented to [facility] emergency room with sudden onset of shortness of breath as she woke up with it.  Patient has no chest pain palpitation but does have nonproductive cough but no fever chills.  Patient found to be hypoxic while EMS arrived and brought to the emergency room which she found to have acute ST elevation MI and taken to the Cath Lab which showed multivessel coronary disease  angioplasty and stent placed to diagonal branch and I am asked to follow patient for medical problem.  At the time of review she is feeling better and has no more shortness of breath and also denies any chest pain.  Patient feels weak but feeling much better after angioplasty.     REVIEW OF SYSTEMS  Unremarkable    PHYSICAL EXAM         Blood pressure 139/67, pulse 70, temperature 97.9 °F (36.6 °C), temperature source Oral, resp. rate 14, height 170.2 cm (67\"), weight 103 kg (227 lb 11.8 oz), SpO2 99 %, not currently breastfeeding.    Constitutional:       General: She is not in acute distress.  HENT:      Head: Normocephalic and atraumatic.   Eyes:      Extraocular Movements: EOM normal.      Pupils: Pupils are equal, round, and reactive to light.   Cardiovascular:      Rate and Rhythm: Normal rate and regular rhythm.      Heart sounds: Normal heart sounds.   Pulmonary:      Effort: Pulmonary effort is normal. No respiratory distress.      Breath sounds: Examination of the right-middle field reveals rhonchi. Examination of the left-middle field reveals rhonchi. Examination of the right-lower field reveals rhonchi. Examination of the left-lower field reveals rhonchi. Rhonchi present.   Abdominal:      Palpations: " Abdomen is soft.      Tenderness: There is no abdominal tenderness. There is no guarding or rebound.   Musculoskeletal:         General: No edema. Normal range of motion.      Cervical back: Normal range of motion and neck supple.   Skin:     General: Skin is warm and dry.      Findings: No rash.   Neurological:      Mental Status: She is alert and oriented to person, place, and time.      Sensory: Sensation is intact.      Motor: Motor strength is normal.   Psychiatric:         Mood and Affect: Mood and affect normal.      LAB RESULTS  Lab Results (last 24 hours)     Procedure Component Value Units Date/Time    Urine Culture - Urine, Urine, Clean Catch [[id number]]  (Abnormal) Collected: 11/08/22 1743    Specimen: Urine, Clean Catch Updated: 11/09/22 1520     Urine Culture >100,000 CFU/mL Gram Negative Bacilli    Narrative:      Colonization of the urinary tract without infection is common. Treatment is discouraged unless the patient is symptomatic, pregnant, or undergoing an invasive urologic procedure.    COVID PRE-OP / PRE-PROCEDURE SCREENING ORDER (NO ISOLATION) - Swab, Nasopharynx [[id number]] Collected: 11/09/22 1356    Specimen: Swab from Nasopharynx Updated: 11/09/22 1404    Narrative:      The following orders were created for panel order COVID PRE-OP / PRE-PROCEDURE SCREENING ORDER (NO ISOLATION) - Swab, Nasopharynx.  Procedure                               Abnormality         Status                     ---------                               -----------         ------                     COVID-19,APTIMA PANTHER(...[[id number]]                      In process                   Please view results for these tests on the individual orders.    COVID-19,APTIMA PANTHER(SRINIVASAN),[facility], NP/OP SWAB IN UTM/VTM/SALINE TRANSPORT MEDIA,24 HR TAT - Swab, Nasopharynx [[id number]] Collected: 11/09/22 1356    Specimen: Swab from Nasopharynx Updated: 11/09/22 1404    POC Glucose Once [[id number]]  (Normal) Collected:  11/09/22 1138    Specimen: Blood Updated: 11/09/22 1139     Glucose 112 mg/dL      Comment: Meter: [id number] : [id number] Mary JETER RN       POC Glucose Once [[id number]]  (Normal) Collected: 11/09/22 0807    Specimen: Blood Updated: 11/09/22 0808     Glucose 117 mg/dL      Comment: Meter: [id number] : [id number] Mary JETER RN       Basic Metabolic Panel [[id number]]  (Abnormal) Collected: 11/09/22 0238    Specimen: Blood Updated: 11/09/22 0442     Glucose 128 mg/dL      BUN 15 mg/dL      Creatinine 0.78 mg/dL      Sodium 137 mmol/L      Potassium 3.9 mmol/L      Comment: Slight hemolysis detected by analyzer. Results may be affected.        Chloride 104 mmol/L      CO2 22.1 mmol/L      Calcium 9.2 mg/dL      BUN/Creatinine Ratio 19.2     Anion Gap 10.9 mmol/L      eGFR 81.3 mL/min/1.73      Comment: National Kidney Foundation and American Society of Nephrology (ASN) Task Force recommended calculation based on the Chronic Kidney Disease Epidemiology Collaboration (CKD-EPI) equation refit without adjustment for race.       Narrative:      GFR Normal >60  Chronic Kidney Disease <60  Kidney Failure <15    The GFR formula is only valid for adults with stable renal function between ages 18 and 70.    Magnesium [[id number]]  (Normal) Collected: 11/09/22 0238    Specimen: Blood Updated: 11/09/22 0407     Magnesium 2.0 mg/dL     CBC & Differential [[id number]]  (Normal) Collected: 11/09/22 0238    Specimen: Blood Updated: 11/09/22 0337    Narrative:      The following orders were created for panel order CBC & Differential.  Procedure                               Abnormality         Status                     ---------                               -----------         ------                     CBC Auto Differential[[id number]]        Normal              Final result                 Please view results for these tests on the individual orders.    CBC Auto Differential [[id number]]  (Normal) Collected: 11/09/22 0238     Specimen: Blood Updated: 11/09/22 0337     WBC 7.57 10*3/mm3      RBC 4.23 10*6/mm3      Hemoglobin 12.3 g/dL      Hematocrit 38.0 %      MCV 89.8 fL      MCH 29.1 pg      MCHC 32.4 g/dL      RDW 14.6 %      RDW-SD 47.4 fl      MPV 11.4 fL      Platelets 235 10*3/mm3      Neutrophil % 57.4 %      Lymphocyte % 29.3 %      Monocyte % 9.6 %      Eosinophil % 2.4 %      Basophil % 1.2 %      Immature Grans % 0.1 %      Neutrophils, Absolute 4.34 10*3/mm3      Lymphocytes, Absolute 2.22 10*3/mm3      Monocytes, Absolute 0.73 10*3/mm3      Eosinophils, Absolute 0.18 10*3/mm3      Basophils, Absolute 0.09 10*3/mm3      Immature Grans, Absolute 0.01 10*3/mm3      nRBC 0.1 /100 WBC     POC Glucose Once [[id number]]  (Abnormal) Collected: 11/08/22 2109    Specimen: Blood Updated: 11/08/22 2110     Glucose 143 mg/dL      Comment: Meter: [id number] : [email or web address] Nunez RN       Urinalysis, Microscopic Only - Urine, Clean Catch [[id number]]  (Abnormal) Collected: 11/08/22 1743    Specimen: Urine, Clean Catch Updated: 11/08/22 1803     RBC, UA Too Numerous to Count /HPF      WBC, UA 13-20 /HPF      Bacteria, UA 4+ /HPF      Squamous Epithelial Cells, UA 0-2 /HPF      Hyaline Casts, UA 0-2 /LPF      Methodology Automated Microscopy    Urinalysis With Culture If Indicated - Urine, Clean Catch [[id number]]  (Abnormal) Collected: 11/08/22 1743    Specimen: Urine, Clean Catch Updated: 11/08/22 1803     Color, UA Yellow     Appearance, UA Cloudy     pH, UA 5.5     Specific Gravity, UA >=1.030     Glucose, UA >=1000 mg/dL (3+)     Ketones, UA Negative     Bilirubin, UA Negative     Blood, UA Large (3+)     Protein, UA 30 mg/dL (1+)     Leuk Esterase, UA Trace     Nitrite, UA Positive     Urobilinogen, UA 0.2 E.U./dL    Narrative:      In absence of clinical symptoms, the presence of pyuria, bacteria, and/or nitrites on the urinalysis result does not correlate with infection.    POC Glucose Once [[id number]]  (Abnormal) Collected:  11/08/22 1720    Specimen: Blood Updated: 11/08/22 1729     Glucose 136 mg/dL      Comment: Meter: [id number] : [id number] MANDUJANO           Imaging Results (Last 24 Hours)     ** No results found for the last 24 hours. **           ECG 12 Lead          Component Ref Range & Units 01:44    QT Interval ms 358 P    Resulting Agency   ECG             HEART RATE= 116  bpm  RR Interval= 517  ms  WV Interval= 110  ms  P Horizontal Axis= -49  deg  P Front Axis= 64  deg  QRSD Interval= 97  ms  QT Interval= 358  ms  QRS Axis= 18  deg  T Wave Axis= 130  deg  - ABNORMAL ECG -  Sinus tachycardia  Probable left atrial enlargement  Lateral infarct, acute (LAD)  Probable anteroseptal infarct, recent             Current Facility-Administered Medications:   •  acetaminophen (TYLENOL) tablet 650 mg, 650 mg, Oral, Q4H PRN, Joanna Marie MD, 650 mg at 11/07/22 2144  •  ALPRAZolam (XANAX) tablet 0.25 mg, 0.25 mg, Oral, Q8H PRN, Martha Golden APRN  •  aspirin EC tablet 81 mg, 81 mg, Oral, Daily, Joanna Marie MD, 81 mg at 11/09/22 0819  •  atorvastatin (LIPITOR) tablet 80 mg, 80 mg, Oral, Daily, Joanna Marie MD, 80 mg at 11/09/22 0819  •  [START ON 11/10/2022] ceFAZolin in dextrose (ANCEF) IVPB solution 2 g, 2 g, Intravenous, Once, Martha Golden APRN  •  chlorhexidine (PERIDEX) 0.12 % solution 15 mL, 15 mL, Mouth/Throat, Q12H, Martha Golden APRN, 15 mL at 11/09/22 0820  •  Chlorhexidine Gluconate Cloth 2 % pads 1 application, 1 application, Topical, Q12H, Martha Golden APRN, 1 application at 11/09/22 1250  •  empagliflozin (JARDIANCE) tablet 10 mg, 10 mg, Oral, Daily, Joanna Marie MD, 10 mg at 11/09/22 0820  •  eptifibatide (INTEGRILIN) 75 mg in 100 mL solution, 2 mcg/kg/min, Intravenous, Continuous, Martha Golden APRN, Last Rate: 16.32 mL/hr at 11/09/22 1349, 2 mcg/kg/min at 11/09/22 1349  •  insulin lispro (ADMELOG) injection 0-7 Units, 0-7 Units, Subcutaneous,  4x Daily With Meals & Nightly, Dee Koch MD, 3 Units at 11/07/22 2040  •  levothyroxine (SYNTHROID, LEVOTHROID) tablet 25 mcg, 25 mcg, Oral, Q AM, Joanna Marie MD, 25 mcg at 11/09/22 0502  •  lisinopril (PRINIVIL,ZESTRIL) tablet 5 mg, 5 mg, Oral, Daily, Dee Koch MD, 5 mg at 11/09/22 0819  •  metoprolol succinate XL (TOPROL-XL) 24 hr tablet 25 mg, 25 mg, Oral, Q24H, Sharron Bolivar APRN, 25 mg at 11/09/22 0820  •  [START ON 11/10/2022] metoprolol tartrate (LOPRESSOR) tablet 12.5 mg, 12.5 mg, Oral, On Call to OR, Martha Golden APRN  •  mupirocin (BACTROBAN) 2 % nasal ointment 1 application, 1 application, Each Nare, Q12H, Martha Golden APRN, 1 application at 11/09/22 0820  •  pantoprazole (PROTONIX) EC tablet 40 mg, 40 mg, Oral, Q AM, Dee Koch MD, 40 mg at 11/09/22 0502  •  [COMPLETED] Insert peripheral IV, , , Once **AND** sodium chloride 0.9 % flush 10 mL, 10 mL, Intravenous, PRN, Joanna Marie MD, 10 mL at 11/08/22 0819  •  sodium chloride 0.9 % flush 10 mL, 10 mL, Intravenous, Q12H, Martha Golden APRN, 10 mL at 11/09/22 1351  •  sodium chloride 0.9 % flush 10 mL, 10 mL, Intravenous, PRN, Martha Golden APRN  •  temazepam (RESTORIL) capsule 7.5 mg, 7.5 mg, Oral, Nightly PRN, Martha Golden APRN     ASSESSMENT  Multivessel coronary artery disease need CABG  Acute ST relation MI s/p angioplasty and stent   Acute GNB UTI  Diabetes mellitus  Hypertension  Hyperlipidemia  Hypothyroidism  Gastroesophageal reflux disease    PLAN  CPM  Post PCI care  Empiric antibiotics  CABG on Thursday  Continue home medications  Stress ulcer DVT prophylaxis  Accu-Cheks sliding scale insulin  Supportive care  Discussed with nursing staff  We will follow with Dr. BECKFORD and further recommendation current hospital course    DEE KOCH MD

## 2022-11-10 ENCOUNTER — ANCILLARY PROCEDURE (OUTPATIENT)
Dept: PERIOP | Facility: HOSPITAL | Age: 72
End: 2022-11-10

## 2022-11-10 ENCOUNTER — ANESTHESIA EVENT (OUTPATIENT)
Dept: PERIOP | Facility: HOSPITAL | Age: 72
End: 2022-11-10

## 2022-11-10 ENCOUNTER — ANESTHESIA (OUTPATIENT)
Dept: PERIOP | Facility: HOSPITAL | Age: 72
End: 2022-11-10

## 2022-11-10 ENCOUNTER — APPOINTMENT (OUTPATIENT)
Dept: GENERAL RADIOLOGY | Facility: HOSPITAL | Age: 72
End: 2022-11-10

## 2022-11-10 LAB
ACT BLD: 138 SECONDS (ref 82–152)
ALBUMIN SERPL-MCNC: 3.9 G/DL (ref 3.5–5.2)
ALBUMIN SERPL-MCNC: 4.3 G/DL (ref 3.5–5.2)
ANION GAP SERPL CALCULATED.3IONS-SCNC: 11.4 MMOL/L (ref 5–15)
ANION GAP SERPL CALCULATED.3IONS-SCNC: 12 MMOL/L (ref 5–15)
ANION GAP SERPL CALCULATED.3IONS-SCNC: 8.9 MMOL/L (ref 5–15)
APTT PPP: 33.3 SECONDS (ref 22.7–35.4)
ARTERIAL PATENCY WRIST A: ABNORMAL
ATMOSPHERIC PRESS: 751.2 MMHG
ATMOSPHERIC PRESS: 753.2 MMHG
ATMOSPHERIC PRESS: 753.6 MMHG
BACTERIA SPEC AEROBE CULT: ABNORMAL
BASE EXCESS BLDA CALC-SCNC: -2 MMOL/L (ref -5–5)
BASE EXCESS BLDA CALC-SCNC: -3.7 MMOL/L (ref 0–2)
BASE EXCESS BLDA CALC-SCNC: -4 MMOL/L (ref -5–5)
BASE EXCESS BLDA CALC-SCNC: -5.5 MMOL/L (ref 0–2)
BASE EXCESS BLDA CALC-SCNC: -7.8 MMOL/L (ref 0–2)
BASE EXCESS BLDA CALC-SCNC: 0 MMOL/L (ref -5–5)
BASOPHILS # BLD AUTO: 0.07 10*3/MM3 (ref 0–0.2)
BASOPHILS # BLD AUTO: 0.1 10*3/MM3 (ref 0–0.2)
BASOPHILS NFR BLD AUTO: 0.7 % (ref 0–1.5)
BASOPHILS NFR BLD AUTO: 1.2 % (ref 0–1.5)
BDY SITE: ABNORMAL
BUN SERPL-MCNC: 16 MG/DL (ref 8–23)
BUN SERPL-MCNC: 17 MG/DL (ref 8–23)
BUN SERPL-MCNC: 21 MG/DL (ref 8–23)
BUN/CREAT SERPL: 16.3 (ref 7–25)
BUN/CREAT SERPL: 16.5 (ref 7–25)
BUN/CREAT SERPL: 23.3 (ref 7–25)
CA-I BLD-MCNC: 5.4 MG/DL (ref 4.6–5.4)
CA-I BLDA-SCNC: ABNORMAL MMOL/L
CA-I SERPL ISE-MCNC: 1.36 MMOL/L (ref 1.15–1.35)
CALCIUM SPEC-SCNC: 8.7 MG/DL (ref 8.6–10.5)
CALCIUM SPEC-SCNC: 9 MG/DL (ref 8.6–10.5)
CALCIUM SPEC-SCNC: 9.5 MG/DL (ref 8.6–10.5)
CHLORIDE SERPL-SCNC: 105 MMOL/L (ref 98–107)
CHLORIDE SERPL-SCNC: 110 MMOL/L (ref 98–107)
CHLORIDE SERPL-SCNC: 112 MMOL/L (ref 98–107)
CO2 BLDA-SCNC: 23 MMOL/L (ref 24–29)
CO2 BLDA-SCNC: 25 MMOL/L (ref 24–29)
CO2 BLDA-SCNC: 26 MMOL/L (ref 24–29)
CO2 SERPL-SCNC: 18.6 MMOL/L (ref 22–29)
CO2 SERPL-SCNC: 20.1 MMOL/L (ref 22–29)
CO2 SERPL-SCNC: 22 MMOL/L (ref 22–29)
CREAT SERPL-MCNC: 0.9 MG/DL (ref 0.57–1)
CREAT SERPL-MCNC: 0.97 MG/DL (ref 0.57–1)
CREAT SERPL-MCNC: 1.04 MG/DL (ref 0.57–1)
DEPRECATED RDW RBC AUTO: 47.9 FL (ref 37–54)
DEPRECATED RDW RBC AUTO: 49.9 FL (ref 37–54)
EGFRCR SERPLBLD CKD-EPI 2021: 57.6 ML/MIN/1.73
EGFRCR SERPLBLD CKD-EPI 2021: 62.6 ML/MIN/1.73
EGFRCR SERPLBLD CKD-EPI 2021: 68.5 ML/MIN/1.73
EOSINOPHIL # BLD AUTO: 0.11 10*3/MM3 (ref 0–0.4)
EOSINOPHIL # BLD AUTO: 0.22 10*3/MM3 (ref 0–0.4)
EOSINOPHIL NFR BLD AUTO: 1 % (ref 0.3–6.2)
EOSINOPHIL NFR BLD AUTO: 2.7 % (ref 0.3–6.2)
ERYTHROCYTE [DISTWIDTH] IN BLOOD BY AUTOMATED COUNT: 14.5 % (ref 12.3–15.4)
ERYTHROCYTE [DISTWIDTH] IN BLOOD BY AUTOMATED COUNT: 14.8 % (ref 12.3–15.4)
FIBRINOGEN PPP-MCNC: 285 MG/DL (ref 219–464)
GLUCOSE BLDC GLUCOMTR-MCNC: 116 MG/DL (ref 70–130)
GLUCOSE BLDC GLUCOMTR-MCNC: 116 MG/DL (ref 70–130)
GLUCOSE BLDC GLUCOMTR-MCNC: 121 MG/DL (ref 70–130)
GLUCOSE BLDC GLUCOMTR-MCNC: 135 MG/DL (ref 70–130)
GLUCOSE BLDC GLUCOMTR-MCNC: 136 MG/DL (ref 70–130)
GLUCOSE BLDC GLUCOMTR-MCNC: 137 MG/DL (ref 70–130)
GLUCOSE BLDC GLUCOMTR-MCNC: 138 MG/DL (ref 70–130)
GLUCOSE BLDC GLUCOMTR-MCNC: 141 MG/DL (ref 70–130)
GLUCOSE BLDC GLUCOMTR-MCNC: 142 MG/DL (ref 70–130)
GLUCOSE BLDC GLUCOMTR-MCNC: 144 MG/DL (ref 70–130)
GLUCOSE BLDC GLUCOMTR-MCNC: 149 MG/DL (ref 70–130)
GLUCOSE BLDC GLUCOMTR-MCNC: 155 MG/DL (ref 70–130)
GLUCOSE BLDC GLUCOMTR-MCNC: 164 MG/DL (ref 70–130)
GLUCOSE BLDC GLUCOMTR-MCNC: 171 MG/DL (ref 70–130)
GLUCOSE SERPL-MCNC: 121 MG/DL (ref 65–99)
GLUCOSE SERPL-MCNC: 129 MG/DL (ref 65–99)
GLUCOSE SERPL-MCNC: 134 MG/DL (ref 65–99)
HCO3 BLDA-SCNC: 18 MMOL/L (ref 22–28)
HCO3 BLDA-SCNC: 19 MMOL/L (ref 22–28)
HCO3 BLDA-SCNC: 20.8 MMOL/L (ref 22–28)
HCO3 BLDA-SCNC: 22.1 MMOL/L (ref 22–26)
HCO3 BLDA-SCNC: 23.6 MMOL/L (ref 22–26)
HCO3 BLDA-SCNC: 24.4 MMOL/L (ref 22–26)
HCO3 BLDA-SCNC: 25.1 MMOL/L (ref 22–26)
HCO3 BLDA-SCNC: 25.2 MMOL/L (ref 22–26)
HCT VFR BLD AUTO: 29.3 % (ref 34–46.6)
HCT VFR BLD AUTO: 35.9 % (ref 34–46.6)
HCT VFR BLDA CALC: 23 % (ref 38–51)
HCT VFR BLDA CALC: 24 % (ref 38–51)
HCT VFR BLDA CALC: 31 % (ref 38–51)
HGB BLD-MCNC: 11.7 G/DL (ref 12–15.9)
HGB BLD-MCNC: 9.4 G/DL (ref 12–15.9)
HGB BLDA-MCNC: 10.5 G/DL (ref 12–17)
HGB BLDA-MCNC: 7.8 G/DL (ref 12–17)
HGB BLDA-MCNC: 8.2 G/DL (ref 12–17)
IMM GRANULOCYTES # BLD AUTO: 0.02 10*3/MM3 (ref 0–0.05)
IMM GRANULOCYTES # BLD AUTO: 0.04 10*3/MM3 (ref 0–0.05)
IMM GRANULOCYTES NFR BLD AUTO: 0.2 % (ref 0–0.5)
IMM GRANULOCYTES NFR BLD AUTO: 0.4 % (ref 0–0.5)
INHALED O2 CONCENTRATION: 100 %
INHALED O2 CONCENTRATION: 40 %
INHALED O2 CONCENTRATION: 40 %
INR PPP: 1.52 (ref 0.9–1.1)
LYMPHOCYTES # BLD AUTO: 0.94 10*3/MM3 (ref 0.7–3.1)
LYMPHOCYTES # BLD AUTO: 2.48 10*3/MM3 (ref 0.7–3.1)
LYMPHOCYTES NFR BLD AUTO: 30.7 % (ref 19.6–45.3)
LYMPHOCYTES NFR BLD AUTO: 9 % (ref 19.6–45.3)
MAGNESIUM SERPL-MCNC: 3 MG/DL (ref 1.6–2.4)
MAGNESIUM SERPL-MCNC: 3.2 MG/DL (ref 1.6–2.4)
MCH RBC QN AUTO: 29.3 PG (ref 26.6–33)
MCH RBC QN AUTO: 29.4 PG (ref 26.6–33)
MCHC RBC AUTO-ENTMCNC: 32.1 G/DL (ref 31.5–35.7)
MCHC RBC AUTO-ENTMCNC: 32.6 G/DL (ref 31.5–35.7)
MCV RBC AUTO: 90 FL (ref 79–97)
MCV RBC AUTO: 91.6 FL (ref 79–97)
MODALITY: ABNORMAL
MONOCYTES # BLD AUTO: 0.71 10*3/MM3 (ref 0.1–0.9)
MONOCYTES # BLD AUTO: 0.78 10*3/MM3 (ref 0.1–0.9)
MONOCYTES NFR BLD AUTO: 6.8 % (ref 5–12)
MONOCYTES NFR BLD AUTO: 9.6 % (ref 5–12)
NEUTROPHILS NFR BLD AUTO: 4.49 10*3/MM3 (ref 1.7–7)
NEUTROPHILS NFR BLD AUTO: 55.6 % (ref 42.7–76)
NEUTROPHILS NFR BLD AUTO: 8.63 10*3/MM3 (ref 1.7–7)
NEUTROPHILS NFR BLD AUTO: 82.1 % (ref 42.7–76)
NRBC BLD AUTO-RTO: 0 /100 WBC (ref 0–0.2)
NRBC BLD AUTO-RTO: 0 /100 WBC (ref 0–0.2)
O2 A-A PPRESDIFF RESPIRATORY: 0.4 MMHG
PCO2 BLDA: 32.4 MM HG (ref 35–45)
PCO2 BLDA: 34.7 MM HG (ref 35–45)
PCO2 BLDA: 37.2 MM HG (ref 35–45)
PCO2 BLDA: 38.8 MM HG (ref 35–45)
PCO2 BLDA: 41.3 MM HG (ref 35–45)
PCO2 BLDA: 42.8 MM HG (ref 35–45)
PCO2 BLDA: 44.4 MM HG (ref 35–45)
PCO2 BLDA: 44.8 MM HG (ref 35–45)
PEEP RESPIRATORY: 7.5 CM[H2O]
PH BLDA: 7.29 PH UNITS (ref 7.35–7.45)
PH BLDA: 7.33 PH UNITS (ref 7.35–7.6)
PH BLDA: 7.35 PH UNITS (ref 7.35–7.6)
PH BLDA: 7.37 PH UNITS (ref 7.35–7.6)
PH BLDA: 7.38 PH UNITS (ref 7.35–7.45)
PH BLDA: 7.39 PH UNITS (ref 7.35–7.45)
PH BLDA: 7.42 PH UNITS (ref 7.35–7.6)
PH BLDA: 7.44 PH UNITS (ref 7.35–7.6)
PHOSPHATE SERPL-MCNC: 3.6 MG/DL (ref 2.5–4.5)
PHOSPHATE SERPL-MCNC: 3.8 MG/DL (ref 2.5–4.5)
PLATELET # BLD AUTO: 155 10*3/MM3 (ref 140–450)
PLATELET # BLD AUTO: 218 10*3/MM3 (ref 140–450)
PMV BLD AUTO: 10.9 FL (ref 6–12)
PMV BLD AUTO: 11 FL (ref 6–12)
PO2 BLDA: 104.2 MM HG (ref 80–100)
PO2 BLDA: 104.3 MM HG (ref 80–100)
PO2 BLDA: 212 MMHG (ref 80–105)
PO2 BLDA: 307.5 MM HG (ref 80–100)
PO2 BLDA: 38 MMHG (ref 80–105)
PO2 BLDA: 447 MMHG (ref 80–105)
PO2 BLDA: 459 MMHG (ref 80–105)
PO2 BLDA: 478 MMHG (ref 80–105)
POTASSIUM BLDA-SCNC: 3.8 MMOL/L (ref 3.5–4.9)
POTASSIUM BLDA-SCNC: 4.1 MMOL/L (ref 3.5–4.9)
POTASSIUM BLDA-SCNC: 4.8 MMOL/L (ref 3.5–4.9)
POTASSIUM BLDA-SCNC: 4.9 MMOL/L (ref 3.5–4.9)
POTASSIUM BLDA-SCNC: 5.2 MMOL/L (ref 3.5–4.9)
POTASSIUM SERPL-SCNC: 3.7 MMOL/L (ref 3.5–5.2)
POTASSIUM SERPL-SCNC: 4.3 MMOL/L (ref 3.5–5.2)
POTASSIUM SERPL-SCNC: 4.4 MMOL/L (ref 3.5–5.2)
PROTHROMBIN TIME: 18.5 SECONDS (ref 11.7–14.2)
RBC # BLD AUTO: 3.2 10*6/MM3 (ref 3.77–5.28)
RBC # BLD AUTO: 3.99 10*6/MM3 (ref 3.77–5.28)
SAO2 % BLDA: 100 % (ref 95–98)
SAO2 % BLDA: 68 % (ref 95–98)
SAO2 % BLDCOA: 97.4 % (ref 92–99)
SAO2 % BLDCOA: 98 % (ref 92–99)
SAO2 % BLDCOA: 99.9 % (ref 92–99)
SET MECH RESP RATE: 15
SET MECH RESP RATE: 15
SODIUM SERPL-SCNC: 139 MMOL/L (ref 136–145)
SODIUM SERPL-SCNC: 140 MMOL/L (ref 136–145)
SODIUM SERPL-SCNC: 141 MMOL/L (ref 136–145)
TOTAL RATE: 15 BREATHS/MINUTE
TOTAL RATE: 15 BREATHS/MINUTE
TOTAL RATE: 18 BREATHS/MINUTE
VENTILATOR MODE: ABNORMAL
VENTILATOR MODE: AC
VENTILATOR MODE: AC
VT ON VENT VENT: 541 ML
VT ON VENT VENT: 610 ML
VT ON VENT VENT: 616 ML
WBC NRBC COR # BLD: 10.5 10*3/MM3 (ref 3.4–10.8)
WBC NRBC COR # BLD: 8.09 10*3/MM3 (ref 3.4–10.8)

## 2022-11-10 PROCEDURE — 94799 UNLISTED PULMONARY SVC/PX: CPT

## 2022-11-10 PROCEDURE — 80048 BASIC METABOLIC PNL TOTAL CA: CPT

## 2022-11-10 PROCEDURE — 5A1221Z PERFORMANCE OF CARDIAC OUTPUT, CONTINUOUS: ICD-10-PCS | Performed by: THORACIC SURGERY (CARDIOTHORACIC VASCULAR SURGERY)

## 2022-11-10 PROCEDURE — 25010000002 HEPARIN (PORCINE) PER 1000 UNITS

## 2022-11-10 PROCEDURE — 25010000002 ALBUMIN HUMAN 25% PER 50 ML

## 2022-11-10 PROCEDURE — 25010000002 PAPAVERINE PER 60 MG: Performed by: THORACIC SURGERY (CARDIOTHORACIC VASCULAR SURGERY)

## 2022-11-10 PROCEDURE — P9047 ALBUMIN (HUMAN), 25%, 50ML: HCPCS | Performed by: NURSE PRACTITIONER

## 2022-11-10 PROCEDURE — C1713 ANCHOR/SCREW BN/BN,TIS/BN: HCPCS | Performed by: THORACIC SURGERY (CARDIOTHORACIC VASCULAR SURGERY)

## 2022-11-10 PROCEDURE — 85347 COAGULATION TIME ACTIVATED: CPT

## 2022-11-10 PROCEDURE — 25010000002 EPINEPHRINE 1 MG/ML SOLUTION 30 ML VIAL: Performed by: NURSE PRACTITIONER

## 2022-11-10 PROCEDURE — 25010000002 MAGNESIUM SULFATE PER 500 MG OF MAGNESIUM: Performed by: STUDENT IN AN ORGANIZED HEALTH CARE EDUCATION/TRAINING PROGRAM

## 2022-11-10 PROCEDURE — 85018 HEMOGLOBIN: CPT

## 2022-11-10 PROCEDURE — 25010000002 ALBUMIN HUMAN 25% PER 50 ML: Performed by: THORACIC SURGERY (CARDIOTHORACIC VASCULAR SURGERY)

## 2022-11-10 PROCEDURE — 71045 X-RAY EXAM CHEST 1 VIEW: CPT

## 2022-11-10 PROCEDURE — 021309W BYPASS CORONARY ARTERY, FOUR OR MORE ARTERIES FROM AORTA WITH AUTOLOGOUS VENOUS TISSUE, OPEN APPROACH: ICD-10-PCS | Performed by: THORACIC SURGERY (CARDIOTHORACIC VASCULAR SURGERY)

## 2022-11-10 PROCEDURE — 25010000002 MIDAZOLAM PER 1 MG: Performed by: STUDENT IN AN ORGANIZED HEALTH CARE EDUCATION/TRAINING PROGRAM

## 2022-11-10 PROCEDURE — 85384 FIBRINOGEN ACTIVITY: CPT | Performed by: NURSE PRACTITIONER

## 2022-11-10 PROCEDURE — 80069 RENAL FUNCTION PANEL: CPT | Performed by: NURSE PRACTITIONER

## 2022-11-10 PROCEDURE — C1751 CATH, INF, PER/CENT/MIDLINE: HCPCS | Performed by: STUDENT IN AN ORGANIZED HEALTH CARE EDUCATION/TRAINING PROGRAM

## 2022-11-10 PROCEDURE — 82803 BLOOD GASES ANY COMBINATION: CPT

## 2022-11-10 PROCEDURE — 06BQ4ZZ EXCISION OF LEFT SAPHENOUS VEIN, PERCUTANEOUS ENDOSCOPIC APPROACH: ICD-10-PCS | Performed by: THORACIC SURGERY (CARDIOTHORACIC VASCULAR SURGERY)

## 2022-11-10 PROCEDURE — 82947 ASSAY GLUCOSE BLOOD QUANT: CPT

## 2022-11-10 PROCEDURE — 63710000001 INSULIN REGULAR HUMAN PER 5 UNITS: Performed by: STUDENT IN AN ORGANIZED HEALTH CARE EDUCATION/TRAINING PROGRAM

## 2022-11-10 PROCEDURE — 93005 ELECTROCARDIOGRAM TRACING: CPT | Performed by: NURSE PRACTITIONER

## 2022-11-10 PROCEDURE — 93318 ECHO TRANSESOPHAGEAL INTRAOP: CPT | Performed by: STUDENT IN AN ORGANIZED HEALTH CARE EDUCATION/TRAINING PROGRAM

## 2022-11-10 PROCEDURE — P9047 ALBUMIN (HUMAN), 25%, 50ML: HCPCS

## 2022-11-10 PROCEDURE — 25010000002 CEFAZOLIN IN DEXTROSE 2-4 GM/100ML-% SOLUTION: Performed by: STUDENT IN AN ORGANIZED HEALTH CARE EDUCATION/TRAINING PROGRAM

## 2022-11-10 PROCEDURE — 85025 COMPLETE CBC W/AUTO DIFF WBC: CPT

## 2022-11-10 PROCEDURE — 83735 ASSAY OF MAGNESIUM: CPT | Performed by: NURSE PRACTITIONER

## 2022-11-10 PROCEDURE — 85610 PROTHROMBIN TIME: CPT | Performed by: NURSE PRACTITIONER

## 2022-11-10 PROCEDURE — 25010000002 FENTANYL CITRATE (PF) 250 MCG/5ML SOLUTION: Performed by: STUDENT IN AN ORGANIZED HEALTH CARE EDUCATION/TRAINING PROGRAM

## 2022-11-10 PROCEDURE — 33533 CABG ARTERIAL SINGLE: CPT | Performed by: SPECIALIST/TECHNOLOGIST, OTHER

## 2022-11-10 PROCEDURE — 06BP4ZZ EXCISION OF RIGHT SAPHENOUS VEIN, PERCUTANEOUS ENDOSCOPIC APPROACH: ICD-10-PCS | Performed by: THORACIC SURGERY (CARDIOTHORACIC VASCULAR SURGERY)

## 2022-11-10 PROCEDURE — 82330 ASSAY OF CALCIUM: CPT | Performed by: NURSE PRACTITIONER

## 2022-11-10 PROCEDURE — 25010000002 PROPOFOL 200 MG/20ML EMULSION: Performed by: STUDENT IN AN ORGANIZED HEALTH CARE EDUCATION/TRAINING PROGRAM

## 2022-11-10 PROCEDURE — 0 PROTAMINE SULFATE PER 10 MG: Performed by: THORACIC SURGERY (CARDIOTHORACIC VASCULAR SURGERY)

## 2022-11-10 PROCEDURE — 33533 CABG ARTERIAL SINGLE: CPT | Performed by: NURSE PRACTITIONER

## 2022-11-10 PROCEDURE — 25010000002 CEFAZOLIN IN DEXTROSE 2-4 GM/100ML-% SOLUTION: Performed by: NURSE PRACTITIONER

## 2022-11-10 PROCEDURE — 25010000002 MAGNESIUM SULFATE IN D5W 1G/100ML (PREMIX) 1-5 GM/100ML-% SOLUTION: Performed by: NURSE PRACTITIONER

## 2022-11-10 PROCEDURE — 94761 N-INVAS EAR/PLS OXIMETRY MLT: CPT

## 2022-11-10 PROCEDURE — C1729 CATH, DRAINAGE: HCPCS | Performed by: THORACIC SURGERY (CARDIOTHORACIC VASCULAR SURGERY)

## 2022-11-10 PROCEDURE — 85730 THROMBOPLASTIN TIME PARTIAL: CPT | Performed by: NURSE PRACTITIONER

## 2022-11-10 PROCEDURE — 02100Z9 BYPASS CORONARY ARTERY, ONE ARTERY FROM LEFT INTERNAL MAMMARY, OPEN APPROACH: ICD-10-PCS | Performed by: THORACIC SURGERY (CARDIOTHORACIC VASCULAR SURGERY)

## 2022-11-10 PROCEDURE — 3E080GC INTRODUCTION OF OTHER THERAPEUTIC SUBSTANCE INTO HEART, OPEN APPROACH: ICD-10-PCS | Performed by: THORACIC SURGERY (CARDIOTHORACIC VASCULAR SURGERY)

## 2022-11-10 PROCEDURE — 85025 COMPLETE CBC W/AUTO DIFF WBC: CPT | Performed by: NURSE PRACTITIONER

## 2022-11-10 PROCEDURE — 33508 ENDOSCOPIC VEIN HARVEST: CPT | Performed by: SPECIALIST/TECHNOLOGIST, OTHER

## 2022-11-10 PROCEDURE — 33521 CABG ARTERY-VEIN FOUR: CPT | Performed by: SPECIALIST/TECHNOLOGIST, OTHER

## 2022-11-10 PROCEDURE — 25010000002 HEPARIN (PORCINE) PER 1000 UNITS: Performed by: THORACIC SURGERY (CARDIOTHORACIC VASCULAR SURGERY)

## 2022-11-10 PROCEDURE — 25010000002 HEPARIN (PORCINE) PER 1000 UNITS: Performed by: STUDENT IN AN ORGANIZED HEALTH CARE EDUCATION/TRAINING PROGRAM

## 2022-11-10 PROCEDURE — 85014 HEMATOCRIT: CPT

## 2022-11-10 PROCEDURE — A4648 IMPLANTABLE TISSUE MARKER: HCPCS | Performed by: THORACIC SURGERY (CARDIOTHORACIC VASCULAR SURGERY)

## 2022-11-10 PROCEDURE — 25010000002 ALBUMIN HUMAN 25% PER 50 ML: Performed by: NURSE PRACTITIONER

## 2022-11-10 PROCEDURE — 33521 CABG ARTERY-VEIN FOUR: CPT | Performed by: NURSE PRACTITIONER

## 2022-11-10 PROCEDURE — 94002 VENT MGMT INPAT INIT DAY: CPT

## 2022-11-10 PROCEDURE — 82962 GLUCOSE BLOOD TEST: CPT

## 2022-11-10 PROCEDURE — P9047 ALBUMIN (HUMAN), 25%, 50ML: HCPCS | Performed by: THORACIC SURGERY (CARDIOTHORACIC VASCULAR SURGERY)

## 2022-11-10 PROCEDURE — 25010000002 MIDAZOLAM PER 1 MG: Performed by: ANESTHESIOLOGY

## 2022-11-10 PROCEDURE — 25010000002 PHENYLEPHRINE 10 MG/ML SOLUTION 5 ML VIAL: Performed by: STUDENT IN AN ORGANIZED HEALTH CARE EDUCATION/TRAINING PROGRAM

## 2022-11-10 PROCEDURE — 25010000002 VANCOMYCIN 1 G RECONSTITUTED SOLUTION

## 2022-11-10 PROCEDURE — 25010000002 FENTANYL CITRATE (PF) 50 MCG/ML SOLUTION: Performed by: ANESTHESIOLOGY

## 2022-11-10 PROCEDURE — 25010000002 METOCLOPRAMIDE PER 10 MG: Performed by: NURSE PRACTITIONER

## 2022-11-10 PROCEDURE — 33508 ENDOSCOPIC VEIN HARVEST: CPT | Performed by: NURSE PRACTITIONER

## 2022-11-10 PROCEDURE — 25010000002 PROPOFOL 10 MG/ML EMULSION: Performed by: STUDENT IN AN ORGANIZED HEALTH CARE EDUCATION/TRAINING PROGRAM

## 2022-11-10 DEVICE — SS SUTURE, 4 PER SLEEVE
Type: IMPLANTABLE DEVICE | Site: STERNUM | Status: FUNCTIONAL
Brand: MYO/WIRE II

## 2022-11-10 DEVICE — SS SUTURE, 3 PER SLEEVE
Type: IMPLANTABLE DEVICE | Site: STERNUM | Status: FUNCTIONAL
Brand: MYO/WIRE II

## 2022-11-10 RX ORDER — ACETAMINOPHEN 160 MG/5ML
650 SOLUTION ORAL EVERY 4 HOURS
Status: ACTIVE | OUTPATIENT
Start: 2022-11-10 | End: 2022-11-11

## 2022-11-10 RX ORDER — HEPARIN SODIUM 1000 [USP'U]/ML
INJECTION, SOLUTION INTRAVENOUS; SUBCUTANEOUS AS NEEDED
Status: DISCONTINUED | OUTPATIENT
Start: 2022-11-10 | End: 2022-11-10 | Stop reason: SURG

## 2022-11-10 RX ORDER — MORPHINE SULFATE 2 MG/ML
4 INJECTION, SOLUTION INTRAMUSCULAR; INTRAVENOUS
Status: DISCONTINUED | OUTPATIENT
Start: 2022-11-10 | End: 2022-11-19 | Stop reason: HOSPADM

## 2022-11-10 RX ORDER — ALPRAZOLAM 0.25 MG/1
0.25 TABLET ORAL EVERY 8 HOURS PRN
Status: DISCONTINUED | OUTPATIENT
Start: 2022-11-10 | End: 2022-11-19 | Stop reason: HOSPADM

## 2022-11-10 RX ORDER — ATORVASTATIN CALCIUM 20 MG/1
40 TABLET, FILM COATED ORAL NIGHTLY
Status: DISCONTINUED | OUTPATIENT
Start: 2022-11-10 | End: 2022-11-19 | Stop reason: HOSPADM

## 2022-11-10 RX ORDER — CEFAZOLIN SODIUM 2 G/100ML
2 INJECTION, SOLUTION INTRAVENOUS EVERY 8 HOURS
Status: COMPLETED | OUTPATIENT
Start: 2022-11-10 | End: 2022-11-12

## 2022-11-10 RX ORDER — DEXTROSE MONOHYDRATE 25 G/50ML
10-50 INJECTION, SOLUTION INTRAVENOUS
Status: DISCONTINUED | OUTPATIENT
Start: 2022-11-10 | End: 2022-11-12 | Stop reason: SDUPTHER

## 2022-11-10 RX ORDER — ENOXAPARIN SODIUM 100 MG/ML
40 INJECTION SUBCUTANEOUS DAILY
Status: DISCONTINUED | OUTPATIENT
Start: 2022-11-11 | End: 2022-11-19 | Stop reason: HOSPADM

## 2022-11-10 RX ORDER — METOCLOPRAMIDE HYDROCHLORIDE 5 MG/ML
10 INJECTION INTRAMUSCULAR; INTRAVENOUS EVERY 6 HOURS
Status: DISPENSED | OUTPATIENT
Start: 2022-11-10 | End: 2022-11-11

## 2022-11-10 RX ORDER — BISACODYL 5 MG/1
10 TABLET, DELAYED RELEASE ORAL DAILY PRN
Status: DISCONTINUED | OUTPATIENT
Start: 2022-11-10 | End: 2022-11-19 | Stop reason: HOSPADM

## 2022-11-10 RX ORDER — SODIUM CHLORIDE 9 MG/ML
800 INJECTION, SOLUTION INTRAVENOUS ONCE
Status: COMPLETED | OUTPATIENT
Start: 2022-11-10 | End: 2022-11-10

## 2022-11-10 RX ORDER — MIDAZOLAM HYDROCHLORIDE 1 MG/ML
0.5 INJECTION INTRAMUSCULAR; INTRAVENOUS
Status: DISCONTINUED | OUTPATIENT
Start: 2022-11-10 | End: 2022-11-10 | Stop reason: HOSPADM

## 2022-11-10 RX ORDER — MEPERIDINE HYDROCHLORIDE 25 MG/ML
25 INJECTION INTRAMUSCULAR; INTRAVENOUS; SUBCUTANEOUS EVERY 4 HOURS PRN
Status: ACTIVE | OUTPATIENT
Start: 2022-11-10 | End: 2022-11-11

## 2022-11-10 RX ORDER — NOREPINEPHRINE BITARTRATE 1 MG/ML
INJECTION, SOLUTION INTRAVENOUS CONTINUOUS PRN
Status: DISCONTINUED | OUTPATIENT
Start: 2022-11-10 | End: 2022-11-10 | Stop reason: SURG

## 2022-11-10 RX ORDER — LIDOCAINE HYDROCHLORIDE 10 MG/ML
0.5 INJECTION, SOLUTION EPIDURAL; INFILTRATION; INTRACAUDAL; PERINEURAL ONCE AS NEEDED
Status: DISCONTINUED | OUTPATIENT
Start: 2022-11-10 | End: 2022-11-10 | Stop reason: HOSPADM

## 2022-11-10 RX ORDER — ONDANSETRON 2 MG/ML
4 INJECTION INTRAMUSCULAR; INTRAVENOUS EVERY 6 HOURS PRN
Status: DISCONTINUED | OUTPATIENT
Start: 2022-11-10 | End: 2022-11-19 | Stop reason: HOSPADM

## 2022-11-10 RX ORDER — POTASSIUM CHLORIDE 29.8 MG/ML
20 INJECTION INTRAVENOUS
Status: DISCONTINUED | OUTPATIENT
Start: 2022-11-10 | End: 2022-11-19 | Stop reason: HOSPADM

## 2022-11-10 RX ORDER — PANTOPRAZOLE SODIUM 40 MG/10ML
40 INJECTION, POWDER, LYOPHILIZED, FOR SOLUTION INTRAVENOUS ONCE
Status: COMPLETED | OUTPATIENT
Start: 2022-11-10 | End: 2022-11-10

## 2022-11-10 RX ORDER — POTASSIUM CHLORIDE 750 MG/1
40 TABLET, FILM COATED, EXTENDED RELEASE ORAL AS NEEDED
Status: DISCONTINUED | OUTPATIENT
Start: 2022-11-10 | End: 2022-11-19 | Stop reason: HOSPADM

## 2022-11-10 RX ORDER — CHLORHEXIDINE GLUCONATE 0.12 MG/ML
15 RINSE ORAL EVERY 12 HOURS
Status: DISCONTINUED | OUTPATIENT
Start: 2022-11-10 | End: 2022-11-19 | Stop reason: HOSPADM

## 2022-11-10 RX ORDER — MILRINONE LACTATE 0.2 MG/ML
.25-.375 INJECTION, SOLUTION INTRAVENOUS CONTINUOUS PRN
Status: DISCONTINUED | OUTPATIENT
Start: 2022-11-10 | End: 2022-11-12

## 2022-11-10 RX ORDER — NOREPINEPHRINE BIT/0.9 % NACL 8 MG/250ML
.02-.2 INFUSION BOTTLE (ML) INTRAVENOUS CONTINUOUS PRN
Status: DISCONTINUED | OUTPATIENT
Start: 2022-11-10 | End: 2022-11-12

## 2022-11-10 RX ORDER — ACETAMINOPHEN 10 MG/ML
1000 INJECTION, SOLUTION INTRAVENOUS EVERY 8 HOURS
Status: COMPLETED | OUTPATIENT
Start: 2022-11-10 | End: 2022-11-11

## 2022-11-10 RX ORDER — POTASSIUM CHLORIDE 7.45 MG/ML
10 INJECTION INTRAVENOUS
Status: DISCONTINUED | OUTPATIENT
Start: 2022-11-10 | End: 2022-11-19 | Stop reason: HOSPADM

## 2022-11-10 RX ORDER — PANTOPRAZOLE SODIUM 40 MG/1
40 TABLET, DELAYED RELEASE ORAL EVERY MORNING
Status: DISCONTINUED | OUTPATIENT
Start: 2022-11-11 | End: 2022-11-19 | Stop reason: HOSPADM

## 2022-11-10 RX ORDER — ALBUMIN (HUMAN) 12.5 G/50ML
50 SOLUTION INTRAVENOUS ONCE
Status: COMPLETED | OUTPATIENT
Start: 2022-11-10 | End: 2022-11-10

## 2022-11-10 RX ORDER — MORPHINE SULFATE 2 MG/ML
1 INJECTION, SOLUTION INTRAMUSCULAR; INTRAVENOUS EVERY 4 HOURS PRN
Status: DISCONTINUED | OUTPATIENT
Start: 2022-11-10 | End: 2022-11-16

## 2022-11-10 RX ORDER — POLYETHYLENE GLYCOL 3350 17 G/17G
17 POWDER, FOR SOLUTION ORAL DAILY PRN
Status: DISCONTINUED | OUTPATIENT
Start: 2022-11-10 | End: 2022-11-19 | Stop reason: HOSPADM

## 2022-11-10 RX ORDER — NALOXONE HCL 0.4 MG/ML
0.4 VIAL (ML) INJECTION
Status: DISCONTINUED | OUTPATIENT
Start: 2022-11-10 | End: 2022-11-16

## 2022-11-10 RX ORDER — DEXMEDETOMIDINE HYDROCHLORIDE 4 UG/ML
.2-1.5 INJECTION, SOLUTION INTRAVENOUS
Status: DISCONTINUED | OUTPATIENT
Start: 2022-11-10 | End: 2022-11-12

## 2022-11-10 RX ORDER — ALBUMIN (HUMAN) 12.5 G/50ML
12.5 SOLUTION INTRAVENOUS ONCE
Status: DISCONTINUED | OUTPATIENT
Start: 2022-11-10 | End: 2022-11-10

## 2022-11-10 RX ORDER — FENTANYL CITRATE 50 UG/ML
50 INJECTION, SOLUTION INTRAMUSCULAR; INTRAVENOUS
Status: DISCONTINUED | OUTPATIENT
Start: 2022-11-10 | End: 2022-11-10 | Stop reason: HOSPADM

## 2022-11-10 RX ORDER — CEFAZOLIN SODIUM 2 G/100ML
INJECTION, SOLUTION INTRAVENOUS AS NEEDED
Status: DISCONTINUED | OUTPATIENT
Start: 2022-11-10 | End: 2022-11-10 | Stop reason: SURG

## 2022-11-10 RX ORDER — MIDAZOLAM HYDROCHLORIDE 1 MG/ML
INJECTION INTRAMUSCULAR; INTRAVENOUS AS NEEDED
Status: COMPLETED | OUTPATIENT
Start: 2022-11-10 | End: 2022-11-10

## 2022-11-10 RX ORDER — PROTAMINE SULFATE 10 MG/ML
INJECTION, SOLUTION INTRAVENOUS AS NEEDED
Status: DISCONTINUED | OUTPATIENT
Start: 2022-11-10 | End: 2022-11-10 | Stop reason: HOSPADM

## 2022-11-10 RX ORDER — ERGOCALCIFEROL 1.25 MG/1
1 CAPSULE ORAL WEEKLY
COMMUNITY
Start: 2022-07-21 | End: 2022-12-21

## 2022-11-10 RX ORDER — ROCURONIUM BROMIDE 10 MG/ML
INJECTION, SOLUTION INTRAVENOUS AS NEEDED
Status: DISCONTINUED | OUTPATIENT
Start: 2022-11-10 | End: 2022-11-10 | Stop reason: SURG

## 2022-11-10 RX ORDER — SODIUM CHLORIDE 0.9 % (FLUSH) 0.9 %
3 SYRINGE (ML) INJECTION EVERY 12 HOURS SCHEDULED
Status: DISCONTINUED | OUTPATIENT
Start: 2022-11-10 | End: 2022-11-10 | Stop reason: HOSPADM

## 2022-11-10 RX ORDER — ASPIRIN 81 MG/1
81 TABLET ORAL DAILY
Status: DISCONTINUED | OUTPATIENT
Start: 2022-11-11 | End: 2022-11-19 | Stop reason: HOSPADM

## 2022-11-10 RX ORDER — SODIUM CHLORIDE 9 MG/ML
INJECTION, SOLUTION INTRAVENOUS CONTINUOUS PRN
Status: DISCONTINUED | OUTPATIENT
Start: 2022-11-10 | End: 2022-11-10 | Stop reason: SURG

## 2022-11-10 RX ORDER — SODIUM CHLORIDE, SODIUM LACTATE, POTASSIUM CHLORIDE, CALCIUM CHLORIDE 600; 310; 30; 20 MG/100ML; MG/100ML; MG/100ML; MG/100ML
9 INJECTION, SOLUTION INTRAVENOUS CONTINUOUS
Status: DISCONTINUED | OUTPATIENT
Start: 2022-11-10 | End: 2022-11-10

## 2022-11-10 RX ORDER — DOPAMINE HYDROCHLORIDE 160 MG/100ML
2-20 INJECTION, SOLUTION INTRAVENOUS CONTINUOUS PRN
Status: DISCONTINUED | OUTPATIENT
Start: 2022-11-10 | End: 2022-11-12

## 2022-11-10 RX ORDER — FENTANYL CITRATE 50 UG/ML
INJECTION, SOLUTION INTRAMUSCULAR; INTRAVENOUS AS NEEDED
Status: DISCONTINUED | OUTPATIENT
Start: 2022-11-10 | End: 2022-11-10 | Stop reason: SURG

## 2022-11-10 RX ORDER — ACETAMINOPHEN 160 MG/5ML
650 SOLUTION ORAL EVERY 4 HOURS PRN
Status: DISCONTINUED | OUTPATIENT
Start: 2022-11-11 | End: 2022-11-19 | Stop reason: HOSPADM

## 2022-11-10 RX ORDER — NITROGLYCERIN 20 MG/100ML
5-200 INJECTION INTRAVENOUS
Status: DISCONTINUED | OUTPATIENT
Start: 2022-11-10 | End: 2022-11-12

## 2022-11-10 RX ORDER — SODIUM CHLORIDE 0.9 % (FLUSH) 0.9 %
3-10 SYRINGE (ML) INJECTION AS NEEDED
Status: DISCONTINUED | OUTPATIENT
Start: 2022-11-10 | End: 2022-11-10 | Stop reason: HOSPADM

## 2022-11-10 RX ORDER — AMINOCAPROIC ACID 250 MG/ML
INJECTION, SOLUTION INTRAVENOUS AS NEEDED
Status: DISCONTINUED | OUTPATIENT
Start: 2022-11-10 | End: 2022-11-10 | Stop reason: SURG

## 2022-11-10 RX ORDER — PHENYLEPHRINE HCL IN 0.9% NACL 0.5 MG/5ML
.2-2 SYRINGE (ML) INTRAVENOUS CONTINUOUS PRN
Status: DISCONTINUED | OUTPATIENT
Start: 2022-11-10 | End: 2022-11-12

## 2022-11-10 RX ORDER — PROPOFOL 10 MG/ML
INJECTION, EMULSION INTRAVENOUS AS NEEDED
Status: DISCONTINUED | OUTPATIENT
Start: 2022-11-10 | End: 2022-11-10 | Stop reason: SURG

## 2022-11-10 RX ORDER — HYDROCODONE BITARTRATE AND ACETAMINOPHEN 5; 325 MG/1; MG/1
2 TABLET ORAL EVERY 4 HOURS PRN
Status: DISCONTINUED | OUTPATIENT
Start: 2022-11-10 | End: 2022-11-19 | Stop reason: HOSPADM

## 2022-11-10 RX ORDER — ALBUMIN, HUMAN INJ 5% 5 %
1500 SOLUTION INTRAVENOUS AS NEEDED
Status: DISPENSED | OUTPATIENT
Start: 2022-11-10 | End: 2022-11-11

## 2022-11-10 RX ORDER — DEXMEDETOMIDINE HYDROCHLORIDE 4 UG/ML
INJECTION, SOLUTION INTRAVENOUS CONTINUOUS PRN
Status: DISCONTINUED | OUTPATIENT
Start: 2022-11-10 | End: 2022-11-10 | Stop reason: SURG

## 2022-11-10 RX ORDER — MIDAZOLAM HYDROCHLORIDE 1 MG/ML
2 INJECTION INTRAMUSCULAR; INTRAVENOUS
Status: DISCONTINUED | OUTPATIENT
Start: 2022-11-10 | End: 2022-11-19 | Stop reason: HOSPADM

## 2022-11-10 RX ORDER — ACETAMINOPHEN 325 MG/1
650 TABLET ORAL EVERY 4 HOURS PRN
Status: DISCONTINUED | OUTPATIENT
Start: 2022-11-11 | End: 2022-11-19 | Stop reason: HOSPADM

## 2022-11-10 RX ORDER — FENTANYL CITRATE 50 UG/ML
INJECTION, SOLUTION INTRAMUSCULAR; INTRAVENOUS AS NEEDED
Status: COMPLETED | OUTPATIENT
Start: 2022-11-10 | End: 2022-11-10

## 2022-11-10 RX ORDER — CYCLOBENZAPRINE HCL 10 MG
10 TABLET ORAL EVERY 8 HOURS PRN
Status: DISCONTINUED | OUTPATIENT
Start: 2022-11-11 | End: 2022-11-19 | Stop reason: HOSPADM

## 2022-11-10 RX ORDER — AMOXICILLIN 250 MG
2 CAPSULE ORAL NIGHTLY
Status: DISCONTINUED | OUTPATIENT
Start: 2022-11-11 | End: 2022-11-19 | Stop reason: HOSPADM

## 2022-11-10 RX ORDER — NICOTINE POLACRILEX 4 MG
15 LOZENGE BUCCAL
Status: DISCONTINUED | OUTPATIENT
Start: 2022-11-10 | End: 2022-11-12 | Stop reason: SDUPTHER

## 2022-11-10 RX ORDER — POTASSIUM CHLORIDE 1.5 G/1.77G
40 POWDER, FOR SOLUTION ORAL AS NEEDED
Status: DISCONTINUED | OUTPATIENT
Start: 2022-11-10 | End: 2022-11-19 | Stop reason: HOSPADM

## 2022-11-10 RX ORDER — BISACODYL 10 MG
10 SUPPOSITORY, RECTAL RECTAL DAILY PRN
Status: DISCONTINUED | OUTPATIENT
Start: 2022-11-11 | End: 2022-11-19 | Stop reason: HOSPADM

## 2022-11-10 RX ORDER — MAGNESIUM SULFATE HEPTAHYDRATE 500 MG/ML
INJECTION, SOLUTION INTRAMUSCULAR; INTRAVENOUS AS NEEDED
Status: DISCONTINUED | OUTPATIENT
Start: 2022-11-10 | End: 2022-11-10 | Stop reason: SURG

## 2022-11-10 RX ORDER — OXYCODONE HYDROCHLORIDE 5 MG/1
10 TABLET ORAL EVERY 4 HOURS PRN
Status: DISCONTINUED | OUTPATIENT
Start: 2022-11-10 | End: 2022-11-19 | Stop reason: HOSPADM

## 2022-11-10 RX ORDER — PAPAVERINE HYDROCHLORIDE 30 MG/ML
INJECTION INTRAMUSCULAR; INTRAVENOUS AS NEEDED
Status: DISCONTINUED | OUTPATIENT
Start: 2022-11-10 | End: 2022-11-10 | Stop reason: HOSPADM

## 2022-11-10 RX ORDER — KETAMINE HCL IN NACL, ISO-OSM 100MG/10ML
SYRINGE (ML) INJECTION AS NEEDED
Status: DISCONTINUED | OUTPATIENT
Start: 2022-11-10 | End: 2022-11-10 | Stop reason: SURG

## 2022-11-10 RX ORDER — ACETAMINOPHEN 325 MG/1
650 TABLET ORAL EVERY 4 HOURS
Status: ACTIVE | OUTPATIENT
Start: 2022-11-10 | End: 2022-11-11

## 2022-11-10 RX ORDER — ACETAMINOPHEN 650 MG/1
650 SUPPOSITORY RECTAL EVERY 4 HOURS PRN
Status: DISCONTINUED | OUTPATIENT
Start: 2022-11-11 | End: 2022-11-19 | Stop reason: HOSPADM

## 2022-11-10 RX ORDER — ACETAMINOPHEN 650 MG/1
650 SUPPOSITORY RECTAL EVERY 4 HOURS
Status: ACTIVE | OUTPATIENT
Start: 2022-11-10 | End: 2022-11-11

## 2022-11-10 RX ORDER — MAGNESIUM SULFATE 1 G/100ML
1 INJECTION INTRAVENOUS EVERY 8 HOURS
Status: DISPENSED | OUTPATIENT
Start: 2022-11-10 | End: 2022-11-11

## 2022-11-10 RX ORDER — SODIUM CHLORIDE 9 MG/ML
50 INJECTION, SOLUTION INTRAVENOUS CONTINUOUS
Status: DISCONTINUED | OUTPATIENT
Start: 2022-11-10 | End: 2022-11-12

## 2022-11-10 RX ADMIN — AMINOCAPROIC ACID 10 G: 250 INJECTION, SOLUTION INTRAVENOUS at 15:34

## 2022-11-10 RX ADMIN — CEFAZOLIN SODIUM 2 G: 2 INJECTION, SOLUTION INTRAVENOUS at 12:35

## 2022-11-10 RX ADMIN — PANTOPRAZOLE SODIUM 40 MG: 40 INJECTION, POWDER, FOR SOLUTION INTRAVENOUS at 16:58

## 2022-11-10 RX ADMIN — MUPIROCIN 1 APPLICATION: 20 OINTMENT TOPICAL at 08:07

## 2022-11-10 RX ADMIN — PROPOFOL 25 MCG/KG/MIN: 10 INJECTION, EMULSION INTRAVENOUS at 12:00

## 2022-11-10 RX ADMIN — ROCURONIUM BROMIDE 50 MG: 50 INJECTION INTRAVENOUS at 12:06

## 2022-11-10 RX ADMIN — PHENYLEPHRINE HYDROCHLORIDE 0.3 MCG/KG/MIN: 10 INJECTION INTRAVENOUS at 12:44

## 2022-11-10 RX ADMIN — ACETAMINOPHEN 1000 MG: 10 INJECTION, SOLUTION INTRAVENOUS at 18:15

## 2022-11-10 RX ADMIN — SODIUM CHLORIDE 1.5 UNITS/HR: 900 INJECTION, SOLUTION INTRAVENOUS at 13:04

## 2022-11-10 RX ADMIN — CEFAZOLIN SODIUM 2 G: 2 INJECTION, SOLUTION INTRAVENOUS at 15:36

## 2022-11-10 RX ADMIN — ALBUMIN HUMAN 25 G: 0.25 SOLUTION INTRAVENOUS at 22:58

## 2022-11-10 RX ADMIN — EPINEPHRINE 0.02 MCG/KG/MIN: 1 INJECTION INTRAMUSCULAR; INTRAVENOUS; SUBCUTANEOUS at 20:16

## 2022-11-10 RX ADMIN — HEPARIN SODIUM 30000 UNITS: 1000 INJECTION INTRAVENOUS; SUBCUTANEOUS at 13:43

## 2022-11-10 RX ADMIN — ATORVASTATIN CALCIUM 80 MG: 80 TABLET, FILM COATED ORAL at 08:07

## 2022-11-10 RX ADMIN — CHLORHEXIDINE GLUCONATE 15 ML: 1.2 SOLUTION ORAL at 10:17

## 2022-11-10 RX ADMIN — FENTANYL CITRATE 100 MCG: 50 INJECTION INTRAMUSCULAR; INTRAVENOUS at 12:06

## 2022-11-10 RX ADMIN — METOCLOPRAMIDE 10 MG: 5 INJECTION, SOLUTION INTRAMUSCULAR; INTRAVENOUS at 16:57

## 2022-11-10 RX ADMIN — ROCURONIUM BROMIDE 20 MG: 50 INJECTION INTRAVENOUS at 15:04

## 2022-11-10 RX ADMIN — MAGNESIUM SULFATE IN DEXTROSE 1 G: 10 INJECTION, SOLUTION INTRAVENOUS at 17:36

## 2022-11-10 RX ADMIN — METOPROLOL TARTRATE 12.5 MG: 25 TABLET, FILM COATED ORAL at 09:08

## 2022-11-10 RX ADMIN — ASPIRIN 81 MG: 81 TABLET, COATED ORAL at 08:07

## 2022-11-10 RX ADMIN — SODIUM CHLORIDE: 9 INJECTION, SOLUTION INTRAVENOUS at 11:52

## 2022-11-10 RX ADMIN — Medication 15 MG: at 14:54

## 2022-11-10 RX ADMIN — Medication 15 MG: at 14:15

## 2022-11-10 RX ADMIN — CEFAZOLIN SODIUM 2 G: 2 INJECTION, SOLUTION INTRAVENOUS at 18:15

## 2022-11-10 RX ADMIN — DEXMEDETOMIDINE HYDROCHLORIDE 1 MCG/KG/HR: 4 INJECTION, SOLUTION INTRAVENOUS at 12:00

## 2022-11-10 RX ADMIN — AMINOCAPROIC ACID 10 G: 250 INJECTION, SOLUTION INTRAVENOUS at 13:49

## 2022-11-10 RX ADMIN — FENTANYL CITRATE 100 MCG: 50 INJECTION INTRAMUSCULAR; INTRAVENOUS at 15:16

## 2022-11-10 RX ADMIN — NICARDIPINE HYDROCHLORIDE 7.5 MG/HR: 2.5 INJECTION, SOLUTION INTRAVENOUS at 15:25

## 2022-11-10 RX ADMIN — NOREPINEPHRINE BITARTRATE 0.05 MCG/KG/MIN: 1 INJECTION, SOLUTION, CONCENTRATE INTRAVENOUS at 14:00

## 2022-11-10 RX ADMIN — MAGNESIUM SULFATE HEPTAHYDRATE 2 G: 500 INJECTION, SOLUTION INTRAMUSCULAR; INTRAVENOUS at 15:14

## 2022-11-10 RX ADMIN — CHLORHEXIDINE GLUCONATE 1 APPLICATION: 500 CLOTH TOPICAL at 00:00

## 2022-11-10 RX ADMIN — ROCURONIUM BROMIDE 30 MG: 50 INJECTION INTRAVENOUS at 13:50

## 2022-11-10 RX ADMIN — ALBUMIN, HUMAN INJ 5% 500 ML: 5 SOLUTION at 17:38

## 2022-11-10 RX ADMIN — MIDAZOLAM 2 MG: 1 INJECTION, SOLUTION INTRAMUSCULAR; INTRAVENOUS at 12:06

## 2022-11-10 RX ADMIN — PROPOFOL 50 MG: 10 INJECTION, EMULSION INTRAVENOUS at 12:06

## 2022-11-10 RX ADMIN — FENTANYL CITRATE 50 MCG: 50 INJECTION INTRAMUSCULAR; INTRAVENOUS at 12:43

## 2022-11-10 RX ADMIN — LEVOTHYROXINE SODIUM 25 MCG: 0.03 TABLET ORAL at 06:01

## 2022-11-10 RX ADMIN — EMPAGLIFLOZIN 10 MG: 10 TABLET, FILM COATED ORAL at 08:07

## 2022-11-10 RX ADMIN — ALBUMIN, HUMAN INJ 5% 500 ML: 5 SOLUTION at 18:39

## 2022-11-10 RX ADMIN — PANTOPRAZOLE SODIUM 40 MG: 40 TABLET, DELAYED RELEASE ORAL at 06:01

## 2022-11-10 RX ADMIN — MUPIROCIN 1 APPLICATION: 20 OINTMENT TOPICAL at 20:07

## 2022-11-10 RX ADMIN — Medication 20 MG: at 12:06

## 2022-11-10 RX ADMIN — SODIUM CHLORIDE 400 ML: 9 INJECTION, SOLUTION INTRAVENOUS at 22:57

## 2022-11-10 NOTE — ANESTHESIA PROCEDURE NOTES
Airway  Urgency: elective    Date/Time: 11/10/2022 12:11 PM  Airway not difficult    General Information and Staff    Patient location during procedure: OR  Anesthesiologist: Maxwell Hemphill MD    Indications and Patient Condition  Indications for airway management: airway protection    Preoxygenated: yes  MILS maintained throughout  Mask difficulty assessment: 2 - vent by mask + OA or adjuvant +/- NMBA    Final Airway Details  Final airway type: endotracheal airway      Successful airway: ETT  Cuffed: yes   Successful intubation technique: direct laryngoscopy  Endotracheal tube insertion site: oral  Blade: Thuy  Blade size: 3  ETT size (mm): 7.5  Cormack-Lehane Classification: grade IIa - partial view of glottis  Placement verified by: chest auscultation and capnometry   Measured from: teeth  ETT/EBT  to teeth (cm): 22  Number of attempts at approach: 1  Assessment: lips, teeth, and gum same as pre-op and atraumatic intubation

## 2022-11-10 NOTE — ANESTHESIA PROCEDURE NOTES
Central Line    Pre-sedation assessment completed: 11/10/2022 11:10 AM    Patient reassessed immediately prior to procedure    Patient location during procedure: holding area  Start time: 11/10/2022 11:10 AM  Stop Time:11/10/2022 11:25 AM  Indications: vascular access and MD/Surgeon request  Staff  Anesthesiologist: Maxwell Cedillo MD  Preanesthetic Checklist  Completed: patient identified, IV checked, site marked, risks and benefits discussed, surgical consent, monitors and equipment checked, pre-op evaluation and timeout performed  Central Line Prep  Sterile Tech:cap, gloves, gown, mask and sterile barriers  Prep: chloraprep  Patient monitoring: blood pressure monitoring, continuous pulse oximetry and EKG  Central Line Procedure  Laterality:right  Location:internal jugular  Catheter Type:[place]-Abbi  Guidance:ultrasound guided and landmark technique  PROCEDURE NOTE/ULTRASOUND INTERPRETATION.  Using ultrasound guidance the potential vascular sites for insertion of the catheter were visualized to determine the patency of the vessel to be used for vascular access.  After selecting the appropriate site for insertion, the needle was visualized under ultrasound being inserted into the internal jugular vein, followed by ultrasound confirmation of wire and catheter placement. There were no abnormalities seen on ultrasound; an image was taken; and the patient tolerated the procedure with no complications.   Assessment  Post procedure:biopatch applied, line sutured and occlusive dressing applied  Assessement:blood return through all ports, free fluid flow and chest x-ray ordered  Complications:no  Patient Tolerance:patient tolerated the procedure well with no apparent complications

## 2022-11-10 NOTE — PROGRESS NOTES
"Daily progress note    Referring physician  Dr. BECKFORD    Chief complaint  Doing same with no new complaints and going for surgery.    History of present illness  71-year-old white female with history of diabetes hypertension hyperlipidemia hypothyroidism presented to [facility] emergency room with sudden onset of shortness of breath as she woke up with it.  Patient has no chest pain palpitation but does have nonproductive cough but no fever chills.  Patient found to be hypoxic while EMS arrived and brought to the emergency room which she found to have acute ST elevation MI and taken to the Cath Lab which showed multivessel coronary disease  angioplasty and stent placed to diagonal branch and I am asked to follow patient for medical problem.  At the time of review she is feeling better and has no more shortness of breath and also denies any chest pain.  Patient feels weak but feeling much better after angioplasty.     REVIEW OF SYSTEMS  Unremarkable    PHYSICAL EXAM         Blood pressure 132/49, pulse 61, temperature 97.6 °F (36.4 °C), temperature source Oral, resp. rate 14, height 170.2 cm (67\"), weight 103 kg (227 lb 11.8 oz), SpO2 100 %, not currently breastfeeding.    Constitutional:       General: She is not in acute distress.  HENT:      Head: Normocephalic and atraumatic.   Eyes:      Extraocular Movements: EOM normal.      Pupils: Pupils are equal, round, and reactive to light.   Cardiovascular:      Rate and Rhythm: Normal rate and regular rhythm.      Heart sounds: Normal heart sounds.   Pulmonary:      Effort: Pulmonary effort is normal. No respiratory distress.      Breath sounds: Examination of the right-middle field reveals rhonchi. Examination of the left-middle field reveals rhonchi. Examination of the right-lower field reveals rhonchi. Examination of the left-lower field reveals rhonchi. Rhonchi present.   Abdominal:      Palpations: Abdomen is soft.      Tenderness: There is no abdominal " tenderness. There is no guarding or rebound.   Musculoskeletal:         General: No edema. Normal range of motion.      Cervical back: Normal range of motion and neck supple.   Skin:     General: Skin is warm and dry.      Findings: No rash.   Neurological:      Mental Status: She is alert and oriented to person, place, and time.      Sensory: Sensation is intact.      Motor: Motor strength is normal.   Psychiatric:         Mood and Affect: Mood and affect normal.      LAB RESULTS  Lab Results (last 24 hours)     Procedure Component Value Units Date/Time    POC OR Panel, ISTAT [[id number]]  (Abnormal) Collected: 11/10/22 1428    Specimen: Arterial Blood Updated: 11/10/22 1555     POC Potassium 4.8 mmol/L      Total CO2 26 mmol/L      Hemoglobin 7.8 g/dL      Hematocrit 23 %      pH, Arterial 7.4230 pH units      HCO3, Arterial 25.2 mmol/L      Base Excess 0.0000 mmol/L      O2 Saturation, Arterial 100 %      pO2, Arterial 478 mmHg      pCO2, Arterial 41.3 mm Hg      Comment: Serial Number: [id number]:  [id number]        Glucose 142 mg/dL      Ionized Calcium --    POC Activated Clotting Time [[id number]]  (Normal) Collected: 11/10/22 1542    Specimen: Arterial Blood Updated: 11/10/22 1554     Activated Clotting Time  138 Seconds      Comment: Serial Number: [id number]:  [id number]       POC OR Panel, ISTAT [[id number]]  (Abnormal) Collected: 11/10/22 1457    Specimen: Arterial Blood Updated: 11/10/22 1554     POC Potassium 5.2 mmol/L      Total CO2 26 mmol/L      Hemoglobin 7.8 g/dL      Hematocrit 23 %      pH, Arterial 7.4370 pH units      HCO3, Arterial 24.4 mmol/L      Base Excess 0.0000 mmol/L      O2 Saturation, Arterial 100 %      pO2, Arterial 447 mmHg      pCO2, Arterial 38.8 mm Hg      Comment: Serial Number: [id number]:  [id number]        Glucose 149 mg/dL      Ionized Calcium --    POC OR Panel, ISTAT [[id number]]  (Abnormal) Collected: 11/10/22 1412    Specimen: Venous Blood Updated: 11/10/22 1554     POC  Potassium 4.9 mmol/L      Total CO2 25 mmol/L      Hemoglobin 7.8 g/dL      Hematocrit 23 %      pH, Arterial 7.3490 pH units      HCO3, Arterial 23.6 mmol/L      Base Excess -2.0000 mmol/L      O2 Saturation, Arterial 68 %      pO2, Arterial 38 mmHg      pCO2, Arterial 44.4 mm Hg      Comment: Serial Number: [id number]:  [id number]        Glucose 137 mg/dL      Ionized Calcium --    POC OR Panel, ISTAT [[id number]]  (Abnormal) Collected: 11/10/22 1406    Specimen: Arterial Blood Updated: 11/10/22 1554     POC Potassium 4.1 mmol/L      Total CO2 26 mmol/L      Hemoglobin 8.2 g/dL      Hematocrit 24 %      pH, Arterial 7.3710 pH units      HCO3, Arterial 25.1 mmol/L      Base Excess 0.0000 mmol/L      O2 Saturation, Arterial 100 %      pO2, Arterial 459 mmHg      pCO2, Arterial 44.8 mm Hg      Comment: Serial Number: [id number]:  [id number]        Glucose 144 mg/dL      Ionized Calcium --    POC OR Panel, ISTAT [[id number]]  (Abnormal) Collected: 11/10/22 1217    Specimen: Arterial Blood Updated: 11/10/22 1553     POC Potassium 3.8 mmol/L      Total CO2 23 mmol/L      Hemoglobin 10.5 g/dL      Hematocrit 31 %      pH, Arterial 7.3350 pH units      HCO3, Arterial 22.1 mmol/L      Base Excess -4.0000 mmol/L      O2 Saturation, Arterial 100 %      pO2, Arterial 212 mmHg      pCO2, Arterial 42.8 mm Hg      Comment: Serial Number: [id number]:  [id number]        Glucose 135 mg/dL      Ionized Calcium --    Urine Culture - Urine, Urine, Clean Catch [[id number]]  (Abnormal)  (Susceptibility) Collected: 11/08/22 1743    Specimen: Urine, Clean Catch Updated: 11/10/22 1016     Urine Culture >100,000 CFU/mL Klebsiella pneumoniae ssp pneumoniae    Narrative:      Colonization of the urinary tract without infection is common. Treatment is discouraged unless the patient is symptomatic, pregnant, or undergoing an invasive urologic procedure.    Susceptibility      Klebsiella pneumoniae ssp pneumoniae      ELKE      Ampicillin Resistant      Ampicillin + Sulbactam Susceptible      Cefazolin Susceptible      Cefepime Susceptible      Ceftazidime Susceptible      Ceftriaxone Susceptible      Gentamicin Susceptible      Levofloxacin Susceptible      Nitrofurantoin Intermediate      Piperacillin + Tazobactam Susceptible      Trimethoprim + Sulfamethoxazole Susceptible                           POC Glucose Once [[id number]]  (Normal) Collected: 11/10/22 0759    Specimen: Blood Updated: 11/10/22 0820     Glucose 116 mg/dL      Comment: Meter: [id number] : [id number] Oliverio MANDUJANO       POC Glucose Once [[id number]]  (Abnormal) Collected: 11/10/22 0525    Specimen: Blood Updated: 11/10/22 0528     Glucose 141 mg/dL      Comment: Meter: [id number] : [id number] [facility]       Basic Metabolic Panel [[id number]]  (Abnormal) Collected: 11/10/22 0410    Specimen: Blood Updated: 11/10/22 0503     Glucose 134 mg/dL      BUN 21 mg/dL      Creatinine 0.90 mg/dL      Sodium 139 mmol/L      Potassium 3.7 mmol/L      Chloride 105 mmol/L      CO2 22.0 mmol/L      Calcium 8.7 mg/dL      BUN/Creatinine Ratio 23.3     Anion Gap 12.0 mmol/L      eGFR 68.5 mL/min/1.73      Comment: National Kidney Foundation and American Society of Nephrology (ASN) Task Force recommended calculation based on the Chronic Kidney Disease Epidemiology Collaboration (CKD-EPI) equation refit without adjustment for race.       Narrative:      GFR Normal >60  Chronic Kidney Disease <60  Kidney Failure <15    The GFR formula is only valid for adults with stable renal function between ages 18 and 70.    CBC & Differential [[id number]]  (Abnormal) Collected: 11/10/22 0410    Specimen: Blood Updated: 11/10/22 0443    Narrative:      The following orders were created for panel order CBC & Differential.  Procedure                               Abnormality         Status                     ---------                               -----------         ------                     CBC Auto  Differential[[id number]]        Abnormal            Final result                 Please view results for these tests on the individual orders.    CBC Auto Differential [[id number]]  (Abnormal) Collected: 11/10/22 0410    Specimen: Blood Updated: 11/10/22 0443     WBC 8.09 10*3/mm3      RBC 3.99 10*6/mm3      Hemoglobin 11.7 g/dL      Hematocrit 35.9 %      MCV 90.0 fL      MCH 29.3 pg      MCHC 32.6 g/dL      RDW 14.5 %      RDW-SD 47.9 fl      MPV 10.9 fL      Platelets 218 10*3/mm3      Neutrophil % 55.6 %      Lymphocyte % 30.7 %      Monocyte % 9.6 %      Eosinophil % 2.7 %      Basophil % 1.2 %      Immature Grans % 0.2 %      Neutrophils, Absolute 4.49 10*3/mm3      Lymphocytes, Absolute 2.48 10*3/mm3      Monocytes, Absolute 0.78 10*3/mm3      Eosinophils, Absolute 0.22 10*3/mm3      Basophils, Absolute 0.10 10*3/mm3      Immature Grans, Absolute 0.02 10*3/mm3      nRBC 0.0 /100 WBC     POC Glucose Once [[id number]]  (Abnormal) Collected: 11/09/22 1941    Specimen: Blood Updated: 11/09/22 1941     Glucose 178 mg/dL      Comment: Meter: [id number] : [id number] [facility]       COVID PRE-OP / PRE-PROCEDURE SCREENING ORDER (NO ISOLATION) - Swab, Nasopharynx [[id number]]  (Normal) Collected: 11/09/22 1356    Specimen: Swab from Nasopharynx Updated: 11/09/22 1831    Narrative:      The following orders were created for panel order COVID PRE-OP / PRE-PROCEDURE SCREENING ORDER (NO ISOLATION) - Swab, Nasopharynx.  Procedure                               Abnormality         Status                     ---------                               -----------         ------                     COVID-19,APTIMA PANTHER(...[[id number]]  Normal              Final result                 Please view results for these tests on the individual orders.    COVID-19,APTIMA PANTHER(SRINIVASAN),[facility], NP/OP SWAB IN UTM/VTM/SALINE TRANSPORT MEDIA,24 HR TAT - Swab, Nasopharynx [[id number]]  (Normal) Collected: 11/09/22 6149     Specimen: Swab from Nasopharynx Updated: 11/09/22 1831     COVID19 Not Detected    Narrative:      Fact sheet for providers: https://www.fda.gov/media/895717/download     Fact sheet for patients: https://www.fda.gov/media/820570/download    Test performed by RT PCR.    POC Glucose Once [[id number]]  (Abnormal) Collected: 11/09/22 1657    Specimen: Blood Updated: 11/09/22 1658     Glucose 138 mg/dL      Comment: Meter: [id number] : [id number] Mary JETER RN           Imaging Results (Last 24 Hours)     ** No results found for the last 24 hours. **           ECG 12 Lead          Component Ref Range & Units 01:44    QT Interval ms 358 P    Resulting Agency   ECG             HEART RATE= 116  bpm  RR Interval= 517  ms  OK Interval= 110  ms  P Horizontal Axis= -49  deg  P Front Axis= 64  deg  QRSD Interval= 97  ms  QT Interval= 358  ms  QRS Axis= 18  deg  T Wave Axis= 130  deg  - ABNORMAL ECG -  Sinus tachycardia  Probable left atrial enlargement  Lateral infarct, acute (LAD)  Probable anteroseptal infarct, recent             Current Facility-Administered Medications:   •  [MAR Hold] acetaminophen (TYLENOL) tablet 650 mg, 650 mg, Oral, Q4H PRN, Joanna Marie MD, 650 mg at 11/07/22 2144  •  [MAR Hold] ALPRAZolam (XANAX) tablet 0.25 mg, 0.25 mg, Oral, Q8H PRN, Martha Golden APRN  •  [MAR Hold] aspirin EC tablet 81 mg, 81 mg, Oral, Daily, Joanna Marie MD, 81 mg at 11/10/22 0807  •  [MAR Hold] atorvastatin (LIPITOR) tablet 80 mg, 80 mg, Oral, Daily, Joanna Marie MD, 80 mg at 11/10/22 0807  •  cefTRIAXone (ROCEPHIN) 1 g in sodium chloride 0.9 % 100 mL IVPB-VTB, 1 g, Intravenous, Q24H, Sammy Koch MD, Last Rate: 200 mL/hr at 11/09/22 1717, 1 g at 11/09/22 1717  •  electrolyte-A 250 mL with heparin (porcine) 5000 UNIT/ML 5,000 Units mixture, , , PRN, Jr Dong Isabel MD, 250 mL at 11/10/22 1550  •  [MAR Hold] empagliflozin (JARDIANCE) tablet 10 mg, 10 mg, Oral, Daily, Frank,  MD Joanna, 10 mg at 11/10/22 0807  •  eptifibatide (INTEGRILIN) 75 mg in 100 mL solution, 2 mcg/kg/min, Intravenous, Continuous, Martha Golden APRN, Stopped at 11/10/22 0024  •  gelatin absorbable (GELFOAM) sponge, , , PRN, Jr Dong Isabel MD, 1 each at 11/10/22 1551  •  [MAR Hold] insulin lispro (ADMELOG) injection 0-7 Units, 0-7 Units, Subcutaneous, 4x Daily With Meals & Nightly, Sammy Koch MD, 2 Units at 11/09/22 2038  •  lactated ringers infusion, 9 mL/hr, Intravenous, Continuous, Maxwell Cedillo MD  •  [MAR Hold] levothyroxine (SYNTHROID, LEVOTHROID) tablet 25 mcg, 25 mcg, Oral, Q AM, Joanna Marie MD, 25 mcg at 11/10/22 0601  •  lisinopril (PRINIVIL,ZESTRIL) tablet 5 mg, 5 mg, Oral, Daily, Sammy Koch MD, 5 mg at 11/09/22 0819  •  metoprolol succinate XL (TOPROL-XL) 24 hr tablet 25 mg, 25 mg, Oral, Q24H, Sharron Bolivar APRN, 25 mg at 11/09/22 0820  •  [MAR Hold] pantoprazole (PROTONIX) EC tablet 40 mg, 40 mg, Oral, Q AM, Sammy Koch MD, 40 mg at 11/10/22 0601  •  papaverine injection, , , PRN, Jr Dong Isabel MD, 60 mg at 11/10/22 1551  •  protamine injection, , , PRN, Jr Dong Isabel MD, 300 mg at 11/10/22 1545  •  [COMPLETED] Insert peripheral IV, , , Once **AND** [MAR Hold] sodium chloride 0.9 % flush 10 mL, 10 mL, Intravenous, PRN, Joanna Marie MD, 10 mL at 11/08/22 0819  •  [MAR Hold] temazepam (RESTORIL) capsule 7.5 mg, 7.5 mg, Oral, Nightly PRN, Martha Golden APRN, 7.5 mg at 11/09/22 2038    Facility-Administered Medications Ordered in Other Encounters:   •  aminocaproic acid (AMICAR) injection, , Intravenous, PRN, Maxwell Hemphill MD, 10 g at 11/10/22 1534  •  ceFAZolin in dextrose (ANCEF) IVPB solution, , Intravenous, PRN, Maxwell Hemphill MD, 2 g at 11/10/22 1536  •  dexmedetomidine (PRECEDEX) 400 mcg in 100 mL NS infusion, , Intravenous, Continuous PRN, Maxwell Hemphill MD, Last Rate: 12.913 mL/hr at 11/10/22 1351,  0.5 mcg/kg/hr at 11/10/22 1351  •  fentaNYL citrate (PF) (SUBLIMAZE) injection, , Intravenous, PRN, Maxwell Hemphill MD, 100 mcg at 11/10/22 1516  •  heparin (porcine) injection, , Intravenous, PRNJoby Christopher A, MD, 30,000 Units at 11/10/22 1343  •  insulin regular (humuLIN R,novoLIN R) 1 Units/mL in sodium chloride 0.9 % 100 mL infusion, , Intravenous, Continuous PRJoby JOSEPH Christopher A, MD, Last Rate: 1.8 mL/hr at 11/10/22 1458, 1.8 Units/hr at 11/10/22 1458  •  ketamine hcl syringe solution prefilled syringe, , Intravenous, Joby TABOR Christopher A, MD, 15 mg at 11/10/22 1454  •  magnesium sulfate injection, , Intravenous, PRNJoby Christopher A, MD, 2 g at 11/10/22 1514  •  niCARdipine (CARDENE) 0.1 mg/mL in sodium chloride 0.9 % 260.4 mL infusion, , Intravenous, Continuous PRJoby JOSEPH Christopher A, MD, Stopped at 11/10/22 1559  •  norepinephrine (LEVOPHED) injection, , Intravenous, Continuous PRJoby JOSEPH Christopher A, MD, Stopped at 11/10/22 1404  •  phenylephrine (DIANNA-SYNEPHRINE) 50 mg in sodium chloride 0.9 % 250 mL infusion, , Intravenous, Continuous PRJoby JOSEPH Christopher A, MD, Stopped at 11/10/22 1400  •  propofol (DIPRIVAN) infusion 10 mg/mL 100 mL, , Intravenous, Continuous PRJoby JOSEPH Christopher A, MD, Last Rate: 30.99 mL/hr at 11/10/22 1515, 50 mcg/kg/min at 11/10/22 1515  •  Propofol (DIPRIVAN) injection, , Intravenous, PRNJoby Christopher A, MD, 50 mg at 11/10/22 1206  •  rocuronium (ZEMURON) injection, , Intravenous, PRNJoby Christopher A, MD, 20 mg at 11/10/22 1504  •  sodium chloride 0.9 % infusion, , Intravenous, Continuous PRN, Maxwell Hemphill MD, New Bag at 11/10/22 1152     ASSESSMENT  Multivessel coronary artery disease need CABG  Acute ST relation MI s/p angioplasty and stent   Acute Klebsiella UTI  Diabetes mellitus  Hypertension  Hyperlipidemia  Hypothyroidism  Gastroesophageal reflux disease    PLAN  CPM  Post PCI care  Continue antibiotics  CABG  today  Continue home medications  Stress ulcer DVT prophylaxis  Accu-Cheks sliding scale insulin  Supportive care  Discussed with nursing staff  We will follow with Dr. BECKFORD and further recommendation current hospital course    DEE FRENCH MD

## 2022-11-10 NOTE — OP NOTE
[facility] CARDIAC SURGERY OP NOTE    Preop Diagnosis: Severe coronary artery disease.  Anterior STEMI with rescue stent to the diagonal branch.  Integrilin.  Plavix.    Postop Diagnosis: Same with class III left systolic congestive heart failure and right pleural effusion.  Post MI pericarditis with pericardial effusion and right pleural effusion.    Indications: This patient had an anterior STEMI that involved the diagonal branch of the LAD.  It was stented.  She was given a loading dose of Plavix so we postpone surgery and she was on Integrilin.  Operation was advisable to prolong life and relieve symptoms.The STS Risk and all risks and alternatives were discussed with the patient and family.  Counseling was done regarding abuse of tobacco, alcohol and drugs as needed.  A discussion about advanced directive was done with the patient. They understand and wish to proceed.    Procedure: Urgent CABG x5.  Skeletonized LIMA to LAD.  Vein graft to RCA.  Vein graft to OM1.  T graft vein graft to OM 2.  Vein graft to diagonal branch the LAD.  Temporary cardiopulmonary bypass.  Antegrade and retrograde cold blood cardioplegia with warm reperfusion.  Neurologic monitoring.  Transesophageal echo.  Endoscopic vein harvest left greater saphenous vein in the calf.  Endoscopic vein harvest of the right greater saphenous vein in the thigh.    Surgeon: Dong Isabel MD    Assistant: Assistant: Stacey Rosales CSA was responsible for performing the following activities: Cardiac Surgery First assist, Endoscopic Vein Reliance if needed for CABG,  surgical wound closure and their skilled assistance was necessary for the success of this case.     Anesthesia: GET    Findings : Body mass index is 35.67 kg/m².  She was edematous.  There was a pericardial effusion that was moderate.  There was about a liter of pleural fluid on the right.  This was either heart failure or post pericardiotomy syndrome  from the STEMI.  The anterior wall around the area of the infarct was hypokinetic and probably stunned.  The left lower leg vein was 4 mm and 3 mm.  We could not find the left thigh vein so we remove the thigh vein from the right leg and it was 4 mm in diameter.  The JORDYN was 2 mm.  The distal right was diffusely diseased but was 2.5 mm in diameter.  The occluded marginal branch was 1.5 mm and thickened.  The second marginal branch was 1.5 mm.  The diagonal branch was diffusely diseased and only 1.5 mm.  The LAD was 2 mm.    Operative Procedure: A primary median sternotomy was made while we look for vein in the left leg and use the left lower leg and then also scoped out the right thigh.  The JORDYN was harvested from the chest wall with a cautery.  Cardiopulmonary bypass was then established for 94 minutes drifting to 34 °C at appropriate flow rates.  The aorta was crossclamped for 73 minutes and we gave a liter of antegrade cold blood cardioplegia and a liter of retrograde cold blood cardioplegia and repeated doses every 10 to 15 minutes to good effect.  3 veins were anastomosed the ascending aorta with 6-0 Prolene and marked with washers.  The first vein was sewn to the RCA with 7-0 Prolene.  The next vein was sewn to the marginal branch with 7-0 Prolene.  A segment of vein was used to make a T graft off this vein graft to the other marginal branch both with 7-0 Prolene.  The last vein was sewn to the diagonal branch with 7-0 Prolene.  The mammary was sewn to the LAD with 7-0 Prolene.  A warm dose of retrograde cardioplegia was given and then with strong suction on the aortic needle vent the cross-clamp was released.  The patient was rewarmed and cardiopulmonary bypass was weaned and discontinued.  Decannulation was effected and the usual devices were placed.  Hemostasis was obtained.  5 chest tubes were inserted and we applied vancomycin enriched platelet rich plasma.  The sternum was closed with stainless steel  wires.  The fascia, soft tissues and skin were closed usual.  We used a Maddie dressing on the skin.  All the grafts lay nicely and all the anastomoses were hemostatic.  The sponge, needle and instrument counts noted to be correct and she went the open-heart recovery room in good condition.    Complications: None    Tubes: 5    Epicardial Wires: 3    Blood Loss: Minimal. 250 mL of Cell Saver returned.    Bypass Time: 94 min    Aortic cross-clamp time: 73 min    Specimen: None    Condition: Good      Patient Care Team:  Spencer Hua MD as PCP - General        Dong Isabel MD  11/10/2022  16:26 EST

## 2022-11-10 NOTE — ANESTHESIA POSTPROCEDURE EVALUATION
Patient: Mayra Hirsch    Procedure Summary     Date: 11/10/22 Room / Location: [place] [address] / [facility] CARDIOVASCULAR OPERATING ROOM    Anesthesia Start: 1154 Anesthesia Stop: 1646    Procedure: NIKKY, CORONARY ARTERY BYPASS GRAFTING TIMES 6 WITH LEFT JORDYN AND ENDOSCOPICALLY HARVESTED LEFT AND RIGHT GREATER SAPHENOUS VEIN, PRP TRANSESOPHAGEAL ECHOCARDIOGRAM WITH ANESTHESIA (Chest) Diagnosis:       Coronary artery disease involving native heart, unspecified vessel or lesion type, unspecified whether angina present      Abnormal findings on diagnostic imaging of other specified body structures      (Coronary artery disease involving native heart, unspecified vessel or lesion type, unspecified whether angina present [I25.10])      (Abnormal findings on diagnostic imaging of other specified body structures [R93.89])    Surgeons: Jr Dong Isabel MD Provider: Maxwell Hemphill MD    Anesthesia Type: general, [place], CVL, PAC ASA Status: 4          Anesthesia Type: general, [place], CVL, PAC    Vitals  Vitals Value Taken Time   BP     Temp     Pulse 89 11/10/22 1645   Resp     SpO2     Vitals shown include unvalidated device data.        Post Anesthesia Care and Evaluation    Patient location during evaluation: bedside  Patient participation: complete - patient cannot participate  Level of consciousness: obtunded/minimal responses  Pain management: adequate    Airway patency: Intubated.  Anesthetic complications: No anesthetic complications  PONV Status: controlled  Cardiovascular status: acceptable  Respiratory status: ETT  Hydration status: acceptable    Comments: S/p CABG x5

## 2022-11-10 NOTE — ANESTHESIA PROCEDURE NOTES
Central Line    Pre-sedation assessment completed: 11/10/2022 10:50 AM    Patient reassessed immediately prior to procedure    Patient location during procedure: holding area  Start time: 11/10/2022 10:50 AM  Stop Time:11/10/2022 11:10 AM  Indications: vascular access and MD/Surgeon request  Staff  Anesthesiologist: Maxwell Cedillo MD  Preanesthetic Checklist  Completed: patient identified, IV checked, site marked, risks and benefits discussed, surgical consent, monitors and equipment checked, pre-op evaluation and timeout performed  Central Line Prep  Sterile Tech:cap, gloves, gown, mask and sterile barriers  Prep: Betadine  Patient monitoring: blood pressure monitoring, continuous pulse oximetry and EKG  Central Line Procedure  Laterality:right  Location:internal jugular  Catheter Type:double lumen and Cordis  Catheter Size:9 Fr  Guidance:ultrasound guided and landmark technique  PROCEDURE NOTE/ULTRASOUND INTERPRETATION.  Using ultrasound guidance the potential vascular sites for insertion of the catheter were visualized to determine the patency of the vessel to be used for vascular access.  After selecting the appropriate site for insertion, the needle was visualized under ultrasound being inserted into the internal jugular vein, followed by ultrasound confirmation of wire and catheter placement. There were no abnormalities seen on ultrasound; an image was taken; and the patient tolerated the procedure with no complications.   Assessment  Post procedure:biopatch applied, line sutured, occlusive dressing applied and secured with tape  Assessement:blood return through all ports, free fluid flow and chest x-ray ordered  Complications:no  Patient Tolerance:patient tolerated the procedure well with no apparent complications

## 2022-11-10 NOTE — ANESTHESIA PROCEDURE NOTES
Diagnostic IntraOp Anesthesia [facility]    Procedure Performed: Diagnostic IntraOp Anesthesia [facility]       Start Time:        End Time:      Preanesthesia Checklist:  Patient identified, IV assessed, risks and benefits discussed, monitors and equipment assessed, procedure being performed at surgeon's request and anesthesia consent obtained.    General Procedure Information  Diagnostic Indications for Echo:  assessment of ascending aorta, assessment of surgical repair, defect repair evaluation and hemodynamic monitoring  Physician Requesting Echo: Jr Dong Isabel MD  Location performed:  OR  Intubated  Bite block placed  Heart visualized  Probe Insertion:  Easy  Probe Type:  Multiplane  Modalities:  2D only, color flow mapping, continuous wave Doppler and pulse wave Doppler    Echocardiographic and Doppler Measurements    Ventricles    Right Ventricle:  Cavity size normal.    Left Ventricle:  Cavity size normal.  Global Function mildly impaired.  Ejection Fraction 45%.          Valves    Aortic Valve:  Annulus normal.  Regurgitation trace.  Leaflet motions normal.      Mitral Valve:  Annulus normal.  Regurgitation mild.  Leaflet motions normal.      Tricuspid Valve:  Annulus normal.  Regurgitation trace.  Leaflet motions normal.    Pulmonic Valve:  Annulus normal.  Regurgitation trace.        Aorta    Ascending Aorta:  Size normal.    Aortic Arch:  Size normal.    Descending Aorta:  Size normal.        Atria    Right Atrium:  Size normal.    Left Atrium:  Size normal.  Left atrial appendage normal.      Septa    Atrial Septum:  Intra-atrial septal morphology lipomatous hypertrophy.          Diastolic Function Measurements:  Diastolic Dysfunction Grade= indeterminate  E=  ms  A=  ms  E/A Ratio=   DT=  ms  S/D=   IVRT=    Other Findings  Pericardium:  normal  Pleural Effusion:  none  Pulmonary Arteries:  normal  Pulmonary Venous Flow:  normal    Anesthesia Information  Performed Personally  Anesthesiologist:  Joby  Maxwell BOURNE MD      Echocardiogram Comments:       71 year old female with multivessel CAD presents for CABG.  - Normal LV size, +LVH, mildly reduced LVEF (estimated 45%, primarily apical hypokinesis)  - Normal RV size, function  - Mild MR, trace TR/AI  - No intracardiac shunt  - No pericardial/pleural effusion  - No aortic dissection    S/p CABG x6:  - Unchanged LV function; hypokinetic anterior wall (mid-apex primarily)  - Normal RV function  - Unchanged valves above  - No pericardial/pleural effusion  - No aortic dissection    Findings discussed with surgical team

## 2022-11-10 NOTE — ANESTHESIA PREPROCEDURE EVALUATION
Anesthesia Evaluation     Patient summary reviewed and Nursing notes reviewed   history of anesthetic complications: PONV               Airway   Mallampati: II  TM distance: >3 FB  Neck ROM: full  Dental      Pulmonary - negative pulmonary ROS   Cardiovascular     ECG reviewed  Rhythm: regular  Rate: normal    (+) hypertension, valvular problems/murmurs TI, past MI , CAD, cardiac stents Drug eluting stent within the past 12 months       Neuro/Psych  (+) psychiatric history Anxiety and Depression,    GI/Hepatic/Renal/Endo    (+) morbid obesity,  diabetes mellitus type 2 poorly controlled using insulin, thyroid problem hypothyroidism    Musculoskeletal (-) negative ROS    Abdominal    Substance History - negative use     OB/GYN negative ob/gyn ROS         Other                      Anesthesia Plan    ASA 4     general, [place], CVL and PAC     (Coronary stents X 2 on this admission now for CABG    I have reviewed the patient's history with the patient and the chart, including all pertinent laboratory results and imaging. I have explained the risks of anesthesia including but not limited to dental damage, corneal abrasion, nerve injury, MI, stroke, and death. Questions asked and answered. Anesthetic plan discussed with patient and team as indicated. Patient expressed understanding of the above.    BMI  Poorly controlled diabetic)  intravenous induction     Anesthetic plan, risks, benefits, and alternatives have been provided, discussed and informed consent has been obtained with: patient.        CODE STATUS:    Level Of Support Discussed With: Patient  Code Status (Patient has no pulse and is not breathing): CPR (Attempt to Resuscitate)  Medical Interventions (Patient has pulse or is breathing): Full Support

## 2022-11-10 NOTE — ANESTHESIA PROCEDURE NOTES
Arterial Line    Pre-sedation assessment completed: 11/10/2022 10:35 AM    Patient reassessed immediately prior to procedure    Patient location during procedure: holding area  Start time: 11/10/2022 10:35 AM  Stop Time:11/10/2022 10:50 AM       Line placed for hemodynamic monitoring, ABGs/Labs/ISTAT and MD/Surgeon request.  Performed By   Anesthesiologist: Maxwell Cedillo MD   Preanesthetic Checklist  Completed: patient identified, IV checked, risks and benefits discussed, surgical consent, monitors and equipment checked, pre-op evaluation and timeout performed  Arterial Line Prep    Sterile Tech: cap, gloves, gown, mask and sterile barriers  Prep: ChloraPrep  Patient monitoring: blood pressure monitoring, continuous pulse oximetry and EKG  Arterial Line Procedure   Laterality:left  Location:  radial artery  Catheter size: 20 G   Guidance: ultrasound guided  PROCEDURE NOTE/ULTRASOUND INTERPRETATION.  Using ultrasound guidance the potential vascular sites for insertion of the catheter were visualized to determine the patency of the vessel to be used for vascular access.  After selecting the appropriate site for insertion, the needle was visualized under ultrasound being inserted into the radial artery, followed by ultrasound confirmation of wire and catheter placement. There were no abnormalities seen on ultrasound; an image was taken; and the patient tolerated the procedure with no complications.   Number of attempts: 1  Successful placement: yes   Post Assessment   Dressing Type: occlusive dressing applied, secured with tape and wrist guard applied.   Complications no  Circ/Move/Sens Assessment: normal.   Patient Tolerance: patient tolerated the procedure well with no apparent complications  Additional Notes  Using ultrasound guidance, the potential vascular sites for insertion of the catheter were visualized to determine the patency of the vessel to be used for vascular access.  After selecting the  appropriate site for insertion, the needle was visualized under ultrasound being inserted into the artery, followed by ultrasound confirmation of wire and catheter placement.  There were no abnormalities seen on ultrasound; an image was taken and the patient tolerated the procedure with no apparent complications.

## 2022-11-11 ENCOUNTER — APPOINTMENT (OUTPATIENT)
Dept: GENERAL RADIOLOGY | Facility: HOSPITAL | Age: 72
End: 2022-11-11

## 2022-11-11 LAB
ACT BLD: 132 SECONDS (ref 82–152)
ACT BLD: 428 SECONDS (ref 82–152)
ACT BLD: 451 SECONDS (ref 82–152)
ACT BLD: 468 SECONDS (ref 82–152)
ACT BLD: 486 SECONDS (ref 82–152)
ALBUMIN SERPL-MCNC: 4.4 G/DL (ref 3.5–5.2)
ANION GAP SERPL CALCULATED.3IONS-SCNC: 13 MMOL/L (ref 5–15)
ANION GAP SERPL CALCULATED.3IONS-SCNC: 16.1 MMOL/L (ref 5–15)
ARTERIAL PATENCY WRIST A: ABNORMAL
ATMOSPHERIC PRESS: 749.2 MMHG
BASE EXCESS BLDA CALC-SCNC: -5 MMOL/L (ref -5–5)
BASE EXCESS BLDA CALC-SCNC: -8 MMOL/L (ref 0–2)
BASOPHILS # BLD AUTO: 0.03 10*3/MM3 (ref 0–0.2)
BASOPHILS NFR BLD AUTO: 0.4 % (ref 0–1.5)
BDY SITE: ABNORMAL
BUN SERPL-MCNC: 17 MG/DL (ref 8–23)
BUN SERPL-MCNC: 20 MG/DL (ref 8–23)
BUN/CREAT SERPL: 16.5 (ref 7–25)
BUN/CREAT SERPL: 19.4 (ref 7–25)
CA-I BLD-MCNC: 5.1 MG/DL (ref 4.6–5.4)
CA-I BLDA-SCNC: ABNORMAL MMOL/L
CA-I SERPL ISE-MCNC: 1.27 MMOL/L (ref 1.15–1.35)
CALCIUM SPEC-SCNC: 8.6 MG/DL (ref 8.6–10.5)
CALCIUM SPEC-SCNC: 8.8 MG/DL (ref 8.6–10.5)
CHLORIDE SERPL-SCNC: 103 MMOL/L (ref 98–107)
CHLORIDE SERPL-SCNC: 111 MMOL/L (ref 98–107)
CO2 BLDA-SCNC: 22 MMOL/L (ref 24–29)
CO2 SERPL-SCNC: 16.9 MMOL/L (ref 22–29)
CO2 SERPL-SCNC: 17 MMOL/L (ref 22–29)
CREAT SERPL-MCNC: 1.03 MG/DL (ref 0.57–1)
CREAT SERPL-MCNC: 1.03 MG/DL (ref 0.57–1)
DEPRECATED RDW RBC AUTO: 46.6 FL (ref 37–54)
DEPRECATED RDW RBC AUTO: 46.6 FL (ref 37–54)
DEPRECATED RDW RBC AUTO: 49.3 FL (ref 37–54)
EGFRCR SERPLBLD CKD-EPI 2021: 58.3 ML/MIN/1.73
EGFRCR SERPLBLD CKD-EPI 2021: 58.3 ML/MIN/1.73
EOSINOPHIL # BLD AUTO: 0 10*3/MM3 (ref 0–0.4)
EOSINOPHIL NFR BLD AUTO: 0 % (ref 0.3–6.2)
ERYTHROCYTE [DISTWIDTH] IN BLOOD BY AUTOMATED COUNT: 14.3 % (ref 12.3–15.4)
ERYTHROCYTE [DISTWIDTH] IN BLOOD BY AUTOMATED COUNT: 14.5 % (ref 12.3–15.4)
ERYTHROCYTE [DISTWIDTH] IN BLOOD BY AUTOMATED COUNT: 14.8 % (ref 12.3–15.4)
GLUCOSE BLDC GLUCOMTR-MCNC: 131 MG/DL (ref 70–130)
GLUCOSE BLDC GLUCOMTR-MCNC: 138 MG/DL (ref 70–130)
GLUCOSE BLDC GLUCOMTR-MCNC: 150 MG/DL (ref 70–130)
GLUCOSE BLDC GLUCOMTR-MCNC: 154 MG/DL (ref 70–130)
GLUCOSE BLDC GLUCOMTR-MCNC: 160 MG/DL (ref 70–130)
GLUCOSE BLDC GLUCOMTR-MCNC: 171 MG/DL (ref 70–130)
GLUCOSE BLDC GLUCOMTR-MCNC: 177 MG/DL (ref 70–130)
GLUCOSE BLDC GLUCOMTR-MCNC: 180 MG/DL (ref 70–130)
GLUCOSE BLDC GLUCOMTR-MCNC: 191 MG/DL (ref 70–130)
GLUCOSE BLDC GLUCOMTR-MCNC: 191 MG/DL (ref 70–130)
GLUCOSE SERPL-MCNC: 156 MG/DL (ref 65–99)
GLUCOSE SERPL-MCNC: 184 MG/DL (ref 65–99)
HCO3 BLDA-SCNC: 17.6 MMOL/L (ref 22–28)
HCO3 BLDA-SCNC: 20.8 MMOL/L (ref 22–26)
HCT VFR BLD AUTO: 23.5 % (ref 34–46.6)
HCT VFR BLD AUTO: 25.6 % (ref 34–46.6)
HCT VFR BLD AUTO: 31.2 % (ref 34–46.6)
HCT VFR BLDA CALC: 25 % (ref 38–51)
HGB BLD-MCNC: 10 G/DL (ref 12–15.9)
HGB BLD-MCNC: 7.5 G/DL (ref 12–15.9)
HGB BLD-MCNC: 8.3 G/DL (ref 12–15.9)
HGB BLDA-MCNC: 8.5 G/DL (ref 12–17)
IMM GRANULOCYTES # BLD AUTO: 0.04 10*3/MM3 (ref 0–0.05)
IMM GRANULOCYTES NFR BLD AUTO: 0.5 % (ref 0–0.5)
INHALED O2 CONCENTRATION: 40 %
INR PPP: 1.45 (ref 0.9–1.1)
LYMPHOCYTES # BLD AUTO: 0.3 10*3/MM3 (ref 0.7–3.1)
LYMPHOCYTES NFR BLD AUTO: 3.6 % (ref 19.6–45.3)
MAGNESIUM SERPL-MCNC: 3.1 MG/DL (ref 1.6–2.4)
MCH RBC QN AUTO: 28.3 PG (ref 26.6–33)
MCH RBC QN AUTO: 28.5 PG (ref 26.6–33)
MCH RBC QN AUTO: 29.3 PG (ref 26.6–33)
MCHC RBC AUTO-ENTMCNC: 31.9 G/DL (ref 31.5–35.7)
MCHC RBC AUTO-ENTMCNC: 32.1 G/DL (ref 31.5–35.7)
MCHC RBC AUTO-ENTMCNC: 32.4 G/DL (ref 31.5–35.7)
MCV RBC AUTO: 88 FL (ref 79–97)
MCV RBC AUTO: 88.7 FL (ref 79–97)
MCV RBC AUTO: 91.5 FL (ref 79–97)
MODALITY: ABNORMAL
MONOCYTES # BLD AUTO: 0.52 10*3/MM3 (ref 0.1–0.9)
MONOCYTES NFR BLD AUTO: 6.2 % (ref 5–12)
NEUTROPHILS NFR BLD AUTO: 7.56 10*3/MM3 (ref 1.7–7)
NEUTROPHILS NFR BLD AUTO: 89.3 % (ref 42.7–76)
NRBC BLD AUTO-RTO: 0 /100 WBC (ref 0–0.2)
O2 A-A PPRESDIFF RESPIRATORY: 0.5 MMHG
PCO2 BLDA: 35.2 MM HG (ref 35–45)
PCO2 BLDA: 36.5 MM HG (ref 35–45)
PEEP RESPIRATORY: 7.5 CM[H2O]
PH BLDA: 7.31 PH UNITS (ref 7.35–7.45)
PH BLDA: 7.37 PH UNITS (ref 7.35–7.6)
PHOSPHATE SERPL-MCNC: 5.8 MG/DL (ref 2.5–4.5)
PLATELET # BLD AUTO: 148 10*3/MM3 (ref 140–450)
PLATELET # BLD AUTO: 159 10*3/MM3 (ref 140–450)
PLATELET # BLD AUTO: 187 10*3/MM3 (ref 140–450)
PMV BLD AUTO: 11.2 FL (ref 6–12)
PMV BLD AUTO: 11.2 FL (ref 6–12)
PMV BLD AUTO: 11.5 FL (ref 6–12)
PO2 BLDA: 121.4 MM HG (ref 80–100)
PO2 BLDA: 139 MMHG (ref 80–105)
POTASSIUM BLDA-SCNC: 4.3 MMOL/L (ref 3.5–4.9)
POTASSIUM SERPL-SCNC: 4.4 MMOL/L (ref 3.5–5.2)
POTASSIUM SERPL-SCNC: 4.5 MMOL/L (ref 3.5–5.2)
PROTHROMBIN TIME: 17.8 SECONDS (ref 11.7–14.2)
QT INTERVAL: 422 MS
QT INTERVAL: 464 MS
RBC # BLD AUTO: 2.65 10*6/MM3 (ref 3.77–5.28)
RBC # BLD AUTO: 2.91 10*6/MM3 (ref 3.77–5.28)
RBC # BLD AUTO: 3.41 10*6/MM3 (ref 3.77–5.28)
SAO2 % BLDA: 99 % (ref 95–98)
SAO2 % BLDCOA: 98.4 % (ref 92–99)
SODIUM SERPL-SCNC: 136 MMOL/L (ref 136–145)
SODIUM SERPL-SCNC: 141 MMOL/L (ref 136–145)
TOTAL RATE: 17 BREATHS/MINUTE
VENTILATOR MODE: ABNORMAL
VT ON VENT VENT: 575 ML
WBC NRBC COR # BLD: 11.25 10*3/MM3 (ref 3.4–10.8)
WBC NRBC COR # BLD: 8.45 10*3/MM3 (ref 3.4–10.8)
WBC NRBC COR # BLD: 8.7 10*3/MM3 (ref 3.4–10.8)

## 2022-11-11 PROCEDURE — 99231 SBSQ HOSP IP/OBS SF/LOW 25: CPT

## 2022-11-11 PROCEDURE — 94799 UNLISTED PULMONARY SVC/PX: CPT

## 2022-11-11 PROCEDURE — 80069 RENAL FUNCTION PANEL: CPT | Performed by: THORACIC SURGERY (CARDIOTHORACIC VASCULAR SURGERY)

## 2022-11-11 PROCEDURE — 25010000002 FUROSEMIDE PER 20 MG: Performed by: NURSE PRACTITIONER

## 2022-11-11 PROCEDURE — 36430 TRANSFUSION BLD/BLD COMPNT: CPT

## 2022-11-11 PROCEDURE — 86900 BLOOD TYPING SEROLOGIC ABO: CPT

## 2022-11-11 PROCEDURE — 80048 BASIC METABOLIC PNL TOTAL CA: CPT | Performed by: THORACIC SURGERY (CARDIOTHORACIC VASCULAR SURGERY)

## 2022-11-11 PROCEDURE — 83735 ASSAY OF MAGNESIUM: CPT | Performed by: THORACIC SURGERY (CARDIOTHORACIC VASCULAR SURGERY)

## 2022-11-11 PROCEDURE — 93010 ELECTROCARDIOGRAM REPORT: CPT | Performed by: INTERNAL MEDICINE

## 2022-11-11 PROCEDURE — 97162 PT EVAL MOD COMPLEX 30 MIN: CPT

## 2022-11-11 PROCEDURE — 85610 PROTHROMBIN TIME: CPT | Performed by: NURSE PRACTITIONER

## 2022-11-11 PROCEDURE — 85025 COMPLETE CBC W/AUTO DIFF WBC: CPT | Performed by: NURSE PRACTITIONER

## 2022-11-11 PROCEDURE — 25010000002 MORPHINE PER 10 MG: Performed by: NURSE PRACTITIONER

## 2022-11-11 PROCEDURE — P9016 RBC LEUKOCYTES REDUCED: HCPCS

## 2022-11-11 PROCEDURE — 82330 ASSAY OF CALCIUM: CPT | Performed by: NURSE PRACTITIONER

## 2022-11-11 PROCEDURE — 85027 COMPLETE CBC AUTOMATED: CPT | Performed by: NURSE PRACTITIONER

## 2022-11-11 PROCEDURE — 71045 X-RAY EXAM CHEST 1 VIEW: CPT

## 2022-11-11 PROCEDURE — 85027 COMPLETE CBC AUTOMATED: CPT | Performed by: THORACIC SURGERY (CARDIOTHORACIC VASCULAR SURGERY)

## 2022-11-11 PROCEDURE — 97530 THERAPEUTIC ACTIVITIES: CPT

## 2022-11-11 PROCEDURE — 99024 POSTOP FOLLOW-UP VISIT: CPT | Performed by: NURSE PRACTITIONER

## 2022-11-11 PROCEDURE — 25010000002 MAGNESIUM SULFATE IN D5W 1G/100ML (PREMIX) 1-5 GM/100ML-% SOLUTION: Performed by: NURSE PRACTITIONER

## 2022-11-11 PROCEDURE — 25010000002 ONDANSETRON PER 1 MG: Performed by: NURSE PRACTITIONER

## 2022-11-11 PROCEDURE — 25010000002 CEFAZOLIN IN DEXTROSE 2-4 GM/100ML-% SOLUTION: Performed by: NURSE PRACTITIONER

## 2022-11-11 PROCEDURE — 82803 BLOOD GASES ANY COMBINATION: CPT

## 2022-11-11 PROCEDURE — 93005 ELECTROCARDIOGRAM TRACING: CPT | Performed by: NURSE PRACTITIONER

## 2022-11-11 PROCEDURE — 82962 GLUCOSE BLOOD TEST: CPT

## 2022-11-11 PROCEDURE — 25010000002 ENOXAPARIN PER 10 MG: Performed by: NURSE PRACTITIONER

## 2022-11-11 RX ORDER — GABAPENTIN 100 MG/1
200 CAPSULE ORAL 3 TIMES DAILY
Status: DISCONTINUED | OUTPATIENT
Start: 2022-11-11 | End: 2022-11-14

## 2022-11-11 RX ORDER — POTASSIUM CHLORIDE 750 MG/1
20 TABLET, FILM COATED, EXTENDED RELEASE ORAL ONCE
Status: COMPLETED | OUTPATIENT
Start: 2022-11-11 | End: 2022-11-11

## 2022-11-11 RX ORDER — MIDAZOLAM HYDROCHLORIDE 1 MG/ML
INJECTION INTRAMUSCULAR; INTRAVENOUS AS NEEDED
Status: DISCONTINUED | OUTPATIENT
Start: 2022-11-10 | End: 2022-11-11 | Stop reason: SURG

## 2022-11-11 RX ORDER — FUROSEMIDE 10 MG/ML
40 INJECTION INTRAMUSCULAR; INTRAVENOUS ONCE
Status: COMPLETED | OUTPATIENT
Start: 2022-11-11 | End: 2022-11-11

## 2022-11-11 RX ADMIN — OXYCODONE HYDROCHLORIDE 10 MG: 5 TABLET ORAL at 22:21

## 2022-11-11 RX ADMIN — ACETAMINOPHEN 650 MG: 325 TABLET, FILM COATED ORAL at 17:44

## 2022-11-11 RX ADMIN — OXYCODONE HYDROCHLORIDE 10 MG: 5 TABLET ORAL at 17:41

## 2022-11-11 RX ADMIN — CEFAZOLIN SODIUM 2 G: 2 INJECTION, SOLUTION INTRAVENOUS at 09:21

## 2022-11-11 RX ADMIN — CEFAZOLIN SODIUM 2 G: 2 INJECTION, SOLUTION INTRAVENOUS at 00:36

## 2022-11-11 RX ADMIN — ACETAMINOPHEN 1000 MG: 10 INJECTION, SOLUTION INTRAVENOUS at 09:20

## 2022-11-11 RX ADMIN — OXYCODONE HYDROCHLORIDE 10 MG: 5 TABLET ORAL at 11:12

## 2022-11-11 RX ADMIN — METOPROLOL TARTRATE 12.5 MG: 25 TABLET, FILM COATED ORAL at 20:02

## 2022-11-11 RX ADMIN — MAGNESIUM SULFATE IN DEXTROSE 1 G: 10 INJECTION, SOLUTION INTRAVENOUS at 01:25

## 2022-11-11 RX ADMIN — ENOXAPARIN SODIUM 40 MG: 100 INJECTION SUBCUTANEOUS at 19:20

## 2022-11-11 RX ADMIN — FUROSEMIDE 40 MG: 10 INJECTION, SOLUTION INTRAMUSCULAR; INTRAVENOUS at 11:34

## 2022-11-11 RX ADMIN — PANTOPRAZOLE SODIUM 40 MG: 40 TABLET, DELAYED RELEASE ORAL at 06:00

## 2022-11-11 RX ADMIN — GABAPENTIN 200 MG: 100 CAPSULE ORAL at 10:54

## 2022-11-11 RX ADMIN — CEFAZOLIN SODIUM 2 G: 2 INJECTION, SOLUTION INTRAVENOUS at 17:45

## 2022-11-11 RX ADMIN — LEVOTHYROXINE SODIUM 25 MCG: 0.03 TABLET ORAL at 06:00

## 2022-11-11 RX ADMIN — CHLORHEXIDINE GLUCONATE 15 ML: 1.2 SOLUTION ORAL at 20:02

## 2022-11-11 RX ADMIN — CHLORHEXIDINE GLUCONATE 15 ML: 1.2 SOLUTION ORAL at 04:32

## 2022-11-11 RX ADMIN — OXYCODONE HYDROCHLORIDE 10 MG: 5 TABLET ORAL at 06:52

## 2022-11-11 RX ADMIN — GABAPENTIN 200 MG: 100 CAPSULE ORAL at 15:45

## 2022-11-11 RX ADMIN — DOCUSATE SODIUM 50MG AND SENNOSIDES 8.6MG 2 TABLET: 8.6; 5 TABLET, FILM COATED ORAL at 20:02

## 2022-11-11 RX ADMIN — MUPIROCIN 1 APPLICATION: 20 OINTMENT TOPICAL at 09:20

## 2022-11-11 RX ADMIN — MUPIROCIN 1 APPLICATION: 20 OINTMENT TOPICAL at 20:02

## 2022-11-11 RX ADMIN — ONDANSETRON 4 MG: 2 INJECTION INTRAMUSCULAR; INTRAVENOUS at 04:17

## 2022-11-11 RX ADMIN — CYCLOBENZAPRINE 10 MG: 10 TABLET, FILM COATED ORAL at 20:02

## 2022-11-11 RX ADMIN — CYCLOBENZAPRINE 10 MG: 10 TABLET, FILM COATED ORAL at 10:00

## 2022-11-11 RX ADMIN — OXYCODONE HYDROCHLORIDE 10 MG: 5 TABLET ORAL at 02:37

## 2022-11-11 RX ADMIN — MORPHINE SULFATE 1 MG: 2 INJECTION, SOLUTION INTRAMUSCULAR; INTRAVENOUS at 10:00

## 2022-11-11 RX ADMIN — ACETAMINOPHEN 1000 MG: 10 INJECTION, SOLUTION INTRAVENOUS at 01:06

## 2022-11-11 RX ADMIN — POTASSIUM CHLORIDE 20 MEQ: 750 TABLET, EXTENDED RELEASE ORAL at 11:34

## 2022-11-11 RX ADMIN — ASPIRIN 81 MG: 81 TABLET, COATED ORAL at 09:20

## 2022-11-11 RX ADMIN — ATORVASTATIN CALCIUM 40 MG: 20 TABLET, FILM COATED ORAL at 20:02

## 2022-11-11 RX ADMIN — ALPRAZOLAM 0.25 MG: 0.25 TABLET ORAL at 17:44

## 2022-11-11 RX ADMIN — GABAPENTIN 200 MG: 100 CAPSULE ORAL at 20:02

## 2022-11-11 NOTE — PLAN OF CARE
Goal Outcome Evaluation:  Plan of Care Reviewed With: patient           Outcome Evaluation: 72yo white female admitted 11/7/22 due to MI and CAD; now s/p stent and then CABG x 6 11/10/22. PMH includes hbp, IDDM, gerd, vit D deficiency, high cholesterol. PLOF: Pt lives in [facility] with 14 optional stairs to loft area. She was independent with ADLs including driving. She is limited now by expected c/o post-op pain, generalizeed weakness, and decreased activity tolerance that limit her functional mobility. Today, she was UIC in NAD, c/o sternal pain = 4/10. She stood from recliner x 2 attempts with min A of 2. Pt amb 5' from chair to bed with min A Of 2 - staggered steps with unsteady balance and poor endurance. Pt req mod A Of 2 to return supine in bed and was able to help raising legs. Pt instructed in seated ther ex and encouraged to not use UE to push up from chair. She would benefit from skilled PT services to address functional deficits. Anticipate d/c to son's home (who does have stairs).

## 2022-11-11 NOTE — PROGRESS NOTES
"Enter Query Response Below      Query Response: Acute systolic CHF Electronically signed by Dong Isabel MD, 22, 4:10 PM EST.              If applicable, please update the problem list.           Patient: Mayra Hirsch        : 1950  Account: [id number]           Admit Date:         How to Respond to this query:       a. Click New Note     b. Answer query within the yellow box.                c. Update the Problem List, if applicable.      If you have any questions about this query contact me at: [email or web address]    ,    Patient is a 71 year old female admitted with a STEMI on 22 and underwent a angioplasty with stent placement to the diagonal branch. Patient underwent a CABG x5 with LIMA. Patient with documented fluid overload per CXR. Postop diagnosis documents, \"class III left systolic congestive heart failure and right pleural effusion.  Post MI pericarditis with pericardial effusion and right pleural effusion.\" ProBNP 3,564. Cardiac cath showed EF 40%. POD #1 patient was transfused 2 units of PRBC's and given IV lasix after transfusion.     Can the patient's fluid overload be further clarified as:    Acute systolic CHF  Fluid overload only  Other, please specify___________  Unable to determine      By submitting this query, we are merely seeking further clarification of documentation to accurately reflect all conditions that you are monitoring, evaluating, treating or that extend the hospitalization or utilize additional resources of care. Please utilize your independent clinical judgment when addressing the question(s) above.     This query and your response, once completed, will be entered into the legal medical record.    Sincerely,  Sravani Bourgeois RN  Clinical Documentation Integrity Program     "

## 2022-11-11 NOTE — PROGRESS NOTES
" LOS: 4 days   Patient Care Team:  Spencer Hua MD as PCP - General    Chief Complaint: post op fu    Subjective:  Symptoms:  No shortness of breath or chest pain.    Diet:  No nausea.          Vital Signs  Temp:  [96.4 °F (35.8 °C)-99.5 °F (37.5 °C)] 99.1 °F (37.3 °C)  Heart Rate:  [57-89] 89  Resp:  [13-16] 16  BP: ()/(41-92) 110/66  Arterial Line BP: ()/() 106/45  FiO2 (%):  [39 %-100 %] 39 %  Body mass index is 32.08 kg/m².    Intake/Output Summary (Last 24 hours) at 11/11/2022 0833  Last data filed at 11/11/2022 0700  Gross per 24 hour   Intake 4284.46 ml   Output 4690 ml   Net -405.54 ml     No intake/output data recorded.    Chest tube drainage last 8 hours:  135, 135, 40        11/07/22  1301 11/09/22  0400 11/11/22  0506   Weight: 102 kg (225 lb) 103 kg (227 lb 11.8 oz) 92.9 kg (204 lb 12.9 oz)         Objective:  General Appearance:  Comfortable.    Vital signs: (most recent): Blood pressure 110/66, pulse 89, temperature 99.1 °F (37.3 °C), temperature source Core, resp. rate 16, height 170.2 cm (67\"), weight 92.9 kg (204 lb 12.9 oz), SpO2 100 %, not currently breastfeeding.    Lungs:  Normal effort and normal respiratory rate.  (O2 at 4 liters)  Heart: Normal rate.  Regular rhythm.    Neurological: Patient is alert and oriented to person, place and time.    Skin:  Warm and dry.              Results Review:        WBC WBC   Date Value Ref Range Status   11/11/2022 8.45 3.40 - 10.80 10*3/mm3 Final   11/11/2022 11.25 (H) 3.40 - 10.80 10*3/mm3 Final   11/10/2022 10.50 3.40 - 10.80 10*3/mm3 Final   11/10/2022 8.09 3.40 - 10.80 10*3/mm3 Final   11/09/2022 7.57 3.40 - 10.80 10*3/mm3 Final      HGB Hemoglobin   Date Value Ref Range Status   11/11/2022 7.5 (L) 12.0 - 15.9 g/dL Final   11/11/2022 8.3 (L) 12.0 - 15.9 g/dL Final   11/10/2022 9.4 (L) 12.0 - 15.9 g/dL Final   11/10/2022 8.5 (L) 12.0 - 17.0 g/dL Final   11/10/2022 7.8 (C) 12.0 - 17.0 g/dL Final   11/10/2022 7.8 (C) 12.0 - 17.0 g/dL " Final   11/10/2022 7.8 (C) 12.0 - 17.0 g/dL Final   11/10/2022 8.2 (L) 12.0 - 17.0 g/dL Final   11/10/2022 10.5 (L) 12.0 - 17.0 g/dL Final   11/10/2022 11.7 (L) 12.0 - 15.9 g/dL Final   11/09/2022 12.3 12.0 - 15.9 g/dL Final      HCT Hematocrit   Date Value Ref Range Status   11/11/2022 23.5 (L) 34.0 - 46.6 % Final   11/11/2022 25.6 (L) 34.0 - 46.6 % Final   11/10/2022 29.3 (L) 34.0 - 46.6 % Final   11/10/2022 25 (L) 38 - 51 % Final   11/10/2022 23 (L) 38 - 51 % Final   11/10/2022 23 (L) 38 - 51 % Final   11/10/2022 23 (L) 38 - 51 % Final   11/10/2022 24 (L) 38 - 51 % Final   11/10/2022 31 (L) 38 - 51 % Final   11/10/2022 35.9 34.0 - 46.6 % Final   11/09/2022 38.0 34.0 - 46.6 % Final      Platelets Platelets   Date Value Ref Range Status   11/11/2022 159 140 - 450 10*3/mm3 Final   11/11/2022 187 140 - 450 10*3/mm3 Final   11/10/2022 155 140 - 450 10*3/mm3 Final   11/10/2022 218 140 - 450 10*3/mm3 Final   11/09/2022 235 140 - 450 10*3/mm3 Final        PT/INR:    Protime   Date Value Ref Range Status   11/11/2022 17.8 (H) 11.7 - 14.2 Seconds Final   11/10/2022 18.5 (H) 11.7 - 14.2 Seconds Final   /  INR   Date Value Ref Range Status   11/11/2022 1.45 (H) 0.90 - 1.10 Final   11/10/2022 1.52 (H) 0.90 - 1.10 Final       Sodium Sodium   Date Value Ref Range Status   11/11/2022 141 136 - 145 mmol/L Final   11/10/2022 140 136 - 145 mmol/L Final   11/10/2022 141 136 - 145 mmol/L Final   11/10/2022 139 136 - 145 mmol/L Final   11/09/2022 137 136 - 145 mmol/L Final      Potassium Potassium   Date Value Ref Range Status   11/11/2022 4.4 3.5 - 5.2 mmol/L Final   11/10/2022 4.3 3.5 - 5.2 mmol/L Final   11/10/2022 4.4 3.5 - 5.2 mmol/L Final     Comment:     Slight hemolysis detected by analyzer. Results may be affected.   11/10/2022 3.7 3.5 - 5.2 mmol/L Final   11/09/2022 3.9 3.5 - 5.2 mmol/L Final     Comment:     Slight hemolysis detected by analyzer. Results may be affected.      Chloride Chloride   Date Value Ref Range Status    11/11/2022 111 (H) 98 - 107 mmol/L Final   11/10/2022 110 (H) 98 - 107 mmol/L Final   11/10/2022 112 (H) 98 - 107 mmol/L Final   11/10/2022 105 98 - 107 mmol/L Final   11/09/2022 104 98 - 107 mmol/L Final      Bicarbonate CO2   Date Value Ref Range Status   11/11/2022 17.0 (L) 22.0 - 29.0 mmol/L Final   11/10/2022 18.6 (L) 22.0 - 29.0 mmol/L Final   11/10/2022 20.1 (L) 22.0 - 29.0 mmol/L Final   11/10/2022 22.0 22.0 - 29.0 mmol/L Final   11/09/2022 22.1 22.0 - 29.0 mmol/L Final      BUN BUN   Date Value Ref Range Status   11/11/2022 17 8 - 23 mg/dL Final   11/10/2022 17 8 - 23 mg/dL Final   11/10/2022 16 8 - 23 mg/dL Final   11/10/2022 21 8 - 23 mg/dL Final   11/09/2022 15 8 - 23 mg/dL Final      Creatinine Creatinine   Date Value Ref Range Status   11/11/2022 1.03 (H) 0.57 - 1.00 mg/dL Final   11/10/2022 1.04 (H) 0.57 - 1.00 mg/dL Final   11/10/2022 0.97 0.57 - 1.00 mg/dL Final   11/10/2022 0.90 0.57 - 1.00 mg/dL Final   11/09/2022 0.78 0.57 - 1.00 mg/dL Final      Calcium Calcium   Date Value Ref Range Status   11/11/2022 8.6 8.6 - 10.5 mg/dL Final   11/10/2022 9.0 8.6 - 10.5 mg/dL Final   11/10/2022 9.5 8.6 - 10.5 mg/dL Final   11/10/2022 8.7 8.6 - 10.5 mg/dL Final   11/09/2022 9.2 8.6 - 10.5 mg/dL Final      Magnesium Magnesium   Date Value Ref Range Status   11/11/2022 3.1 (H) 1.6 - 2.4 mg/dL Final   11/10/2022 3.0 (H) 1.6 - 2.4 mg/dL Final   11/10/2022 3.2 (H) 1.6 - 2.4 mg/dL Final   11/09/2022 2.0 1.6 - 2.4 mg/dL Final      Troponin No results found for: TROPONIN   BNP No results found for: BNP         acetaminophen, 1,000 mg, Intravenous, Q8H  acetaminophen, 650 mg, Oral, Q4H   Or  acetaminophen, 650 mg, Oral, Q4H   Or  acetaminophen, 650 mg, Rectal, Q4H  aspirin, 81 mg, Oral, Daily  atorvastatin, 40 mg, Oral, Nightly  ceFAZolin, 2 g, Intravenous, Q8H  chlorhexidine, 15 mL, Mouth/Throat, Q12H  enoxaparin, 40 mg, Subcutaneous, Daily  levothyroxine, 25 mcg, Oral, Q AM  magnesium sulfate, 1 g, Intravenous,  Q8H  metoclopramide, 10 mg, Intravenous, Q6H  metoprolol tartrate, 12.5 mg, Oral, Q12H  mupirocin, , Each Nare, BID  pantoprazole, 40 mg, Oral, QAM  senna-docusate sodium, 2 tablet, Oral, Nightly      clevidipine, 2-32 mg/hr  dexmedetomidine, 0.2-1.5 mcg/kg/hr, Last Rate: Stopped (11/10/22 2030)  DOPamine, 2-20 mcg/kg/min  EPINEPHrine, 0.02-0.1 mcg/kg/min, Last Rate: Stopped (11/11/22 0321)  insulin, 0-100 Units/hr, Last Rate: Stopped (11/11/22 0752)  milrinone, 0.25-0.375 mcg/kg/min  niCARdipine, 5-15 mg/hr  nitroglycerin, 5-200 mcg/min  norepinephrine, 0.02-0.2 mcg/kg/min  phenylephrine, 0.2-2 mcg/kg/min, Last Rate: 0.4 mcg/kg/min (11/11/22 0330)  propofol, 5-50 mcg/kg/min, Last Rate: Stopped (11/10/22 1940)  sodium chloride, 30 mL/hr, Last Rate: 30 mL/hr (11/10/22 1655)        PRN Meds:.•  acetaminophen **OR** acetaminophen **OR** acetaminophen  •  albumin human  •  ALPRAZolam  •  bisacodyl  •  bisacodyl  •  clevidipine  •  cyclobenzaprine  •  dextrose  •  dextrose  •  DOPamine  •  EPINEPHrine  •  glucagon (human recombinant)  •  HYDROcodone-acetaminophen  •  magnesium hydroxide  •  midazolam  •  milrinone  •  Morphine **AND** naloxone  •  Morphine  •  niCARdipine  •  norepinephrine  •  ondansetron  •  oxyCODONE  •  phenylephrine  •  polyethylene glycol  •  potassium chloride **OR** potassium chloride  •  potassium chloride **OR** potassium chloride  •  potassium chloride  •  potassium chloride  •  potassium chloride  •  propofol    Patient Active Problem List   Diagnosis Code   • Vitamin D deficiency E55.9   • Primary hypothyroidism E03.9   • Dyslipidemia E78.5   • Essential hypertension I10   • Type 2 diabetes mellitus without complication, with long-term current use of insulin (HCC) E11.9, Z79.4   • Noncompliance with diabetes treatment Z91.199   • ST elevation myocardial infarction (STEMI) ([facility]) I21.3   • ST elevation myocardial infarction (STEMI), unspecified artery ([facility]) I21.3   • Coronary artery disease  involving native heart I25.10   • Abnormal findings on diagnostic imaging of other specified body structures R93.89       Assessment & Plan    - STEMI, Multivessel CAD- angioplasty and stent placed diagonal branch preop, s/p CABG x5 with LIMA, EVH left calf, right thigh ([place])  - Hypertension  - HL---statin  - Obesity  - Diabetes type 2- A1C 7    POD#1.  Plavix resumed.  Transfusing 2 units today, Lasix 40 mg IV after transfusion, wean pressor off, and anticipate transfer to step down later today.      Le Edwards, APRN  11/11/22  08:33 EST

## 2022-11-11 NOTE — PROGRESS NOTES
"Daily progress note    Referring physician  Dr. BECKFORD    Chief complaint  S/p CABG  awake and alert follow commands and complain of weakness    History of present illness  71-year-old white female with history of diabetes hypertension hyperlipidemia hypothyroidism presented to [facility] emergency room with sudden onset of shortness of breath as she woke up with it.  Patient has no chest pain palpitation but does have nonproductive cough but no fever chills.  Patient found to be hypoxic while EMS arrived and brought to the emergency room which she found to have acute ST elevation MI and taken to the Cath Lab which showed multivessel coronary disease  angioplasty and stent placed to diagonal branch and I am asked to follow patient for medical problem.  At the time of review she is feeling better and has no more shortness of breath and also denies any chest pain.  Patient feels weak but feeling much better after angioplasty.     REVIEW OF SYSTEMS  Unremarkable except generalized weakness    PHYSICAL EXAM         Blood pressure 104/68, pulse 89, temperature 99.1 °F (37.3 °C), temperature source Core, resp. rate 16, height 170.2 cm (67\"), weight 92.9 kg (204 lb 12.9 oz), SpO2 100 %, not currently breastfeeding.    Constitutional:       General: She is not in acute distress.  HENT:      Head: Normocephalic and atraumatic.   Eyes:      Extraocular Movements: EOM normal.      Pupils: Pupils are equal, round, and reactive to light.   Cardiovascular:      Rate and Rhythm: Normal rate and regular rhythm.      Heart sounds: Normal heart sounds.   Pulmonary:      Effort: Pulmonary effort is normal. No respiratory distress.      Breath sounds: Examination of the right-middle field reveals rhonchi. Examination of the left-middle field reveals rhonchi. Examination of the right-lower field reveals rhonchi. Examination of the left-lower field reveals rhonchi. Rhonchi present.   Abdominal:      Palpations: Abdomen is soft.     "  Tenderness: There is no abdominal tenderness. There is no guarding or rebound.   Musculoskeletal:         General: No edema. Normal range of motion.      Cervical back: Normal range of motion and neck supple.   Skin:     General: Skin is warm and dry.      Findings: No rash.   Neurological:      Mental Status: She is alert and oriented to person, place, and time.      Sensory: Sensation is intact.      Motor: Motor strength is normal.   Psychiatric:         Mood and Affect: Mood and affect normal.      LAB RESULTS  Lab Results (last 24 hours)     Procedure Component Value Units Date/Time    POC Glucose Once [[id number]]  (Abnormal) Collected: 11/11/22 1136    Specimen: Blood Updated: 11/11/22 1144     Glucose 180 mg/dL      Comment: Meter: [id number] : [id number] Alexandr Mccarty RN       POC Glucose Once [[id number]]  (Abnormal) Collected: 11/11/22 0649    Specimen: Blood Updated: 11/11/22 0650     Glucose 150 mg/dL      Comment: Meter: [id number] : [id number] Jake Morales RN       POC Glucose Once [[id number]]  (Abnormal) Collected: 11/11/22 0553    Specimen: Blood Updated: 11/11/22 0555     Glucose 138 mg/dL      Comment: Meter: [id number] : [id number] Jake Morales RN       POC Glucose Once [[id number]]  (Abnormal) Collected: 11/11/22 0508    Specimen: Blood Updated: 11/11/22 0509     Glucose 154 mg/dL      Comment: Meter: [id number] : [id number] Jake Morales RN       Calcium, Ionized [[id number]]  (Normal) Collected: 11/11/22 0323    Specimen: Blood Updated: 11/11/22 0421     Ionized Calcium 1.27 mmol/L      Ionized Calcium 5.1 mg/dL     Renal Function Panel [[id number]]  (Abnormal) Collected: 11/11/22 0323    Specimen: Blood Updated: 11/11/22 0419     Glucose 184 mg/dL      BUN 17 mg/dL      Creatinine 1.03 mg/dL      Sodium 141 mmol/L      Potassium 4.4 mmol/L      Chloride 111 mmol/L      CO2 17.0 mmol/L      Calcium 8.6 mg/dL      Albumin 4.40 g/dL      Phosphorus 5.8 mg/dL      Anion Gap 13.0 mmol/L       BUN/Creatinine Ratio 16.5     eGFR 58.3 mL/min/1.73      Comment: National Kidney Foundation and American Society of Nephrology (ASN) Task Force recommended calculation based on the Chronic Kidney Disease Epidemiology Collaboration (CKD-EPI) equation refit without adjustment for race.       Narrative:      GFR Normal >60  Chronic Kidney Disease <60  Kidney Failure <15    The GFR formula is only valid for adults with stable renal function between ages 18 and 70.    Magnesium [[id number]]  (Abnormal) Collected: 11/11/22 0323    Specimen: Blood Updated: 11/11/22 0409     Magnesium 3.1 mg/dL     POC Glucose Once [[id number]]  (Abnormal) Collected: 11/11/22 0407    Specimen: Blood Updated: 11/11/22 0408     Glucose 177 mg/dL      Comment: Meter: [id number] : [id number] Jake Morales RN       Protime-INR [[id number]]  (Abnormal) Collected: 11/11/22 0323    Specimen: Blood Updated: 11/11/22 0401     Protime 17.8 Seconds      INR 1.45    CBC & Differential [[id number]]  (Abnormal) Collected: 11/11/22 0323    Specimen: Blood Updated: 11/11/22 0355    Narrative:      The following orders were created for panel order CBC & Differential.  Procedure                               Abnormality         Status                     ---------                               -----------         ------                     CBC Auto Differential[[id number]]        Abnormal            Final result                 Please view results for these tests on the individual orders.    CBC Auto Differential [[id number]]  (Abnormal) Collected: 11/11/22 0323    Specimen: Blood Updated: 11/11/22 0355     WBC 8.45 10*3/mm3      RBC 2.65 10*6/mm3      Hemoglobin 7.5 g/dL      Hematocrit 23.5 %      MCV 88.7 fL      MCH 28.3 pg      MCHC 31.9 g/dL      RDW 14.3 %      RDW-SD 46.6 fl      MPV 11.2 fL      Platelets 159 10*3/mm3      Neutrophil % 89.3 %      Lymphocyte % 3.6 %      Monocyte % 6.2 %      Eosinophil % 0.0 %      Basophil % 0.4 %      Immature  Grans % 0.5 %      Neutrophils, Absolute 7.56 10*3/mm3      Lymphocytes, Absolute 0.30 10*3/mm3      Monocytes, Absolute 0.52 10*3/mm3      Eosinophils, Absolute 0.00 10*3/mm3      Basophils, Absolute 0.03 10*3/mm3      Immature Grans, Absolute 0.04 10*3/mm3      nRBC 0.0 /100 WBC     POC Glucose Once [[id number]]  (Abnormal) Collected: 11/11/22 0314    Specimen: Blood Updated: 11/11/22 0317     Glucose 191 mg/dL      Comment: Meter: [id number] : [id number] Jake Morales RN       POC OR Panel, ISTAT [[id number]]  (Abnormal) Collected: 11/10/22 1542    Specimen: Arterial Blood Updated: 11/11/22 0234     POC Potassium 4.3 mmol/L      Total CO2 22 mmol/L      Hemoglobin 8.5 g/dL      Hematocrit 25 %      pH, Arterial 7.3710 pH units      HCO3, Arterial 20.8 mmol/L      Base Excess -5.0000 mmol/L      O2 Saturation, Arterial 99 %      pO2, Arterial 139 mmHg      pCO2, Arterial 36.5 mm Hg      Comment: Serial Number: [id number]:  [id number]        Glucose 131 mg/dL      Ionized Calcium --    POC Activated Clotting Time [[id number]]  (Abnormal) Collected: 11/10/22 1457    Specimen: Arterial Blood Updated: 11/11/22 0234     Activated Clotting Time  451 Seconds      Comment: Serial Number: [id number]:  [id number]       POC Activated Clotting Time [[id number]]  (Abnormal) Collected: 11/10/22 1428    Specimen: Arterial Blood Updated: 11/11/22 0232     Activated Clotting Time  468 Seconds      Comment: Serial Number: [id number]:  [id number]       POC Activated Clotting Time [[id number]]  (Abnormal) Collected: 11/10/22 1405    Specimen: Arterial Blood Updated: 11/11/22 0232     Activated Clotting Time  428 Seconds      Comment: Serial Number: [id number]:  [id number]       POC Activated Clotting Time [[id number]]  (Abnormal) Collected: 11/10/22 1349    Specimen: Arterial Blood Updated: 11/11/22 0232     Activated Clotting Time  486 Seconds      Comment: Serial Number: [id number]:  [id number]       POC Activated Clotting Time [[id number]]   (Normal) Collected: 11/10/22 1217    Specimen: Arterial Blood Updated: 11/11/22 0232     Activated Clotting Time  132 Seconds      Comment: Serial Number: [id number]:  [id number]       POC Glucose Once [[id number]]  (Abnormal) Collected: 11/11/22 0159    Specimen: Blood Updated: 11/11/22 0201     Glucose 191 mg/dL      Comment: Meter: [id number] : [id number] Jana Carty RN       Blood Gas, Arterial - [[id number]]  (Abnormal) Collected: 11/11/22 0109    Specimen: Arterial Blood Updated: 11/11/22 0113     Site Arterial Line     Alberto's Test N/A     pH, Arterial 7.307 pH units      pCO2, Arterial 35.2 mm Hg      pO2, Arterial 121.4 mm Hg      HCO3, Arterial 17.6 mmol/L      Base Excess, Arterial -8.0 mmol/L      O2 Saturation Calculated 98.4 %      A-a DO2 0.5 mmHg      Barometric Pressure for Blood Gas 749.2 mmHg      Modality Adult Vent     FIO2 40 %      Ventilator Mode CPAP/PS     Set Tidal Volume 575     Rate 17 Breaths/minute      PEEP 7.5     Comment: 100% @0106 PS6 Meter: [id number] : [id number] Anyadickson Sheltona       CBC (No Diff) [[id number]]  (Abnormal) Collected: 11/11/22 0009    Specimen: Blood Updated: 11/11/22 0023     WBC 11.25 10*3/mm3      RBC 2.91 10*6/mm3      Hemoglobin 8.3 g/dL      Hematocrit 25.6 %      MCV 88.0 fL      MCH 28.5 pg      MCHC 32.4 g/dL      RDW 14.5 %      RDW-SD 46.6 fl      MPV 11.2 fL      Platelets 187 10*3/mm3     POC Glucose Once [[id number]]  (Abnormal) Collected: 11/11/22 0007    Specimen: Blood Updated: 11/11/22 0008     Glucose 171 mg/dL      Comment: Meter: [id number] : [id number] Jake Morales RN       Blood Gas, Arterial - [[id number]]  (Abnormal) Collected: 11/10/22 2318    Specimen: Arterial Blood Updated: 11/10/22 2322     Site Arterial Line     Alberto's Test N/A     pH, Arterial 7.293 pH units      Comment: 99%@2316 PS 8 Meter: [id number] : [id number] Anya McintyreesaCritical:Notify JAMESON VITALE (10-Nov-22 23:21:23)Read back ok        pCO2,  Arterial 37.2 mm Hg      pO2, Arterial 104.3 mm Hg      HCO3, Arterial 18.0 mmol/L      Base Excess, Arterial -7.8 mmol/L      O2 Saturation Calculated 97.4 %      A-a DO2 0.4 mmHg      Barometric Pressure for Blood Gas 751.2 mmHg      Modality Adult Vent     FIO2 40 %      Ventilator Mode CPAP/PS     Set Tidal Volume 541     Rate 18 Breaths/minute      PEEP 7.5    POC Glucose Once [[id number]]  (Abnormal) Collected: 11/10/22 2304    Specimen: Blood Updated: 11/10/22 2306     Glucose 171 mg/dL      Comment: Meter: [id number] : [id number] Jake Morales RN       POC Glucose Once [[id number]]  (Abnormal) Collected: 11/10/22 2201    Specimen: Blood Updated: 11/10/22 2202     Glucose 164 mg/dL      Comment: Meter: [id number] : [id number] Jake Morales RN       POC Glucose Once [[id number]]  (Abnormal) Collected: 11/10/22 2102    Specimen: Blood Updated: 11/10/22 2104     Glucose 155 mg/dL      Comment: Meter: [id number] : [id number] Jake Morales RN       Renal Function Panel [[id number]]  (Abnormal) Collected: 11/10/22 2013    Specimen: Blood Updated: 11/10/22 2054     Glucose 129 mg/dL      BUN 17 mg/dL      Creatinine 1.04 mg/dL      Sodium 140 mmol/L      Potassium 4.3 mmol/L      Chloride 110 mmol/L      CO2 18.6 mmol/L      Calcium 9.0 mg/dL      Albumin 4.30 g/dL      Phosphorus 3.8 mg/dL      Anion Gap 11.4 mmol/L      BUN/Creatinine Ratio 16.3     eGFR 57.6 mL/min/1.73      Comment: National Kidney Foundation and American Society of Nephrology (ASN) Task Force recommended calculation based on the Chronic Kidney Disease Epidemiology Collaboration (CKD-EPI) equation refit without adjustment for race.       Narrative:      GFR Normal >60  Chronic Kidney Disease <60  Kidney Failure <15    The GFR formula is only valid for adults with stable renal function between ages 18 and 70.    Magnesium [[id number]]  (Abnormal) Collected: 11/10/22 2013    Specimen: Blood Updated: 11/10/22 2054     Magnesium 3.0 mg/dL      POC Glucose Once [[id number]]  (Abnormal) Collected: 11/10/22 2011    Specimen: Blood Updated: 11/10/22 2012     Glucose 136 mg/dL      Comment: Meter: [id number] : [id number] Jake Morales RN       Blood Gas, Arterial - [[id number]]  (Abnormal) Collected: 11/10/22 1949    Specimen: Arterial Blood Updated: 11/10/22 1951     Site Arterial Line     Alberto's Test N/A     pH, Arterial 7.377 pH units      pCO2, Arterial 32.4 mm Hg      pO2, Arterial 104.2 mm Hg      HCO3, Arterial 19.0 mmol/L      Base Excess, Arterial -5.5 mmol/L      O2 Saturation Calculated 98.0 %      A-a DO2 0.4 mmHg      Barometric Pressure for Blood Gas 753.2 mmHg      Modality Adult Vent     FIO2 40 %      Ventilator Mode AC     Set Tidal Volume 616     Set Mech Resp Rate 15     Rate 15 Breaths/minute      PEEP 7.5     Comment: 100% Meter: [id number] : [id number] Anya Burroughs       POC Glucose Once [[id number]]  (Abnormal) Collected: 11/10/22 1918    Specimen: Blood Updated: 11/10/22 1919     Glucose 138 mg/dL      Comment: Meter: [id number] : [id number] Jake Morales RN       POC Glucose Once [[id number]]  (Normal) Collected: 11/10/22 1805    Specimen: Blood Updated: 11/10/22 1806     Glucose 116 mg/dL      Comment: Meter: [id number] : [id number] Danielle Pruitt RN       Fibrinogen [[id number]]  (Normal) Collected: 11/10/22 1649    Specimen: Blood Updated: 11/10/22 1742     Fibrinogen 285 mg/dL     aPTT [[id number]]  (Normal) Collected: 11/10/22 1649    Specimen: Blood Updated: 11/10/22 1742     PTT 33.3 seconds     Protime-INR [[id number]]  (Abnormal) Collected: 11/10/22 1649    Specimen: Blood Updated: 11/10/22 1742     Protime 18.5 Seconds      INR 1.52    Calcium, Ionized [[id number]]  (Abnormal) Collected: 11/10/22 1649    Specimen: Blood Updated: 11/10/22 1729     Ionized Calcium 1.36 mmol/L      Ionized Calcium 5.4 mg/dL     Renal Function Panel [[id number]]  (Abnormal) Collected: 11/10/22 5643    Specimen: Blood Updated:  11/10/22 1725     Glucose 121 mg/dL      BUN 16 mg/dL      Creatinine 0.97 mg/dL      Sodium 141 mmol/L      Potassium 4.4 mmol/L      Comment: Slight hemolysis detected by analyzer. Results may be affected.        Chloride 112 mmol/L      CO2 20.1 mmol/L      Calcium 9.5 mg/dL      Albumin 3.90 g/dL      Phosphorus 3.6 mg/dL      Anion Gap 8.9 mmol/L      BUN/Creatinine Ratio 16.5     eGFR 62.6 mL/min/1.73      Comment: National Kidney Foundation and American Society of Nephrology (ASN) Task Force recommended calculation based on the Chronic Kidney Disease Epidemiology Collaboration (CKD-EPI) equation refit without adjustment for race.       Narrative:      GFR Normal >60  Chronic Kidney Disease <60  Kidney Failure <15    The GFR formula is only valid for adults with stable renal function between ages 18 and 70.    Magnesium [[id number]]  (Abnormal) Collected: 11/10/22 1649    Specimen: Blood Updated: 11/10/22 1725     Magnesium 3.2 mg/dL     Blood Gas, Arterial - [[id number]]  (Abnormal) Collected: 11/10/22 1720    Specimen: Arterial Blood Updated: 11/10/22 1722     Site Arterial Line     Alberto's Test N/A     pH, Arterial 7.387 pH units      pCO2, Arterial 34.7 mm Hg      pO2, Arterial 307.5 mm Hg      HCO3, Arterial 20.8 mmol/L      Base Excess, Arterial -3.7 mmol/L      O2 Saturation Calculated 99.9 %      A-a DO2 0.4 mmHg      Barometric Pressure for Blood Gas 753.6 mmHg      Modality Adult Vent     FIO2 100 %      Ventilator Mode AC     Set Tidal Volume 610     Set Mech Resp Rate 15     Rate 15 Breaths/minute      PEEP 7.5     Comment: Meter: [id number] : [id number] Louis Moreno       CBC & Differential [[id number]]  (Abnormal) Collected: 11/10/22 1649    Specimen: Blood Updated: 11/10/22 1703    Narrative:      The following orders were created for panel order CBC & Differential.  Procedure                               Abnormality         Status                     ---------                                -----------         ------                     CBC Auto Differential[[id number]]        Abnormal            Final result                 Please view results for these tests on the individual orders.    CBC Auto Differential [[id number]]  (Abnormal) Collected: 11/10/22 1649    Specimen: Blood Updated: 11/10/22 1703     WBC 10.50 10*3/mm3      RBC 3.20 10*6/mm3      Hemoglobin 9.4 g/dL      Hematocrit 29.3 %      MCV 91.6 fL      MCH 29.4 pg      MCHC 32.1 g/dL      RDW 14.8 %      RDW-SD 49.9 fl      MPV 11.0 fL      Platelets 155 10*3/mm3      Neutrophil % 82.1 %      Lymphocyte % 9.0 %      Monocyte % 6.8 %      Eosinophil % 1.0 %      Basophil % 0.7 %      Immature Grans % 0.4 %      Neutrophils, Absolute 8.63 10*3/mm3      Lymphocytes, Absolute 0.94 10*3/mm3      Monocytes, Absolute 0.71 10*3/mm3      Eosinophils, Absolute 0.11 10*3/mm3      Basophils, Absolute 0.07 10*3/mm3      Immature Grans, Absolute 0.04 10*3/mm3      nRBC 0.0 /100 WBC     POC Glucose Once [[id number]]  (Normal) Collected: 11/10/22 1652    Specimen: Blood Updated: 11/10/22 1654     Glucose 121 mg/dL      Comment: Meter: [id number] : [id number] Danielle Pruitt RN           Imaging Results (Last 24 Hours)     Procedure Component Value Units Date/Time    XR Chest 1 View [[id number]] Collected: 11/11/22 0558     Updated: 11/11/22 0558    Narrative:        Patient: AUDRA MARSHALL  Time Out: 05:57  Exam(s): FILM CXR 1 VIEW     EXAM:    XR Chest, 1 View    CLINICAL HISTORY:     Reason for exam: Post-Op Heart Surgery.    TECHNIQUE:    Frontal view of the chest.    COMPARISON:    11 10 2022    FINDINGS:    Lungs:  Unremarkable.  No consolidation.    Pleural space: Bilateral chest tubes in place.  No pneumothorax.    Heart: Median sternotomy wires in place.  No cardiomegaly.    Mediastinum: Mediastinal drain in place.  Right IJ pulmonary arterial   catheter in place.  Interval extubation.    Bones joints:  Unremarkable.    IMPRESSION:       1. Interval  extubation.  Otherwise stable lines and tubes.    2. No new consolidations.    Impression:          Electronically signed by Luis Miguel Gomez M.D. on 11-11-22 at 0557    XR Chest 1 View [[id number]] Collected: 11/10/22 1739     Updated: 11/10/22 1744    Narrative:      XR CHEST 1 VW-     HISTORY:  Postop.     COMPARISON:  Chest radiograph 11/7/2022     FINDINGS:    A single view the chest was obtained. There are multiple new support  devices. There is an endotracheal tube terminating approximately 4.8 cm  above the avila. There is a Guston-Abbi catheter with the tip projecting  over the expected location of the pulmonary trunk. There are mediastinal  drains and chest tubes. The cardiac silhouette is upper normal in size.  There are new postsurgical changes from CABG. There is no focal  consolidation. There is no definite pneumothorax. There are new median  sternotomy wires.     This report was finalized on 11/10/2022 5:41 PM by Dr. Sonal Dougherty M.D.              ECG 12 Lead          Component Ref Range & Units 01:44    QT Interval ms 358 P    Resulting Agency   ECG             HEART RATE= 116  bpm  RR Interval= 517  ms  FL Interval= 110  ms  P Horizontal Axis= -49  deg  P Front Axis= 64  deg  QRSD Interval= 97  ms  QT Interval= 358  ms  QRS Axis= 18  deg  T Wave Axis= 130  deg  - ABNORMAL ECG -  Sinus tachycardia  Probable left atrial enlargement  Lateral infarct, acute (LAD)  Probable anteroseptal infarct, recent             Current Facility-Administered Medications:   •  acetaminophen (TYLENOL) tablet 650 mg, 650 mg, Oral, Q4H PRN **OR** acetaminophen (TYLENOL) 160 MG/5ML solution 650 mg, 650 mg, Oral, Q4H PRN **OR** acetaminophen (TYLENOL) suppository 650 mg, 650 mg, Rectal, Q4H PRN, Martha Golden, APRN  •  ALPRAZolam (XANAX) tablet 0.25 mg, 0.25 mg, Oral, Q8H PRN, Argenis Goldenay, APRN  •  aspirin EC tablet 81 mg, 81 mg, Oral, Daily, Martha Golden, APRN, 81 mg at 11/11/22 0920  •  atorvastatin (LIPITOR)  tablet 40 mg, 40 mg, Oral, Nightly, Martha Golden APRN  •  bisacodyl (DULCOLAX) EC tablet 10 mg, 10 mg, Oral, Daily PRN, Martha Golden APRN  •  bisacodyl (DULCOLAX) suppository 10 mg, 10 mg, Rectal, Daily PRN, Martha Golden APRN  •  ceFAZolin in dextrose (ANCEF) IVPB solution 2 g, 2 g, Intravenous, Q8H, Martha Golden APRN, 2 g at 11/11/22 0921  •  chlorhexidine (PERIDEX) 0.12 % solution 15 mL, 15 mL, Mouth/Throat, Q12H, Martha Golden APRN, 15 mL at 11/11/22 0432  •  clevidipine (CLEVIPREX) infusion 0.5 mg/mL, 2-32 mg/hr, Intravenous, Continuous PRN, Martha Golden APRN  •  cyclobenzaprine (FLEXERIL) tablet 10 mg, 10 mg, Oral, Q8H PRN, Martha Golden APRN, 10 mg at 11/11/22 1000  •  dexmedetomidine (PRECEDEX) 400 mcg in 100 mL NS infusion, 0.2-1.5 mcg/kg/hr, Intravenous, Titrated, Martha Golden APRN, Stopped at 11/10/22 2030  •  dextrose (D50W) (25 g/50 mL) IV injection 10-50 mL, 10-50 mL, Intravenous, Q15 Min PRN, Martha Golden APRN  •  dextrose (GLUTOSE) oral gel 15 g, 15 g, Oral, Q15 Min PRN, Martha Golden APRN  •  DOPamine 400 mg in 250 mL D5W infusion, 2-20 mcg/kg/min, Intravenous, Continuous PRN, Martha Golden APRN  •  Enoxaparin Sodium (LOVENOX) syringe 40 mg, 40 mg, Subcutaneous, Daily, Martha Golden APRN  •  EPINEPHrine 5 mg in 250 mL NS infusion, 0.02-0.1 mcg/kg/min, Intravenous, Continuous PRN, Martha Golden APRN, Stopped at 11/11/22 0321  •  gabapentin (NEURONTIN) capsule 200 mg, 200 mg, Oral, TID, Le Edwards APRN, 200 mg at 11/11/22 1545  •  glucagon (human recombinant) (GLUCAGEN DIAGNOSTIC) injection 1 mg, 1 mg, Intramuscular, Q15 Min PRN, Martha Golden APRN  •  HYDROcodone-acetaminophen (NORCO) 5-325 MG per tablet 2 tablet, 2 tablet, Oral, Q4H PRN, Martha Golden APRN  •  insulin regular 1 unit/mL in 0.9% sodium chloride (Glucommander), 0-100 Units/hr, Intravenous, Titrated, Martha Golden APRN, Stopped at 11/11/22 5382  •  levothyroxine  (SYNTHROID, LEVOTHROID) tablet 25 mcg, 25 mcg, Oral, Q AM, Martha Golden APRN, 25 mcg at 11/11/22 0600  •  magnesium hydroxide (MILK OF MAGNESIA) suspension 10 mL, 10 mL, Oral, Daily PRN, Martha Golden APRN  •  magnesium sulfate in D5W 1g/100mL (PREMIX), 1 g, Intravenous, Q8H, Martha Golden APRN, 1 g at 11/11/22 0125  •  metoclopramide (REGLAN) injection 10 mg, 10 mg, Intravenous, Q6H, Martha Golden APRN, 10 mg at 11/10/22 1657  •  metoprolol tartrate (LOPRESSOR) tablet 12.5 mg, 12.5 mg, Oral, Q12H, Martha Golden APRN  •  midazolam (VERSED) injection 2 mg, 2 mg, Intravenous, Q1H PRN, Martha Golden APRN  •  milrinone (PRIMACOR) 20 mg in 100 mL D5W infusion, 0.25-0.375 mcg/kg/min, Intravenous, Continuous PRN, Martha Golden APRJAKE  •  morphine injection 1 mg, 1 mg, Intravenous, Q4H PRN, 1 mg at 11/11/22 1000 **AND** naloxone (NARCAN) injection 0.4 mg, 0.4 mg, Intravenous, Q5 Min PRN, Martha Golden APRJAKE  •  morphine injection 4 mg, 4 mg, Intravenous, Q30 Min PRN, Martha Golden APRN  •  mupirocin (BACTROBAN) 2 % nasal ointment, , Each Nare, BID, Martha Golden APRN, 1 application at 11/11/22 0920  •  niCARdipine (CARDENE) 25 mg in 250 mL NS infusion kit, 5-15 mg/hr, Intravenous, Continuous PRN, Martha Golden APRN  •  nitroglycerin (TRIDIL) 200 mcg/ml infusion, 5-200 mcg/min, Intravenous, Titrated, Martha Golden APRN  •  norepinephrine (LEVOPHED) 8 mg in 250 mL NS infusion (premix), 0.02-0.2 mcg/kg/min, Intravenous, Continuous PRN, Martha Golden APRN  •  ondansetron (ZOFRAN) injection 4 mg, 4 mg, Intravenous, Q6H PRN, Martha Golden APRN, 4 mg at 11/11/22 0417  •  oxyCODONE (ROXICODONE) immediate release tablet 10 mg, 10 mg, Oral, Q4H PRN, Martha Golden APRN, 10 mg at 11/11/22 1112  •  [COMPLETED] pantoprazole (PROTONIX) injection 40 mg, 40 mg, Intravenous, Once, 40 mg at 11/10/22 1658 **FOLLOWED BY** pantoprazole (PROTONIX) EC tablet 40 mg, 40 mg, Oral, QAM,  Martha Golden APRN, 40 mg at 11/11/22 0600  •  phenylephrine (DIANNA-SYNEPHRINE) 50 mg in 250 mL NS infusion, 0.2-2 mcg/kg/min, Intravenous, Continuous PRN, Martha Golden APRN, Last Rate: 3.09 mL/hr at 11/11/22 0900, 0.1 mcg/kg/min at 11/11/22 0900  •  polyethylene glycol (MIRALAX) packet 17 g, 17 g, Oral, Daily PRN, Martha Golden APRN  •  potassium chloride (K-DUR,KLOR-CON) ER tablet 40 mEq, 40 mEq, Oral, PRN **OR** potassium chloride (KLOR-CON) packet 40 mEq, 40 mEq, Oral, PRN, Martha Golden APRN  •  potassium chloride 10 mEq in 100 mL IVPB, 10 mEq, Intravenous, Q1H PRN **OR** potassium chloride 10 mEq in 100 mL IVPB, 10 mEq, Intravenous, Q1H PRN, Martha Golden APRN  •  potassium chloride 20 mEq in 50 mL IVPB, 20 mEq, Intravenous, Q1H PRN, Martha Golden APRN  •  potassium chloride 20 mEq in 50 mL IVPB, 20 mEq, Intravenous, Q1H PRN, Martha Golden APRN  •  potassium chloride 20 mEq in 50 mL IVPB, 20 mEq, Intravenous, Q1H PRN, Martha Golden APRN  •  propofol (DIPRIVAN) infusion 10 mg/mL 100 mL, 5-50 mcg/kg/min, Intravenous, Continuous PRN, Martha Golden APRN, Stopped at 11/10/22 1940  •  sennosides-docusate (PERICOLACE) 8.6-50 MG per tablet 2 tablet, 2 tablet, Oral, Nightly, Martha Golden APRN  •  sodium chloride 0.9 % infusion, 30 mL/hr, Intravenous, Continuous, Martha Golden APRN, Last Rate: 30 mL/hr at 11/10/22 1655, 30 mL/hr at 11/10/22 1655     ASSESSMENT  Multivessel coronary artery disease s/p CABG postop day 1  Acute ST relation MI s/p angioplasty and stent   Acute Klebsiella UTI  Diabetes mellitus  Hypertension  Hyperlipidemia  Hypothyroidism  Gastroesophageal reflux disease    PLAN  CPM  Postop care  Post PCI care  Continue antibiotics  Continue home medications  Stress ulcer DVT prophylaxis  Accu-Cheks sliding scale insulin  Supportive care  Discussed with nursing staff  We will follow with Dr. BECKFORD and further recommendation current hospital course    DEE FRENCH  MD

## 2022-11-11 NOTE — PROGRESS NOTES
[facility]  Cardiology Progress Note    Patient Identification:  Name: Mayra Hirsch  Age: 71 y.o.  Sex: female  :  1950  MRN: [id number]                 Follow Up / Chief Complaint: follow up for CAD    Interval History:  CABG x5 11/10/22 with Dr Isabel. Feels pretty good today, no shortness of breath. 2 units PRBC today     Subjective:no chest pain or shortness of breath      Objective:    Past Medical History:  Past Medical History:   Diagnosis Date   • Depression    • Diabetes mellitus (HCC)    • Disease of thyroid gland    • Fibrocystic breast    • Hypertension    • Hypothyroidism    • PONV (postoperative nausea and vomiting)    • Type 2 diabetes mellitus (HCC)      Past Surgical History:  Past Surgical History:   Procedure Laterality Date   • BREAST EXCISIONAL BIOPSY Right     at age 22; benign.   • CARDIAC CATHETERIZATION Left 2022    Procedure: Cardiac Catheterization/Vascular Study;  Surgeon: Joanna Marie MD;  Location: [facility] CATH INVASIVE LOCATION;  Service: Cardiology;  Laterality: Left;   • CARDIAC CATHETERIZATION N/A 2022    Procedure: Percutaneous Coronary Intervention;  Surgeon: Joanna Marie MD;  Location: [facility] CATH INVASIVE LOCATION;  Service: Cardiology;  Laterality: N/A;   • CARDIAC CATHETERIZATION N/A 2022    Procedure: Left ventriculography;  Surgeon: Joanna Marie MD;  Location: [facility] CATH INVASIVE LOCATION;  Service: Cardiology;  Laterality: N/A;   • CARDIAC CATHETERIZATION N/A 2022    Procedure: Stent MARTINEZ coronary;  Surgeon: Joanna Marie MD;  Location: [facility] CATH INVASIVE LOCATION;  Service: Cardiology;  Laterality: N/A;   • CARDIAC CATHETERIZATION N/A 2022    Procedure: Left Heart Cath;  Surgeon: Joanna Marie MD;  Location: [facility] CATH INVASIVE LOCATION;  Service: Cardiology;  Laterality: N/A;   •  SECTION     • CORONARY ARTERY BYPASS GRAFT N/A 11/10/2022    Procedure: NIKKY,  "CORONARY ARTERY BYPASS GRAFTING TIMES 6 WITH LEFT JORDYN AND ENDOSCOPICALLY HARVESTED LEFT AND RIGHT GREATER SAPHENOUS VEIN, PRP TRANSESOPHAGEAL ECHOCARDIOGRAM WITH ANESTHESIA;  Surgeon: Jr Dong Isabel MD;  Location: [facility];  Service: Cardiothoracic;  Laterality: N/A;   • HAND SURGERY     • KNEE SURGERY     • OOPHORECTOMY      1 ovary removed due to ectopic pregnancy        Social History:   Social History     Tobacco Use   • Smoking status: Never   • Smokeless tobacco: Never   Substance Use Topics   • Alcohol use: No      Family History:  Family History   Problem Relation Age of Onset   • Coronary artery disease Mother    • Alzheimer's disease Mother    • Coronary artery disease Father    • Cancer Father    • Thyroid disease Sister    • Malig Hyperthermia Neg Hx           Allergies:  Allergies   Allergen Reactions   • Bydureon [Exenatide] Diarrhea   • Metformin And Related Nausea And Vomiting     Scheduled Meds:  aspirin, 81 mg, Daily  atorvastatin, 40 mg, Nightly  ceFAZolin, 2 g, Q8H  chlorhexidine, 15 mL, Q12H  enoxaparin, 40 mg, Daily  gabapentin, 200 mg, TID  levothyroxine, 25 mcg, Q AM  magnesium sulfate, 1 g, Q8H  metoclopramide, 10 mg, Q6H  metoprolol tartrate, 12.5 mg, Q12H  mupirocin, , BID  pantoprazole, 40 mg, QAM  senna-docusate sodium, 2 tablet, Nightly            INTAKE AND OUTPUT:    Intake/Output Summary (Last 24 hours) at 11/11/2022 1626  Last data filed at 11/11/2022 1000  Gross per 24 hour   Intake 3334.46 ml   Output 3990 ml   Net -655.54 ml     ROS  Constitutional: awake and alert, no fever. no nosebleeds  Abdomen           no abdominal pain   Cardiac              no chest pain  Pulmonary          no shortness of breath      /68   Pulse 89   Temp 99.1 °F (37.3 °C) (Core)   Resp 16   Ht 170.2 cm (67\")   Wt 92.9 kg (204 lb 12.9 oz)   SpO2 100%   BMI 32.08 kg/m²     Physical Exam:  General:  Appears in no acute distress  Eyes: EOM normal no conjunctival drainage  HEENT:  No " JVD. Thyroid not visibly enlarged. No mucosal pallor or cyanosis  Respiratory: Respirations regular and unlabored at rest. BBS with good air entry in all fields. No crackles, rubs or wheezes auscultated  Cardiovascular: S1S2 Regular rate and rhythm. No murmur, rub or gallop. No carotid bruits. DP/PT pulses  2+   . No pretibial pitting edema  Gastrointestinal: Abdomen soft, non tender. Bowel sounds present.   Musculoskeletal: ROSALES x4. No abnormal movements  Neuro: AAO x3 CN II-XII grossly intact  Psych: Mood and affect normal, pleasant and cooperative            I reviewed the patient's new clinical results, and personally reviewed and interpreted the patient's ECG and telemetry data from the last 24 hours        Cardiographics          Telemetry:    paced         Interpretation Summary       •  The left ventricular cavity is mildly dilated.  •  The following left ventricular wall segments are hypokinetic: apical anterior, apical lateral, apical inferior, apical septal, apex hypokinetic and mid anteroseptal.  •  There is calcification of the aortic valve.  •  Estimated right ventricular systolic pressure from tricuspid regurgitation is mildly elevated (35-45 mmHg).         Lab Review   Results from last 7 days   Lab Units 11/07/22  0154   TROPONIN T ng/mL 0.044*     Results from last 7 days   Lab Units 11/11/22  0323   MAGNESIUM mg/dL 3.1*     Results from last 7 days   Lab Units 11/11/22  0323   SODIUM mmol/L 141   POTASSIUM mmol/L 4.4   BUN mg/dL 17   CREATININE mg/dL 1.03*   CALCIUM mg/dL 8.6     @[id number]@  Results from last 7 days   Lab Units 11/11/22 0323 11/11/22  0009 11/10/22  1649   WBC 10*3/mm3 8.45 11.25* 10.50   HEMOGLOBIN g/dL 7.5* 8.3* 9.4*   HEMATOCRIT % 23.5* 25.6* 29.3*   PLATELETS 10*3/mm3 159 187 155     Results from last 7 days   Lab Units 11/11/22  0323 11/10/22  1649 11/07/22  0154   INR  1.45* 1.52* 1.08   APTT seconds  --  33.3 27.0     The following medical decision was discussed in  "detail with Dr. Marie      Assessment:  CABG x5 11/10/22 with Dr Isabel  STEMI   Hypertension   diabetes mellitus: Internal medicine following  hypothyroidism       Plan:  Resting in bed, no chest pain or shortness of breath, on room air  Off pressor  2u PRBC today  Paced with underlying SR.   Feels well, CTS managing post op care, encouraged IS, we will continue to follow for supportive care.     Patricia Munguia, APRN    )11/11/2022       EMR Dragon/Transcription:   \"Dictated utilizing Dragon dictation\".     "

## 2022-11-11 NOTE — THERAPY EVALUATION
Patient Name: Mayra Hirsch  : 1950    MRN: [id number]                              Today's Date: 2022       Admit Date: 2022    Visit Dx:     ICD-10-CM ICD-9-CM   1. ST elevation myocardial infarction (STEMI), unspecified artery (HCC)  I21.3 410.90   2. ST elevation myocardial infarction (STEMI) involving other coronary artery (HCC)  I21.29 410.10   3. Coronary artery disease involving native heart, unspecified vessel or lesion type, unspecified whether angina present  I25.10 414.01   4. Abnormal findings on diagnostic imaging of other specified body structures  R93.89 793.99     Patient Active Problem List   Diagnosis   • Vitamin D deficiency   • Primary hypothyroidism   • Dyslipidemia   • Essential hypertension   • Type 2 diabetes mellitus without complication, with long-term current use of insulin (HCC)   • Noncompliance with diabetes treatment   • ST elevation myocardial infarction (STEMI) (HCC)   • ST elevation myocardial infarction (STEMI), unspecified artery (HCC)   • Coronary artery disease involving native heart   • Abnormal findings on diagnostic imaging of other specified body structures     Past Medical History:   Diagnosis Date   • Depression    • Diabetes mellitus (HCC)    • Disease of thyroid gland    • Fibrocystic breast    • Hypertension    • Hypothyroidism    • PONV (postoperative nausea and vomiting)    • Type 2 diabetes mellitus (HCC)      Past Surgical History:   Procedure Laterality Date   • BREAST EXCISIONAL BIOPSY Right     at age 22; benign.   • CARDIAC CATHETERIZATION Left 2022    Procedure: Cardiac Catheterization/Vascular Study;  Surgeon: Joanna Marie MD;  Location:  [facility] CATH INVASIVE LOCATION;  Service: Cardiology;  Laterality: Left;   • CARDIAC CATHETERIZATION N/A 2022    Procedure: Percutaneous Coronary Intervention;  Surgeon: Joanna Marie MD;  Location:  [facility] CATH INVASIVE LOCATION;  Service: Cardiology;  Laterality: N/A;   •  CARDIAC CATHETERIZATION N/A 2022    Procedure: Left ventriculography;  Surgeon: Joanna Marie MD;  Location:  [facility] CATH INVASIVE LOCATION;  Service: Cardiology;  Laterality: N/A;   • CARDIAC CATHETERIZATION N/A 2022    Procedure: Stent MARTINEZ coronary;  Surgeon: Joanna Marie MD;  Location:  [facility] CATH INVASIVE LOCATION;  Service: Cardiology;  Laterality: N/A;   • CARDIAC CATHETERIZATION N/A 2022    Procedure: Left Heart Cath;  Surgeon: Joanna Marie MD;  Location:  [facility] CATH INVASIVE LOCATION;  Service: Cardiology;  Laterality: N/A;   •  SECTION     • CORONARY ARTERY BYPASS GRAFT N/A 11/10/2022    Procedure: NIKKY, CORONARY ARTERY BYPASS GRAFTING TIMES 6 WITH LEFT JORDYN AND ENDOSCOPICALLY HARVESTED LEFT AND RIGHT GREATER SAPHENOUS VEIN, PRP TRANSESOPHAGEAL ECHOCARDIOGRAM WITH ANESTHESIA;  Surgeon: Jr Dong Isabel MD;  Location: [facility];  Service: Cardiothoracic;  Laterality: N/A;   • HAND SURGERY     • KNEE SURGERY     • OOPHORECTOMY      1 ovary removed due to ectopic pregnancy      General Information     Row Name 22 1033          Physical Therapy Time and Intention    Document Type evaluation  -DJ     Mode of Treatment individual therapy;physical therapy  -DJ     Row Name 22 1033          General Information    Patient Profile Reviewed yes  -DJ     Prior Level of Function independent:  -DJ     Existing Precautions/Restrictions cardiac;fall;sternal  -DJ     Barriers to Rehab medically complex  -DJ     Row Name 22 1033          Living Environment    People in Home alone  -DJ     Row Name 22 1033          Home Main Entrance    Number of Stairs, Main Entrance none  -DJ     Row Name 22 1033          Stairs Within Home, Primary    Stairs, Within Home, Primary 14 optional stairs to loft  -DJ     Row Name 22 1033          Cognition    Orientation Status (Cognition) oriented x 3  -DJ     Row Name 22 1033          Safety  Issues, Functional Mobility    Safety Issues Affecting Function (Mobility) judgment;safety precaution awareness  -DJ     Impairments Affecting Function (Mobility) balance;strength;pain;endurance/activity tolerance  -DJ     Comment, Safety Issues/Impairments (Mobility) gt belt, nonskid socks  -DJ           User Key  (r) = Recorded By, (t) = Taken By, (c) = Cosigned By    Initials Name Provider Type    Sravani Mcguire, PT Physical Therapist               Mobility     Row Name 11/11/22 1035          Bed Mobility    Bed Mobility sit-supine  -DJ     Sit-Supine [place] (Bed Mobility) moderate assist (50% patient effort);maximum assist (25% patient effort);2 person assist;verbal cues  -DJ     Assistive Device (Bed Mobility) bed rails;head of bed elevated  -DJ     Comment, (Bed Mobility) able to assist with raising legs back into bed  -DJ     Row Name 11/11/22 1035          Transfers    Comment, (Transfers) sit/stand from recliner x 2 attempts  -DJ     Row Name 11/11/22 1035          Bed-Chair Transfer    Bed-Chair [place] (Transfers) minimum assist (75% patient effort);2 person assist;verbal cues  -DJ     Row Name 11/11/22 1035          Sit-Stand Transfer    Sit-Stand [place] (Transfers) minimum assist (75% patient effort);2 person assist;verbal cues  -DJ     Comment, (Sit-Stand Transfer) vc to avoid pushing up with UEs  -DJ     Row Name 11/11/22 1035          Gait/Stairs (Locomotion)    [place] Level (Gait) minimum assist (75% patient effort);2 person assist;verbal cues  -DJ     Distance in Feet (Gait) 5'  -DJ     Deviations/Abnormal Patterns (Gait) base of support, wide;abelardo decreased;festinating/shuffling;gait speed decreased;stride length decreased;antalgic  -DJ     Bilateral Gait Deviations forward flexed posture  -DJ     [place] Level (Stairs) unable to assess  -DJ     Comment, (Gait/Stairs) Pt amb 5' from chair to bed with min A Of 2 - staggered steps with unsteady balance and poor  endurance.  -DJ           User Key  (r) = Recorded By, (t) = Taken By, (c) = Cosigned By    Initials Name Provider Type    Sravani Mcguire, FRANCISCA Physical Therapist               Obj/Interventions     Row Name 11/11/22 1038          Range of Motion Comprehensive    General Range of Motion no range of motion deficits identified  -DJ     Row Name 11/11/22 1038          Strength Comprehensive (MMT)    Comment, General Manual Muscle Testing (MMT) Assessment moves all 4s  -DJ     Row Name 11/11/22 1038          Motor Skills    Motor Skills functional endurance  -DJ     Functional Endurance poor  -DJ     Therapeutic Exercise other (see comments)  AP, LAQ, seated hip flex  -DJ     Row Name 11/11/22 1038          Balance    Balance Assessment standing static balance;standing dynamic balance  -DJ     Static Standing Balance minimal assist;2-person assist;verbal cues  -DJ     Dynamic Standing Balance minimal assist;2-person assist;verbal cues  -DJ     Position/Device Used, Standing Balance supported  -DJ     Balance Interventions sitting;standing;sit to stand;supported;weight shifting activity  -DJ     Comment, Balance unsteady  -DJ     Row Name 11/11/22 1038          Sensory Assessment (Somatosensory)    Sensory Assessment (Somatosensory) not tested  -DJ           User Key  (r) = Recorded By, (t) = Taken By, (c) = Cosigned By    Initials Name Provider Type    Sravani Mcguire, PT Physical Therapist               Goals/Plan     Row Name 11/11/22 1046          Bed Mobility Goal 1 (PT)    Activity/Assistive Device (Bed Mobility Goal 1, PT) sit to supine;supine to sit  -DJ     [place] Level/Cues Needed (Bed Mobility Goal 1, PT) standby assist;verbal cues required  -DJ     Time Frame (Bed Mobility Goal 1, PT) 2 weeks  -DJ     Row Name 11/11/22 1046          Transfer Goal 1 (PT)    Activity/Assistive Device (Transfer Goal 1, PT) sit-to-stand/stand-to-sit  -DJ     [place] Level/Cues Needed (Transfer Goal 1, PT) verbal cues  required;standby assist  -DJ     Time Frame (Transfer Goal 1, PT) 2 weeks  -DJ     Row Name 11/11/22 1046          Gait Training Goal 1 (PT)    Activity/Assistive Device (Gait Training Goal 1, PT) gait (walking locomotion);improve balance and speed;increase endurance/gait distance;increase energy conservation  -DJ     [place] Level (Gait Training Goal 1, PT) standby assist  -DJ     Distance (Gait Training Goal 1, PT) >350'  -DJ     Time Frame (Gait Training Goal 1, PT) 2 weeks  -DJ     Row Name 11/11/22 1046          Stairs Goal 1 (PT)    [place] Level/Cues Needed (Stairs Goal 1, PT) contact guard required;verbal cues required  -DJ     Number of Stairs (Stairs Goal 1, PT) 4-5  -DJ     Time Frame (Stairs Goal 1, PT) 2 weeks  -DJ     Row Name 11/11/22 1046          Patient Education Goal (PT)    Activity (Patient Education Goal, PT) HEP  -DJ     [place]/Cues/Accuracy (Memory Goal 2, PT) demonstrates adequately;verbalizes understanding  -DJ     Time Frame (Patient Education Goal, PT) 5 days;short term goal (STG)  -DJ     Row Name 11/11/22 1046          Therapy Assessment/Plan (PT)    Planned Therapy Interventions (PT) balance training;bed mobility training;gait training;home exercise program;strengthening;stair training;postural re-education;patient/family education;transfer training  -DJ           User Key  (r) = Recorded By, (t) = Taken By, (c) = Cosigned By    Initials Name Provider Type    Sravani Mcguire, PT Physical Therapist               Clinical Impression     Row Name 11/11/22 1038          Pain    Pretreatment Pain Rating 4/10  -DJ     Pain Location incisional  -DJ     Pain Location - chest  -DJ     Pain Intervention(s) Repositioned;Rest  -DJ     Row Name 11/11/22 1038          Plan of Care Review    Plan of Care Reviewed With patient  -DJ     Outcome Evaluation 70yo white female admitted 11/7/22 due to MI and CAD; now s/p stent and then CABG x 6 11/10/22. PMH includes hbp, IDDM, gerd, vit D  deficiency, high cholesterol. PLOF: Pt lives in [facility] with 14 optional stairs to loft area. She was independent with ADLs including driving. She is limited now by expected c/o post-op pain, generalizeed weakness, and decreased activity tolerance that limit her functional mobility. Today, she was UIC in NAD, c/o sternal pain = 4/10. She stood from recliner x 2 attempts with min A of 2. Pt amb 5' from chair to bed with min A Of 2 - staggered steps with unsteady balance and poor endurance. Pt req mod A Of 2 to return supine in bed and was able to help raising legs. Pt instructed in seated ther ex and encouraged to not use UE to push up from chair. She would benefit from skilled PT services to address functional deficits. Anticipate d/c to son's home (who does have stairs).  -DJ     Row Name 11/11/22 1038          Therapy Assessment/Plan (PT)    Patient/Family Therapy Goals Statement (PT) son's house  -DJ     Rehab Potential (PT) good, to achieve stated therapy goals  -DJ     Criteria for Skilled Interventions Met (PT) yes;meets criteria;skilled treatment is necessary  -DJ     Therapy Frequency (PT) daily  -DJ     Row Name 11/11/22 1038          Vital Signs    O2 Delivery Pre Treatment supplemental O2  -DJ     O2 Delivery Intra Treatment supplemental O2  -DJ     O2 Delivery Post Treatment supplemental O2  -DJ     Pre Patient Position Sitting  -DJ     Intra Patient Position Standing  -DJ     Post Patient Position Supine  -DJ     Row Name 11/11/22 1038          Positioning and Restraints    Pre-Treatment Position sitting in chair/recliner  -DJ     Post Treatment Position bed  -DJ     In Bed supine;call light within reach;encouraged to call for assist;with nsg  -DJ           User Key  (r) = Recorded By, (t) = Taken By, (c) = Cosigned By    Initials Name Provider Type    Sravani Mcguire, PT Physical Therapist               Outcome Measures     Row Name 11/11/22 1047          How much help from another person do you  currently need...    Turning from your back to your side while in flat bed without using bedrails? 2  -DJ     Moving from lying on back to sitting on the side of a flat bed without bedrails? 2  -DJ     Moving to and from a bed to a chair (including a wheelchair)? 3  -DJ     Standing up from a chair using your arms (e.g., wheelchair, bedside chair)? 3  -DJ     Climbing 3-5 steps with a railing? 2  -DJ     To walk in hospital room? 2  -DJ     AM-PAC 6 Clicks Score (PT) 14  -DJ     Highest level of mobility 4 --> Transferred to chair/commode  -DJ     Row Name 11/11/22 1047          Functional Assessment    Outcome Measure Options AM-PAC 6 Clicks Basic Mobility (PT)  -DJ           User Key  (r) = Recorded By, (t) = Taken By, (c) = Cosigned By    Initials Name Provider Type    Sravani Mcguire, PT Physical Therapist                             Physical Therapy Education     Title: PT OT SLP Therapies (Done)     Topic: Physical Therapy (Done)     Point: Mobility training (Done)     Learning Progress Summary           Patient Acceptance, E, VU,NR by  at 11/11/2022 1049                   Point: Home exercise program (Done)     Learning Progress Summary           Patient Acceptance, E, VU,NR by  at 11/11/2022 1049                   Point: Body mechanics (Done)     Learning Progress Summary           Patient Acceptance, E, VU,NR by  at 11/11/2022 1049                   Point: Precautions (Done)     Learning Progress Summary           Patient Acceptance, E, VU,NR by  at 11/11/2022 1049                               User Key     Initials Effective Dates Name Provider Type Discipline     10/25/19 -  Sravani Downs, FRANCISCA Physical Therapist PT              PT Recommendation and Plan  Planned Therapy Interventions (PT): balance training, bed mobility training, gait training, home exercise program, strengthening, stair training, postural re-education, patient/family education, transfer training  Plan of Care Reviewed With:  patient  Outcome Evaluation: 70yo white female admitted 11/7/22 due to MI and CAD; now s/p stent and then CABG x 6 11/10/22. PMH includes hbp, IDDM, gerd, vit D deficiency, high cholesterol. PLOF: Pt lives in [facility] with 14 optional stairs to loft area. She was independent with ADLs including driving. She is limited now by expected c/o post-op pain, generalizeed weakness, and decreased activity tolerance that limit her functional mobility. Today, she was UIC in NAD, c/o sternal pain = 4/10. She stood from recliner x 2 attempts with min A of 2. Pt amb 5' from chair to bed with min A Of 2 - staggered steps with unsteady balance and poor endurance. Pt req mod A Of 2 to return supine in bed and was able to help raising legs. Pt instructed in seated ther ex and encouraged to not use UE to push up from chair. She would benefit from skilled PT services to address functional deficits. Anticipate d/c to son's home (who does have stairs).     Time Calculation:    PT Charges     Row Name 11/11/22 1049             Time Calculation    Start Time 1015  -DJ      Stop Time 1032  -DJ      Time Calculation (min) 17 min  -DJ      PT Non-Billable Time (min) 10 min  -DJ      PT Received On 11/11/22  -DJ      PT - Next Appointment 11/12/22  -DJ      PT Goal Re-Cert Due Date 11/25/22  -DJ            User Key  (r) = Recorded By, (t) = Taken By, (c) = Cosigned By    Initials Name Provider Type    Sravani Mcguire PT Physical Therapist              Therapy Charges for Today     Code Description Service Date Service Provider Modifiers Qty    43274835997 HC PT EVAL MOD COMPLEXITY 2 11/11/2022 Sravani Downs, PT GP 1    42094096571 HC PT THERAPEUTIC ACT EA 15 MIN 11/11/2022 Sravani Downs, PT GP 1    90847339252 HC PT THER SUPP EA 15 MIN 11/11/2022 Sravani Downs, PT GP 1          PT G-Codes  Outcome Measure Options: AM-PAC 6 Clicks Basic Mobility (PT)  AM-PAC 6 Clicks Score (PT): 14    Sravani Downs PT  11/11/2022

## 2022-11-12 ENCOUNTER — APPOINTMENT (OUTPATIENT)
Dept: GENERAL RADIOLOGY | Facility: HOSPITAL | Age: 72
End: 2022-11-12

## 2022-11-12 ENCOUNTER — APPOINTMENT (OUTPATIENT)
Dept: CT IMAGING | Facility: HOSPITAL | Age: 72
End: 2022-11-12

## 2022-11-12 PROBLEM — R93.89 ABNORMAL FINDINGS ON DIAGNOSTIC IMAGING OF OTHER SPECIFIED BODY STRUCTURES: Status: RESOLVED | Noted: 2022-11-07 | Resolved: 2022-11-12

## 2022-11-12 LAB
ANION GAP SERPL CALCULATED.3IONS-SCNC: 13 MMOL/L (ref 5–15)
BH BB BLOOD EXPIRATION DATE: NORMAL
BH BB BLOOD EXPIRATION DATE: NORMAL
BH BB BLOOD TYPE BARCODE: 600
BH BB BLOOD TYPE BARCODE: 600
BH BB DISPENSE STATUS: NORMAL
BH BB DISPENSE STATUS: NORMAL
BH BB PRODUCT CODE: NORMAL
BH BB PRODUCT CODE: NORMAL
BH BB UNIT NUMBER: NORMAL
BH BB UNIT NUMBER: NORMAL
BUN SERPL-MCNC: 26 MG/DL (ref 8–23)
BUN/CREAT SERPL: 20.6 (ref 7–25)
CALCIUM SPEC-SCNC: 8.7 MG/DL (ref 8.6–10.5)
CHLORIDE SERPL-SCNC: 106 MMOL/L (ref 98–107)
CO2 SERPL-SCNC: 20 MMOL/L (ref 22–29)
CREAT SERPL-MCNC: 1.26 MG/DL (ref 0.57–1)
CROSSMATCH INTERPRETATION: NORMAL
CROSSMATCH INTERPRETATION: NORMAL
DEPRECATED RDW RBC AUTO: 48.4 FL (ref 37–54)
EGFRCR SERPLBLD CKD-EPI 2021: 45.7 ML/MIN/1.73
ERYTHROCYTE [DISTWIDTH] IN BLOOD BY AUTOMATED COUNT: 14.5 % (ref 12.3–15.4)
GLUCOSE BLDC GLUCOMTR-MCNC: 155 MG/DL (ref 70–130)
GLUCOSE BLDC GLUCOMTR-MCNC: 161 MG/DL (ref 70–130)
GLUCOSE BLDC GLUCOMTR-MCNC: 167 MG/DL (ref 70–130)
GLUCOSE BLDC GLUCOMTR-MCNC: 176 MG/DL (ref 70–130)
GLUCOSE SERPL-MCNC: 167 MG/DL (ref 65–99)
HCT VFR BLD AUTO: 29.7 % (ref 34–46.6)
HGB BLD-MCNC: 9.6 G/DL (ref 12–15.9)
MCH RBC QN AUTO: 29.6 PG (ref 26.6–33)
MCHC RBC AUTO-ENTMCNC: 32.3 G/DL (ref 31.5–35.7)
MCV RBC AUTO: 91.7 FL (ref 79–97)
PLATELET # BLD AUTO: 136 10*3/MM3 (ref 140–450)
PMV BLD AUTO: 11.2 FL (ref 6–12)
POTASSIUM SERPL-SCNC: 4.6 MMOL/L (ref 3.5–5.2)
QT INTERVAL: 400 MS
RBC # BLD AUTO: 3.24 10*6/MM3 (ref 3.77–5.28)
SODIUM SERPL-SCNC: 139 MMOL/L (ref 136–145)
UNIT  ABO: NORMAL
UNIT  ABO: NORMAL
UNIT  RH: NORMAL
UNIT  RH: NORMAL
WBC NRBC COR # BLD: 8.96 10*3/MM3 (ref 3.4–10.8)

## 2022-11-12 PROCEDURE — 71045 X-RAY EXAM CHEST 1 VIEW: CPT

## 2022-11-12 PROCEDURE — 80048 BASIC METABOLIC PNL TOTAL CA: CPT | Performed by: NURSE PRACTITIONER

## 2022-11-12 PROCEDURE — 93010 ELECTROCARDIOGRAM REPORT: CPT | Performed by: INTERNAL MEDICINE

## 2022-11-12 PROCEDURE — 70450 CT HEAD/BRAIN W/O DYE: CPT

## 2022-11-12 PROCEDURE — 93005 ELECTROCARDIOGRAM TRACING: CPT | Performed by: NURSE PRACTITIONER

## 2022-11-12 PROCEDURE — 25010000002 CEFAZOLIN IN DEXTROSE 2-4 GM/100ML-% SOLUTION: Performed by: NURSE PRACTITIONER

## 2022-11-12 PROCEDURE — 25010000002 ENOXAPARIN PER 10 MG: Performed by: NURSE PRACTITIONER

## 2022-11-12 PROCEDURE — 93005 ELECTROCARDIOGRAM TRACING: CPT | Performed by: INTERNAL MEDICINE

## 2022-11-12 PROCEDURE — 25010000002 FUROSEMIDE PER 20 MG: Performed by: INTERNAL MEDICINE

## 2022-11-12 PROCEDURE — 85027 COMPLETE CBC AUTOMATED: CPT | Performed by: NURSE PRACTITIONER

## 2022-11-12 PROCEDURE — 99232 SBSQ HOSP IP/OBS MODERATE 35: CPT | Performed by: INTERNAL MEDICINE

## 2022-11-12 PROCEDURE — 97110 THERAPEUTIC EXERCISES: CPT

## 2022-11-12 PROCEDURE — 63710000001 INSULIN LISPRO (HUMAN) PER 5 UNITS: Performed by: THORACIC SURGERY (CARDIOTHORACIC VASCULAR SURGERY)

## 2022-11-12 PROCEDURE — 82962 GLUCOSE BLOOD TEST: CPT

## 2022-11-12 RX ORDER — CLOPIDOGREL BISULFATE 75 MG/1
75 TABLET ORAL DAILY
Status: DISCONTINUED | OUTPATIENT
Start: 2022-11-12 | End: 2022-11-12

## 2022-11-12 RX ORDER — INSULIN LISPRO 100 [IU]/ML
0-7 INJECTION, SOLUTION INTRAVENOUS; SUBCUTANEOUS
Status: DISCONTINUED | OUTPATIENT
Start: 2022-11-12 | End: 2022-11-19 | Stop reason: HOSPADM

## 2022-11-12 RX ORDER — DEXTROSE MONOHYDRATE 25 G/50ML
25 INJECTION, SOLUTION INTRAVENOUS
Status: DISCONTINUED | OUTPATIENT
Start: 2022-11-12 | End: 2022-11-19 | Stop reason: HOSPADM

## 2022-11-12 RX ORDER — FUROSEMIDE 10 MG/ML
40 INJECTION INTRAMUSCULAR; INTRAVENOUS ONCE
Status: COMPLETED | OUTPATIENT
Start: 2022-11-12 | End: 2022-11-12

## 2022-11-12 RX ORDER — NICOTINE POLACRILEX 4 MG
15 LOZENGE BUCCAL
Status: DISCONTINUED | OUTPATIENT
Start: 2022-11-12 | End: 2022-11-19 | Stop reason: HOSPADM

## 2022-11-12 RX ADMIN — CHLORHEXIDINE GLUCONATE 15 ML: 1.2 SOLUTION ORAL at 10:16

## 2022-11-12 RX ADMIN — CHLORHEXIDINE GLUCONATE 15 ML: 1.2 SOLUTION ORAL at 21:03

## 2022-11-12 RX ADMIN — LEVOTHYROXINE SODIUM 25 MCG: 0.03 TABLET ORAL at 05:10

## 2022-11-12 RX ADMIN — ENOXAPARIN SODIUM 40 MG: 100 INJECTION SUBCUTANEOUS at 19:00

## 2022-11-12 RX ADMIN — METOPROLOL TARTRATE 12.5 MG: 25 TABLET, FILM COATED ORAL at 21:04

## 2022-11-12 RX ADMIN — HYDROCODONE BITARTRATE AND ACETAMINOPHEN 2 TABLET: 5; 325 TABLET ORAL at 11:25

## 2022-11-12 RX ADMIN — CEFAZOLIN SODIUM 2 G: 2 INJECTION, SOLUTION INTRAVENOUS at 01:22

## 2022-11-12 RX ADMIN — HYDROCODONE BITARTRATE AND ACETAMINOPHEN 2 TABLET: 5; 325 TABLET ORAL at 21:02

## 2022-11-12 RX ADMIN — MUPIROCIN 1 APPLICATION: 20 OINTMENT TOPICAL at 21:04

## 2022-11-12 RX ADMIN — GABAPENTIN 200 MG: 100 CAPSULE ORAL at 21:02

## 2022-11-12 RX ADMIN — PANTOPRAZOLE SODIUM 40 MG: 40 TABLET, DELAYED RELEASE ORAL at 07:26

## 2022-11-12 RX ADMIN — INSULIN LISPRO 2 UNITS: 100 INJECTION, SOLUTION INTRAVENOUS; SUBCUTANEOUS at 11:26

## 2022-11-12 RX ADMIN — GABAPENTIN 200 MG: 100 CAPSULE ORAL at 10:17

## 2022-11-12 RX ADMIN — INSULIN GLARGINE-YFGN 10 UNITS: 100 INJECTION, SOLUTION SUBCUTANEOUS at 21:04

## 2022-11-12 RX ADMIN — DOCUSATE SODIUM 50MG AND SENNOSIDES 8.6MG 2 TABLET: 8.6; 5 TABLET, FILM COATED ORAL at 21:03

## 2022-11-12 RX ADMIN — ATORVASTATIN CALCIUM 40 MG: 20 TABLET, FILM COATED ORAL at 21:02

## 2022-11-12 RX ADMIN — INSULIN LISPRO 2 UNITS: 100 INJECTION, SOLUTION INTRAVENOUS; SUBCUTANEOUS at 07:25

## 2022-11-12 RX ADMIN — BISACODYL 10 MG: 5 TABLET ORAL at 19:01

## 2022-11-12 RX ADMIN — GABAPENTIN 200 MG: 100 CAPSULE ORAL at 19:01

## 2022-11-12 RX ADMIN — MUPIROCIN 1 APPLICATION: 20 OINTMENT TOPICAL at 10:18

## 2022-11-12 RX ADMIN — ASPIRIN 81 MG: 81 TABLET, COATED ORAL at 10:16

## 2022-11-12 RX ADMIN — POLYETHYLENE GLYCOL 3350 17 G: 17 POWDER, FOR SOLUTION ORAL at 19:01

## 2022-11-12 RX ADMIN — METOPROLOL TARTRATE 12.5 MG: 25 TABLET, FILM COATED ORAL at 10:17

## 2022-11-12 RX ADMIN — FUROSEMIDE 40 MG: 10 INJECTION, SOLUTION INTRAMUSCULAR; INTRAVENOUS at 11:25

## 2022-11-12 RX ADMIN — INSULIN LISPRO 2 UNITS: 100 INJECTION, SOLUTION INTRAVENOUS; SUBCUTANEOUS at 19:01

## 2022-11-12 NOTE — PROGRESS NOTES
"   LOS: 5 days   Patient Care Team:  Spencer Hua MD as PCP - General    Chief Complaint: STEMI     Interval History: Had garbled speech last night, but knew what she wanted to say and could tell that she was saying it wrong.        Objective   Vital Signs  Temp:  [97.6 °F (36.4 °C)-98.4 °F (36.9 °C)] 97.6 °F (36.4 °C)  Heart Rate:  [79-89] 80  Resp:  [16-17] 17  BP: ()/(60-74) 124/67  Arterial Line BP: (105-129)/(44-56) 122/47    Intake/Output Summary (Last 24 hours) at 11/12/2022 1059  Last data filed at 11/12/2022 0700  Gross per 24 hour   Intake 2637 ml   Output 2750 ml   Net -113 ml       Last Weight and Admission Weight        11/12/22  0600   Weight: 99.9 kg (220 lb 3.2 oz)     Flowsheet Rows    Flowsheet Row First Filed Value   Admission Height 172.7 cm (68\") Documented at 11/07/2022 0146   Admission Weight 102 kg (225 lb 14.4 oz) Documented at 11/07/2022 0146                  Physical Exam  Vitals reviewed.   Constitutional:       Appearance: She is well-developed.   HENT:      Head: Normocephalic.      Nose: Nose normal.   Eyes:      Conjunctiva/sclera: Conjunctivae normal.   Neck:      Vascular: No JVD.      Comments: Right neck TLC  Cardiovascular:      Rate and Rhythm: Normal rate and regular rhythm.      Pulses: Normal pulses.      Heart sounds: Normal heart sounds.   Pulmonary:      Effort: Pulmonary effort is normal.      Breath sounds: Examination of the left-lower field reveals decreased breath sounds. Decreased breath sounds present.   Abdominal:      Palpations: Abdomen is soft. There is no mass.      Tenderness: There is no abdominal tenderness.   Musculoskeletal:         General: No swelling. Normal range of motion.   Skin:     General: Skin is warm and dry.      Findings: No erythema.   Neurological:      General: No focal deficit present.      Mental Status: She is alert.      Cranial Nerves: No cranial nerve deficit.   Psychiatric:         Mood and Affect: Mood normal. "         Results Review:      Results from last 7 days   Lab Units 11/12/22 0314 11/11/22  1741 11/11/22  0323   SODIUM mmol/L 139 136 141   POTASSIUM mmol/L 4.6 4.5 4.4   CHLORIDE mmol/L 106 103 111*   CO2 mmol/L 20.0* 16.9* 17.0*   BUN mg/dL 26* 20 17   CREATININE mg/dL 1.26* 1.03* 1.03*   GLUCOSE mg/dL 167* 156* 184*   CALCIUM mg/dL 8.7 8.8 8.6     Results from last 7 days   Lab Units 11/07/22  0154   TROPONIN T ng/mL 0.044*     Results from last 7 days   Lab Units 11/12/22  0314 11/11/22  1741 11/11/22  0323   WBC 10*3/mm3 8.96 8.70 8.45   HEMOGLOBIN g/dL 9.6* 10.0* 7.5*   HEMATOCRIT % 29.7* 31.2* 23.5*   PLATELETS 10*3/mm3 136* 148 159     Results from last 7 days   Lab Units 11/11/22  0323 11/10/22  1649 11/07/22  0154   INR  1.45* 1.52* 1.08   APTT seconds  --  33.3 27.0     Results from last 7 days   Lab Units 11/08/22  0623   CHOLESTEROL mg/dL 137     Results from last 7 days   Lab Units 11/11/22  0323   MAGNESIUM mg/dL 3.1*     Results from last 7 days   Lab Units 11/08/22  0623   CHOLESTEROL mg/dL 137   TRIGLYCERIDES mg/dL 99   HDL CHOL mg/dL 57   LDL CHOL mg/dL 62       I reviewed the patient's new clinical results.  I personally viewed and interpreted the patient's EKG/Telemetry data        Medication Review:   aspirin, 81 mg, Oral, Daily  atorvastatin, 40 mg, Oral, Nightly  chlorhexidine, 15 mL, Mouth/Throat, Q12H  enoxaparin, 40 mg, Subcutaneous, Daily  gabapentin, 200 mg, Oral, TID  insulin lispro, 0-7 Units, Subcutaneous, TID AC  levothyroxine, 25 mcg, Oral, Q AM  metoprolol tartrate, 12.5 mg, Oral, Q12H  mupirocin, , Each Nare, BID  pantoprazole, 40 mg, Oral, QAM  senna-docusate sodium, 2 tablet, Oral, Nightly        clevidipine, 2-32 mg/hr  sodium chloride, 30 mL/hr, Last Rate: 30 mL/hr (11/10/22 9115)        Assessment & Plan     1. STEMI s/p PCI to diagonal (2.5x12mm MARTINEZ on 11/9/22)    2. CAD, POD#2 CABG x 5 (LIMA-LAD, SVG-RCA, SVG-OM1, T-graft to OM2, SVG-diag)    3. Mild LV systolic  dysfunction    4. DM2    5. HTN    6. Hyperlipidemia    7. Episode of dysarthria vs expressive aphasia last night lasting a few hours -- ? TIA     *Continue aspirin/statin/BB; resume clopidogrel per CTS note from yesterday  *Low dose furosemide today  *Tubes and wires and lines per CTS  *CT head given last night's event    Venkata Doll MD  11/12/22  10:59 EST

## 2022-11-12 NOTE — THERAPY TREATMENT NOTE
Patient Name: Mayra Hirsch  : 1950    MRN: [id number]                              Today's Date: 2022       Admit Date: 2022    Visit Dx:     ICD-10-CM ICD-9-CM   1. ST elevation myocardial infarction (STEMI), unspecified artery (HCC)  I21.3 410.90   2. ST elevation myocardial infarction (STEMI) involving other coronary artery (HCC)  I21.29 410.10   3. Coronary artery disease involving native heart, unspecified vessel or lesion type, unspecified whether angina present  I25.10 414.01   4. Abnormal findings on diagnostic imaging of other specified body structures  R93.89 793.99     Patient Active Problem List   Diagnosis   • Vitamin D deficiency   • Primary hypothyroidism   • Dyslipidemia   • Essential hypertension   • Type 2 diabetes mellitus without complication, with long-term current use of insulin (HCC)   • Noncompliance with diabetes treatment   • ST elevation myocardial infarction (STEMI) (HCC)   • Coronary artery disease involving native heart     Past Medical History:   Diagnosis Date   • Depression    • Diabetes mellitus (HCC)    • Disease of thyroid gland    • Fibrocystic breast    • Hypertension    • Hypothyroidism    • PONV (postoperative nausea and vomiting)    • Type 2 diabetes mellitus (HCC)      Past Surgical History:   Procedure Laterality Date   • BREAST EXCISIONAL BIOPSY Right     at age 22; benign.   • CARDIAC CATHETERIZATION Left 2022    Procedure: Cardiac Catheterization/Vascular Study;  Surgeon: Joanna Marie MD;  Location: [facility] INVASIVE LOCATION;  Service: Cardiology;  Laterality: Left;   • CARDIAC CATHETERIZATION N/A 2022    Procedure: Percutaneous Coronary Intervention;  Surgeon: Joanna Marie MD;  Location: [facility] CATH INVASIVE LOCATION;  Service: Cardiology;  Laterality: N/A;   • CARDIAC CATHETERIZATION N/A 2022    Procedure: Left ventriculography;  Surgeon: Joanna Marie MD;  Location: [facility] INVASIVE LOCATION;   Service: Cardiology;  Laterality: N/A;   • CARDIAC CATHETERIZATION N/A 2022    Procedure: Stent MARTINEZ coronary;  Surgeon: Joanna Marie MD;  Location: [facility] CATH INVASIVE LOCATION;  Service: Cardiology;  Laterality: N/A;   • CARDIAC CATHETERIZATION N/A 2022    Procedure: Left Heart Cath;  Surgeon: Joanna Marie MD;  Location:  [facility] CATH INVASIVE LOCATION;  Service: Cardiology;  Laterality: N/A;   •  SECTION     • CORONARY ARTERY BYPASS GRAFT N/A 11/10/2022    Procedure: NIKKY, CORONARY ARTERY BYPASS GRAFTING TIMES 6 WITH LEFT JORDYN AND ENDOSCOPICALLY HARVESTED LEFT AND RIGHT GREATER SAPHENOUS VEIN, PRP TRANSESOPHAGEAL ECHOCARDIOGRAM WITH ANESTHESIA;  Surgeon: Jr Dong Isabel MD;  Location: [facility];  Service: Cardiothoracic;  Laterality: N/A;   • HAND SURGERY     • KNEE SURGERY     • OOPHORECTOMY      1 ovary removed due to ectopic pregnancy      General Information     Row Name 22 1231          Physical Therapy Time and Intention    Document Type therapy note (daily note)  -     Mode of Treatment individual therapy;physical therapy  -     Row Name 22 1231          General Information    Patient Profile Reviewed yes  -     Existing Precautions/Restrictions cardiac;fall;sternal  -     Row Name 22 1231          Cognition    Orientation Status (Cognition) oriented x 3  -     Row Name 22 1231          Safety Issues, Functional Mobility    Impairments Affecting Function (Mobility) balance;strength;pain;endurance/activity tolerance  -           User Key  (r) = Recorded By, (t) = Taken By, (c) = Cosigned By    Initials Name Provider Type     Jaxson Gaffney PT Physical Therapist               Mobility     Row Name 22 1231          Bed Mobility    Sit-Supine [place] (Bed Mobility) moderate assist (50% patient effort);verbal cues;nonverbal cues (demo/gesture)  -     Assistive Device (Bed Mobility) bed rails;head of bed elevated  -   "   Row Name 11/12/22 1231          Sit-Stand Transfer    Sit-Stand [place] (Transfers) verbal cues;1 person assist;moderate assist (50% patient effort)  -     Assistive Device (Sit-Stand Transfers) --  HHA  -     Row Name 11/12/22 1231          Gait/Stairs (Locomotion)    [place] Level (Gait) verbal cues;nonverbal cues (demo/gesture);1 person assist;moderate assist (50% patient effort)  -     Distance in Feet (Gait) 3 sidesteps to HOB  -     Deviations/Abnormal Patterns (Gait) base of support, wide;abelardo decreased;festinating/shuffling;gait speed decreased;stride length decreased;antalgic  -           User Key  (r) = Recorded By, (t) = Taken By, (c) = Cosigned By    Initials Name Provider Type    Jaxson Yost, PT Physical Therapist               Obj/Interventions     Row Name 11/12/22 1232          Motor Skills    Therapeutic Exercise other (see comments)  Cardiac Protocol Level II 5 reps ea  -           User Key  (r) = Recorded By, (t) = Taken By, (c) = Cosigned By    Initials Name Provider Type    Jaxson Yost, PT Physical Therapist               Goals/Plan    No documentation.                Clinical Impression     Row Name 11/12/22 1232          Pain    Pretreatment Pain Rating 6/10  -     Posttreatment Pain Rating 6/10  -     Pain Location - chest  -     Pain Intervention(s) Repositioned;Ambulation/increased activity  -     Row Name 11/12/22 1232          Plan of Care Review    Plan of Care Reviewed With patient  -     Progress no change  -     Outcome Evaluation Pt presented to PT today with increased fatigue and soreness after \"I just got back in bed.\" She performed functional mobility with mod Ax1 and stood at EOB and took sidesteps toward HOB. She also performed seated exercises to mobilize shoulders and chest. Pt will continue to benefit from skilled PT to address remaining functional mobility deficits and to reach functional goals.  -     Row Name 11/12/22 " 1232          Positioning and Restraints    Pre-Treatment Position in bed  -CH     Post Treatment Position bed  -CH     In Bed supine;with nsg;call light within reach;encouraged to call for assist;exit alarm on  -CH           User Key  (r) = Recorded By, (t) = Taken By, (c) = Cosigned By    Initials Name Provider Type    Jaxson Yost, PT Physical Therapist               Outcome Measures     Row Name 11/12/22 1235          How much help from another person do you currently need...    Turning from your back to your side while in flat bed without using bedrails? 2  -CH     Moving from lying on back to sitting on the side of a flat bed without bedrails? 2  -CH     Moving to and from a bed to a chair (including a wheelchair)? 3  -CH     Standing up from a chair using your arms (e.g., wheelchair, bedside chair)? 3  -CH     Climbing 3-5 steps with a railing? 2  -CH     To walk in hospital room? 2  -CH     AM-PAC 6 Clicks Score (PT) 14  -CH     Highest level of mobility 4 --> Transferred to chair/commode  -     Row Name 11/12/22 1235          Functional Assessment    Outcome Measure Options AM-PAC 6 Clicks Basic Mobility (PT)  -           User Key  (r) = Recorded By, (t) = Taken By, (c) = Cosigned By    Initials Name Provider Type    Jaxson Yost, FRANCISCA Physical Therapist                             Physical Therapy Education     Title: PT OT SLP Therapies (Done)     Topic: Physical Therapy (Done)     Point: Mobility training (Done)     Learning Progress Summary           Patient Acceptance, E, VU by  at 11/12/2022 1235    Acceptance, E, VU,NR by MILTON at 11/11/2022 1049                   Point: Home exercise program (Done)     Learning Progress Summary           Patient Acceptance, E, VU by  at 11/12/2022 1235    Acceptance, E, VU,NR by MILTON at 11/11/2022 1049                   Point: Body mechanics (Done)     Learning Progress Summary           Patient Acceptance, E, VU by  at 11/12/2022 1235    Acceptance,  "E, VU,NR by  at 11/11/2022 1049                   Point: Precautions (Done)     Learning Progress Summary           Patient Acceptance, E, VU by  at 11/12/2022 1235    Acceptance, E, VU,NR by  at 11/11/2022 1049                               User Key     Initials Effective Dates Name Provider Type Discipline     06/16/21 -  Jaxson Gaffney, PT Physical Therapist PT    MILTON 10/25/19 -  Sravani Downs PT Physical Therapist PT              PT Recommendation and Plan     Plan of Care Reviewed With: patient  Progress: no change  Outcome Evaluation: Pt presented to PT today with increased fatigue and soreness after \"I just got back in bed.\" She performed functional mobility with mod Ax1 and stood at EOB and took sidesteps toward HOB. She also performed seated exercises to mobilize shoulders and chest. Pt will continue to benefit from skilled PT to address remaining functional mobility deficits and to reach functional goals.     Time Calculation:    PT Charges     Row Name 11/12/22 1236             Time Calculation    Start Time 1100  -      Stop Time 1124  -      Time Calculation (min) 24 min  -      PT Received On 11/12/22  -      PT - Next Appointment 11/13/22  -      PT Goal Re-Cert Due Date 11/25/22  -            User Key  (r) = Recorded By, (t) = Taken By, (c) = Cosigned By    Initials Name Provider Type     Jaxson Gaffney, PT Physical Therapist              Therapy Charges for Today     Code Description Service Date Service Provider Modifiers Qty    76037958679 HC PT THER PROC EA 15 MIN 11/12/2022 Jaxson Gaffney, PT GP 2          PT G-Codes  Outcome Measure Options: AM-PAC 6 Clicks Basic Mobility (PT)  AM-PAC 6 Clicks Score (PT): 14    Jaxson Gaffney PT  11/12/2022    "

## 2022-11-12 NOTE — PLAN OF CARE
Goal Outcome Evaluation:            Diuretic in Use: lasix  Response to Diuretics (Output greater than intake): output < intake  Daily Weight (up or down): up  O2 Requirements: same  Functional Status (Activity level, tolerance and respiratory symptoms): decreased  Discharge Plans: home with home care

## 2022-11-12 NOTE — PLAN OF CARE
"Goal Outcome Evaluation:  Plan of Care Reviewed With: patient        Progress: no change  Outcome Evaluation: Pt presented to PT today with increased fatigue and soreness after \"I just got back in bed.\" She performed functional mobility with mod Ax1 and stood at EOB and took sidesteps toward HOB. She also performed seated exercises to mobilize shoulders and chest. Pt will continue to benefit from skilled PT to address remaining functional mobility deficits and to reach functional goals.    Patient was not wearing a face mask during this therapy encounter. Therapist used appropriate personal protective equipment including mask and gloves.  Mask used was standard procedure mask. Appropriate PPE was worn during the entire therapy session. Hand hygiene was completed before and after therapy session. Patient is not in enhanced droplet precautions.    "

## 2022-11-12 NOTE — NURSING NOTE
Upon transfer to unit, CRISTIAN device shows air leak on display. Patient's pain is well controlled. Vitals WNL, saturating well on 2L nasal canula. Will continue to monitor.

## 2022-11-12 NOTE — PROGRESS NOTES
"Daily progress note    Referring physician  Dr. BECKFORD    Chief complaint  Awake and alert with no new complaints    History of present illness  71-year-old white female with history of diabetes hypertension hyperlipidemia hypothyroidism presented to [facility] emergency room with sudden onset of shortness of breath as she woke up with it.  Patient has no chest pain palpitation but does have nonproductive cough but no fever chills.  Patient found to be hypoxic while EMS arrived and brought to the emergency room which she found to have acute ST elevation MI and taken to the Cath Lab which showed multivessel coronary disease  angioplasty and stent placed to diagonal branch and I am asked to follow patient for medical problem.  At the time of review she is feeling better and has no more shortness of breath and also denies any chest pain.  Patient feels weak but feeling much better after angioplasty.     REVIEW OF SYSTEMS  Unremarkable except generalized weakness    PHYSICAL EXAM         Blood pressure 111/74, pulse 75, temperature 98.1 °F (36.7 °C), temperature source Oral, resp. rate 16, height 170.2 cm (67\"), weight 99.9 kg (220 lb 3.2 oz), SpO2 100 %, not currently breastfeeding.    Constitutional:       General: She is not in acute distress.  HENT:      Head: Normocephalic and atraumatic.   Eyes:      Extraocular Movements: EOM normal.      Pupils: Pupils are equal, round, and reactive to light.   Cardiovascular:      Rate and Rhythm: Normal rate and regular rhythm.      Heart sounds: Normal heart sounds.   Pulmonary:      Effort: Pulmonary effort is normal. No respiratory distress.      Breath sounds: Examination of the right-middle field reveals rhonchi. Examination of the left-middle field reveals rhonchi. Examination of the right-lower field reveals rhonchi. Examination of the left-lower field reveals rhonchi. Rhonchi present.   Abdominal:      Palpations: Abdomen is soft.      Tenderness: There is no " abdominal tenderness. There is no guarding or rebound.   Musculoskeletal:         General: No edema. Normal range of motion.      Cervical back: Normal range of motion and neck supple.   Skin:     General: Skin is warm and dry.      Findings: No rash.   Neurological:      Mental Status: She is alert and oriented to person, place, and time.      Sensory: Sensation is intact.      Motor: Motor strength is normal.   Psychiatric:         Mood and Affect: Mood and affect normal.      LAB RESULTS  Lab Results (last 24 hours)     Procedure Component Value Units Date/Time    POC Glucose Once [[id number]]  (Abnormal) Collected: 11/12/22 1109    Specimen: Blood Updated: 11/12/22 1110     Glucose 176 mg/dL      Comment: Meter: [id number] : [id number] Casiekailyn Bañuelos NAZIA       POC Glucose Once [[id number]]  (Abnormal) Collected: 11/12/22 0618    Specimen: Blood Updated: 11/12/22 0628     Glucose 167 mg/dL      Comment: Meter: [id number] : [id number] Naida No CNA       Basic Metabolic Panel [[id number]]  (Abnormal) Collected: 11/12/22 0314    Specimen: Blood Updated: 11/12/22 0408     Glucose 167 mg/dL      BUN 26 mg/dL      Creatinine 1.26 mg/dL      Sodium 139 mmol/L      Potassium 4.6 mmol/L      Chloride 106 mmol/L      CO2 20.0 mmol/L      Calcium 8.7 mg/dL      BUN/Creatinine Ratio 20.6     Anion Gap 13.0 mmol/L      eGFR 45.7 mL/min/1.73      Comment: National Kidney Foundation and American Society of Nephrology (ASN) Task Force recommended calculation based on the Chronic Kidney Disease Epidemiology Collaboration (CKD-EPI) equation refit without adjustment for race.       Narrative:      GFR Normal >60  Chronic Kidney Disease <60  Kidney Failure <15    The GFR formula is only valid for adults with stable renal function between ages 18 and 70.    CBC (No Diff) [[id number]]  (Abnormal) Collected: 11/12/22 0314    Specimen: Blood Updated: 11/12/22 0350     WBC 8.96 10*3/mm3      RBC 3.24 10*6/mm3      Hemoglobin 9.6  g/dL      Hematocrit 29.7 %      MCV 91.7 fL      MCH 29.6 pg      MCHC 32.3 g/dL      RDW 14.5 %      RDW-SD 48.4 fl      MPV 11.2 fL      Platelets 136 10*3/mm3     Basic Metabolic Panel [[id number]]  (Abnormal) Collected: 11/11/22 1741    Specimen: Blood Updated: 11/11/22 1825     Glucose 156 mg/dL      BUN 20 mg/dL      Creatinine 1.03 mg/dL      Sodium 136 mmol/L      Potassium 4.5 mmol/L      Chloride 103 mmol/L      CO2 16.9 mmol/L      Calcium 8.8 mg/dL      BUN/Creatinine Ratio 19.4     Anion Gap 16.1 mmol/L      eGFR 58.3 mL/min/1.73      Comment: National Kidney Foundation and American Society of Nephrology (ASN) Task Force recommended calculation based on the Chronic Kidney Disease Epidemiology Collaboration (CKD-EPI) equation refit without adjustment for race.       Narrative:      GFR Normal >60  Chronic Kidney Disease <60  Kidney Failure <15    The GFR formula is only valid for adults with stable renal function between ages 18 and 70.    CBC (No Diff) [[id number]]  (Abnormal) Collected: 11/11/22 1741    Specimen: Blood Updated: 11/11/22 1812     WBC 8.70 10*3/mm3      RBC 3.41 10*6/mm3      Hemoglobin 10.0 g/dL      Hematocrit 31.2 %      MCV 91.5 fL      MCH 29.3 pg      MCHC 32.1 g/dL      RDW 14.8 %      RDW-SD 49.3 fl      MPV 11.5 fL      Platelets 148 10*3/mm3     POC Glucose Once [[id number]]  (Abnormal) Collected: 11/11/22 1740    Specimen: Blood Updated: 11/11/22 1742     Glucose 160 mg/dL      Comment: Meter: [id number] : [id number] Alexandr Mccarty RN           Imaging Results (Last 24 Hours)     Procedure Component Value Units Date/Time    XR Chest 1 View [[id number]] Resulted: 11/12/22 1534     Updated: 11/12/22 1535    CT Head Without Contrast [[id number]] Collected: 11/12/22 1222     Updated: 11/12/22 1228    Narrative:      CT SCAN OF THE BRAIN WITHOUT CONTRAST     HISTORY: Recent cardiac surgery. Episode of aphasia. Possible TIA.     The CT scan was performed through the brain without  contrast. The  ventricles are normal in size and midline. There is no evidence of  intracranial hemorrhage or mass effect. The visualized sinuses and  mastoid air cells are clear.                 Radiation dose reduction techniques were utilized, including automated  exposure control and exposure modulation based on body size.     This report was finalized on 11/12/2022 12:25 PM by Dr. Og Earl M.D.       XR Chest 1 View [[id number]] Collected: 11/12/22 0618     Updated: 11/12/22 0625    Narrative:      ONE VIEW PORTABLE CHEST AT 4:29 AM     HISTORY: Recent cardiac surgery. Atelectasis.     FINDINGS: The patient has had recent cardiac surgery with mediastinal  and bilateral chest tubes in place and there is no evidence of  pneumothorax. There is some scattered atelectasis, particularly at the  left base and showing no change from yesterday's exam. The heart remains  slightly enlarged. A right jugular sheath ends in the SVC.     This report was finalized on 11/12/2022 6:22 AM by Dr. Og Earl M.D.              ECG 12 Lead          Component Ref Range & Units 01:44    QT Interval ms 358 P    Resulting Agency   ECG             HEART RATE= 116  bpm  RR Interval= 517  ms  AZ Interval= 110  ms  P Horizontal Axis= -49  deg  P Front Axis= 64  deg  QRSD Interval= 97  ms  QT Interval= 358  ms  QRS Axis= 18  deg  T Wave Axis= 130  deg  - ABNORMAL ECG -  Sinus tachycardia  Probable left atrial enlargement  Lateral infarct, acute (LAD)  Probable anteroseptal infarct, recent             Current Facility-Administered Medications:   •  acetaminophen (TYLENOL) tablet 650 mg, 650 mg, Oral, Q4H PRN, 650 mg at 11/11/22 1744 **OR** acetaminophen (TYLENOL) 160 MG/5ML solution 650 mg, 650 mg, Oral, Q4H PRN **OR** acetaminophen (TYLENOL) suppository 650 mg, 650 mg, Rectal, Q4H PRN, Alisha Goldendsay, APRN  •  ALPRAZolam (XANAX) tablet 0.25 mg, 0.25 mg, Oral, Q8H PRN, Golden, Martha, APRN, 0.25 mg at 11/11/22 1744  •  aspirin  EC tablet 81 mg, 81 mg, Oral, Daily, Martha Golden APRN, 81 mg at 11/12/22 1016  •  atorvastatin (LIPITOR) tablet 40 mg, 40 mg, Oral, Nightly, Martha oGlden APRN, 40 mg at 11/11/22 2002  •  bisacodyl (DULCOLAX) EC tablet 10 mg, 10 mg, Oral, Daily PRN, Martha Golden APRN  •  bisacodyl (DULCOLAX) suppository 10 mg, 10 mg, Rectal, Daily PRN, Martha Golden APRN  •  chlorhexidine (PERIDEX) 0.12 % solution 15 mL, 15 mL, Mouth/Throat, Q12H, Martha Golden APRN, 15 mL at 11/12/22 1016  •  clevidipine (CLEVIPREX) infusion 0.5 mg/mL, 2-32 mg/hr, Intravenous, Continuous PRN, Martha Golden APRN  •  cyclobenzaprine (FLEXERIL) tablet 10 mg, 10 mg, Oral, Q8H PRN, Martha Golden APRN, 10 mg at 11/11/22 2002  •  dextrose (D50W) (25 g/50 mL) IV injection 25 g, 25 g, Intravenous, Q15 Min PRN, Breann Giron MD  •  dextrose (GLUTOSE) oral gel 15 g, 15 g, Oral, Q15 Min PRN, Breann Giron MD  •  Enoxaparin Sodium (LOVENOX) syringe 40 mg, 40 mg, Subcutaneous, Daily, Martha Golden APRN, 40 mg at 11/11/22 1920  •  gabapentin (NEURONTIN) capsule 200 mg, 200 mg, Oral, TID, Le Edwards, APRN, 200 mg at 11/12/22 1017  •  glucagon (human recombinant) (GLUCAGEN DIAGNOSTIC) injection 1 mg, 1 mg, Intramuscular, Q15 Min PRN, Breann Giron MD  •  HYDROcodone-acetaminophen (NORCO) 5-325 MG per tablet 2 tablet, 2 tablet, Oral, Q4H PRN, Martha Golden APRN, 2 tablet at 11/12/22 1125  •  insulin lispro (ADMELOG) injection 0-7 Units, 0-7 Units, Subcutaneous, TID AC, Breann Giron MD, 2 Units at 11/12/22 1126  •  levothyroxine (SYNTHROID, LEVOTHROID) tablet 25 mcg, 25 mcg, Oral, Q AM, Martha Golden APRN, 25 mcg at 11/12/22 0510  •  magnesium hydroxide (MILK OF MAGNESIA) suspension 10 mL, 10 mL, Oral, Daily PRN, Martha Golden APRN  •  metoprolol tartrate (LOPRESSOR) tablet 12.5 mg, 12.5 mg, Oral, Q12H, Martha Golden APRN, 12.5 mg at 11/12/22 1017  •  midazolam (VERSED) injection 2 mg, 2 mg,  Intravenous, Q1H PRN, Martha Golden APRN  •  morphine injection 1 mg, 1 mg, Intravenous, Q4H PRN, 1 mg at 11/11/22 1000 **AND** naloxone (NARCAN) injection 0.4 mg, 0.4 mg, Intravenous, Q5 Min PRN, Martha Golden APRN  •  morphine injection 4 mg, 4 mg, Intravenous, Q30 Min PRN, Martha Golden APRN  •  mupirocin (BACTROBAN) 2 % nasal ointment, , Each Nare, BID, Martha Golden APRN, 1 application at 11/12/22 1018  •  ondansetron (ZOFRAN) injection 4 mg, 4 mg, Intravenous, Q6H PRN, Martha Golden APRN, 4 mg at 11/11/22 0417  •  oxyCODONE (ROXICODONE) immediate release tablet 10 mg, 10 mg, Oral, Q4H PRN, Martha Golden APRN, 10 mg at 11/11/22 2221  •  [COMPLETED] pantoprazole (PROTONIX) injection 40 mg, 40 mg, Intravenous, Once, 40 mg at 11/10/22 1658 **FOLLOWED BY** pantoprazole (PROTONIX) EC tablet 40 mg, 40 mg, Oral, QAM, Martha Golden, APRN, 40 mg at 11/12/22 0726  •  polyethylene glycol (MIRALAX) packet 17 g, 17 g, Oral, Daily PRN, Martha Golden APRN  •  potassium chloride (K-DUR,KLOR-CON) ER tablet 40 mEq, 40 mEq, Oral, PRN **OR** potassium chloride (KLOR-CON) packet 40 mEq, 40 mEq, Oral, PRN, Martha Golden, APRN  •  potassium chloride 10 mEq in 100 mL IVPB, 10 mEq, Intravenous, Q1H PRN **OR** potassium chloride 10 mEq in 100 mL IVPB, 10 mEq, Intravenous, Q1H PRN, Martha Golden, APRN  •  potassium chloride 20 mEq in 50 mL IVPB, 20 mEq, Intravenous, Q1H PRN, Martha Golden, APRN  •  potassium chloride 20 mEq in 50 mL IVPB, 20 mEq, Intravenous, Q1H PRN, Martha Golden APRN  •  potassium chloride 20 mEq in 50 mL IVPB, 20 mEq, Intravenous, Q1H PRN, Martha Golden APRN  •  sennosides-docusate (PERICOLACE) 8.6-50 MG per tablet 2 tablet, 2 tablet, Oral, Nightly, Martha Golden APRN, 2 tablet at 11/11/22 2002  •  sodium chloride 0.9 % infusion, 30 mL/hr, Intravenous, Continuous, Martha Golden APRN, Last Rate: 30 mL/hr at 11/10/22 1655, 30 mL/hr at 11/10/22 1655      ASSESSMENT  Multivessel coronary artery disease s/p CABG postop day 2  Acute ST relation MI s/p angioplasty and stent   Acute Klebsiella UTI  Diabetes mellitus  Hypertension  Hyperlipidemia  Hypothyroidism  Gastroesophageal reflux disease    PLAN  CPM  Postop care  Post PCI care  Continue home medications  Stress ulcer DVT prophylaxis  Accu-Cheks sliding scale insulin  Supportive care  PT/OT  Discussed with nursing staff  We will follow with Dr. BECKFORD and further recommendation current hospital course    DEE FRENCH MD

## 2022-11-13 ENCOUNTER — APPOINTMENT (OUTPATIENT)
Dept: GENERAL RADIOLOGY | Facility: HOSPITAL | Age: 72
End: 2022-11-13

## 2022-11-13 LAB
ANION GAP SERPL CALCULATED.3IONS-SCNC: 12 MMOL/L (ref 5–15)
BUN SERPL-MCNC: 42 MG/DL (ref 8–23)
BUN/CREAT SERPL: 31.6 (ref 7–25)
CALCIUM SPEC-SCNC: 8.4 MG/DL (ref 8.6–10.5)
CHLORIDE SERPL-SCNC: 104 MMOL/L (ref 98–107)
CO2 SERPL-SCNC: 19 MMOL/L (ref 22–29)
CREAT SERPL-MCNC: 1.33 MG/DL (ref 0.57–1)
DEPRECATED RDW RBC AUTO: 46.6 FL (ref 37–54)
EGFRCR SERPLBLD CKD-EPI 2021: 42.9 ML/MIN/1.73
ERYTHROCYTE [DISTWIDTH] IN BLOOD BY AUTOMATED COUNT: 14.3 % (ref 12.3–15.4)
GLUCOSE BLDC GLUCOMTR-MCNC: 149 MG/DL (ref 70–130)
GLUCOSE BLDC GLUCOMTR-MCNC: 162 MG/DL (ref 70–130)
GLUCOSE BLDC GLUCOMTR-MCNC: 167 MG/DL (ref 70–130)
GLUCOSE BLDC GLUCOMTR-MCNC: 185 MG/DL (ref 70–130)
GLUCOSE SERPL-MCNC: 140 MG/DL (ref 65–99)
HCT VFR BLD AUTO: 28 % (ref 34–46.6)
HGB BLD-MCNC: 9.2 G/DL (ref 12–15.9)
MCH RBC QN AUTO: 29.2 PG (ref 26.6–33)
MCHC RBC AUTO-ENTMCNC: 32.9 G/DL (ref 31.5–35.7)
MCV RBC AUTO: 88.9 FL (ref 79–97)
PLATELET # BLD AUTO: 146 10*3/MM3 (ref 140–450)
PMV BLD AUTO: 11.4 FL (ref 6–12)
POTASSIUM SERPL-SCNC: 4.3 MMOL/L (ref 3.5–5.2)
RBC # BLD AUTO: 3.15 10*6/MM3 (ref 3.77–5.28)
SODIUM SERPL-SCNC: 135 MMOL/L (ref 136–145)
WBC NRBC COR # BLD: 8.73 10*3/MM3 (ref 3.4–10.8)

## 2022-11-13 PROCEDURE — 80048 BASIC METABOLIC PNL TOTAL CA: CPT | Performed by: NURSE PRACTITIONER

## 2022-11-13 PROCEDURE — 25010000002 ENOXAPARIN PER 10 MG: Performed by: NURSE PRACTITIONER

## 2022-11-13 PROCEDURE — 82962 GLUCOSE BLOOD TEST: CPT

## 2022-11-13 PROCEDURE — 71046 X-RAY EXAM CHEST 2 VIEWS: CPT

## 2022-11-13 PROCEDURE — 99024 POSTOP FOLLOW-UP VISIT: CPT | Performed by: THORACIC SURGERY (CARDIOTHORACIC VASCULAR SURGERY)

## 2022-11-13 PROCEDURE — 97110 THERAPEUTIC EXERCISES: CPT

## 2022-11-13 PROCEDURE — 71045 X-RAY EXAM CHEST 1 VIEW: CPT

## 2022-11-13 PROCEDURE — 99232 SBSQ HOSP IP/OBS MODERATE 35: CPT | Performed by: INTERNAL MEDICINE

## 2022-11-13 PROCEDURE — 63710000001 INSULIN LISPRO (HUMAN) PER 5 UNITS: Performed by: THORACIC SURGERY (CARDIOTHORACIC VASCULAR SURGERY)

## 2022-11-13 PROCEDURE — 85027 COMPLETE CBC AUTOMATED: CPT | Performed by: NURSE PRACTITIONER

## 2022-11-13 RX ADMIN — METOPROLOL TARTRATE 12.5 MG: 25 TABLET, FILM COATED ORAL at 09:06

## 2022-11-13 RX ADMIN — INSULIN LISPRO 2 UNITS: 100 INJECTION, SOLUTION INTRAVENOUS; SUBCUTANEOUS at 17:37

## 2022-11-13 RX ADMIN — GABAPENTIN 200 MG: 100 CAPSULE ORAL at 22:13

## 2022-11-13 RX ADMIN — INSULIN LISPRO 2 UNITS: 100 INJECTION, SOLUTION INTRAVENOUS; SUBCUTANEOUS at 11:38

## 2022-11-13 RX ADMIN — METOPROLOL TARTRATE 12.5 MG: 25 TABLET, FILM COATED ORAL at 22:13

## 2022-11-13 RX ADMIN — GABAPENTIN 200 MG: 100 CAPSULE ORAL at 17:37

## 2022-11-13 RX ADMIN — HYDROCODONE BITARTRATE AND ACETAMINOPHEN 2 TABLET: 5; 325 TABLET ORAL at 09:07

## 2022-11-13 RX ADMIN — MUPIROCIN 1 APPLICATION: 20 OINTMENT TOPICAL at 22:14

## 2022-11-13 RX ADMIN — ATORVASTATIN CALCIUM 40 MG: 20 TABLET, FILM COATED ORAL at 22:13

## 2022-11-13 RX ADMIN — MUPIROCIN 1 APPLICATION: 20 OINTMENT TOPICAL at 09:06

## 2022-11-13 RX ADMIN — CYCLOBENZAPRINE 10 MG: 10 TABLET, FILM COATED ORAL at 22:13

## 2022-11-13 RX ADMIN — ALPRAZOLAM 0.25 MG: 0.25 TABLET ORAL at 22:13

## 2022-11-13 RX ADMIN — ENOXAPARIN SODIUM 40 MG: 100 INJECTION SUBCUTANEOUS at 22:19

## 2022-11-13 RX ADMIN — ASPIRIN 81 MG: 81 TABLET, COATED ORAL at 09:07

## 2022-11-13 RX ADMIN — LEVOTHYROXINE SODIUM 25 MCG: 0.03 TABLET ORAL at 06:16

## 2022-11-13 RX ADMIN — CHLORHEXIDINE GLUCONATE 15 ML: 1.2 SOLUTION ORAL at 22:13

## 2022-11-13 RX ADMIN — INSULIN GLARGINE-YFGN 15 UNITS: 100 INJECTION, SOLUTION SUBCUTANEOUS at 22:14

## 2022-11-13 RX ADMIN — PANTOPRAZOLE SODIUM 40 MG: 40 TABLET, DELAYED RELEASE ORAL at 06:16

## 2022-11-13 RX ADMIN — DOCUSATE SODIUM 50MG AND SENNOSIDES 8.6MG 2 TABLET: 8.6; 5 TABLET, FILM COATED ORAL at 22:13

## 2022-11-13 RX ADMIN — CHLORHEXIDINE GLUCONATE 15 ML: 1.2 SOLUTION ORAL at 09:06

## 2022-11-13 RX ADMIN — GABAPENTIN 200 MG: 100 CAPSULE ORAL at 09:06

## 2022-11-13 RX ADMIN — OXYCODONE HYDROCHLORIDE 10 MG: 5 TABLET ORAL at 22:20

## 2022-11-13 RX ADMIN — BISACODYL 10 MG: 5 TABLET ORAL at 22:13

## 2022-11-13 NOTE — THERAPY TREATMENT NOTE
Patient Name: Mayra Hirsch  : 1950    MRN: [id number]                              Today's Date: 2022       Admit Date: 2022    Visit Dx:     ICD-10-CM ICD-9-CM   1. ST elevation myocardial infarction (STEMI), unspecified artery (HCC)  I21.3 410.90   2. ST elevation myocardial infarction (STEMI) involving other coronary artery (HCC)  I21.29 410.10   3. Coronary artery disease involving native heart, unspecified vessel or lesion type, unspecified whether angina present  I25.10 414.01   4. Abnormal findings on diagnostic imaging of other specified body structures  R93.89 793.99     Patient Active Problem List   Diagnosis   • Vitamin D deficiency   • Primary hypothyroidism   • Dyslipidemia   • Essential hypertension   • Type 2 diabetes mellitus without complication, with long-term current use of insulin (HCC)   • Noncompliance with diabetes treatment   • ST elevation myocardial infarction (STEMI) (HCC)   • Coronary artery disease involving native heart     Past Medical History:   Diagnosis Date   • Depression    • Diabetes mellitus (HCC)    • Disease of thyroid gland    • Fibrocystic breast    • Hypertension    • Hypothyroidism    • PONV (postoperative nausea and vomiting)    • Type 2 diabetes mellitus (HCC)      Past Surgical History:   Procedure Laterality Date   • BREAST EXCISIONAL BIOPSY Right     at age 22; benign.   • CARDIAC CATHETERIZATION Left 2022    Procedure: Cardiac Catheterization/Vascular Study;  Surgeon: Joanna Marie MD;  Location: [facility] INVASIVE LOCATION;  Service: Cardiology;  Laterality: Left;   • CARDIAC CATHETERIZATION N/A 2022    Procedure: Percutaneous Coronary Intervention;  Surgeon: Joanna Marie MD;  Location: [facility] CATH INVASIVE LOCATION;  Service: Cardiology;  Laterality: N/A;   • CARDIAC CATHETERIZATION N/A 2022    Procedure: Left ventriculography;  Surgeon: Joanna Marie MD;  Location: [facility] INVASIVE LOCATION;   Service: Cardiology;  Laterality: N/A;   • CARDIAC CATHETERIZATION N/A 2022    Procedure: Stent MARTINEZ coronary;  Surgeon: Joanna Marie MD;  Location: [facility] CATH INVASIVE LOCATION;  Service: Cardiology;  Laterality: N/A;   • CARDIAC CATHETERIZATION N/A 2022    Procedure: Left Heart Cath;  Surgeon: Joanna Marie MD;  Location:  [facility] CATH INVASIVE LOCATION;  Service: Cardiology;  Laterality: N/A;   •  SECTION     • CORONARY ARTERY BYPASS GRAFT N/A 11/10/2022    Procedure: NIKKY, CORONARY ARTERY BYPASS GRAFTING TIMES 6 WITH LEFT JORDYN AND ENDOSCOPICALLY HARVESTED LEFT AND RIGHT GREATER SAPHENOUS VEIN, PRP TRANSESOPHAGEAL ECHOCARDIOGRAM WITH ANESTHESIA;  Surgeon: Jr Dong Isabel MD;  Location: [facility];  Service: Cardiothoracic;  Laterality: N/A;   • HAND SURGERY     • KNEE SURGERY     • OOPHORECTOMY      1 ovary removed due to ectopic pregnancy      General Information     Row Name 22 1006          Physical Therapy Time and Intention    Document Type therapy note (daily note)  -     Mode of Treatment individual therapy;physical therapy  -     Row Name 22 1006          General Information    Patient Profile Reviewed yes  -     Existing Precautions/Restrictions cardiac;fall;sternal  -     Row Name 22 1006          Cognition    Orientation Status (Cognition) oriented x 3  -     Row Name 22 1006          Safety Issues, Functional Mobility    Impairments Affecting Function (Mobility) balance;strength;pain;endurance/activity tolerance  -           User Key  (r) = Recorded By, (t) = Taken By, (c) = Cosigned By    Initials Name Provider Type     Jaxson Gaffney PT Physical Therapist               Mobility     Row Name 22 1006          Bed Mobility    Bed Mobility supine-sit;sit-supine  -     Supine-Sit Weber (Bed Mobility) moderate assist (50% patient effort);2 person assist  -     Sit-Supine Weber (Bed Mobility) moderate  assist (50% patient effort);2 person assist  -     Assistive Device (Bed Mobility) head of bed elevated  -     Row Name 11/13/22 1006          Sit-Stand Transfer    Sit-Stand [place] (Transfers) verbal cues;moderate assist (50% patient effort);2 person assist;nonverbal cues (demo/gesture)  -     Assistive Device (Sit-Stand Transfers) --  B HHA  -     Row Name 11/13/22 1006          Gait/Stairs (Locomotion)    [place] Level (Gait) verbal cues;nonverbal cues (demo/gesture);moderate assist (50% patient effort);2 person assist  -     Assistive Device (Gait) --  B A  -     Distance in Feet (Gait) 5' fwd/bwd (x2)  -     Deviations/Abnormal Patterns (Gait) base of support, wide;abelardo decreased;festinating/shuffling;gait speed decreased;stride length decreased;antalgic  -     Bilateral Gait Deviations forward flexed posture  -           User Key  (r) = Recorded By, (t) = Taken By, (c) = Cosigned By    Initials Name Provider Type    Jaxson Yost, FRANCISCA Physical Therapist               Obj/Interventions     Row Name 11/13/22 1008          Motor Skills    Therapeutic Exercise other (see comments)  Cardiac Protocol Level II 5 reps ea  -           User Key  (r) = Recorded By, (t) = Taken By, (c) = Cosigned By    Initials Name Provider Type    Jaxson Yost, FRANCISCA Physical Therapist               Goals/Plan    No documentation.                Clinical Impression     Row Name 11/13/22 1008          Pain    Pretreatment Pain Rating 6/10  -     Posttreatment Pain Rating 6/10  -     Row Name 11/13/22 1008          Plan of Care Review    Plan of Care Reviewed With patient  -CH     Progress improving  -     Row Name 11/13/22 1008          Positioning and Restraints    Pre-Treatment Position in bed  -     Post Treatment Position bed  -     In Bed supine;call light within reach;encouraged to call for assist;exit alarm on  -           User Key  (r) = Recorded By, (t) = Taken By, (c) =  Cosigned By    Initials Name Provider Type    Jaxson Yost, PT Physical Therapist               Outcome Measures     Row Name 11/13/22 1009          How much help from another person do you currently need...    Turning from your back to your side while in flat bed without using bedrails? 2  -CH     Moving from lying on back to sitting on the side of a flat bed without bedrails? 2  -CH     Moving to and from a bed to a chair (including a wheelchair)? 3  -CH     Standing up from a chair using your arms (e.g., wheelchair, bedside chair)? 3  -CH     Climbing 3-5 steps with a railing? 2  -CH     To walk in hospital room? 3  -CH     AM-PAC 6 Clicks Score (PT) 15  -CH     Highest level of mobility 4 --> Transferred to chair/commode  -     Row Name 11/13/22 1009          Functional Assessment    Outcome Measure Options AM-PAC 6 Clicks Basic Mobility (PT)  -           User Key  (r) = Recorded By, (t) = Taken By, (c) = Cosigned By    Initials Name Provider Type    Jaxson Yost, FRANCISCA Physical Therapist                             Physical Therapy Education     Title: PT OT SLP Therapies (Done)     Topic: Physical Therapy (Done)     Point: Mobility training (Done)     Learning Progress Summary           Patient Acceptance, E, VU by  at 11/13/2022 1009    Acceptance, E, VU by  at 11/12/2022 1235    Acceptance, E, VU,NR by MILTON at 11/11/2022 1049                   Point: Home exercise program (Done)     Learning Progress Summary           Patient Acceptance, E, VU by  at 11/13/2022 1009    Acceptance, E, VU by  at 11/12/2022 1235    Acceptance, E, VU,NR by MILTON at 11/11/2022 1049                   Point: Body mechanics (Done)     Learning Progress Summary           Patient Acceptance, E, VU by  at 11/13/2022 1009    Acceptance, E, VU by  at 11/12/2022 1235    Acceptance, E, VU,NR by MILTON at 11/11/2022 1049                   Point: Precautions (Done)     Learning Progress Summary           Patient Acceptance,  "E, VU by  at 11/13/2022 1009    Acceptance, E, VU by  at 11/12/2022 1235    Acceptance, E, VU,NR by  at 11/11/2022 1049                               User Key     Initials Effective Dates Name Provider Type [facility] 06/16/21 -  Jaxson Gaffney, PT Physical Therapist PT    [facility] 10/25/19 -  Sravani Downs PT Physical Therapist PT              PT Recommendation and Plan     Plan of Care Reviewed With: patient  Progress: improving  Outcome Evaluation: Pt presented to PT today with increased fatigue and soreness after \"I just got back in bed.\" She performed functional mobility with mod Ax1 and stood at EOB and took sidesteps toward HOB. She also performed seated exercises to mobilize shoulders and chest. Pt will continue to benefit from skilled PT to address remaining functional mobility deficits and to reach functional goals.     Time Calculation:    PT Charges     Row Name 11/13/22 1011             Time Calculation    Start Time 0934  -      Stop Time 0948  -      Time Calculation (min) 14 min  -      PT Received On 11/13/22  -      PT - Next Appointment 11/14/22  -      PT Goal Re-Cert Due Date 11/25/22  -            User Key  (r) = Recorded By, (t) = Taken By, (c) = Cosigned By    Initials Name Provider Type     Jaxson Gaffney, PT Physical Therapist              Therapy Charges for Today     Code Description Service Date Service Provider Modifiers Qty    91499469337 HC PT THER PROC EA 15 MIN 11/12/2022 Jaxson Gaffney, PT GP 2    72416828784 HC PT THER PROC EA 15 MIN 11/13/2022 Jaxson Gaffney, PT GP 1    80305382119 HC PT THER SUPP EA 15 MIN 11/13/2022 Jaxson Gaffney, PT GP 1          PT G-Codes  Outcome Measure Options: AM-PAC 6 Clicks Basic Mobility (PT)  AM-PAC 6 Clicks Score (PT): 15    Jaxson Gaffney PT  11/13/2022    "

## 2022-11-13 NOTE — PROGRESS NOTES
"Daily progress note    Referring physician  Dr. BECKFORD    Chief complaint  Doing well better with no new problems.  Patient remained awake and alert.    History of present illness  71-year-old white female with history of diabetes hypertension hyperlipidemia hypothyroidism presented to [facility] emergency room with sudden onset of shortness of breath as she woke up with it.  Patient has no chest pain palpitation but does have nonproductive cough but no fever chills.  Patient found to be hypoxic while EMS arrived and brought to the emergency room which she found to have acute ST elevation MI and taken to the Cath Lab which showed multivessel coronary disease  angioplasty and stent placed to diagonal branch and I am asked to follow patient for medical problem.  At the time of review she is feeling better and has no more shortness of breath and also denies any chest pain.  Patient feels weak but feeling much better after angioplasty.     REVIEW OF SYSTEMS  Unremarkable except generalized weakness    PHYSICAL EXAM         Blood pressure 112/59, pulse 66, temperature 98.2 °F (36.8 °C), temperature source Oral, resp. rate 17, height 170.2 cm (67\"), weight 104 kg (229 lb 4.8 oz), SpO2 97 %, not currently breastfeeding.    Constitutional:       General: She is not in acute distress.  HENT:      Head: Normocephalic and atraumatic.   Eyes:      Extraocular Movements: EOM normal.      Pupils: Pupils are equal, round, and reactive to light.   Cardiovascular:      Rate and Rhythm: Normal rate and regular rhythm.      Heart sounds: Normal heart sounds.   Pulmonary:      Effort: Pulmonary effort is normal. No respiratory distress.      Breath sounds: Examination of the right-middle field reveals rhonchi. Examination of the left-middle field reveals rhonchi. Examination of the right-lower field reveals rhonchi. Examination of the left-lower field reveals rhonchi. Rhonchi present.   Abdominal:      Palpations: Abdomen is " soft.      Tenderness: There is no abdominal tenderness. There is no guarding or rebound.   Musculoskeletal:         General: No edema. Normal range of motion.      Cervical back: Normal range of motion and neck supple.   Skin:     General: Skin is warm and dry.      Findings: No rash.   Neurological:      Mental Status: She is alert and oriented to person, place, and time.      Sensory: Sensation is intact.      Motor: Motor strength is normal.   Psychiatric:         Mood and Affect: Mood and affect normal.      LAB RESULTS  Lab Results (last 24 hours)     Procedure Component Value Units Date/Time    POC Glucose Once [[id number]]  (Abnormal) Collected: 11/13/22 1057    Specimen: Blood Updated: 11/13/22 1059     Glucose 167 mg/dL      Comment: Meter: [id number] : [id number] Alissa MANDUJANO       POC Glucose Once [[id number]]  (Abnormal) Collected: 11/13/22 0606    Specimen: Blood Updated: 11/13/22 0607     Glucose 149 mg/dL      Comment: Meter: [id number] : [id number] Parminder MANDUJANO       Basic Metabolic Panel [[id number]]  (Abnormal) Collected: 11/13/22 0310    Specimen: Blood Updated: 11/13/22 0458     Glucose 140 mg/dL      BUN 42 mg/dL      Creatinine 1.33 mg/dL      Sodium 135 mmol/L      Potassium 4.3 mmol/L      Chloride 104 mmol/L      CO2 19.0 mmol/L      Calcium 8.4 mg/dL      BUN/Creatinine Ratio 31.6     Anion Gap 12.0 mmol/L      eGFR 42.9 mL/min/1.73      Comment: National Kidney Foundation and American Society of Nephrology (ASN) Task Force recommended calculation based on the Chronic Kidney Disease Epidemiology Collaboration (CKD-EPI) equation refit without adjustment for race.       Narrative:      GFR Normal >60  Chronic Kidney Disease <60  Kidney Failure <15    The GFR formula is only valid for adults with stable renal function between ages 18 and 70.    CBC (No Diff) [[id number]]  (Abnormal) Collected: 11/13/22 0310    Specimen: Blood Updated: 11/13/22 0355     WBC 8.73 10*3/mm3      RBC  3.15 10*6/mm3      Hemoglobin 9.2 g/dL      Hematocrit 28.0 %      MCV 88.9 fL      MCH 29.2 pg      MCHC 32.9 g/dL      RDW 14.3 %      RDW-SD 46.6 fl      MPV 11.4 fL      Platelets 146 10*3/mm3     POC Glucose Once [[id number]]  (Abnormal) Collected: 11/12/22 2019    Specimen: Blood Updated: 11/12/22 2020     Glucose 161 mg/dL      Comment: Meter: [id number] : [id number] Parminder MANDUJANO           Imaging Results (Last 24 Hours)     Procedure Component Value Units Date/Time    XR Chest 1 View [[id number]] Resulted: 11/13/22 1612     Updated: 11/13/22 1613    XR Chest PA & Lateral [[id number]] Collected: 11/13/22 0807     Updated: 11/13/22 0811    Narrative:      TWO-VIEW CHEST     HISTORY: Recent cardiac surgery. Chest tubes.     FINDINGS: There are bilateral chest tubes in place and there appears to  be a small right apical pneumothorax measuring 10 mm that is new since  yesterday's exam. There is some atelectasis, particularly at the left  base that is unchanged. The heart remains mildly enlarged. A right  jugular sheath ends in the SVC without change.     This report was finalized on 11/13/2022 8:08 AM by Dr. Og Earl M.D.              ECG 12 Lead          Component Ref Range & Units 01:44    QT Interval ms 358 P    Resulting Agency   ECG             HEART RATE= 116  bpm  RR Interval= 517  ms  MO Interval= 110  ms  P Horizontal Axis= -49  deg  P Front Axis= 64  deg  QRSD Interval= 97  ms  QT Interval= 358  ms  QRS Axis= 18  deg  T Wave Axis= 130  deg  - ABNORMAL ECG -  Sinus tachycardia  Probable left atrial enlargement  Lateral infarct, acute (LAD)  Probable anteroseptal infarct, recent             Current Facility-Administered Medications:   •  acetaminophen (TYLENOL) tablet 650 mg, 650 mg, Oral, Q4H PRN, 650 mg at 11/11/22 1744 **OR** acetaminophen (TYLENOL) 160 MG/5ML solution 650 mg, 650 mg, Oral, Q4H PRN **OR** acetaminophen (TYLENOL) suppository 650 mg, 650 mg, Rectal, Q4H PRN, Chantel  GUEVARA Thomas  •  ALPRAZolam (XANAX) tablet 0.25 mg, 0.25 mg, Oral, Q8H PRN, Martha Golden APRN, 0.25 mg at 11/11/22 1744  •  aspirin EC tablet 81 mg, 81 mg, Oral, Daily, Martha Golden APRN, 81 mg at 11/13/22 0907  •  atorvastatin (LIPITOR) tablet 40 mg, 40 mg, Oral, Nightly, Martha Golden APRN, 40 mg at 11/12/22 2102  •  bisacodyl (DULCOLAX) EC tablet 10 mg, 10 mg, Oral, Daily PRN, Martha Golden APRN, 10 mg at 11/12/22 1901  •  bisacodyl (DULCOLAX) suppository 10 mg, 10 mg, Rectal, Daily PRN, Martha Golden APRN  •  chlorhexidine (PERIDEX) 0.12 % solution 15 mL, 15 mL, Mouth/Throat, Q12H, Martha Golden APRN, 15 mL at 11/13/22 0906  •  clevidipine (CLEVIPREX) infusion 0.5 mg/mL, 2-32 mg/hr, Intravenous, Continuous PRN, Martha Golden APRN  •  cyclobenzaprine (FLEXERIL) tablet 10 mg, 10 mg, Oral, Q8H PRN, Martha Golden APRN, 10 mg at 11/11/22 2002  •  dextrose (D50W) (25 g/50 mL) IV injection 25 g, 25 g, Intravenous, Q15 Min PRN, Breann Giron MD  •  dextrose (GLUTOSE) oral gel 15 g, 15 g, Oral, Q15 Min PRN, Breann Giron MD  •  Enoxaparin Sodium (LOVENOX) syringe 40 mg, 40 mg, Subcutaneous, Daily, Martha Golden APRN, 40 mg at 11/12/22 1900  •  gabapentin (NEURONTIN) capsule 200 mg, 200 mg, Oral, TID, Le Edwards, APRN, 200 mg at 11/13/22 0906  •  glucagon (human recombinant) (GLUCAGEN DIAGNOSTIC) injection 1 mg, 1 mg, Intramuscular, Q15 Min PRN, Breann Giron MD  •  HYDROcodone-acetaminophen (NORCO) 5-325 MG per tablet 2 tablet, 2 tablet, Oral, Q4H PRN, Martha Golden APRN, 2 tablet at 11/13/22 0907  •  insulin glargine (LANTUS, SEMGLEE) injection 10 Units, 10 Units, Subcutaneous, Nightly, Sammy Koch MD, 10 Units at 11/12/22 2104  •  insulin lispro (ADMELOG) injection 0-7 Units, 0-7 Units, Subcutaneous, TID AC, Breann Giron MD, 2 Units at 11/13/22 1138  •  levothyroxine (SYNTHROID, LEVOTHROID) tablet 25 mcg, 25 mcg, Oral, Q AM, Martha Golden APRN,  25 mcg at 11/13/22 0616  •  magnesium hydroxide (MILK OF MAGNESIA) suspension 10 mL, 10 mL, Oral, Daily PRN, Martha Golden APRN  •  metoprolol tartrate (LOPRESSOR) tablet 12.5 mg, 12.5 mg, Oral, Q12H, Martha Golden, APRN, 12.5 mg at 11/13/22 0906  •  midazolam (VERSED) injection 2 mg, 2 mg, Intravenous, Q1H PRN, Martha Golden APRN  •  morphine injection 1 mg, 1 mg, Intravenous, Q4H PRN, 1 mg at 11/11/22 1000 **AND** naloxone (NARCAN) injection 0.4 mg, 0.4 mg, Intravenous, Q5 Min PRN, Martha Golden APRN  •  morphine injection 4 mg, 4 mg, Intravenous, Q30 Min PRN, Martha Golden APRN  •  mupirocin (BACTROBAN) 2 % nasal ointment, , Each Nare, BID, Martha Golden APRN, 1 application at 11/13/22 0906  •  ondansetron (ZOFRAN) injection 4 mg, 4 mg, Intravenous, Q6H PRN, Martha Golden APRN, 4 mg at 11/11/22 0417  •  oxyCODONE (ROXICODONE) immediate release tablet 10 mg, 10 mg, Oral, Q4H PRN, Martha Golden APRN, 10 mg at 11/11/22 2221  •  [COMPLETED] pantoprazole (PROTONIX) injection 40 mg, 40 mg, Intravenous, Once, 40 mg at 11/10/22 1658 **FOLLOWED BY** pantoprazole (PROTONIX) EC tablet 40 mg, 40 mg, Oral, QAM, Martha Golden APRN, 40 mg at 11/13/22 0616  •  polyethylene glycol (MIRALAX) packet 17 g, 17 g, Oral, Daily PRN, Martha Golden APRN, 17 g at 11/12/22 1901  •  potassium chloride (K-DUR,KLOR-CON) ER tablet 40 mEq, 40 mEq, Oral, PRN **OR** potassium chloride (KLOR-CON) packet 40 mEq, 40 mEq, Oral, PRN, Martha Golden, APRN  •  potassium chloride 10 mEq in 100 mL IVPB, 10 mEq, Intravenous, Q1H PRN **OR** potassium chloride 10 mEq in 100 mL IVPB, 10 mEq, Intravenous, Q1H PRN, Martha Golden, APRN  •  potassium chloride 20 mEq in 50 mL IVPB, 20 mEq, Intravenous, Q1H PRN, Martha Golden, APRN  •  potassium chloride 20 mEq in 50 mL IVPB, 20 mEq, Intravenous, Q1H PRN, Martha Golden, APRN  •  potassium chloride 20 mEq in 50 mL IVPB, 20 mEq, Intravenous, Q1H PRN, Martha Golden,  APRN  •  sennosides-docusate (PERICOLACE) 8.6-50 MG per tablet 2 tablet, 2 tablet, Oral, Nightly, Chantel Martha, GUEVARA, 2 tablet at 11/12/22 2103     ASSESSMENT  Multivessel coronary artery disease s/p CABG postop day 2  Acute ST relation MI s/p angioplasty and stent   Acute Klebsiella UTI  Diabetes mellitus  Hypertension  Hyperlipidemia  Hypothyroidism  Gastroesophageal reflux disease    PLAN  CPM  Postop care  Post PCI care  Continue home medications  Stress ulcer DVT prophylaxis  Accu-Cheks sliding scale insulin  Supportive care  PT/OT  Discussed with nursing staff  We will follow with Dr. ANALY FRENCH MD

## 2022-11-13 NOTE — PLAN OF CARE
Goal Outcome Evaluation:               VSS. Patient's pain is well managed. SOB when ambulating to chair but mobilizes well with two-person assistance. Will continue to monitor.

## 2022-11-13 NOTE — PROGRESS NOTES
LOS: 6 days   Patient Care Team:  Spencer Hua MD as PCP - General    Chief Complaint: post op fu     Subjective      Vital Signs  Temp:  [97.3 °F (36.3 °C)-98.1 °F (36.7 °C)] 97.4 °F (36.3 °C)  Heart Rate:  [65-79] 68  Resp:  [16-17] 17  BP: (101-116)/(63-74) 101/63  Body mass index is 35.91 kg/m².    Intake/Output Summary (Last 24 hours) at 11/13/2022 0851  Last data filed at 11/13/2022 0700  Gross per 24 hour   Intake 1410 ml   Output 1126 ml   Net 284 ml     No intake/output data recorded.    Chest tube drainage last 8 hours:  13, 135        11/11/22  0506 11/12/22  0600 11/13/22  0630   Weight: 92.9 kg (204 lb 12.9 oz) 99.9 kg (220 lb 3.2 oz) 104 kg (229 lb 4.8 oz)         Objective    Results Review:        WBC WBC   Date Value Ref Range Status   11/13/2022 8.73 3.40 - 10.80 10*3/mm3 Final   11/12/2022 8.96 3.40 - 10.80 10*3/mm3 Final   11/11/2022 8.70 3.40 - 10.80 10*3/mm3 Final   11/11/2022 8.45 3.40 - 10.80 10*3/mm3 Final   11/11/2022 11.25 (H) 3.40 - 10.80 10*3/mm3 Final   11/10/2022 10.50 3.40 - 10.80 10*3/mm3 Final      HGB Hemoglobin   Date Value Ref Range Status   11/13/2022 9.2 (L) 12.0 - 15.9 g/dL Final   11/12/2022 9.6 (L) 12.0 - 15.9 g/dL Final   11/11/2022 10.0 (L) 12.0 - 15.9 g/dL Final   11/11/2022 7.5 (L) 12.0 - 15.9 g/dL Final   11/11/2022 8.3 (L) 12.0 - 15.9 g/dL Final   11/10/2022 9.4 (L) 12.0 - 15.9 g/dL Final   11/10/2022 8.5 (L) 12.0 - 17.0 g/dL Final   11/10/2022 7.8 (C) 12.0 - 17.0 g/dL Final   11/10/2022 7.8 (C) 12.0 - 17.0 g/dL Final   11/10/2022 7.8 (C) 12.0 - 17.0 g/dL Final   11/10/2022 8.2 (L) 12.0 - 17.0 g/dL Final   11/10/2022 10.5 (L) 12.0 - 17.0 g/dL Final      HCT Hematocrit   Date Value Ref Range Status   11/13/2022 28.0 (L) 34.0 - 46.6 % Final   11/12/2022 29.7 (L) 34.0 - 46.6 % Final   11/11/2022 31.2 (L) 34.0 - 46.6 % Final   11/11/2022 23.5 (L) 34.0 - 46.6 % Final   11/11/2022 25.6 (L) 34.0 - 46.6 % Final   11/10/2022 29.3 (L) 34.0 - 46.6 % Final   11/10/2022 25 (L)  38 - 51 % Final   11/10/2022 23 (L) 38 - 51 % Final   11/10/2022 23 (L) 38 - 51 % Final   11/10/2022 23 (L) 38 - 51 % Final   11/10/2022 24 (L) 38 - 51 % Final   11/10/2022 31 (L) 38 - 51 % Final      Platelets Platelets   Date Value Ref Range Status   11/13/2022 146 140 - 450 10*3/mm3 Final   11/12/2022 136 (L) 140 - 450 10*3/mm3 Final   11/11/2022 148 140 - 450 10*3/mm3 Final   11/11/2022 159 140 - 450 10*3/mm3 Final   11/11/2022 187 140 - 450 10*3/mm3 Final   11/10/2022 155 140 - 450 10*3/mm3 Final        PT/INR:    Protime   Date Value Ref Range Status   11/11/2022 17.8 (H) 11.7 - 14.2 Seconds Final   11/10/2022 18.5 (H) 11.7 - 14.2 Seconds Final   /  INR   Date Value Ref Range Status   11/11/2022 1.45 (H) 0.90 - 1.10 Final   11/10/2022 1.52 (H) 0.90 - 1.10 Final       Sodium Sodium   Date Value Ref Range Status   11/13/2022 135 (L) 136 - 145 mmol/L Final   11/12/2022 139 136 - 145 mmol/L Final   11/11/2022 136 136 - 145 mmol/L Final   11/11/2022 141 136 - 145 mmol/L Final   11/10/2022 140 136 - 145 mmol/L Final   11/10/2022 141 136 - 145 mmol/L Final      Potassium Potassium   Date Value Ref Range Status   11/13/2022 4.3 3.5 - 5.2 mmol/L Final   11/12/2022 4.6 3.5 - 5.2 mmol/L Final   11/11/2022 4.5 3.5 - 5.2 mmol/L Final   11/11/2022 4.4 3.5 - 5.2 mmol/L Final   11/10/2022 4.3 3.5 - 5.2 mmol/L Final   11/10/2022 4.4 3.5 - 5.2 mmol/L Final     Comment:     Slight hemolysis detected by analyzer. Results may be affected.      Chloride Chloride   Date Value Ref Range Status   11/13/2022 104 98 - 107 mmol/L Final   11/12/2022 106 98 - 107 mmol/L Final   11/11/2022 103 98 - 107 mmol/L Final   11/11/2022 111 (H) 98 - 107 mmol/L Final   11/10/2022 110 (H) 98 - 107 mmol/L Final   11/10/2022 112 (H) 98 - 107 mmol/L Final      Bicarbonate CO2   Date Value Ref Range Status   11/13/2022 19.0 (L) 22.0 - 29.0 mmol/L Final   11/12/2022 20.0 (L) 22.0 - 29.0 mmol/L Final   11/11/2022 16.9 (L) 22.0 - 29.0 mmol/L Final    11/11/2022 17.0 (L) 22.0 - 29.0 mmol/L Final   11/10/2022 18.6 (L) 22.0 - 29.0 mmol/L Final   11/10/2022 20.1 (L) 22.0 - 29.0 mmol/L Final      BUN BUN   Date Value Ref Range Status   11/13/2022 42 (H) 8 - 23 mg/dL Final   11/12/2022 26 (H) 8 - 23 mg/dL Final   11/11/2022 20 8 - 23 mg/dL Final   11/11/2022 17 8 - 23 mg/dL Final   11/10/2022 17 8 - 23 mg/dL Final   11/10/2022 16 8 - 23 mg/dL Final      Creatinine Creatinine   Date Value Ref Range Status   11/13/2022 1.33 (H) 0.57 - 1.00 mg/dL Final   11/12/2022 1.26 (H) 0.57 - 1.00 mg/dL Final   11/11/2022 1.03 (H) 0.57 - 1.00 mg/dL Final   11/11/2022 1.03 (H) 0.57 - 1.00 mg/dL Final   11/10/2022 1.04 (H) 0.57 - 1.00 mg/dL Final   11/10/2022 0.97 0.57 - 1.00 mg/dL Final      Calcium Calcium   Date Value Ref Range Status   11/13/2022 8.4 (L) 8.6 - 10.5 mg/dL Final   11/12/2022 8.7 8.6 - 10.5 mg/dL Final   11/11/2022 8.8 8.6 - 10.5 mg/dL Final   11/11/2022 8.6 8.6 - 10.5 mg/dL Final   11/10/2022 9.0 8.6 - 10.5 mg/dL Final   11/10/2022 9.5 8.6 - 10.5 mg/dL Final      Magnesium Magnesium   Date Value Ref Range Status   11/11/2022 3.1 (H) 1.6 - 2.4 mg/dL Final   11/10/2022 3.0 (H) 1.6 - 2.4 mg/dL Final   11/10/2022 3.2 (H) 1.6 - 2.4 mg/dL Final      Troponin No results found for: TROPONIN   BNP No results found for: BNP         aspirin, 81 mg, Oral, Daily  atorvastatin, 40 mg, Oral, Nightly  chlorhexidine, 15 mL, Mouth/Throat, Q12H  enoxaparin, 40 mg, Subcutaneous, Daily  gabapentin, 200 mg, Oral, TID  insulin glargine, 10 Units, Subcutaneous, Nightly  insulin lispro, 0-7 Units, Subcutaneous, TID AC  levothyroxine, 25 mcg, Oral, Q AM  metoprolol tartrate, 12.5 mg, Oral, Q12H  mupirocin, , Each Nare, BID  pantoprazole, 40 mg, Oral, QAM  senna-docusate sodium, 2 tablet, Oral, Nightly      clevidipine, 2-32 mg/hr        PRN Meds:.•  acetaminophen **OR** acetaminophen **OR** acetaminophen  •  ALPRAZolam  •  bisacodyl  •  bisacodyl  •  clevidipine  •  cyclobenzaprine  •   dextrose  •  dextrose  •  glucagon (human recombinant)  •  HYDROcodone-acetaminophen  •  magnesium hydroxide  •  midazolam  •  Morphine **AND** naloxone  •  Morphine  •  ondansetron  •  oxyCODONE  •  polyethylene glycol  •  potassium chloride **OR** potassium chloride  •  potassium chloride **OR** potassium chloride  •  potassium chloride  •  potassium chloride  •  potassium chloride    Patient Active Problem List   Diagnosis Code   • Vitamin D deficiency E55.9   • Primary hypothyroidism E03.9   • Dyslipidemia E78.5   • Essential hypertension I10   • Type 2 diabetes mellitus without complication, with long-term current use of insulin ([facility]) E11.9, Z79.4   • Noncompliance with diabetes treatment Z91.199   • ST elevation myocardial infarction (STEMI) ([facility]) I21.3   • Coronary artery disease involving native heart I25.10       Assessment & Plan    - STEMI, Multivessel CAD- angioplasty and stent placed diagonal branch preop, s/p CABG x5 with LIMA, EVH left calf, right thigh ([place])  - Hypertension----hypotension post op  - HL---statin  - Obesity  - Diabetes type 2- A1C 7  - Post op expected acute blood loss anemia---transfused 11/11/22  - MERCEDES---creatinine 1.3, recent hypotension    POD#3.  Small ptx on right, 10 mm, leave right chest tube, pull left.  Elevated creatinine today, mild, no diuresis today.  Pull wires and cordis.    Le Edwards, APRN  11/13/22  08:51 EST

## 2022-11-13 NOTE — PLAN OF CARE
Goal Outcome Evaluation:  Plan of Care Reviewed With: patient        Progress: improving  Inc amb distance to two trials of fwd/bwd stepping for 5feet, with sitting rest break in between, using B HHA and min/mod Ax2. Pt demonstrated slow gait with shortened steps, slightly unsteady, but only c/o mild lightheadedness while up on feet. Pt performed exercises to mobilize chest and shoulders. No adverse reactions noted to interventions this visit. Pt will continue to benefit from skilled PT to address remaining functional mobility deficits and to reach functional goals.     Patient was not wearing a face mask during this therapy encounter. Therapist used appropriate personal protective equipment including mask and gloves.  Mask used was standard procedure mask. Appropriate PPE was worn during the entire therapy session. Hand hygiene was completed before and after therapy session. Patient is not in enhanced droplet precautions.

## 2022-11-13 NOTE — PROGRESS NOTES
"   LOS: 6 days   Patient Care Team:  Spencer Hua MD as PCP - General    Chief Complaint: STEMI     Interval History: Doing better today. No neuro symptoms. Pain controlled. Remains in SR.       Objective   Vital Signs  Temp:  [97.3 °F (36.3 °C)-98.2 °F (36.8 °C)] 98.2 °F (36.8 °C)  Heart Rate:  [65-73] 66  Resp:  [16-17] 17  BP: (101-116)/(59-66) 112/59    Intake/Output Summary (Last 24 hours) at 11/13/2022 1219  Last data filed at 11/13/2022 0700  Gross per 24 hour   Intake 1410 ml   Output 983.5 ml   Net 426.5 ml       Last Weight and Admission Weight        11/13/22  0630   Weight: 104 kg (229 lb 4.8 oz)     Flowsheet Rows    Flowsheet Row First Filed Value   Admission Height 172.7 cm (68\") Documented at 11/07/2022 0146   Admission Weight 102 kg (225 lb 14.4 oz) Documented at 11/07/2022 0146                  Physical Exam  Vitals reviewed.   Constitutional:       Appearance: She is well-developed.   HENT:      Head: Normocephalic.      Nose: Nose normal.   Eyes:      Conjunctiva/sclera: Conjunctivae normal.   Neck:      Vascular: No JVD.      Comments: TLC right neck  Cardiovascular:      Rate and Rhythm: Normal rate and regular rhythm.      Heart sounds: Normal heart sounds.   Pulmonary:      Effort: Pulmonary effort is normal.      Breath sounds: Examination of the left-lower field reveals decreased breath sounds. Decreased breath sounds present.   Abdominal:      Palpations: Abdomen is soft.      Tenderness: There is no abdominal tenderness.   Musculoskeletal:         General: No swelling. Normal range of motion.      Cervical back: Normal range of motion.   Skin:     General: Skin is warm and dry.      Findings: No erythema.   Neurological:      General: No focal deficit present.      Mental Status: She is alert.      Cranial Nerves: No cranial nerve deficit.   Psychiatric:         Mood and Affect: Mood normal.         Results Review:      Results from last 7 days   Lab Units 11/13/22  0310 " 11/12/22  0314 11/11/22  1741   SODIUM mmol/L 135* 139 136   POTASSIUM mmol/L 4.3 4.6 4.5   CHLORIDE mmol/L 104 106 103   CO2 mmol/L 19.0* 20.0* 16.9*   BUN mg/dL 42* 26* 20   CREATININE mg/dL 1.33* 1.26* 1.03*   GLUCOSE mg/dL 140* 167* 156*   CALCIUM mg/dL 8.4* 8.7 8.8     Results from last 7 days   Lab Units 11/07/22  0154   TROPONIN T ng/mL 0.044*     Results from last 7 days   Lab Units 11/13/22  0310 11/12/22  0314 11/11/22  1741   WBC 10*3/mm3 8.73 8.96 8.70   HEMOGLOBIN g/dL 9.2* 9.6* 10.0*   HEMATOCRIT % 28.0* 29.7* 31.2*   PLATELETS 10*3/mm3 146 136* 148     Results from last 7 days   Lab Units 11/11/22  0323 11/10/22  1649 11/07/22  0154   INR  1.45* 1.52* 1.08   APTT seconds  --  33.3 27.0     Results from last 7 days   Lab Units 11/08/22  0623   CHOLESTEROL mg/dL 137     Results from last 7 days   Lab Units 11/11/22  0323   MAGNESIUM mg/dL 3.1*     Results from last 7 days   Lab Units 11/08/22  0623   CHOLESTEROL mg/dL 137   TRIGLYCERIDES mg/dL 99   HDL CHOL mg/dL 57   LDL CHOL mg/dL 62       I reviewed the patient's new clinical results.  I personally viewed and interpreted the patient's EKG/Telemetry data        Medication Review:   aspirin, 81 mg, Oral, Daily  atorvastatin, 40 mg, Oral, Nightly  chlorhexidine, 15 mL, Mouth/Throat, Q12H  enoxaparin, 40 mg, Subcutaneous, Daily  gabapentin, 200 mg, Oral, TID  insulin glargine, 10 Units, Subcutaneous, Nightly  insulin lispro, 0-7 Units, Subcutaneous, TID AC  levothyroxine, 25 mcg, Oral, Q AM  metoprolol tartrate, 12.5 mg, Oral, Q12H  mupirocin, , Each Nare, BID  pantoprazole, 40 mg, Oral, QAM  senna-docusate sodium, 2 tablet, Oral, Nightly        clevidipine, 2-32 mg/hr        Assessment & Plan     1. STEMI s/p PCI to diagonal (2.5x12mm MARTINEZ on 11/9/22)    2. CAD, POD#3 CABG x 5 (LIMA-LAD, SVG-RCA, SVG-OM1, T-graft to OM2, SVG-diag)    3. Mild LV systolic dysfunction    4. DM2    5. HTN    6. Hyperlipidemia    7. Episode of dysarthria vs expressive aphasia  on POD1 -- normal CT, no recurrence    *Continue aspirin/statin  *CTS APRN note from two days ago said okay to resume clopidogrel; CTS MD verbally said no to clopidogrel yesterday but there's no documentation. Will ask them to verify.  *Cr bumped slightly, avoid diuresis today  *Continue low dose BB, can't increase as BP is a bit low  *Tubes and wires and lines per CTS    Venkata Doll MD  11/13/22  12:19 EST

## 2022-11-14 ENCOUNTER — APPOINTMENT (OUTPATIENT)
Dept: GENERAL RADIOLOGY | Facility: HOSPITAL | Age: 72
End: 2022-11-14

## 2022-11-14 LAB
ANION GAP SERPL CALCULATED.3IONS-SCNC: 11.3 MMOL/L (ref 5–15)
BASOPHILS # BLD AUTO: 0.09 10*3/MM3 (ref 0–0.2)
BASOPHILS NFR BLD AUTO: 1.1 % (ref 0–1.5)
BUN SERPL-MCNC: 49 MG/DL (ref 8–23)
BUN/CREAT SERPL: 45 (ref 7–25)
CALCIUM SPEC-SCNC: 8.5 MG/DL (ref 8.6–10.5)
CHLORIDE SERPL-SCNC: 104 MMOL/L (ref 98–107)
CO2 SERPL-SCNC: 17.7 MMOL/L (ref 22–29)
CREAT SERPL-MCNC: 1.09 MG/DL (ref 0.57–1)
DEPRECATED RDW RBC AUTO: 47.8 FL (ref 37–54)
EGFRCR SERPLBLD CKD-EPI 2021: 54.4 ML/MIN/1.73
EOSINOPHIL # BLD AUTO: 0.24 10*3/MM3 (ref 0–0.4)
EOSINOPHIL NFR BLD AUTO: 3 % (ref 0.3–6.2)
ERYTHROCYTE [DISTWIDTH] IN BLOOD BY AUTOMATED COUNT: 14.4 % (ref 12.3–15.4)
GLUCOSE BLDC GLUCOMTR-MCNC: 142 MG/DL (ref 70–130)
GLUCOSE BLDC GLUCOMTR-MCNC: 154 MG/DL (ref 70–130)
GLUCOSE BLDC GLUCOMTR-MCNC: 158 MG/DL (ref 70–130)
GLUCOSE BLDC GLUCOMTR-MCNC: 201 MG/DL (ref 70–130)
GLUCOSE SERPL-MCNC: 154 MG/DL (ref 65–99)
HCT VFR BLD AUTO: 31.2 % (ref 34–46.6)
HGB BLD-MCNC: 10 G/DL (ref 12–15.9)
IMM GRANULOCYTES # BLD AUTO: 0.03 10*3/MM3 (ref 0–0.05)
IMM GRANULOCYTES NFR BLD AUTO: 0.4 % (ref 0–0.5)
LYMPHOCYTES # BLD AUTO: 1.44 10*3/MM3 (ref 0.7–3.1)
LYMPHOCYTES NFR BLD AUTO: 18 % (ref 19.6–45.3)
MCH RBC QN AUTO: 29.3 PG (ref 26.6–33)
MCHC RBC AUTO-ENTMCNC: 32.1 G/DL (ref 31.5–35.7)
MCV RBC AUTO: 91.5 FL (ref 79–97)
MONOCYTES # BLD AUTO: 0.97 10*3/MM3 (ref 0.1–0.9)
MONOCYTES NFR BLD AUTO: 12.1 % (ref 5–12)
NEUTROPHILS NFR BLD AUTO: 5.22 10*3/MM3 (ref 1.7–7)
NEUTROPHILS NFR BLD AUTO: 65.4 % (ref 42.7–76)
NRBC BLD AUTO-RTO: 0.1 /100 WBC (ref 0–0.2)
PLATELET # BLD AUTO: 165 10*3/MM3 (ref 140–450)
PMV BLD AUTO: 11.5 FL (ref 6–12)
POTASSIUM SERPL-SCNC: 4.4 MMOL/L (ref 3.5–5.2)
QT INTERVAL: 406 MS
RBC # BLD AUTO: 3.41 10*6/MM3 (ref 3.77–5.28)
SODIUM SERPL-SCNC: 133 MMOL/L (ref 136–145)
WBC NRBC COR # BLD: 7.99 10*3/MM3 (ref 3.4–10.8)

## 2022-11-14 PROCEDURE — 99024 POSTOP FOLLOW-UP VISIT: CPT | Performed by: NURSE PRACTITIONER

## 2022-11-14 PROCEDURE — 63710000001 INSULIN LISPRO (HUMAN) PER 5 UNITS: Performed by: THORACIC SURGERY (CARDIOTHORACIC VASCULAR SURGERY)

## 2022-11-14 PROCEDURE — 25010000002 ENOXAPARIN PER 10 MG: Performed by: NURSE PRACTITIONER

## 2022-11-14 PROCEDURE — 71045 X-RAY EXAM CHEST 1 VIEW: CPT

## 2022-11-14 PROCEDURE — 82962 GLUCOSE BLOOD TEST: CPT

## 2022-11-14 PROCEDURE — 63710000001 INSULIN LISPRO (HUMAN) PER 5 UNITS: Performed by: HOSPITALIST

## 2022-11-14 PROCEDURE — 97110 THERAPEUTIC EXERCISES: CPT | Performed by: PHYSICAL THERAPIST

## 2022-11-14 PROCEDURE — 85025 COMPLETE CBC W/AUTO DIFF WBC: CPT | Performed by: HOSPITALIST

## 2022-11-14 PROCEDURE — 80048 BASIC METABOLIC PNL TOTAL CA: CPT | Performed by: NURSE PRACTITIONER

## 2022-11-14 PROCEDURE — 99231 SBSQ HOSP IP/OBS SF/LOW 25: CPT

## 2022-11-14 PROCEDURE — 93010 ELECTROCARDIOGRAM REPORT: CPT | Performed by: INTERNAL MEDICINE

## 2022-11-14 PROCEDURE — 93005 ELECTROCARDIOGRAM TRACING: CPT | Performed by: THORACIC SURGERY (CARDIOTHORACIC VASCULAR SURGERY)

## 2022-11-14 RX ORDER — INSULIN LISPRO 100 [IU]/ML
3 INJECTION, SOLUTION INTRAVENOUS; SUBCUTANEOUS
Status: DISCONTINUED | OUTPATIENT
Start: 2022-11-14 | End: 2022-11-15

## 2022-11-14 RX ORDER — POTASSIUM CHLORIDE 750 MG/1
20 TABLET, FILM COATED, EXTENDED RELEASE ORAL ONCE
Status: COMPLETED | OUTPATIENT
Start: 2022-11-14 | End: 2022-11-14

## 2022-11-14 RX ORDER — BUMETANIDE 0.25 MG/ML
1 INJECTION INTRAMUSCULAR; INTRAVENOUS ONCE
Status: COMPLETED | OUTPATIENT
Start: 2022-11-14 | End: 2022-11-14

## 2022-11-14 RX ORDER — CLOPIDOGREL BISULFATE 75 MG/1
75 TABLET ORAL DAILY
Status: DISCONTINUED | OUTPATIENT
Start: 2022-11-14 | End: 2022-11-19 | Stop reason: HOSPADM

## 2022-11-14 RX ORDER — METOPROLOL SUCCINATE 25 MG/1
25 TABLET, EXTENDED RELEASE ORAL
Status: DISCONTINUED | OUTPATIENT
Start: 2022-11-14 | End: 2022-11-16

## 2022-11-14 RX ORDER — INSULIN LISPRO 100 [IU]/ML
5 INJECTION, SOLUTION INTRAVENOUS; SUBCUTANEOUS
Status: DISCONTINUED | OUTPATIENT
Start: 2022-11-14 | End: 2022-11-14

## 2022-11-14 RX ADMIN — ASPIRIN 81 MG: 81 TABLET, COATED ORAL at 09:33

## 2022-11-14 RX ADMIN — ALPRAZOLAM 0.25 MG: 0.25 TABLET ORAL at 21:54

## 2022-11-14 RX ADMIN — PANTOPRAZOLE SODIUM 40 MG: 40 TABLET, DELAYED RELEASE ORAL at 06:06

## 2022-11-14 RX ADMIN — POTASSIUM CHLORIDE 20 MEQ: 750 TABLET, EXTENDED RELEASE ORAL at 13:20

## 2022-11-14 RX ADMIN — BISACODYL 10 MG: 5 TABLET ORAL at 21:54

## 2022-11-14 RX ADMIN — ATORVASTATIN CALCIUM 40 MG: 20 TABLET, FILM COATED ORAL at 21:54

## 2022-11-14 RX ADMIN — MUPIROCIN 1 APPLICATION: 20 OINTMENT TOPICAL at 21:54

## 2022-11-14 RX ADMIN — INSULIN GLARGINE-YFGN 15 UNITS: 100 INJECTION, SOLUTION SUBCUTANEOUS at 21:54

## 2022-11-14 RX ADMIN — INSULIN LISPRO 2 UNITS: 100 INJECTION, SOLUTION INTRAVENOUS; SUBCUTANEOUS at 09:33

## 2022-11-14 RX ADMIN — ENOXAPARIN SODIUM 40 MG: 100 INJECTION SUBCUTANEOUS at 17:58

## 2022-11-14 RX ADMIN — MAGNESIUM HYDROXIDE 10 ML: 2400 SUSPENSION ORAL at 09:32

## 2022-11-14 RX ADMIN — LEVOTHYROXINE SODIUM 25 MCG: 0.03 TABLET ORAL at 05:40

## 2022-11-14 RX ADMIN — INSULIN LISPRO 2 UNITS: 100 INJECTION, SOLUTION INTRAVENOUS; SUBCUTANEOUS at 10:58

## 2022-11-14 RX ADMIN — CYCLOBENZAPRINE 10 MG: 10 TABLET, FILM COATED ORAL at 21:54

## 2022-11-14 RX ADMIN — DOCUSATE SODIUM 50MG AND SENNOSIDES 8.6MG 2 TABLET: 8.6; 5 TABLET, FILM COATED ORAL at 21:54

## 2022-11-14 RX ADMIN — MUPIROCIN 1 APPLICATION: 20 OINTMENT TOPICAL at 09:34

## 2022-11-14 RX ADMIN — METOPROLOL SUCCINATE 25 MG: 25 TABLET, EXTENDED RELEASE ORAL at 10:58

## 2022-11-14 RX ADMIN — BUMETANIDE 1 MG: 0.25 INJECTION INTRAMUSCULAR; INTRAVENOUS at 13:20

## 2022-11-14 RX ADMIN — CLOPIDOGREL 75 MG: 75 TABLET, FILM COATED ORAL at 21:54

## 2022-11-14 RX ADMIN — CHLORHEXIDINE GLUCONATE 15 ML: 1.2 SOLUTION ORAL at 09:33

## 2022-11-14 RX ADMIN — CHLORHEXIDINE GLUCONATE 15 ML: 1.2 SOLUTION ORAL at 21:54

## 2022-11-14 RX ADMIN — INSULIN LISPRO 3 UNITS: 100 INJECTION, SOLUTION INTRAVENOUS; SUBCUTANEOUS at 17:58

## 2022-11-14 RX ADMIN — INSULIN LISPRO 2 UNITS: 100 INJECTION, SOLUTION INTRAVENOUS; SUBCUTANEOUS at 17:58

## 2022-11-14 NOTE — DISCHARGE PLACEMENT REQUEST
"Audra Marshall (71 y.o. Female)     Date of Birth   1950    Social Security Number       Address   [address] [place] [address]    Home Phone   [phone]    MRN   [id number]       Hoahaoism   Patient Refused    Marital Status                               Admission Date   11/7/22    Admission Type   Emergency    Admitting Provider   Joanna Marie MD    Attending Provider   Joanna Marie MD    Department, Room/Bed   [facility] CARDIOVASC UNIT, 2227/1       Discharge Date       Discharge Disposition       Discharge Destination                               Attending Provider: Joanna Marie MD    Allergies: Bydureon [Exenatide], Metformin And Related    Isolation: None   Infection: None   Code Status: CPR    Ht: 170.2 cm (67\")   Wt: 104 kg (229 lb 8 oz)    Admission Cmt: None   Principal Problem: ST elevation myocardial infarction (STEMI) ([facility]) [I21.3]                 Active Insurance as of 11/7/2022     Primary Coverage     Payor Plan Insurance Group Employer/Plan Group    [facility] MEDICARE REPLACEMENT [facility] MEDICARE REPLACEMENT [id number]     Payor Plan Address Payor Plan Phone Number Payor Plan Fax Number Effective Dates    [address] [address]   1/1/2017 - None Entered    [facility] [id number]       Subscriber Name Subscriber Birth Date Member ID       AUDRA MARSHALL 1950 [id number]                 Emergency Contacts      (Rel.) Home Phone Work Phone Mobile Phone    Bello Marshall (Son) -- -- [phone]    Stefano,Gail (Sister) -- -- [phone]            {Outbreak/Travel/Exposure Documentation......;  Question Available Choices Patient Response   COVID-19 Outbreak Screen:  Do you currently have a new onset of the following symptoms?        Fever/Chills, Cough, Shortness of air, Loss of taste or smell, No, Unknown  (!) Shortness of Air (11/07/22 0207)   COVID-19 Outbreak Screen: In the last 14 days, have you " had contact with anyone who is ill, has show any of the symptoms listed above and/or has been diagnosis with the 2019 Novel Coronavirus? This includes any immediate household members but excludes any patients with whom you have been in contact within your normal work duties wearing proper PPE, if you are a healthcare worker.  Yes, No, Unknown              No (11/07/22 0207)   COVID-19 Outbreak Screen: Who was notified? Free text (not recorded)   Ebola Screening Outbreak Screen: Have you traveled to the [place] or [place] within the past 21 days?  Yes, No, Unknown No (11/07/22 0207)   Ebola Screening Outbreak Screen: Do you have ANY of the following symptoms: Fever/Chills, Vomiting, Diarrhea, Fatigue, Headache, Muscle pain, Unexplained bleeding, Abdominal (stomach) pain, No, Unknown (not recorded)   Ebola Screening Outbreak Screen: Name of Person notified Free text (not recorded)   Travel Screen: Have you traveled in the last month? If so, to what country have you traveled? If [place] what state? Yes, No, Unknown  List of all countries  List of all States No (11/07/22 0154)  (not recorded)  (not recorded)   Infection Risk: Do you currently have the following symptoms?  (If cough is selected, the Tuberculosis Screen is performed.) Cough, Fever, Rash, No No (11/07/22 0154)   Tuberculosis Screen: Do you have any of the following Tuberculosis Risks?  · Have you lived or spent time with anyone who had or may have TB?  · Have you lived in or visited any of the following areas for more than one month: [place], [place], [place], [place] or [place], the [place] or [place]?  · Do you have HIV/AIDS?  · Have you lived in or worked in a nursing home, homeless shelter, correctional facility, or substance abuse treatment facility?   · No    If Yes do you have any of the following symptoms? Yes responses display to the right    If Yes, symptoms listed are:  Cough greater than or equal to 3 weeks, Loss of  appetite, Unexplained weight loss, Night sweats, Bloody sputum or hemoptysis, Hoarseness, Fever, Fatigue, Chest pain, No (not recorded)  (not recorded)   Exposure Screen: Have you been exposed to any of these contagious diseases in the last month? Measles, Chickenpox, Meningitis, Pertussis, Whooping Cough, No No (11/07/22 0854)

## 2022-11-14 NOTE — PLAN OF CARE
Goal Outcome Evaluation:  Plan of Care Reviewed With: patient           Outcome Evaluation: Pt was up in the chair and agreeable to therapy. She is weak and fatigued very quickly. She ambualted 10 ft with mod A x2 and  max a x 2 required to return to the chair. Activity was also limited lightheadeness. Will continue to progress as tolerated.

## 2022-11-14 NOTE — THERAPY TREATMENT NOTE
Patient Name: Mayra Hirsch  : 1950    MRN: [id number]                              Today's Date: 2022       Admit Date: 2022    Visit Dx:     ICD-10-CM ICD-9-CM   1. ST elevation myocardial infarction (STEMI), unspecified artery (HCC)  I21.3 410.90   2. ST elevation myocardial infarction (STEMI) involving other coronary artery (HCC)  I21.29 410.10   3. Coronary artery disease involving native heart, unspecified vessel or lesion type, unspecified whether angina present  I25.10 414.01   4. Abnormal findings on diagnostic imaging of other specified body structures  R93.89 793.99     Patient Active Problem List   Diagnosis   • Vitamin D deficiency   • Primary hypothyroidism   • Dyslipidemia   • Essential hypertension   • Type 2 diabetes mellitus without complication, with long-term current use of insulin (HCC)   • Noncompliance with diabetes treatment   • ST elevation myocardial infarction (STEMI) (HCC)   • Coronary artery disease involving native heart     Past Medical History:   Diagnosis Date   • Depression    • Diabetes mellitus (HCC)    • Disease of thyroid gland    • Fibrocystic breast    • Hypertension    • Hypothyroidism    • PONV (postoperative nausea and vomiting)    • Type 2 diabetes mellitus (HCC)      Past Surgical History:   Procedure Laterality Date   • BREAST EXCISIONAL BIOPSY Right     at age 22; benign.   • CARDIAC CATHETERIZATION Left 2022    Procedure: Cardiac Catheterization/Vascular Study;  Surgeon: Joanna Marie MD;  Location: [facility] INVASIVE LOCATION;  Service: Cardiology;  Laterality: Left;   • CARDIAC CATHETERIZATION N/A 2022    Procedure: Percutaneous Coronary Intervention;  Surgeon: Joanna Marie MD;  Location: [facility] CATH INVASIVE LOCATION;  Service: Cardiology;  Laterality: N/A;   • CARDIAC CATHETERIZATION N/A 2022    Procedure: Left ventriculography;  Surgeon: Joanna Marie MD;  Location: [facility] INVASIVE LOCATION;   Service: Cardiology;  Laterality: N/A;   • CARDIAC CATHETERIZATION N/A 2022    Procedure: Stent MARTINEZ coronary;  Surgeon: Joanna Marie MD;  Location: [facility] CATH INVASIVE LOCATION;  Service: Cardiology;  Laterality: N/A;   • CARDIAC CATHETERIZATION N/A 2022    Procedure: Left Heart Cath;  Surgeon: Joanna Marie MD;  Location: [facility] CATH INVASIVE LOCATION;  Service: Cardiology;  Laterality: N/A;   •  SECTION     • CORONARY ARTERY BYPASS GRAFT N/A 11/10/2022    Procedure: NIKKY, CORONARY ARTERY BYPASS GRAFTING TIMES 6 WITH LEFT JORDYN AND ENDOSCOPICALLY HARVESTED LEFT AND RIGHT GREATER SAPHENOUS VEIN, PRP TRANSESOPHAGEAL ECHOCARDIOGRAM WITH ANESTHESIA;  Surgeon: Jr Dong Isabel MD;  Location: [facility];  Service: Cardiothoracic;  Laterality: N/A;   • HAND SURGERY     • KNEE SURGERY     • OOPHORECTOMY      1 ovary removed due to ectopic pregnancy      General Information     Row Name 22 1203          Physical Therapy Time and Intention    Document Type therapy note (daily note)  -     Mode of Treatment individual therapy;physical therapy  -           User Key  (r) = Recorded By, (t) = Taken By, (c) = Cosigned By    Initials Name Provider Type    Geraldine Cortez, PT Physical Therapist               Mobility     Row Name 22 1203          Bed Mobility    Comment, (Bed Mobility) UI  -     Row Name 22 1203          Sit-Stand Transfer    Sit-Stand [place] (Transfers) verbal cues;moderate assist (50% patient effort);2 person assist;nonverbal cues (demo/gesture)  -     Assistive Device (Sit-Stand Transfers) --  HHA  -     Row Name 22 1203          Gait/Stairs (Locomotion)    [place] Level (Gait) verbal cues;nonverbal cues (demo/gesture);moderate assist (50% patient effort);2 person assist;maximum assist (25% patient effort)  -     Distance in Feet (Gait) 10 ft  -     Deviations/Abnormal Patterns (Gait) base of support,  wide;abelardo decreased;festinating/shuffling;gait speed decreased;stride length decreased;antalgic  -     Bilateral Gait Deviations forward flexed posture  -     Comment, (Gait/Stairs) fatigued quickly and cmoplained of lightheadedness. Max A x 2 when pivoting back to the chair  -           User Key  (r) = Recorded By, (t) = Taken By, (c) = Cosigned By    Initials Name Provider Type    Geraldine Cortez, FRANCISCA Physical Therapist               Obj/Interventions     Row Name 11/14/22 1204          Motor Skills    Therapeutic Exercise --  cardiac protocol x 5 reps  -           User Key  (r) = Recorded By, (t) = Taken By, (c) = Cosigned By    Initials Name Provider Type    Geraldine Cortez, FRANCISCA Physical Therapist               Goals/Plan    No documentation.                Clinical Impression     Row Name 11/14/22 1208          Pain    Pretreatment Pain Rating 6/10  -     Posttreatment Pain Rating 6/10  -     Pain Location incisional  -RACHEL     Pain Location - chest  -RACHEL     Pain Intervention(s) Repositioned  -     Row Name 11/14/22 1208          Plan of Care Review    Plan of Care Reviewed With patient  -     Outcome Evaluation Pt was up in the chair and agreeable to therapy. She is weak and fatigued very quickly. She ambualted 10 ft with mod A x2 and  max a x 2 required to return to the chair. Activity was also limited lightheadeness. Will continue to progress as tolerated.  -           User Key  (r) = Recorded By, (t) = Taken By, (c) = Cosigned By    Initials Name Provider Type    Geraldine Cortez, PT Physical Therapist               Outcome Measures     Row Name 11/14/22 1210 11/14/22 0800       How much help from another person do you currently need...    Turning from your back to your side while in flat bed without using bedrails? 3  -RACHEL 3  -RK    Moving from lying on back to sitting on the side of a flat bed without bedrails? 3  -RACHEL 3  -RK    Moving to and from a bed to a  chair (including a wheelchair)? 3  -KH 3  -RK    Standing up from a chair using your arms (e.g., wheelchair, bedside chair)? 2  -KH 3  -RK    Climbing 3-5 steps with a railing? 1  -KH 2  -RK    To walk in hospital room? 2  -KH 3  -RK    AM-PAC 6 Clicks Score (PT) 14  -KH 17  -RK    Highest level of mobility 4 --> Transferred to chair/commode  -KH 5 --> Static standing  -RK    Row Name 11/14/22 1210          Functional Assessment    Outcome Measure Options AM-PAC 6 Clicks Basic Mobility (PT)  -           User Key  (r) = Recorded By, (t) = Taken By, (c) = Cosigned By    Initials Name Provider Type    Geraldine Cortez, PT Physical Therapist    Jessica Ellis, RN Registered Nurse                             Physical Therapy Education     Title: PT OT SLP Therapies (Done)     Topic: Physical Therapy (Done)     Point: Mobility training (Done)     Learning Progress Summary           Patient Acceptance, E, VU,NR by  at 11/14/2022 1211    Acceptance, E, VU by  at 11/13/2022 1009    Acceptance, E, VU by  at 11/12/2022 1235    Acceptance, E, VU,NR by MILTON at 11/11/2022 1049                   Point: Home exercise program (Done)     Learning Progress Summary           Patient Acceptance, E, VU,NR by  at 11/14/2022 1211    Acceptance, E, VU by  at 11/13/2022 1009    Acceptance, E, VU by  at 11/12/2022 1235    Acceptance, E, VU,NR by MILTON at 11/11/2022 1049                   Point: Body mechanics (Done)     Learning Progress Summary           Patient Acceptance, E, VU,NR by  at 11/14/2022 1211    Acceptance, E, VU by  at 11/13/2022 1009    Acceptance, E, VU by  at 11/12/2022 1235    Acceptance, E, VU,NR by MILTON at 11/11/2022 1049                   Point: Precautions (Done)     Learning Progress Summary           Patient Acceptance, E, VU,NR by  at 11/14/2022 1211    Acceptance, E, VU by  at 11/13/2022 1009    Acceptance, E, VU by  at 11/12/2022 1235    Acceptance, E, VU,NR by MILTON at 11/11/2022 1040                                User Key     Initials Effective Dates Name Provider Type Discipline     06/16/21 -  Geraldine Boyd, PT Physical Therapist PT    CH 06/16/21 -  Jaxson Gaffney, FRANCISCA Physical Therapist PT    DJ 10/25/19 -  Sravani Downs, PT Physical Therapist PT              PT Recommendation and Plan     Plan of Care Reviewed With: patient  Outcome Evaluation: Pt was up in the chair and agreeable to therapy. She is weak and fatigued very quickly. She ambualted 10 ft with mod A x2 and  max a x 2 required to return to the chair. Activity was also limited lightheadeness. Will continue to progress as tolerated.     Time Calculation:    PT Charges     Row Name 11/14/22 1211             Time Calculation    Start Time 0938  -      Stop Time 0947  -      Time Calculation (min) 9 min  -KH      PT Received On 11/14/22  -      PT - Next Appointment 11/15/22  -         Timed Charges    92694 - PT Therapeutic Exercise Minutes 9  -KH         Total Minutes    Timed Charges Total Minutes 9  -KH       Total Minutes 9  -KH            User Key  (r) = Recorded By, (t) = Taken By, (c) = Cosigned By    Initials Name Provider Type     Geraldine Boyd, PT Physical Therapist              Therapy Charges for Today     Code Description Service Date Service Provider Modifiers Qty    76254335867 HC PT THER PROC EA 15 MIN 11/14/2022 Geraldine Boyd, PT GP 1          PT G-Codes  Outcome Measure Options: AM-PAC 6 Clicks Basic Mobility (PT)  AM-PAC 6 Clicks Score (PT): 14    Geraldine Boyd, PT  11/14/2022

## 2022-11-14 NOTE — NURSING NOTE
Patient is very weak, cannot stand 5 minutes for orthostatic bp, see emr for results. No major changes in heart rate during activities.

## 2022-11-14 NOTE — PLAN OF CARE
Goal Outcome Evaluation:  Plan of Care Reviewed With: patient        Progress: improving  Outcome Evaluation: Pt s/p CABGx5, CRISTIAN dressing C/D/I, CTx2 to bulb suction & Oasis chamber with serous to serosanguinous drainage. Pt ambulated to bathroom with walker & staff assistance, tolerated well. Remains on room air throughout night, encouraging use of IS, pain treated with PO prn pain medications adequately. Receiving Lovenox SubQ for DVT prophylaxis. NSR on monitor, low dose betablocker, VSS, will continue to monitor

## 2022-11-14 NOTE — PROGRESS NOTES
[facility]  Cardiology Progress Note    Patient Identification:  Name: Mayra Hirsch  Age: 71 y.o.  Sex: female  :  1950  MRN: [id number]                 Follow Up / Chief Complaint: follow up for CAD    Interval History:  CABG x5 11/10/22 with Dr Isabel. No chest pain or shortness of breath on 1L nc. Sitting in chair.      Subjective:no chest pain      Objective:    Past Medical History:  Past Medical History:   Diagnosis Date   • Depression    • Diabetes mellitus (HCC)    • Disease of thyroid gland    • Fibrocystic breast    • Hypertension    • Hypothyroidism    • PONV (postoperative nausea and vomiting)    • Type 2 diabetes mellitus (HCC)      Past Surgical History:  Past Surgical History:   Procedure Laterality Date   • BREAST EXCISIONAL BIOPSY Right     at age 22; benign.   • CARDIAC CATHETERIZATION Left 2022    Procedure: Cardiac Catheterization/Vascular Study;  Surgeon: Joanna Marie MD;  Location:  [facility] CATH INVASIVE LOCATION;  Service: Cardiology;  Laterality: Left;   • CARDIAC CATHETERIZATION N/A 2022    Procedure: Percutaneous Coronary Intervention;  Surgeon: Joanna Marie MD;  Location:  [facility] CATH INVASIVE LOCATION;  Service: Cardiology;  Laterality: N/A;   • CARDIAC CATHETERIZATION N/A 2022    Procedure: Left ventriculography;  Surgeon: Joanna Marie MD;  Location:  [facility] CATH INVASIVE LOCATION;  Service: Cardiology;  Laterality: N/A;   • CARDIAC CATHETERIZATION N/A 2022    Procedure: Stent MARTINEZ coronary;  Surgeon: Joanna Marie MD;  Location:  [facility] CATH INVASIVE LOCATION;  Service: Cardiology;  Laterality: N/A;   • CARDIAC CATHETERIZATION N/A 2022    Procedure: Left Heart Cath;  Surgeon: Joanna Marie MD;  [facility];  Service: Cardiology;  Laterality: N/A;   •  SECTION     • CORONARY ARTERY BYPASS GRAFT N/A 11/10/2022    Procedure: NIKKY, CORONARY ARTERY BYPASS  "GRAFTING TIMES 6 WITH LEFT JORDYN AND ENDOSCOPICALLY HARVESTED LEFT AND RIGHT GREATER SAPHENOUS VEIN, PRP TRANSESOPHAGEAL ECHOCARDIOGRAM WITH ANESTHESIA;  Surgeon: Jr Dong Isabel MD;  Location: [facility];  Service: Cardiothoracic;  Laterality: N/A;   • HAND SURGERY     • KNEE SURGERY     • OOPHORECTOMY      1 ovary removed due to ectopic pregnancy        Social History:   Social History     Tobacco Use   • Smoking status: Never   • Smokeless tobacco: Never   Substance Use Topics   • Alcohol use: No      Family History:  Family History   Problem Relation Age of Onset   • Coronary artery disease Mother    • Alzheimer's disease Mother    • Coronary artery disease Father    • Cancer Father    • Thyroid disease Sister    • Malig Hyperthermia Neg Hx           Allergies:  Allergies   Allergen Reactions   • Bydureon [Exenatide] Diarrhea   • Metformin And Related Nausea And Vomiting     Scheduled Meds:  aspirin, 81 mg, Daily  atorvastatin, 40 mg, Nightly  chlorhexidine, 15 mL, Q12H  clopidogrel, 75 mg, Daily  enoxaparin, 40 mg, Daily  insulin glargine, 15 Units, Nightly  insulin lispro, 0-7 Units, TID AC  levothyroxine, 25 mcg, Q AM  metoprolol succinate XL, 25 mg, Q24H  mupirocin, , BID  pantoprazole, 40 mg, QAM  senna-docusate sodium, 2 tablet, Nightly            INTAKE AND OUTPUT:    Intake/Output Summary (Last 24 hours) at 11/14/2022 1111  Last data filed at 11/14/2022 1100  Gross per 24 hour   Intake 240 ml   Output 935 ml   Net -695 ml     ROS  Constitutional: awake and alert, no fever. No nosebleeds  Abdomen           no abdominal pain   Cardiac              no chest pain  Pulmonary          no shortness of breath      /63   Pulse 71   Temp 98.9 °F (37.2 °C) (Oral)   Resp 18   Ht 170.2 cm (67\")   Wt 104 kg (229 lb 8 oz)   SpO2 96%   BMI 35.94 kg/m²       Physical Exam:  General:  Appears in no acute distress  Eyes: eom normal no conjunctival drainage  HEENT:  No JVD. Thyroid not visibly enlarged. No " mucosal pallor or cyanosis  Respiratory: Respirations regular and unlabored at rest. BBS with good air entry in all fields. No crackles, rubs or wheezes auscultated  Cardiovascular: S1S2 Regular rate and rhythm. No murmur, rub or gallop. No carotid bruits. DP/PT pulses 2+    . No pretibial pitting edema  Gastrointestinal: Abdomen soft, flat, non tender. Bowel sounds present.  Musculoskeletal: ROSALES x4. No abnormal movements  Neuro: AAO x3 CN II-XII grossly intact  Psych: Mood and affect normal, pleasant and cooperative            I reviewed the patient's new clinical results, and personally reviewed and interpreted the patient's ECG and telemetry data from the last 24 hours          Cardiographics   sr             Telemetry:    sr         Interpretation Summary       •  The left ventricular cavity is mildly dilated.  •  The following left ventricular wall segments are hypokinetic: apical anterior, apical lateral, apical inferior, apical septal, apex hypokinetic and mid anteroseptal.  •  There is calcification of the aortic valve.  •  Estimated right ventricular systolic pressure from tricuspid regurgitation is mildly elevated (35-45 mmHg).         Lab Review       Results from last 7 days   Lab Units 11/11/22  0323   MAGNESIUM mg/dL 3.1*     Results from last 7 days   Lab Units 11/14/22  0255   SODIUM mmol/L 133*   POTASSIUM mmol/L 4.4   BUN mg/dL 49*   CREATININE mg/dL 1.09*   CALCIUM mg/dL 8.5*     @LABRCNTIPbnp@  Results from last 7 days   Lab Units 11/14/22  0255 11/13/22  0310 11/12/22  0314   WBC 10*3/mm3 7.99 8.73 8.96   HEMOGLOBIN g/dL 10.0* 9.2* 9.6*   HEMATOCRIT % 31.2* 28.0* 29.7*   PLATELETS 10*3/mm3 165 146 136*     Results from last 7 days   Lab Units 11/11/22  0323 11/10/22  1649   INR  1.45* 1.52*   APTT seconds  --  33.3     The following medical decision was discussed in detail with Dr. Marie      Assessment:  CABG x5 11/10/22 with Dr Isabel  STEMI   Hypertension   diabetes mellitus: Internal  "medicine following  hypothyroidism       Plan:    BP and HR stable  Cr improved today  She was unable to stand for orthostatics today, discussed with RN, agree with stopping gabapentin.   Chest tube-CTS managing post op care. Continue aspirin, statin, beta blockade, plavix.       Patricia Munguia, APRN    )11/14/2022       EMR Dragon/Transcription:   \"Dictated utilizing Dragon dictation\".     "

## 2022-11-14 NOTE — PROGRESS NOTES
" LOS: 7 days   Patient Care Team:  Spencer Hua MD as PCP - General    Chief Complaint: post oop fu    Subjective:  Symptoms:  She reports shortness of breath (with ambulation).    Diet:  No nausea.    Activity level: Returning to normal.    Pain:  She reports no pain.          Vital Signs  Temp:  [97.7 °F (36.5 °C)-98.9 °F (37.2 °C)] 98.9 °F (37.2 °C)  Heart Rate:  [66-70] 67  Resp:  [17-18] 18  BP: (103-131)/(66-74) 110/68  Body mass index is 35.94 kg/m².    Intake/Output Summary (Last 24 hours) at 11/14/2022 0920  Last data filed at 11/14/2022 0600  Gross per 24 hour   Intake 240 ml   Output 925 ml   Net -685 ml     No intake/output data recorded.    Chest tube drainage last 8 hours:  150 (no air leak), 35        11/12/22  0600 11/13/22  0630 11/14/22  0600   Weight: 99.9 kg (220 lb 3.2 oz) 104 kg (229 lb 4.8 oz) 104 kg (229 lb 8 oz)         Objective:  General Appearance:  Comfortable.    Vital signs: (most recent): Blood pressure 108/63, pulse 71, temperature 98.2 °F (36.8 °C), temperature source Oral, resp. rate 18, height 170.2 cm (67\"), weight 104 kg (229 lb 8 oz), SpO2 98 %, not currently breastfeeding.    Lungs:  Normal effort and normal respiratory rate.  (Room air)  Heart: Normal rate.  Regular rhythm.    Extremities: There is dependent edema (hands, legs, non pitting).    Neurological: Patient is alert and oriented to person, place and time.    Skin:  Warm and dry.  (Sternal dressing intact)            Results Review:        WBC WBC   Date Value Ref Range Status   11/14/2022 7.99 3.40 - 10.80 10*3/mm3 Final   11/13/2022 8.73 3.40 - 10.80 10*3/mm3 Final   11/12/2022 8.96 3.40 - 10.80 10*3/mm3 Final   11/11/2022 8.70 3.40 - 10.80 10*3/mm3 Final      HGB Hemoglobin   Date Value Ref Range Status   11/14/2022 10.0 (L) 12.0 - 15.9 g/dL Final   11/13/2022 9.2 (L) 12.0 - 15.9 g/dL Final   11/12/2022 9.6 (L) 12.0 - 15.9 g/dL Final   11/11/2022 10.0 (L) 12.0 - 15.9 g/dL Final      HCT Hematocrit   Date Value " Ref Range Status   11/14/2022 31.2 (L) 34.0 - 46.6 % Final   11/13/2022 28.0 (L) 34.0 - 46.6 % Final   11/12/2022 29.7 (L) 34.0 - 46.6 % Final   11/11/2022 31.2 (L) 34.0 - 46.6 % Final      Platelets Platelets   Date Value Ref Range Status   11/14/2022 165 140 - 450 10*3/mm3 Final   11/13/2022 146 140 - 450 10*3/mm3 Final   11/12/2022 136 (L) 140 - 450 10*3/mm3 Final   11/11/2022 148 140 - 450 10*3/mm3 Final        PT/INR:  No results found for: PROTIME/No results found for: INR    Sodium Sodium   Date Value Ref Range Status   11/14/2022 133 (L) 136 - 145 mmol/L Final   11/13/2022 135 (L) 136 - 145 mmol/L Final   11/12/2022 139 136 - 145 mmol/L Final   11/11/2022 136 136 - 145 mmol/L Final      Potassium Potassium   Date Value Ref Range Status   11/14/2022 4.4 3.5 - 5.2 mmol/L Final   11/13/2022 4.3 3.5 - 5.2 mmol/L Final   11/12/2022 4.6 3.5 - 5.2 mmol/L Final   11/11/2022 4.5 3.5 - 5.2 mmol/L Final      Chloride Chloride   Date Value Ref Range Status   11/14/2022 104 98 - 107 mmol/L Final   11/13/2022 104 98 - 107 mmol/L Final   11/12/2022 106 98 - 107 mmol/L Final   11/11/2022 103 98 - 107 mmol/L Final      Bicarbonate CO2   Date Value Ref Range Status   11/14/2022 17.7 (L) 22.0 - 29.0 mmol/L Final   11/13/2022 19.0 (L) 22.0 - 29.0 mmol/L Final   11/12/2022 20.0 (L) 22.0 - 29.0 mmol/L Final   11/11/2022 16.9 (L) 22.0 - 29.0 mmol/L Final      BUN BUN   Date Value Ref Range Status   11/14/2022 49 (H) 8 - 23 mg/dL Final   11/13/2022 42 (H) 8 - 23 mg/dL Final   11/12/2022 26 (H) 8 - 23 mg/dL Final   11/11/2022 20 8 - 23 mg/dL Final      Creatinine Creatinine   Date Value Ref Range Status   11/14/2022 1.09 (H) 0.57 - 1.00 mg/dL Final   11/13/2022 1.33 (H) 0.57 - 1.00 mg/dL Final   11/12/2022 1.26 (H) 0.57 - 1.00 mg/dL Final   11/11/2022 1.03 (H) 0.57 - 1.00 mg/dL Final      Calcium Calcium   Date Value Ref Range Status   11/14/2022 8.5 (L) 8.6 - 10.5 mg/dL Final   11/13/2022 8.4 (L) 8.6 - 10.5 mg/dL Final   11/12/2022  8.7 8.6 - 10.5 mg/dL Final   11/11/2022 8.8 8.6 - 10.5 mg/dL Final      Magnesium No results found for: MG   Troponin No results found for: TROPONIN   BNP No results found for: BNP         aspirin, 81 mg, Oral, Daily  atorvastatin, 40 mg, Oral, Nightly  chlorhexidine, 15 mL, Mouth/Throat, Q12H  enoxaparin, 40 mg, Subcutaneous, Daily  gabapentin, 200 mg, Oral, TID  insulin glargine, 15 Units, Subcutaneous, Nightly  insulin lispro, 0-7 Units, Subcutaneous, TID AC  levothyroxine, 25 mcg, Oral, Q AM  metoprolol tartrate, 12.5 mg, Oral, Q12H  mupirocin, , Each Nare, BID  pantoprazole, 40 mg, Oral, QAM  senna-docusate sodium, 2 tablet, Oral, Nightly      clevidipine, 2-32 mg/hr        PRN Meds:.•  acetaminophen **OR** acetaminophen **OR** acetaminophen  •  ALPRAZolam  •  bisacodyl  •  bisacodyl  •  clevidipine  •  cyclobenzaprine  •  dextrose  •  dextrose  •  glucagon (human recombinant)  •  HYDROcodone-acetaminophen  •  magnesium hydroxide  •  midazolam  •  Morphine **AND** naloxone  •  Morphine  •  ondansetron  •  oxyCODONE  •  polyethylene glycol  •  potassium chloride **OR** potassium chloride  •  potassium chloride **OR** potassium chloride  •  potassium chloride  •  potassium chloride  •  potassium chloride    Patient Active Problem List   Diagnosis Code   • Vitamin D deficiency E55.9   • Primary hypothyroidism E03.9   • Dyslipidemia E78.5   • Essential hypertension I10   • Type 2 diabetes mellitus without complication, with long-term current use of insulin ([facility]) E11.9, Z79.4   • Noncompliance with diabetes treatment Z91.199   • ST elevation myocardial infarction (STEMI) ([facility]) I21.3   • Coronary artery disease involving native heart I25.10       Assessment & Plan    - STEMI, Multivessel CAD- angioplasty and stent placed diagonal branch preop, s/p CABG x5 with LIMA, EVH left calf, right thigh ([place])  - ICM, EF 40%----no ARB/ACE with MERCEDES  - Hypertension----controlled  - HL---statin  - Obesity  - Diabetes type 2-  A1C 7----Internal medicine managing, resume home meds when taking adequate po  - Klebsiella UTI, preop asymptomatic  - Post op expected acute blood loss anemia---transfused 11/11/22  - MERCEDES---creatinine peak at 1.3 r/t recent hypotension  - Episode of dysarthria on POD#2---no recurrence, CT head ok    POD#4.  Change to Toprol with mild cardiomyopathy. Resume plavix today.  D/c pleural chest tube, leave bulb, bumex 1 mg IV today.      Le Edwards, APRN  11/14/22  09:20 EST

## 2022-11-14 NOTE — PLAN OF CARE
Goal Outcome Evaluation:      Diuretic in Use: none  Response to Diuretics (Output greater than intake): none  Daily Weight (up or down): up  O2 Requirements: same  Functional Status (Activity level, tolerance and respiratory symptoms): decreased  Discharge Plans:  home with home care

## 2022-11-14 NOTE — CASE MANAGEMENT/SOCIAL WORK
Continued Stay Note  [facility]     Patient Name: Mayra Hirsch  MRN: [id number]  Today's Date: 11/14/2022    Admit Date: 11/7/2022    Plan: Home to son's house   Discharge Plan     Row Name 11/14/22 1418       Plan    Plan Home to son's house    Plan Comments CCP followed up with the patient at the bedside. The patient states she plans to discharge to her son, Bello's home, at discharge ([address] [id number]). She is agreeable to HH referrals with no agenct preference. HH referrals placed in [facility]. CCP to follow. Armando PHILLIPS RN CCP               Discharge Codes    No documentation.               Expected Discharge Date and Time     Expected Discharge Date Expected Discharge Time    Nov 17, 2022             Armando Cooper RN

## 2022-11-15 ENCOUNTER — TELEPHONE (OUTPATIENT)
Dept: CARDIAC SURGERY | Facility: CLINIC | Age: 72
End: 2022-11-15

## 2022-11-15 LAB
ANION GAP SERPL CALCULATED.3IONS-SCNC: 10.7 MMOL/L (ref 5–15)
BASOPHILS # BLD AUTO: 0.09 10*3/MM3 (ref 0–0.2)
BASOPHILS NFR BLD AUTO: 1.2 % (ref 0–1.5)
BUN SERPL-MCNC: 47 MG/DL (ref 8–23)
BUN/CREAT SERPL: 46.1 (ref 7–25)
CALCIUM SPEC-SCNC: 9 MG/DL (ref 8.6–10.5)
CHLORIDE SERPL-SCNC: 107 MMOL/L (ref 98–107)
CO2 SERPL-SCNC: 21.3 MMOL/L (ref 22–29)
CREAT SERPL-MCNC: 1.02 MG/DL (ref 0.57–1)
DEPRECATED RDW RBC AUTO: 47.4 FL (ref 37–54)
EGFRCR SERPLBLD CKD-EPI 2021: 58.9 ML/MIN/1.73
EOSINOPHIL # BLD AUTO: 0.3 10*3/MM3 (ref 0–0.4)
EOSINOPHIL NFR BLD AUTO: 4.1 % (ref 0.3–6.2)
ERYTHROCYTE [DISTWIDTH] IN BLOOD BY AUTOMATED COUNT: 14.4 % (ref 12.3–15.4)
GLUCOSE BLDC GLUCOMTR-MCNC: 120 MG/DL (ref 70–130)
GLUCOSE BLDC GLUCOMTR-MCNC: 134 MG/DL (ref 70–130)
GLUCOSE BLDC GLUCOMTR-MCNC: 167 MG/DL (ref 70–130)
GLUCOSE BLDC GLUCOMTR-MCNC: 170 MG/DL (ref 70–130)
GLUCOSE SERPL-MCNC: 126 MG/DL (ref 65–99)
HCT VFR BLD AUTO: 29.7 % (ref 34–46.6)
HGB BLD-MCNC: 9.6 G/DL (ref 12–15.9)
IMM GRANULOCYTES # BLD AUTO: 0.07 10*3/MM3 (ref 0–0.05)
IMM GRANULOCYTES NFR BLD AUTO: 1 % (ref 0–0.5)
LYMPHOCYTES # BLD AUTO: 1.32 10*3/MM3 (ref 0.7–3.1)
LYMPHOCYTES NFR BLD AUTO: 18.3 % (ref 19.6–45.3)
MCH RBC QN AUTO: 29.1 PG (ref 26.6–33)
MCHC RBC AUTO-ENTMCNC: 32.3 G/DL (ref 31.5–35.7)
MCV RBC AUTO: 90 FL (ref 79–97)
MONOCYTES # BLD AUTO: 0.96 10*3/MM3 (ref 0.1–0.9)
MONOCYTES NFR BLD AUTO: 13.3 % (ref 5–12)
NEUTROPHILS NFR BLD AUTO: 4.49 10*3/MM3 (ref 1.7–7)
NEUTROPHILS NFR BLD AUTO: 62.1 % (ref 42.7–76)
NRBC BLD AUTO-RTO: 0.1 /100 WBC (ref 0–0.2)
PLATELET # BLD AUTO: 202 10*3/MM3 (ref 140–450)
PMV BLD AUTO: 10.9 FL (ref 6–12)
POTASSIUM SERPL-SCNC: 4.4 MMOL/L (ref 3.5–5.2)
RBC # BLD AUTO: 3.3 10*6/MM3 (ref 3.77–5.28)
SODIUM SERPL-SCNC: 139 MMOL/L (ref 136–145)
WBC NRBC COR # BLD: 7.23 10*3/MM3 (ref 3.4–10.8)

## 2022-11-15 PROCEDURE — 25010000002 ENOXAPARIN PER 10 MG: Performed by: NURSE PRACTITIONER

## 2022-11-15 PROCEDURE — 63710000001 INSULIN LISPRO (HUMAN) PER 5 UNITS: Performed by: HOSPITALIST

## 2022-11-15 PROCEDURE — 97110 THERAPEUTIC EXERCISES: CPT

## 2022-11-15 PROCEDURE — 82962 GLUCOSE BLOOD TEST: CPT

## 2022-11-15 PROCEDURE — 80048 BASIC METABOLIC PNL TOTAL CA: CPT | Performed by: NURSE PRACTITIONER

## 2022-11-15 PROCEDURE — 99232 SBSQ HOSP IP/OBS MODERATE 35: CPT | Performed by: INTERNAL MEDICINE

## 2022-11-15 PROCEDURE — 63710000001 INSULIN LISPRO (HUMAN) PER 5 UNITS: Performed by: THORACIC SURGERY (CARDIOTHORACIC VASCULAR SURGERY)

## 2022-11-15 PROCEDURE — 85025 COMPLETE CBC W/AUTO DIFF WBC: CPT | Performed by: HOSPITALIST

## 2022-11-15 RX ORDER — FUROSEMIDE 40 MG/1
40 TABLET ORAL DAILY
Status: DISCONTINUED | OUTPATIENT
Start: 2022-11-15 | End: 2022-11-19 | Stop reason: HOSPADM

## 2022-11-15 RX ORDER — INSULIN LISPRO 100 [IU]/ML
5 INJECTION, SOLUTION INTRAVENOUS; SUBCUTANEOUS
Status: DISCONTINUED | OUTPATIENT
Start: 2022-11-15 | End: 2022-11-19 | Stop reason: HOSPADM

## 2022-11-15 RX ORDER — NITROGLYCERIN 0.4 MG/1
0.4 TABLET SUBLINGUAL
Status: DISCONTINUED | OUTPATIENT
Start: 2022-11-15 | End: 2022-11-19 | Stop reason: HOSPADM

## 2022-11-15 RX ADMIN — MUPIROCIN 1 APPLICATION: 20 OINTMENT TOPICAL at 20:51

## 2022-11-15 RX ADMIN — FUROSEMIDE 40 MG: 40 TABLET ORAL at 11:40

## 2022-11-15 RX ADMIN — METOPROLOL SUCCINATE 25 MG: 25 TABLET, EXTENDED RELEASE ORAL at 08:37

## 2022-11-15 RX ADMIN — MUPIROCIN 1 APPLICATION: 20 OINTMENT TOPICAL at 08:37

## 2022-11-15 RX ADMIN — HYDROCODONE BITARTRATE AND ACETAMINOPHEN 2 TABLET: 5; 325 TABLET ORAL at 00:37

## 2022-11-15 RX ADMIN — CHLORHEXIDINE GLUCONATE 15 ML: 1.2 SOLUTION ORAL at 08:37

## 2022-11-15 RX ADMIN — INSULIN LISPRO 2 UNITS: 100 INJECTION, SOLUTION INTRAVENOUS; SUBCUTANEOUS at 11:40

## 2022-11-15 RX ADMIN — HYDROCODONE BITARTRATE AND ACETAMINOPHEN 2 TABLET: 5; 325 TABLET ORAL at 20:55

## 2022-11-15 RX ADMIN — INSULIN LISPRO 3 UNITS: 100 INJECTION, SOLUTION INTRAVENOUS; SUBCUTANEOUS at 11:41

## 2022-11-15 RX ADMIN — CHLORHEXIDINE GLUCONATE 15 ML: 1.2 SOLUTION ORAL at 20:51

## 2022-11-15 RX ADMIN — ENOXAPARIN SODIUM 40 MG: 100 INJECTION SUBCUTANEOUS at 18:25

## 2022-11-15 RX ADMIN — LEVOTHYROXINE SODIUM 25 MCG: 0.03 TABLET ORAL at 06:51

## 2022-11-15 RX ADMIN — ASPIRIN 81 MG: 81 TABLET, COATED ORAL at 08:37

## 2022-11-15 RX ADMIN — INSULIN LISPRO 3 UNITS: 100 INJECTION, SOLUTION INTRAVENOUS; SUBCUTANEOUS at 08:36

## 2022-11-15 RX ADMIN — INSULIN LISPRO 5 UNITS: 100 INJECTION, SOLUTION INTRAVENOUS; SUBCUTANEOUS at 18:25

## 2022-11-15 RX ADMIN — ATORVASTATIN CALCIUM 40 MG: 20 TABLET, FILM COATED ORAL at 20:51

## 2022-11-15 RX ADMIN — INSULIN GLARGINE-YFGN 15 UNITS: 100 INJECTION, SOLUTION SUBCUTANEOUS at 20:51

## 2022-11-15 RX ADMIN — PANTOPRAZOLE SODIUM 40 MG: 40 TABLET, DELAYED RELEASE ORAL at 06:51

## 2022-11-15 NOTE — PROGRESS NOTES
" LOS: 8 days   Patient Care Team:  Spencer Hua MD as PCP - General    Chief Complaint: post-op    Subjective:  Symptoms:  Stable.  No shortness of breath (with ambulation) or chest pain.    Diet:  Adequate intake.  No nausea or vomiting.    Activity level: Returning to normal.    Pain:  She reports no pain.      Vital Signs  Temp:  [98 °F (36.7 °C)-98.6 °F (37 °C)] 98 °F (36.7 °C)  Heart Rate:  [64-74] 74  Resp:  [18] 18  BP: (104-119)/(58-94) 113/61  Body mass index is 35.4 kg/m².    Intake/Output Summary (Last 24 hours) at 11/15/2022 0929  Last data filed at 11/15/2022 0800  Gross per 24 hour   Intake 700 ml   Output 1555 ml   Net -855 ml     I/O this shift:  In: 100 [P.O.:100]  Out: -     Chest tube drainage last 8 hours: not documented         11/13/22  0630 11/14/22  0600 11/15/22  0500   Weight: 104 kg (229 lb 4.8 oz) 104 kg (229 lb 8 oz) 103 kg (226 lb)         Objective:  General Appearance:  Comfortable and in no acute distress.    Vital signs: (most recent): Blood pressure 113/61, pulse 74, temperature 98 °F (36.7 °C), temperature source Oral, resp. rate 18, height 170.2 cm (67\"), weight 103 kg (226 lb), SpO2 97 %, not currently breastfeeding.  Vital signs are normal.  No fever.    Output: Producing urine and producing stool.    Lungs:  Normal effort and normal respiratory rate.  There are decreased breath sounds.  (Room air)  Heart: Normal rate.  Regular rhythm.  S1 normal and S2 normal.    Abdomen: Bowel sounds are normal.     Extremities: There is dependent edema (hands, legs, non pitting).  (Trace, bilaterally )  Neurological: Patient is alert and oriented to person, place and time.    Skin:  Warm and dry.  (Sternal dressing intact, right and left EVH incision healing well without sign of erythema or discharge )          Results Review:        WBC WBC   Date Value Ref Range Status   11/15/2022 7.23 3.40 - 10.80 10*3/mm3 Final   11/14/2022 7.99 3.40 - 10.80 10*3/mm3 Final   11/13/2022 8.73 3.40 - " 10.80 10*3/mm3 Final      HGB Hemoglobin   Date Value Ref Range Status   11/15/2022 9.6 (L) 12.0 - 15.9 g/dL Final   11/14/2022 10.0 (L) 12.0 - 15.9 g/dL Final   11/13/2022 9.2 (L) 12.0 - 15.9 g/dL Final      HCT Hematocrit   Date Value Ref Range Status   11/15/2022 29.7 (L) 34.0 - 46.6 % Final   11/14/2022 31.2 (L) 34.0 - 46.6 % Final   11/13/2022 28.0 (L) 34.0 - 46.6 % Final      Platelets Platelets   Date Value Ref Range Status   11/15/2022 202 140 - 450 10*3/mm3 Final   11/14/2022 165 140 - 450 10*3/mm3 Final   11/13/2022 146 140 - 450 10*3/mm3 Final        PT/INR:  No results found for: PROTIME/No results found for: INR    Sodium Sodium   Date Value Ref Range Status   11/15/2022 139 136 - 145 mmol/L Final   11/14/2022 133 (L) 136 - 145 mmol/L Final   11/13/2022 135 (L) 136 - 145 mmol/L Final      Potassium Potassium   Date Value Ref Range Status   11/15/2022 4.4 3.5 - 5.2 mmol/L Final   11/14/2022 4.4 3.5 - 5.2 mmol/L Final   11/13/2022 4.3 3.5 - 5.2 mmol/L Final      Chloride Chloride   Date Value Ref Range Status   11/15/2022 107 98 - 107 mmol/L Final   11/14/2022 104 98 - 107 mmol/L Final   11/13/2022 104 98 - 107 mmol/L Final      Bicarbonate CO2   Date Value Ref Range Status   11/15/2022 21.3 (L) 22.0 - 29.0 mmol/L Final   11/14/2022 17.7 (L) 22.0 - 29.0 mmol/L Final   11/13/2022 19.0 (L) 22.0 - 29.0 mmol/L Final      BUN BUN   Date Value Ref Range Status   11/15/2022 47 (H) 8 - 23 mg/dL Final   11/14/2022 49 (H) 8 - 23 mg/dL Final   11/13/2022 42 (H) 8 - 23 mg/dL Final      Creatinine Creatinine   Date Value Ref Range Status   11/15/2022 1.02 (H) 0.57 - 1.00 mg/dL Final   11/14/2022 1.09 (H) 0.57 - 1.00 mg/dL Final   11/13/2022 1.33 (H) 0.57 - 1.00 mg/dL Final      Calcium Calcium   Date Value Ref Range Status   11/15/2022 9.0 8.6 - 10.5 mg/dL Final   11/14/2022 8.5 (L) 8.6 - 10.5 mg/dL Final   11/13/2022 8.4 (L) 8.6 - 10.5 mg/dL Final      Magnesium No results found for: MG   Troponin No results found  for: TROPONIN   BNP No results found for: BNP         aspirin, 81 mg, Oral, Daily  atorvastatin, 40 mg, Oral, Nightly  chlorhexidine, 15 mL, Mouth/Throat, Q12H  clopidogrel, 75 mg, Oral, Daily  enoxaparin, 40 mg, Subcutaneous, Daily  insulin glargine, 15 Units, Subcutaneous, Nightly  insulin lispro, 0-7 Units, Subcutaneous, TID AC  insulin lispro, 3 Units, Subcutaneous, TID With Meals  levothyroxine, 25 mcg, Oral, Q AM  metoprolol succinate XL, 25 mg, Oral, Q24H  mupirocin, , Each Nare, BID  pantoprazole, 40 mg, Oral, QAM  senna-docusate sodium, 2 tablet, Oral, Nightly      clevidipine, 2-32 mg/hr        PRN Meds:.•  acetaminophen **OR** acetaminophen **OR** acetaminophen  •  ALPRAZolam  •  bisacodyl  •  bisacodyl  •  clevidipine  •  cyclobenzaprine  •  dextrose  •  dextrose  •  glucagon (human recombinant)  •  HYDROcodone-acetaminophen  •  magnesium hydroxide  •  midazolam  •  Morphine **AND** naloxone  •  Morphine  •  ondansetron  •  oxyCODONE  •  polyethylene glycol  •  potassium chloride **OR** potassium chloride  •  potassium chloride **OR** potassium chloride  •  potassium chloride  •  potassium chloride  •  potassium chloride    Patient Active Problem List   Diagnosis Code   • Vitamin D deficiency E55.9   • Primary hypothyroidism E03.9   • Dyslipidemia E78.5   • Essential hypertension I10   • Type 2 diabetes mellitus without complication, with long-term current use of insulin ([facility]) E11.9, Z79.4   • Noncompliance with diabetes treatment Z91.199   • ST elevation myocardial infarction (STEMI) ([facility]) I21.3   • Coronary artery disease involving native heart I25.10       Assessment & Plan    - STEMI, Multivessel CAD- angioplasty and stent placed diagonal branch preop, s/p CABG x5 with LIMA, EVH left calf, right thigh ([place])  - ICM, EF 40%----no ARB/ACE with MERCEDES  - Hypertension----controlled  - HL---statin  - Obesity  - Diabetes type 2- A1C 7----Internal medicine managing, resume home meds when taking adequate  po  - Klebsiella UTI, preop asymptomatic  - Post op expected acute blood loss anemia---transfused 11/11/22  - MERCEDES---creatinine peak at 1.3 r/t recent hypotension  - Episode of dysarthria on POD#2---no recurrence, CT head ok    POD5:     Patient looks good this morning, sitting at bedside after receiving bath, denies pain  Patient tolerating Toprol , Plavix resumed yesterday  Pleural chest tube removed yesterday, briana drain to bulb still in place with minimal output -- leave another day  Patient diuresed yesterday with good response, creatinine stable -- Placing patient on PO lasix, potassium 4.4 today, assess need to replace tomorrow   When discharged, patient to go to son's house --  referrals placed   Continue work with PT, patient experiencing weakness and fatigue yesterday  Continue routine care, mobilize      Salima Johnson PA-C  11/15/22  09:29 EST

## 2022-11-15 NOTE — TELEPHONE ENCOUNTER
Caller: Marixa Hirsch    Relationship: Emergency Contact    Best call back number: [phone]    What orders are you requesting (i.e. lab or imaging): CARDIAC REHAB    Additional notes: PT DAUGHTER IN LAW CALLING IN REQUESTING CARDIAC REHAB ORDERS BE PLACED AS PT IS UNABLE TO WALK TO THE BATHROOM BY HERSELF & HAS A HARD TIME BREATHING WHEN STANDING & TRYING TO WALK.    DR ANDREWS STATED IN HOSPITAL THEY WOULD BE RELEASING HER SOON- PT DAUGHTER IN LAW FEELS THAT HER MOTHER IN LAW NEEDS SOME TIME IN REHAB BEFORE TRANSITIONING HOME. PT DAUGHTER IN LAW REQUEST A REFERRAL TO A LOCATION THAT WILL TAKE PT'S INSURANCE.     PLEASE CONTACT PT DAUGHTER IN LAW WITH UPDATE.

## 2022-11-15 NOTE — PLAN OF CARE
"Goal Outcome Evaluation:         Diuretic in Use: bumex  Response to Diuretics (Output greater than intake): unable to measure, misses hat  Daily Weight (up or down): up recheck in am  O2 Requirements: down  Functional Status (Activity level, tolerance and respiratory symptoms): increase in tolerance by very little  Discharge Plans:  patient has not walked any further than the door way, barely able to stand for 4 minutes, family is concerned about going home, please assess for rehab needs, expressive aphagia communication delays seem to improve slightly after discontinuation of gabapentin and narcotics. Patient still needs to read things \"a couple times\" before she \"gets it\". Which is unusual for patient-former . Family is still concerned. Spoke with aprn ct and cardiology today about concerns, bumex orders with potassium given and right chest tube out per order, bulb to suction per order. Mepilex applied, pump applied to bed, blanching area on sacrum noted, will continue to shift weight and monitor.        "

## 2022-11-15 NOTE — PLAN OF CARE
Goal Outcome Evaluation:  Plan of Care Reviewed With: patient        Progress: improving  Outcome Evaluation: The patient reports that she walked earlier in the day and was agreeable to walk again.  Pt displayed improved independence with sit to stand.  Utilized a front wheeled walker and the patient did much better.  The patient did feel SOA and resp rate increased slightly but 02 sats were 100% on room air at end of ambulation.  Pt does not have a walker at home but may need one at time of discharge

## 2022-11-15 NOTE — PROGRESS NOTES
"Daily progress note    Referring physician  Dr. BECKFORD    Chief complaint  Doing better with no new complaints and wants to go home    History of present illness  71-year-old white female with history of diabetes hypertension hyperlipidemia hypothyroidism presented to [facility] emergency room with sudden onset of shortness of breath as she woke up with it.  Patient has no chest pain palpitation but does have nonproductive cough but no fever chills.  Patient found to be hypoxic while EMS arrived and brought to the emergency room which she found to have acute ST elevation MI and taken to the Cath Lab which showed multivessel coronary disease  angioplasty and stent placed to diagonal branch and I am asked to follow patient for medical problem.  At the time of review she is feeling better and has no more shortness of breath and also denies any chest pain.  Patient feels weak but feeling much better after angioplasty.     REVIEW OF SYSTEMS  Unremarkable except weakness    PHYSICAL EXAM         Blood pressure 122/62, pulse 68, temperature 98 °F (36.7 °C), temperature source Oral, resp. rate 18, height 170.2 cm (67\"), weight 103 kg (226 lb), SpO2 97 %, not currently breastfeeding.    Constitutional:       General: She is not in acute distress.  HENT:      Head: Normocephalic and atraumatic.   Eyes:      Extraocular Movements: EOM normal.      Pupils: Pupils are equal, round, and reactive to light.   Cardiovascular:      Rate and Rhythm: Normal rate and regular rhythm.      Heart sounds: Normal heart sounds.   Pulmonary:      Effort: Pulmonary effort is normal. No respiratory distress.      Breath sounds: Examination of the right-middle field reveals rhonchi. Examination of the left-middle field reveals rhonchi. Examination of the right-lower field reveals rhonchi. Examination of the left-lower field reveals rhonchi. Rhonchi present.   Abdominal:      Palpations: Abdomen is soft.      Tenderness: There is no " abdominal tenderness. There is no guarding or rebound.   Musculoskeletal:         General: No edema. Normal range of motion.      Cervical back: Normal range of motion and neck supple.   Skin:     General: Skin is warm and dry.      Findings: No rash.   Neurological:      Mental Status: She is alert and oriented to person, place, and time.      Sensory: Sensation is intact.      Motor: Motor strength is normal.   Psychiatric:         Mood and Affect: Mood and affect normal.      LAB RESULTS  Lab Results (last 24 hours)     Procedure Component Value Units Date/Time    POC Glucose Once [[id number]]  (Abnormal) Collected: 11/15/22 1112    Specimen: Blood Updated: 11/15/22 1115     Glucose 170 mg/dL      Comment: Meter: [id number] : [id number] Green Yara MANDUJANO       POC Glucose Once [[id number]]  (Normal) Collected: 11/15/22 0548    Specimen: Blood Updated: 11/15/22 0550     Glucose 120 mg/dL      Comment: Meter: [id number] : [id number] Simon Alvarado NA       Basic Metabolic Panel [[id number]]  (Abnormal) Collected: 11/15/22 0259    Specimen: Blood Updated: 11/15/22 0359     Glucose 126 mg/dL      BUN 47 mg/dL      Creatinine 1.02 mg/dL      Sodium 139 mmol/L      Potassium 4.4 mmol/L      Chloride 107 mmol/L      CO2 21.3 mmol/L      Calcium 9.0 mg/dL      BUN/Creatinine Ratio 46.1     Anion Gap 10.7 mmol/L      eGFR 58.9 mL/min/1.73      Comment: National Kidney Foundation and American Society of Nephrology (ASN) Task Force recommended calculation based on the Chronic Kidney Disease Epidemiology Collaboration (CKD-EPI) equation refit without adjustment for race.       Narrative:      GFR Normal >60  Chronic Kidney Disease <60  Kidney Failure <15    The GFR formula is only valid for adults with stable renal function between ages 18 and 70.    CBC & Differential [[id number]]  (Abnormal) Collected: 11/15/22 0259    Specimen: Blood Updated: 11/15/22 0337    Narrative:      The following orders were created for panel  order CBC & Differential.  Procedure                               Abnormality         Status                     ---------                               -----------         ------                     CBC Auto Differential[[id number]]        Abnormal            Final result                 Please view results for these tests on the individual orders.    CBC Auto Differential [[id number]]  (Abnormal) Collected: 11/15/22 0259    Specimen: Blood Updated: 11/15/22 0337     WBC 7.23 10*3/mm3      RBC 3.30 10*6/mm3      Hemoglobin 9.6 g/dL      Hematocrit 29.7 %      MCV 90.0 fL      MCH 29.1 pg      MCHC 32.3 g/dL      RDW 14.4 %      RDW-SD 47.4 fl      MPV 10.9 fL      Platelets 202 10*3/mm3      Neutrophil % 62.1 %      Lymphocyte % 18.3 %      Monocyte % 13.3 %      Eosinophil % 4.1 %      Basophil % 1.2 %      Immature Grans % 1.0 %      Neutrophils, Absolute 4.49 10*3/mm3      Lymphocytes, Absolute 1.32 10*3/mm3      Monocytes, Absolute 0.96 10*3/mm3      Eosinophils, Absolute 0.30 10*3/mm3      Basophils, Absolute 0.09 10*3/mm3      Immature Grans, Absolute 0.07 10*3/mm3      nRBC 0.1 /100 WBC     POC Glucose Once [[id number]]  (Abnormal) Collected: 11/14/22 2005    Specimen: Blood Updated: 11/14/22 2006     Glucose 142 mg/dL      Comment: Meter: [id number] : [id number] [facility]       POC Glucose Once [[id number]]  (Abnormal) Collected: 11/14/22 1611    Specimen: Blood Updated: 11/14/22 1612     Glucose 158 mg/dL      Comment: Meter: [id number] : [id number] Vito MENARD           Imaging Results (Last 24 Hours)     Procedure Component Value Units Date/Time    XR Chest 1 View [[id number]] Collected: 11/14/22 1414     Updated: 11/14/22 1420    Narrative:      XR CHEST 1 VW-     HISTORY: Female who is 71 years-old,  chest tube removal     TECHNIQUE: Frontal view of the chest     COMPARISON: 11/13/2022     FINDINGS: Previous right chest tube is been removed. Sternotomy wires  are noted. The heart is  enlarged. Pulmonary vasculature is unremarkable.  Trace right apical pneumothorax. No focal pulmonary consolidation,  pleural effusion, or left pneumothorax. No acute osseous process.       Impression:      Interval right chest tube removal. Trace right apical  pneumothorax.     This report was finalized on 11/14/2022 2:17 PM by Dr. Tr Hinton M.D.              ECG 12 Lead          Component Ref Range & Units 01:44    QT Interval ms 358 P    Resulting Agency   ECG             HEART RATE= 116  bpm  RR Interval= 517  ms  CO Interval= 110  ms  P Horizontal Axis= -49  deg  P Front Axis= 64  deg  QRSD Interval= 97  ms  QT Interval= 358  ms  QRS Axis= 18  deg  T Wave Axis= 130  deg  - ABNORMAL ECG -  Sinus tachycardia  Probable left atrial enlargement  Lateral infarct, acute (LAD)  Probable anteroseptal infarct, recent             Current Facility-Administered Medications:   •  acetaminophen (TYLENOL) tablet 650 mg, 650 mg, Oral, Q4H PRN, 650 mg at 11/11/22 1744 **OR** acetaminophen (TYLENOL) 160 MG/5ML solution 650 mg, 650 mg, Oral, Q4H PRN **OR** acetaminophen (TYLENOL) suppository 650 mg, 650 mg, Rectal, Q4H PRN, Golden, Martha, APRN  •  ALPRAZolam (XANAX) tablet 0.25 mg, 0.25 mg, Oral, Q8H PRN, Chantel, Martha, APRN, 0.25 mg at 11/14/22 2154  •  aspirin EC tablet 81 mg, 81 mg, Oral, Daily, Chantel, Martha, APRN, 81 mg at 11/15/22 0837  •  atorvastatin (LIPITOR) tablet 40 mg, 40 mg, Oral, Nightly, Golden, Martha, APRN, 40 mg at 11/14/22 2154  •  bisacodyl (DULCOLAX) EC tablet 10 mg, 10 mg, Oral, Daily PRN, Chantel, Martha, APRN, 10 mg at 11/14/22 2154  •  bisacodyl (DULCOLAX) suppository 10 mg, 10 mg, Rectal, Daily PRN, Golden, Martha, APRN  •  chlorhexidine (PERIDEX) 0.12 % solution 15 mL, 15 mL, Mouth/Throat, Q12H, Martha Golden APRN, 15 mL at 11/15/22 0837  •  clevidipine (CLEVIPREX) infusion 0.5 mg/mL, 2-32 mg/hr, Intravenous, Continuous PRN, Martha Golden, GUEVARA  •  clopidogrel (PLAVIX)  tablet 75 mg, 75 mg, Oral, Daily, Le Edwards APRN, 75 mg at 11/14/22 2154  •  cyclobenzaprine (FLEXERIL) tablet 10 mg, 10 mg, Oral, Q8H PRN, Martha Golden APRN, 10 mg at 11/14/22 2154  •  dextrose (D50W) (25 g/50 mL) IV injection 25 g, 25 g, Intravenous, Q15 Min PRN, Breann Giron MD  •  dextrose (GLUTOSE) oral gel 15 g, 15 g, Oral, Q15 Min PRN, Breann Giron MD  •  Enoxaparin Sodium (LOVENOX) syringe 40 mg, 40 mg, Subcutaneous, Daily, Martha Golden APRN, 40 mg at 11/14/22 1758  •  furosemide (LASIX) tablet 40 mg, 40 mg, Oral, Daily, Salima Johnson PA-C, 40 mg at 11/15/22 1140  •  glucagon (human recombinant) (GLUCAGEN DIAGNOSTIC) injection 1 mg, 1 mg, Intramuscular, Q15 Min PRN, Breann Giron MD  •  HYDROcodone-acetaminophen (NORCO) 5-325 MG per tablet 2 tablet, 2 tablet, Oral, Q4H PRN, Martha Golden APRN, 2 tablet at 11/15/22 0037  •  insulin glargine (LANTUS, SEMGLEE) injection 15 Units, 15 Units, Subcutaneous, Nightly, Sammy Koch MD, 15 Units at 11/14/22 2154  •  insulin lispro (ADMELOG) injection 0-7 Units, 0-7 Units, Subcutaneous, TID AC, Breann Giron MD, 2 Units at 11/15/22 1140  •  insulin lispro (ADMELOG) injection 3 Units, 3 Units, Subcutaneous, TID With Meals, Sammy Koch MD, 3 Units at 11/15/22 1141  •  levothyroxine (SYNTHROID, LEVOTHROID) tablet 25 mcg, 25 mcg, Oral, Q AM, Martha Golden APRN, 25 mcg at 11/15/22 0651  •  magnesium hydroxide (MILK OF MAGNESIA) suspension 10 mL, 10 mL, Oral, Daily PRN, Martha Golden APRN, 10 mL at 11/14/22 0932  •  metoprolol succinate XL (TOPROL-XL) 24 hr tablet 25 mg, 25 mg, Oral, Q24H, Le Edwards, APRN, 25 mg at 11/15/22 0837  •  midazolam (VERSED) injection 2 mg, 2 mg, Intravenous, Q1H PRN, Martha Golden APRN  •  morphine injection 1 mg, 1 mg, Intravenous, Q4H PRN, 1 mg at 11/11/22 1000 **AND** naloxone (NARCAN) injection 0.4 mg, 0.4 mg, Intravenous, Q5 Min PRN, Martha Golden APRN  •  morphine injection 4  mg, 4 mg, Intravenous, Q30 Min PRN, Martha Golden APRN  •  mupirocin (BACTROBAN) 2 % nasal ointment, , Each Nare, BID, Martha Golden APRN, 1 application at 11/15/22 0837  •  nitroglycerin (NITROSTAT) SL tablet 0.4 mg, 0.4 mg, Sublingual, Q5 Min PRN, Joanna Marie MD  •  ondansetron (ZOFRAN) injection 4 mg, 4 mg, Intravenous, Q6H PRN, Martha Golden APRN, 4 mg at 11/11/22 0417  •  oxyCODONE (ROXICODONE) immediate release tablet 10 mg, 10 mg, Oral, Q4H PRN, Martha Golden APRN, 10 mg at 11/13/22 2220  •  [COMPLETED] pantoprazole (PROTONIX) injection 40 mg, 40 mg, Intravenous, Once, 40 mg at 11/10/22 1658 **FOLLOWED BY** pantoprazole (PROTONIX) EC tablet 40 mg, 40 mg, Oral, QAM, Martha Golden, APRN, 40 mg at 11/15/22 0651  •  polyethylene glycol (MIRALAX) packet 17 g, 17 g, Oral, Daily PRN, Martha Golden APRN, 17 g at 11/12/22 1901  •  potassium chloride (K-DUR,KLOR-CON) ER tablet 40 mEq, 40 mEq, Oral, PRN **OR** potassium chloride (KLOR-CON) packet 40 mEq, 40 mEq, Oral, PRN, Martha Golden, APRN  •  potassium chloride 10 mEq in 100 mL IVPB, 10 mEq, Intravenous, Q1H PRN **OR** potassium chloride 10 mEq in 100 mL IVPB, 10 mEq, Intravenous, Q1H PRN, Martha Golden, APRN  •  potassium chloride 20 mEq in 50 mL IVPB, 20 mEq, Intravenous, Q1H PRN, Martha Golden, APRN  •  potassium chloride 20 mEq in 50 mL IVPB, 20 mEq, Intravenous, Q1H PRN, Martha Golden, APRN  •  potassium chloride 20 mEq in 50 mL IVPB, 20 mEq, Intravenous, Q1H PRN, Martha Golden, GUEVARA  •  sennosides-docusate (PERICOLACE) 8.6-50 MG per tablet 2 tablet, 2 tablet, Oral, Nightly, Martha Golden APRN, 2 tablet at 11/14/22 4353     ASSESSMENT  Multivessel coronary artery disease s/p CABG postop day 5  Acute ST relation MI s/p angioplasty and stent   Acute Klebsiella UTI  Diabetes mellitus  Hypertension  Hyperlipidemia  Hypothyroidism  Dysarthria resolved  Gastroesophageal reflux disease    PLAN  CPM  Postop  care  Post PCI care  Completed antibiotic  Continue home medications  Stress ulcer DVT prophylaxis  Accu-Cheks sliding scale insulin  Supportive care  PT/OT  Discussed with nursing staff  Discharge planning    DEE FRENCH MD

## 2022-11-15 NOTE — PLAN OF CARE
Goal Outcome Evaluation:  Plan of Care Reviewed With: patient        Progress: improving  Outcome Evaluation: Pt s/p CABGX5 POD 5, progressing well. On room air, encouraging IS. Pt is Alert & oriented, gait unsteady due to weakness, working with PT, using walker with staff assistance with ambulation to bathroom. Medication changes noted, reviewed with Pt, denies pain currently, VSS, will continue to monitor

## 2022-11-15 NOTE — THERAPY TREATMENT NOTE
Patient Name: Mayra Hirsch  : 1950    MRN: [id number]                              Today's Date: 11/15/2022       Admit Date: 2022    Visit Dx:     ICD-10-CM ICD-9-CM   1. ST elevation myocardial infarction (STEMI), unspecified artery (HCC)  I21.3 410.90   2. ST elevation myocardial infarction (STEMI) involving other coronary artery (HCC)  I21.29 410.10   3. Coronary artery disease involving native heart, unspecified vessel or lesion type, unspecified whether angina present  I25.10 414.01   4. Abnormal findings on diagnostic imaging of other specified body structures  R93.89 793.99     Patient Active Problem List   Diagnosis   • Vitamin D deficiency   • Primary hypothyroidism   • Dyslipidemia   • Essential hypertension   • Type 2 diabetes mellitus without complication, with long-term current use of insulin (HCC)   • Noncompliance with diabetes treatment   • ST elevation myocardial infarction (STEMI) (HCC)   • Coronary artery disease involving native heart     Past Medical History:   Diagnosis Date   • Depression    • Diabetes mellitus (HCC)    • Disease of thyroid gland    • Fibrocystic breast    • Hypertension    • Hypothyroidism    • PONV (postoperative nausea and vomiting)    • Type 2 diabetes mellitus (HCC)      Past Surgical History:   Procedure Laterality Date   • BREAST EXCISIONAL BIOPSY Right     at age 22; benign.   • CARDIAC CATHETERIZATION Left 2022    Procedure: Cardiac Catheterization/Vascular Study;  Surgeon: Joanna Marie MD;  Location: [facility] INVASIVE LOCATION;  Service: Cardiology;  Laterality: Left;   • CARDIAC CATHETERIZATION N/A 2022    Procedure: Percutaneous Coronary Intervention;  Surgeon: Joanna Marie MD;  Location: [facility] CATH INVASIVE LOCATION;  Service: Cardiology;  Laterality: N/A;   • CARDIAC CATHETERIZATION N/A 2022    Procedure: Left ventriculography;  Surgeon: Joanna Marie MD;  Location: [facility] INVASIVE LOCATION;   Service: Cardiology;  Laterality: N/A;   • CARDIAC CATHETERIZATION N/A 2022    Procedure: Stent MARTINEZ coronary;  Surgeon: Joanna Marie MD;  Location: [facility] CATH INVASIVE LOCATION;  Service: Cardiology;  Laterality: N/A;   • CARDIAC CATHETERIZATION N/A 2022    Procedure: Left Heart Cath;  Surgeon: Joanna Marie MD;  Location: [facility] CATH INVASIVE LOCATION;  Service: Cardiology;  Laterality: N/A;   •  SECTION     • CORONARY ARTERY BYPASS GRAFT N/A 11/10/2022    Procedure: NIKKY, CORONARY ARTERY BYPASS GRAFTING TIMES 6 WITH LEFT JORDYN AND ENDOSCOPICALLY HARVESTED LEFT AND RIGHT GREATER SAPHENOUS VEIN, PRP TRANSESOPHAGEAL ECHOCARDIOGRAM WITH ANESTHESIA;  Surgeon: Jr Dogn Isabel MD;  Location: [facility];  Service: Cardiothoracic;  Laterality: N/A;   • HAND SURGERY     • KNEE SURGERY     • OOPHORECTOMY      1 ovary removed due to ectopic pregnancy      General Information     Row Name 11/15/22 1257          Physical Therapy Time and Intention    Document Type therapy note (daily note)  -LB     Mode of Treatment physical therapy  -LB     Row Name 11/15/22 1257          General Information    Existing Precautions/Restrictions cardiac;fall;sternal  -LB     Row Name 11/15/22 1257          Safety Issues, Functional Mobility    Impairments Affecting Function (Mobility) balance;strength;pain;endurance/activity tolerance  -LB           User Key  (r) = Recorded By, (t) = Taken By, (c) = Cosigned By    Initials Name Provider Type    LB Zuleyka Smith, PT Physical Therapist               Mobility     Row Name 11/15/22 1257          Bed Mobility    Sit-Supine Etta (Bed Mobility) moderate assist (50% patient effort)  -LB     Row Name 11/15/22 1257          Sit-Stand Transfer    Sit-Stand Etta (Transfers) verbal cues;contact guard  -LB     Assistive Device (Sit-Stand Transfers) walker, front-wheeled  -LB     Row Name 11/15/22 1257          Gait/Stairs (Locomotion)    Etta  Level (Gait) verbal cues;contact guard;2 person assist  -LB     Assistive Device (Gait) walker, front-wheeled  -LB     Distance in Feet (Gait) 50  -LB     Deviations/Abnormal Patterns (Gait) base of support, wide;abelardo decreased;festinating/shuffling;gait speed decreased;stride length decreased;antalgic  -LB     Bilateral Gait Deviations forward flexed posture  -LB           User Key  (r) = Recorded By, (t) = Taken By, (c) = Cosigned By    Initials Name Provider Type    Zuleyka Byrne PT Physical Therapist               Obj/Interventions     Row Name 11/15/22 1258          Motor Skills    Therapeutic Exercise --  5 reps of cardiac protocol  -LB           User Key  (r) = Recorded By, (t) = Taken By, (c) = Cosigned By    Initials Name Provider Type    Zuleyka Byrne PT Physical Therapist               Goals/Plan    No documentation.                Clinical Impression     Row Name 11/15/22 1259          Pain    Pretreatment Pain Rating 5/10  -LB     Posttreatment Pain Rating 5/10  -LB     Row Name 11/15/22 1259          Plan of Care Review    Plan of Care Reviewed With patient  -LB     Progress improving  -LB     Outcome Evaluation The patient reports that she walked earlier in the day and was agreeable to walk again.  Pt displayed improved independence with sit to stand.  Utilized a front wheeled walker and the patient did much better.  The patient did feel SOA and resp rate increased slightly but 02 sats were 100% on room air at end of ambulation.  Pt does not have a walker at home but may need one at time of discharge  -LB     Row Name 11/15/22 1259          Vital Signs    Post SpO2 (%) 100  -LB     O2 Delivery Post Treatment room air  -LB     Row Name 11/15/22 1259          Positioning and Restraints    Pre-Treatment Position sitting in chair/recliner  -LB     Post Treatment Position bed  -LB     In Bed supine;call light within reach;encouraged to call for assist;exit alarm on  -LB           User Key  (r) =  Recorded By, (t) = Taken By, (c) = Cosigned By    Initials Name Provider Type    Zuleyka Byrne, PT Physical Therapist               Outcome Measures     Row Name 11/15/22 1302 11/15/22 0800       How much help from another person do you currently need...    Turning from your back to your side while in flat bed without using bedrails? 3  -LB 3  -RK    Moving from lying on back to sitting on the side of a flat bed without bedrails? 2  -LB 2  -RK    Moving to and from a bed to a chair (including a wheelchair)? 3  -LB 2  -RK    Standing up from a chair using your arms (e.g., wheelchair, bedside chair)? 3  -LB 2  -RK    Climbing 3-5 steps with a railing? 2  -LB 1  -RK    To walk in hospital room? 3  -LB 2  -RK    AM-PAC 6 Clicks Score (PT) 16  -LB 12  -RK    Highest level of mobility 5 --> Static standing  -LB 4 --> Transferred to chair/commode  -RK          User Key  (r) = Recorded By, (t) = Taken By, (c) = Cosigned By    Initials Name Provider Type    Zuleyka Byrne PT Physical Therapist    Jessica Ellis RN Registered Nurse                             Physical Therapy Education     Title: PT OT SLP Therapies (Done)     Topic: Physical Therapy (Done)     Point: Mobility training (Done)     Learning Progress Summary           Patient Acceptance, E,TB, VU by AIDAN at 11/15/2022 1302    Acceptance, E, VU,NR by RACHEL at 11/14/2022 1211    Acceptance, E, VU by  at 11/13/2022 1009    Acceptance, E, VU by  at 11/12/2022 1235    Acceptance, E, VU,NR by MILTON at 11/11/2022 1049                   Point: Home exercise program (Done)     Learning Progress Summary           Patient Acceptance, E, VU,NR by RACHEL at 11/14/2022 1211    Acceptance, E, VU by  at 11/13/2022 1009    Acceptance, E, VU by  at 11/12/2022 1235    Acceptance, E, VU,NR by MILTON at 11/11/2022 1049                   Point: Body mechanics (Done)     Learning Progress Summary           Patient Acceptance, E, VU,NR by RACHEL at 11/14/2022 1211    Acceptance, E, VU by   at 11/13/2022 1009    Acceptance, E, VU by  at 11/12/2022 1235    Acceptance, E, VU,NR by  at 11/11/2022 1049                   Point: Precautions (Done)     Learning Progress Summary           Patient Acceptance, E, VU,NR by  at 11/14/2022 1211    Acceptance, E, VU by  at 11/13/2022 1009    Acceptance, E, VU by  at 11/12/2022 1235    Acceptance, E, VU,NR by  at 11/11/2022 1049                               User Key     Initials Effective Dates Name Provider Type Discipline     06/16/21 -  Geraldine Boyd, PT Physical Therapist PT     06/16/21 -  Zuleyka Smith PT Physical Therapist PT     06/16/21 -  Jaxson Gaffney, PT Physical Therapist PT     10/25/19 -  Sravani Downs PT Physical Therapist PT              PT Recommendation and Plan     Plan of Care Reviewed With: patient  Progress: improving  Outcome Evaluation: The patient reports that she walked earlier in the day and was agreeable to walk again.  Pt displayed improved independence with sit to stand.  Utilized a front wheeled walker and the patient did much better.  The patient did feel SOA and resp rate increased slightly but 02 sats were 100% on room air at end of ambulation.  Pt does not have a walker at home but may need one at time of discharge     Time Calculation:    PT Charges     Row Name 11/15/22 1515             Time Calculation    Start Time 1240  -LB      Stop Time 1253  -LB      Time Calculation (min) 13 min  -LB      PT Received On 11/15/22  -LB      PT - Next Appointment 11/16/22  -LB            User Key  (r) = Recorded By, (t) = Taken By, (c) = Cosigned By    Initials Name Provider Type    LB Zuleyka Smith, PT Physical Therapist              Therapy Charges for Today     Code Description Service Date Service Provider Modifiers Qty    20948286807 HC PT THER PROC EA 15 MIN 11/15/2022 Zuleyka Smith PT GP 1          PT G-Codes  Outcome Measure Options: AM-PAC 6 Clicks Basic Mobility (PT)  AM-PAC 6 Clicks Score (PT):  16    Patient was wearing a face mask during this therapy encounter. Therapist used appropriate personal protective equipment including mask and gloves.  Mask used was standard procedure mask. Appropriate PPE was worn during the entire therapy session. Hand hygiene was completed before and after therapy session. Patient is not in enhanced droplet precautions.         Zuleyka Smith, PT  11/15/2022

## 2022-11-15 NOTE — TELEPHONE ENCOUNTER
Returned call from pt's daughter-in-law, Marixa, regarding outpatient rehab. Marixa is concerned that pt will not be able to do the 15 stairs in their home at discharge. Advised her to call  and RN in hospital to discuss her concerns. Marixa verbalized understanding.

## 2022-11-16 LAB
ANION GAP SERPL CALCULATED.3IONS-SCNC: 9.8 MMOL/L (ref 5–15)
BASOPHILS # BLD AUTO: 0.1 10*3/MM3 (ref 0–0.2)
BASOPHILS NFR BLD AUTO: 1.5 % (ref 0–1.5)
BUN SERPL-MCNC: 34 MG/DL (ref 8–23)
BUN/CREAT SERPL: 41.5 (ref 7–25)
CALCIUM SPEC-SCNC: 8.7 MG/DL (ref 8.6–10.5)
CHLORIDE SERPL-SCNC: 105 MMOL/L (ref 98–107)
CO2 SERPL-SCNC: 22.2 MMOL/L (ref 22–29)
CREAT SERPL-MCNC: 0.82 MG/DL (ref 0.57–1)
DEPRECATED RDW RBC AUTO: 48.1 FL (ref 37–54)
EGFRCR SERPLBLD CKD-EPI 2021: 76.6 ML/MIN/1.73
EOSINOPHIL # BLD AUTO: 0.33 10*3/MM3 (ref 0–0.4)
EOSINOPHIL NFR BLD AUTO: 4.9 % (ref 0.3–6.2)
ERYTHROCYTE [DISTWIDTH] IN BLOOD BY AUTOMATED COUNT: 14.4 % (ref 12.3–15.4)
GLUCOSE BLDC GLUCOMTR-MCNC: 108 MG/DL (ref 70–130)
GLUCOSE BLDC GLUCOMTR-MCNC: 121 MG/DL (ref 70–130)
GLUCOSE BLDC GLUCOMTR-MCNC: 133 MG/DL (ref 70–130)
GLUCOSE BLDC GLUCOMTR-MCNC: 152 MG/DL (ref 70–130)
GLUCOSE SERPL-MCNC: 114 MG/DL (ref 65–99)
HCT VFR BLD AUTO: 31.8 % (ref 34–46.6)
HGB BLD-MCNC: 9.8 G/DL (ref 12–15.9)
IMM GRANULOCYTES # BLD AUTO: 0.06 10*3/MM3 (ref 0–0.05)
IMM GRANULOCYTES NFR BLD AUTO: 0.9 % (ref 0–0.5)
LYMPHOCYTES # BLD AUTO: 1.63 10*3/MM3 (ref 0.7–3.1)
LYMPHOCYTES NFR BLD AUTO: 24.4 % (ref 19.6–45.3)
MCH RBC QN AUTO: 28.2 PG (ref 26.6–33)
MCHC RBC AUTO-ENTMCNC: 30.8 G/DL (ref 31.5–35.7)
MCV RBC AUTO: 91.4 FL (ref 79–97)
MONOCYTES # BLD AUTO: 0.84 10*3/MM3 (ref 0.1–0.9)
MONOCYTES NFR BLD AUTO: 12.6 % (ref 5–12)
NEUTROPHILS NFR BLD AUTO: 3.73 10*3/MM3 (ref 1.7–7)
NEUTROPHILS NFR BLD AUTO: 55.7 % (ref 42.7–76)
NRBC BLD AUTO-RTO: 0.1 /100 WBC (ref 0–0.2)
PLATELET # BLD AUTO: 251 10*3/MM3 (ref 140–450)
PMV BLD AUTO: 10.5 FL (ref 6–12)
POTASSIUM SERPL-SCNC: 3.6 MMOL/L (ref 3.5–5.2)
RBC # BLD AUTO: 3.48 10*6/MM3 (ref 3.77–5.28)
SODIUM SERPL-SCNC: 137 MMOL/L (ref 136–145)
WBC NRBC COR # BLD: 6.69 10*3/MM3 (ref 3.4–10.8)

## 2022-11-16 PROCEDURE — 97166 OT EVAL MOD COMPLEX 45 MIN: CPT | Performed by: OCCUPATIONAL THERAPIST

## 2022-11-16 PROCEDURE — 99232 SBSQ HOSP IP/OBS MODERATE 35: CPT | Performed by: INTERNAL MEDICINE

## 2022-11-16 PROCEDURE — 63710000001 INSULIN LISPRO (HUMAN) PER 5 UNITS: Performed by: HOSPITALIST

## 2022-11-16 PROCEDURE — 97110 THERAPEUTIC EXERCISES: CPT

## 2022-11-16 PROCEDURE — 85025 COMPLETE CBC W/AUTO DIFF WBC: CPT | Performed by: HOSPITALIST

## 2022-11-16 PROCEDURE — 80048 BASIC METABOLIC PNL TOTAL CA: CPT | Performed by: NURSE PRACTITIONER

## 2022-11-16 PROCEDURE — 82962 GLUCOSE BLOOD TEST: CPT

## 2022-11-16 PROCEDURE — 97535 SELF CARE MNGMENT TRAINING: CPT | Performed by: OCCUPATIONAL THERAPIST

## 2022-11-16 PROCEDURE — 25010000002 ENOXAPARIN PER 10 MG: Performed by: NURSE PRACTITIONER

## 2022-11-16 PROCEDURE — 63710000001 INSULIN LISPRO (HUMAN) PER 5 UNITS: Performed by: THORACIC SURGERY (CARDIOTHORACIC VASCULAR SURGERY)

## 2022-11-16 RX ORDER — METOPROLOL SUCCINATE 50 MG/1
50 TABLET, EXTENDED RELEASE ORAL
Status: DISCONTINUED | OUTPATIENT
Start: 2022-11-16 | End: 2022-11-19 | Stop reason: HOSPADM

## 2022-11-16 RX ADMIN — MUPIROCIN 1 APPLICATION: 20 OINTMENT TOPICAL at 08:55

## 2022-11-16 RX ADMIN — DOCUSATE SODIUM 50MG AND SENNOSIDES 8.6MG 2 TABLET: 8.6; 5 TABLET, FILM COATED ORAL at 21:41

## 2022-11-16 RX ADMIN — METOPROLOL SUCCINATE 50 MG: 50 TABLET, FILM COATED, EXTENDED RELEASE ORAL at 08:55

## 2022-11-16 RX ADMIN — ATORVASTATIN CALCIUM 40 MG: 20 TABLET, FILM COATED ORAL at 21:41

## 2022-11-16 RX ADMIN — PANTOPRAZOLE SODIUM 40 MG: 40 TABLET, DELAYED RELEASE ORAL at 06:35

## 2022-11-16 RX ADMIN — POTASSIUM CHLORIDE 40 MEQ: 750 TABLET, EXTENDED RELEASE ORAL at 14:03

## 2022-11-16 RX ADMIN — INSULIN LISPRO 5 UNITS: 100 INJECTION, SOLUTION INTRAVENOUS; SUBCUTANEOUS at 11:45

## 2022-11-16 RX ADMIN — ENOXAPARIN SODIUM 40 MG: 100 INJECTION SUBCUTANEOUS at 17:42

## 2022-11-16 RX ADMIN — INSULIN LISPRO 5 UNITS: 100 INJECTION, SOLUTION INTRAVENOUS; SUBCUTANEOUS at 08:56

## 2022-11-16 RX ADMIN — MUPIROCIN 1 APPLICATION: 20 OINTMENT TOPICAL at 21:41

## 2022-11-16 RX ADMIN — HYDROCODONE BITARTRATE AND ACETAMINOPHEN 2 TABLET: 5; 325 TABLET ORAL at 11:44

## 2022-11-16 RX ADMIN — INSULIN GLARGINE-YFGN 15 UNITS: 100 INJECTION, SOLUTION SUBCUTANEOUS at 21:41

## 2022-11-16 RX ADMIN — LEVOTHYROXINE SODIUM 25 MCG: 0.03 TABLET ORAL at 06:35

## 2022-11-16 RX ADMIN — FUROSEMIDE 40 MG: 40 TABLET ORAL at 08:55

## 2022-11-16 RX ADMIN — CLOPIDOGREL 75 MG: 75 TABLET, FILM COATED ORAL at 08:55

## 2022-11-16 RX ADMIN — POTASSIUM CHLORIDE 40 MEQ: 750 TABLET, EXTENDED RELEASE ORAL at 09:04

## 2022-11-16 RX ADMIN — CHLORHEXIDINE GLUCONATE 15 ML: 1.2 SOLUTION ORAL at 21:41

## 2022-11-16 RX ADMIN — INSULIN LISPRO 2 UNITS: 100 INJECTION, SOLUTION INTRAVENOUS; SUBCUTANEOUS at 17:43

## 2022-11-16 RX ADMIN — HYDROCODONE BITARTRATE AND ACETAMINOPHEN 2 TABLET: 5; 325 TABLET ORAL at 21:41

## 2022-11-16 RX ADMIN — INSULIN LISPRO 5 UNITS: 100 INJECTION, SOLUTION INTRAVENOUS; SUBCUTANEOUS at 17:43

## 2022-11-16 RX ADMIN — ASPIRIN 81 MG: 81 TABLET, COATED ORAL at 08:55

## 2022-11-16 NOTE — PROGRESS NOTES
[facility]  Cardiology Progress Note    Patient Identification:  Name: Mayra Hirsch  Age: 71 y.o.  Sex: female  :  1950  MRN: [id number]                 Follow Up / Chief Complaint: follow up for CAD    Interval History:  CABG x5 11/10/22 with Dr Isabel. Resting in bed, feels well today. Worked with pt/ot. No chest pain or shortness of breath, on room air.      Subjective:no chest pain      Objective:    Past Medical History:  Past Medical History:   Diagnosis Date    Depression     Diabetes mellitus (HCC)     Disease of thyroid gland     Fibrocystic breast     Hypertension     Hypothyroidism     PONV (postoperative nausea and vomiting)     Type 2 diabetes mellitus (HCC)      Past Surgical History:  Past Surgical History:   Procedure Laterality Date    BREAST EXCISIONAL BIOPSY Right     at age 22; benign.    CARDIAC CATHETERIZATION Left 2022    Procedure: Cardiac Catheterization/Vascular Study;  Surgeon: Joanna Marie MD;  [facility];  Service: Cardiology;  Laterality: Left;    CARDIAC CATHETERIZATION N/A 2022    Procedure: Percutaneous Coronary Intervention;  Surgeon: Joanna Marie MD;  Location:  [facility] CATH INVASIVE LOCATION;  Service: Cardiology;  Laterality: N/A;    CARDIAC CATHETERIZATION N/A 2022    Procedure: Left ventriculography;  Surgeon: Joanna Marie MD;  Location:  [facility] CATH INVASIVE LOCATION;  Service: Cardiology;  Laterality: N/A;    CARDIAC CATHETERIZATION N/A 2022    Procedure: Stent MARTINEZ coronary;  Surgeon: Joanna Marie MD;  [facility];  Service: Cardiology;  Laterality: N/A;    CARDIAC CATHETERIZATION N/A 2022    Procedure: Left Heart Cath;  Surgeon: Joanna Marie MD;  [facility];  Service: Cardiology;  Laterality: N/A;     SECTION      CORONARY ARTERY BYPASS GRAFT N/A 11/10/2022    Procedure: NIKKY,  "CORONARY ARTERY BYPASS GRAFTING TIMES 6 WITH LEFT JORDYN AND ENDOSCOPICALLY HARVESTED LEFT AND RIGHT GREATER SAPHENOUS VEIN, PRP TRANSESOPHAGEAL ECHOCARDIOGRAM WITH ANESTHESIA;  Surgeon: Jr Dong Isable MD;  Location: [facility];  Service: Cardiothoracic;  Laterality: N/A;    HAND SURGERY      KNEE SURGERY      OOPHORECTOMY      1 ovary removed due to ectopic pregnancy        Social History:   Social History     Tobacco Use    Smoking status: Never    Smokeless tobacco: Never   Substance Use Topics    Alcohol use: No      Family History:  Family History   Problem Relation Age of Onset    Coronary artery disease Mother     Alzheimer's disease Mother     Coronary artery disease Father     Cancer Father     Thyroid disease Sister     Malig Hyperthermia Neg Hx           Allergies:  Allergies   Allergen Reactions    Bydureon [Exenatide] Diarrhea    Metformin And Related Nausea And Vomiting     Scheduled Meds:  aspirin, 81 mg, Daily  atorvastatin, 40 mg, Nightly  chlorhexidine, 15 mL, Q12H  clopidogrel, 75 mg, Daily  enoxaparin, 40 mg, Daily  furosemide, 40 mg, Daily  insulin glargine, 15 Units, Nightly  insulin lispro, 0-7 Units, TID AC  insulin lispro, 5 Units, TID With Meals  levothyroxine, 25 mcg, Q AM  metoprolol succinate XL, 50 mg, Q24H  mupirocin, , BID  pantoprazole, 40 mg, QAM  senna-docusate sodium, 2 tablet, Nightly            INTAKE AND OUTPUT:    Intake/Output Summary (Last 24 hours) at 11/16/2022 1658  Last data filed at 11/16/2022 1239  Gross per 24 hour   Intake 840 ml   Output 1205 ml   Net -365 ml     ROS  Constitutional: Awake and alert, no fever. No nosebleeds  Abdomen           no abdominal pain   Cardiac              no chest pain  Pulmonary          no shortness of breath      /70 (BP Location: Right arm, Patient Position: Lying)   Pulse 84   Temp 98 °F (36.7 °C) (Oral)   Resp 17   Ht 170.2 cm (67\")   Wt 100 kg (221 lb 3.2 oz)   SpO2 100%   BMI 34.64 kg/m²   General appearance: No " acute changes   Neck: Trachea midline; NECK, supple, no thyromegaly or lymphadenopathy   Lungs: Normal size and shape, normal breath sounds, equal distribution of air, no rales or rhonchi   CV: S1-S2 regular, no murmurs, no rub, no gallop   Abdomen: Soft, nontender; no masses , no abnormal abdominal sounds   Extremities: No deformity , normal color , no peripheral edema   Skin: Normal temperature, turgor and texture; no rash, ulcers          I reviewed the patient's new clinical results, and personally reviewed and interpreted the patient's ECG and telemetry data from the last 24 hours        Cardiographics     Telemetry:    sr         Interpretation Summary         The left ventricular cavity is mildly dilated.    The following left ventricular wall segments are hypokinetic: apical anterior, apical lateral, apical inferior, apical septal, apex hypokinetic and mid anteroseptal.    There is calcification of the aortic valve.    Estimated right ventricular systolic pressure from tricuspid regurgitation is mildly elevated (35-45 mmHg).         Lab Review       Results from last 7 days   Lab Units 11/11/22  0323   MAGNESIUM mg/dL 3.1*     Results from last 7 days   Lab Units 11/16/22  0304   SODIUM mmol/L 137   POTASSIUM mmol/L 3.6   BUN mg/dL 34*   CREATININE mg/dL 0.82   CALCIUM mg/dL 8.7     @[id number]@  Results from last 7 days   Lab Units 11/16/22  0304 11/15/22  0259 11/14/22  0255   WBC 10*3/mm3 6.69 7.23 7.99   HEMOGLOBIN g/dL 9.8* 9.6* 10.0*   HEMATOCRIT % 31.8* 29.7* 31.2*   PLATELETS 10*3/mm3 251 202 165     Results from last 7 days   Lab Units 11/11/22  0323 11/10/22  1649   INR  1.45* 1.52*   APTT seconds  --  33.3     The following medical decision was discussed in detail with Dr. Marie      Assessment:  CABG x5 11/10/22 with Dr Isabel  STEMI   Hypertension   diabetes mellitus: Internal medicine following  hypothyroidism       Plan:    BP and HR stable on increased metoprolol, no chest pain or  "shortness of breath, on room air  Up in chair today and worked with therapy   Cr stable, on po lasix  Planning for home with hh vs rehab -probably ready in the next few days-she reports she has about 15 steps at her sons home for bathroom and bedroom. CCP following .   Continue aspirin, atorvastatin, plavix, lasix, toprol    Patricia Munguia, APRN  )11/16/2022     Patient personally interviewed and above subjective findings personally confirmed during a face to face contact with patient today  All findings of physical examination confirmed  All pertinent and performed labs, cardiac procedures ,  radiographs of the last 24 hours personally reviewed  Impression and plans discussed/elaborated and implemented jointly as described above     Joanna Marie MD          EMR Dragon/Transcription:   \"Dictated utilizing Dragon dictation\".     "

## 2022-11-16 NOTE — PLAN OF CARE
Goal Outcome Evaluation:               Patient is slow to progress, activity-pulmonary hygeine-independence encouraged, patient still only ambulates 70 ft as the furthest with complaints of weakness, soa, and dizziness. Goal is to go home with home care, however family is concerned for her ability to be properly cared for with her slow progression, spoke with ccp, cardiology and ct updated on progression, her neurologic symptoms of expressive aphagia are improved slightly however still has to proof read her texts before sending and fix it to make sense and cannot always get the time correct displayed on the clock on the wall, has to re-read text to understand it. Orientation questions are answered appropriately with nihs 1. Next shift aware and to monitor.

## 2022-11-16 NOTE — THERAPY EVALUATION
Patient Name: Mayra Hirsch  : 1950    MRN: [id number]                              Today's Date: 2022       Admit Date: 2022    Visit Dx:     ICD-10-CM ICD-9-CM   1. ST elevation myocardial infarction (STEMI), unspecified artery (HCC)  I21.3 410.90   2. ST elevation myocardial infarction (STEMI) involving other coronary artery (HCC)  I21.29 410.10   3. Coronary artery disease involving native heart, unspecified vessel or lesion type, unspecified whether angina present  I25.10 414.01   4. Abnormal findings on diagnostic imaging of other specified body structures  R93.89 793.99     Patient Active Problem List   Diagnosis   • Vitamin D deficiency   • Primary hypothyroidism   • Dyslipidemia   • Essential hypertension   • Type 2 diabetes mellitus without complication, with long-term current use of insulin (HCC)   • Noncompliance with diabetes treatment   • ST elevation myocardial infarction (STEMI) (HCC)   • Coronary artery disease involving native heart     Past Medical History:   Diagnosis Date   • Depression    • Diabetes mellitus (HCC)    • Disease of thyroid gland    • Fibrocystic breast    • Hypertension    • Hypothyroidism    • PONV (postoperative nausea and vomiting)    • Type 2 diabetes mellitus (HCC)      Past Surgical History:   Procedure Laterality Date   • BREAST EXCISIONAL BIOPSY Right     at age 22; benign.   • CARDIAC CATHETERIZATION Left 2022    Procedure: Cardiac Catheterization/Vascular Study;  Surgeon: Joanna Marie MD;  Location: [facility] INVASIVE LOCATION;  Service: Cardiology;  Laterality: Left;   • CARDIAC CATHETERIZATION N/A 2022    Procedure: Percutaneous Coronary Intervention;  Surgeon: Joanna Marie MD;  Location: [facility] CATH INVASIVE LOCATION;  Service: Cardiology;  Laterality: N/A;   • CARDIAC CATHETERIZATION N/A 2022    Procedure: Left ventriculography;  Surgeon: Joanna Marie MD;  Location: [facility] INVASIVE LOCATION;   Service: Cardiology;  Laterality: N/A;   • CARDIAC CATHETERIZATION N/A 2022    Procedure: Stent MARTINEZ coronary;  Surgeon: Joanna Marie MD;  Location: [facility] CATH INVASIVE LOCATION;  Service: Cardiology;  Laterality: N/A;   • CARDIAC CATHETERIZATION N/A 2022    Procedure: Left Heart Cath;  Surgeon: Joanna Marie MD;  Location:  [facility] CATH INVASIVE LOCATION;  Service: Cardiology;  Laterality: N/A;   •  SECTION     • CORONARY ARTERY BYPASS GRAFT N/A 11/10/2022    Procedure: NIKKY, CORONARY ARTERY BYPASS GRAFTING TIMES 6 WITH LEFT JORDYN AND ENDOSCOPICALLY HARVESTED LEFT AND RIGHT GREATER SAPHENOUS VEIN, PRP TRANSESOPHAGEAL ECHOCARDIOGRAM WITH ANESTHESIA;  Surgeon: Jr Dong Isabel MD;  Location: [facility];  Service: Cardiothoracic;  Laterality: N/A;   • HAND SURGERY     • KNEE SURGERY     • OOPHORECTOMY      1 ovary removed due to ectopic pregnancy      General Information     Row Name 22 1514          OT Time and Intention    Document Type evaluation  -     Mode of Treatment individual therapy;occupational therapy  -     Row Name 22 1514          General Information    Patient Profile Reviewed yes  -     Prior Level of Function independent:;community mobility;gait;transfer;all household mobility;ADL's;bed mobility  -     Existing Precautions/Restrictions fall;cardiac;sternal  -     Barriers to Rehab none identified  -     Row Name 22 1514          Living Environment    People in Home alone  -     Row Name 22 1514          Cognition    Orientation Status (Cognition) oriented to;person;place  -     Row Name 22 1514          Safety Issues, Functional Mobility    Impairments Affecting Function (Mobility) balance;endurance/activity tolerance;strength  -           User Key  (r) = Recorded By, (t) = Taken By, (c) = Cosigned By    Initials Name Provider Type     Zoey Gray, SULTANAR Occupational Therapist                  Mobility/ADL's     Row Name 11/16/22 1515          Bed Mobility    Comment, (Bed Mobility) UIC  -     Row Name 11/16/22 1515          Transfers    Transfers sit-stand transfer;stand-sit transfer;toilet transfer  -     Row Name 11/16/22 1515          Sit-Stand Transfer    Sit-Stand [place] (Transfers) set up;contact guard  -     Assistive Device (Sit-Stand Transfers) walker, front-wheeled  -     Row Name 11/16/22 1515          Stand-Sit Transfer    Stand-Sit [place] (Transfers) set up;contact guard  -     Assistive Device (Stand-Sit Transfers) walker, front-wheeled  -KP     Row Name 11/16/22 1515          Toilet Transfer    Type (Toilet Transfer) stand-sit;sit-stand  -     [place] Level (Toilet Transfer) set up;standby assist;contact guard  -     Assistive Device (Toilet Transfer) walker, front-wheeled  -     Row Name 11/16/22 1515          Functional Mobility    Functional Mobility- Ind. Level contact guard assist  -     Functional Mobility- Device walker, front-wheeled  -     Functional Mobility- Comment in room, to BR, to chair  -     Row Name 11/16/22 1515          Activities of Daily Living    BADL Assessment/Intervention grooming;toileting  -[facility] Name 11/16/22 1515          Grooming Assessment/Training    [place] Level (Grooming) grooming skills;wash face, hands;standby assist;set up  -     Position (Grooming) supported sitting  -[facility] Name 11/16/22 1515          Toileting Assessment/Training    [place] Level (Toileting) toileting skills;perform perineal hygiene;set up;standby assist  -     Position (Toileting) supported standing;supported sitting  -           User Key  (r) = Recorded By, (t) = Taken By, (c) = Cosigned By    Initials Name Provider Type    Zoey Kincaid OTR Occupational Therapist               Obj/Interventions     Row Name 11/16/22 1516          Sensory Assessment (Somatosensory)    Sensory Assessment (Somatosensory)  sensation intact  -[facility] Name 11/16/22 1516          Vision Assessment/Intervention    Visual Impairment/Limitations WFL  -[facility] Name 11/16/22 1516          Range of Motion Comprehensive    General Range of Motion bilateral upper extremity ROM WNL  -     Comment, General Range of Motion B UE 8/8  -KP     [facility] Name 11/16/22 1516          Strength Comprehensive (MMT)    General Manual Muscle Testing (MMT) Assessment upper extremity strength deficits identified  -     Comment, General Manual Muscle Testing (MMT) Assessment B UE 4-/5  -KP     [facility] Name 11/16/22 1516          Motor Skills    Motor Skills coordination;functional endurance  -     Coordination WFL  -     Functional Endurance fair +  -KP     [facility] Name 11/16/22 1516          Balance    Static Sitting Balance set-up;standby assist  -     Dynamic Sitting Balance set-up;standby assist  -KP     Static Standing Balance set-up;contact guard  -     Dynamic Standing Balance set-up;contact guard  -     Balance Interventions sitting;standing;sit to stand;supported;static;dynamic;occupation based/functional task  -           User Key  (r) = Recorded By, (t) = Taken By, (c) = Cosigned By    Initials Name Provider Type     Zoey Gray OTDASIA Occupational Therapist               Goals/Plan     [facility] Name 11/16/22 1519          Transfer Goal 1 (OT)    Activity/Assistive Device (Transfer Goal 1, OT) bed-to-chair/chair-to-bed;sit-to-stand/stand-to-sit;toilet  -     [place] Level/Cues Needed (Transfer Goal 1, OT) standby assist  -     Time Frame (Transfer Goal 1, OT) short term goal (STG);2 weeks  -     Progress/Outcome (Transfer Goal 1, OT) goal ongoing  -[facility] Name 11/16/22 1519          Bathing Goal 1 (OT)    Activity/Device (Bathing Goal 1, OT) bathing skills, all  -KP     [place] Level/Cues Needed (Bathing Goal 1, OT) standby assist  -     Time Frame (Bathing Goal 1, OT) short term goal (STG);2 weeks  -      Progress/Outcomes (Bathing Goal 1, OT) goal ongoing  -     Row Name 11/16/22 1519          Strength Goal 1 (OT)    Strength Goal 1 (OT) incr UE To 4+/5  -KP     Time Frame (Strength Goal 1, OT) short term goal (STG);2 weeks  -     Progress/Outcome (Strength Goal 1, OT) goal ongoing  -     Row Name 11/16/22 1519          Therapy Assessment/Plan (OT)    Planned Therapy Interventions (OT) activity tolerance training;functional balance retraining;occupation/activity based interventions;ROM/therapeutic exercise;strengthening exercise;transfer/mobility retraining;patient/caregiver education/training;BADL retraining  -           User Key  (r) = Recorded By, (t) = Taken By, (c) = Cosigned By    Initials Name Provider Type    Zoey Kincaid, OTR Occupational Therapist               Clinical Impression     Row Name 11/16/22 1517          Pain Assessment    Pretreatment Pain Rating 0/10 - no pain  -     Posttreatment Pain Rating 0/10 - no pain  -[facility] Name 11/16/22 1517          Plan of Care Review    Plan of Care Reviewed With patient  -     Progress improving  -     Outcome Evaluation pt evaluated for OT. pt admitted from home where she lives alone. she is s/p CABG x6. pt UIC upon OT arrival. pt completed sit to stand w CGA and walked to BR w walker and tsf to toilet. pt was SBA w toileting skills and grooming skills. pt demo ability to cross LE over other LE to simulate don/doff socks. pt has decr endurance , strength, balance and ADL and cont to benefit from OT. pt wants rehab as she had 15 steps to get to her bathroom and bedroom.  -     Row Name 11/16/22 1517          Therapy Assessment/Plan (OT)    Rehab Potential (OT) good, to achieve stated therapy goals  -     Criteria for Skilled Therapeutic Interventions Met (OT) yes;meets criteria;skilled treatment is necessary  -     Therapy Frequency (OT) 5 times/wk  -     Row Name 11/16/22 1517          Therapy Plan Review/Discharge Plan  (OT)    Anticipated Discharge Disposition (OT) skilled nursing facility  -     Row Name 11/16/22 1517          Positioning and Restraints    Pre-Treatment Position sitting in chair/recliner  -KP     Post Treatment Position chair  -KP     In Chair reclined;with family/caregiver;call light within reach;encouraged to call for assist;exit alarm on  -KP           User Key  (r) = Recorded By, (t) = Taken By, (c) = Cosigned By    Initials Name Provider Type     Zoey Gray, OTR Occupational Therapist               Outcome Measures     Row Name 11/16/22 1519          How much help from another is currently needed...    Putting on and taking off regular lower body clothing? 3  -KP     Bathing (including washing, rinsing, and drying) 3  -KP     Toileting (which includes using toilet bed pan or urinal) 3  -KP     Putting on and taking off regular upper body clothing 3  -KP     Taking care of personal grooming (such as brushing teeth) 3  -KP     Eating meals 3  -KP     AM-PAC 6 Clicks Score (OT) 18  -KP     Row Name 11/16/22 1132 11/16/22 0859       How much help from another person do you currently need...    Turning from your back to your side while in flat bed without using bedrails? 3  -LH (r) ND (t) LH (c) 3  -PY    Moving from lying on back to sitting on the side of a flat bed without bedrails? 3  -LH (r) ND (t) LH (c) 3  -PY    Moving to and from a bed to a chair (including a wheelchair)? 3  -LH (r) ND (t) LH (c) 3  -PY    Standing up from a chair using your arms (e.g., wheelchair, bedside chair)? 3  -LH (r) ND (t) LH (c) 3  -PY    Climbing 3-5 steps with a railing? 2  -LH (r) ND (t) LH (c) 2  -PY    To walk in hospital room? 3  -LH (r) ND (t) LH (c) 3  -PY    AM-PAC 6 Clicks Score (PT) 17  -LH (r) ND (t) 17  -PY    Highest level of mobility 5 --> Static standing  -LH (r) ND (t) 5 --> Static standing  -PY    Row Name 11/16/22 1519          Functional Assessment    Outcome Measure Options AM-PAC 6 Clicks  Daily Activity (OT)  -           User Key  (r) = Recorded By, (t) = Taken By, (c) = Cosigned By    Initials Name Provider Type    Zoey Kincaid, OTR Occupational Therapist    Tiffanie Paredes, PT Physical Therapist    Elizabeth Barr, RN Registered Nurse    Lindsey Aguilar, PT Student PT Student                Occupational Therapy Education     Title: PT OT SLP Therapies (Done)     Topic: Occupational Therapy (Done)     Point: ADL training (Done)     Description:   Instruct learner(s) on proper safety adaptation and remediation techniques during self care or transfers.   Instruct in proper use of assistive devices.              Learning Progress Summary           Patient Acceptance, E,TB,D, VU,DU by  at 11/16/2022 1520    Comment: ed pt and family on role of OT. benefit of therapy, POC w. OT. pt [facility]   Family Acceptance, E,TB,D, VU,DU by  at 11/16/2022 1520    Comment: ed pt and family on role of OT. benefit of therapy, POC w. OT. pt [facility]                   Point: Home exercise program (Done)     Description:   Instruct learner(s) on appropriate technique for monitoring, assisting and/or progressing therapeutic exercises/activities.              Learning Progress Summary           Patient Acceptance, E,TB,D, VU,DU by  at 11/16/2022 1520    Comment: ed pt and family on role of OT. benefit of therapy, POC w. OT. pt [facility]   Family Acceptance, E,TB,D, VU,DU by  at 11/16/2022 1520    Comment: ed pt and family on role of OT. benefit of therapy, POC w. OT. pt [facility]                   Point: Precautions (Done)     Description:   Instruct learner(s) on prescribed precautions during self-care and functional transfers.              Learning Progress Summary           Patient Acceptance, E,TB,D, VU,DU by  at 11/16/2022 1520    Comment: ed pt and family on role of OT. benefit of therapy, POC w. OT. pt [facility]   Family Acceptance, E,TB,D, VU,DU by  at  11/16/2022 1520    Comment: ed pt and family on role of OT. benefit of therapy, POC w. OT. pt demo tsf w CGA                   Point: Body mechanics (Done)     Description:   Instruct learner(s) on proper positioning and spine alignment during self-care, functional mobility activities and/or exercises.              Learning Progress Summary           Patient Acceptance, E,TB,D, VU,DU by  at 11/16/2022 1520    Comment: ed pt and family on role of OT. benefit of therapy, POC w. OT. pt demo tsf w CGA   Family Acceptance, E,TB,D, VU,DU by  at 11/16/2022 1520    Comment: ed pt and family on role of OT. benefit of therapy, POC w. OT. pt demo tsf w CGA                               User Key     Initials Effective Dates Name Provider Type Discipline     06/16/21 -  Zoey Gray, OTR Occupational Therapist OT              OT Recommendation and Plan  Planned Therapy Interventions (OT): activity tolerance training, functional balance retraining, occupation/activity based interventions, ROM/therapeutic exercise, strengthening exercise, transfer/mobility retraining, patient/caregiver education/training, BADL retraining  Therapy Frequency (OT): 5 times/wk  Plan of Care Review  Plan of Care Reviewed With: patient  Progress: improving  Outcome Evaluation: pt evaluated for OT. pt admitted from home where she lives alone. she is s/p CABG x6. pt UIC upon OT arrival. pt completed sit to stand w CGA and walked to BR w walker and tsf to toilet. pt was SBA w toileting skills and grooming skills. pt demo ability to cross LE over other LE to simulate don/doff socks. pt has decr endurance , strength, balance and ADL and cont to benefit from OT. pt wants rehab as she had 15 steps to get to her bathroom and bedroom.     Time Calculation:    Time Calculation- OT     Row Name 11/16/22 1521             Time Calculation- OT    OT Start Time 1324  -KP      OT Stop Time 1347  -KP      OT Time Calculation (min) 23 min  -KP      Total  Timed Code Minutes- OT 12 minute(s)  -      OT Received On 11/16/22  -      OT - Next Appointment 11/17/22  -      OT Goal Re-Cert Due Date 11/30/22  -         Timed Charges    55937 - OT Self Care/Mgmt Minutes 12  -         Untimed Charges    OT Eval/Re-eval Minutes 11  -KP         Total Minutes    Timed Charges Total Minutes 12  -KP      Untimed Charges Total Minutes 11  -       Total Minutes 23  -            User Key  (r) = Recorded By, (t) = Taken By, (c) = Cosigned By    Initials Name Provider Type     Zoey Gray OTR Occupational Therapist              Therapy Charges for Today     Code Description Service Date Service Provider Modifiers Qty    85771263298 HC OT SELF CARE/MGMT/TRAIN EA 15 MIN 11/16/2022 Zoey Gray OTR GO 1    08006551514 HC OT EVAL MOD COMPLEXITY 2 11/16/2022 Zoey Gray OTR GO 1               DANIELLA James  11/16/2022

## 2022-11-16 NOTE — PROGRESS NOTES
"Nutrition Services    Patient Name:  Mayra Hirsch  YOB: 1950  MRN: [id number]  Admit Date:  11/7/2022      CLINICAL NUTRITION ASSESSMENT     Encounter Information         Reason for Encounter LOS 9    Admitting Diagnosis STEMI    Pertinent Medical History HTN, DM, depression, hypothyroidism    Current Issues POD#6 s/p CABG x5 with LIMA SHANEL L calf, R thigh ([place])  HTN controlled, A1C 7- on home DM meds.   Up in chair today, dizziness improved, increase activity, possibly d/c home w/ HH in next few days.  Tolerating HH/ConsCHO diet well with 50-75% po intake of meals.   Last BM 11/15     Current Nutrition Orders & Evaluation of Intake       Oral Nutrition     Current PO Diet Diet Regular Texture (IDDSI 7); Regular Consistency; Cardiac Diets, Diabetic Diets; Healthy Heart (2-3 Na+); Consistent Carbohydrate   Supplement none   PO Evaluation     Trending % PO Intake 50-75%    Factors Affecting Intake none   --  Anthropometrics          Height    Weight Height: 170.2 cm (67\")  Weight: 101 kg (222 lb 12.8 oz) (11/16/22 0500)    BMI kg/m2 Body mass index is 34.9 kg/m².    Weight Trend Stable    Weight History  Wt Readings from Last 15 Encounters:   11/16/22 0500 101 kg (222 lb 12.8 oz)   11/15/22 0500 103 kg (226 lb)   11/14/22 0600 104 kg (229 lb 8 oz)   11/13/22 0630 104 kg (229 lb 4.8 oz)   11/12/22 0600 99.9 kg (220 lb 3.2 oz)   11/11/22 0506 92.9 kg (204 lb 12.9 oz)   11/09/22 0400 103 kg (227 lb 11.8 oz)   11/07/22 1301 102 kg (225 lb)   11/07/22 0305 102 kg (225 lb 1.4 oz)   11/07/22 0146 102 kg (225 lb 14.4 oz)   07/21/22 1024 101 kg (221 lb 12.8 oz)   03/21/22 1433 101 kg (222 lb)   12/18/20 1423 99 kg (218 lb 3.2 oz)   03/27/20 1838 90.7 kg (200 lb)   08/13/19 1051 92.1 kg (203 lb)   02/07/19 0914 96.2 kg (212 lb)   08/01/18 0943 97.1 kg (214 lb)   03/12/18 0922 96.6 kg (213 lb)   09/19/17 1451 93.2 kg (205 lb 6.4 oz)   04/28/17 0934 89.2 kg (196 lb 9.6 oz)   10/02/16 1618 86.2 kg (190 lb) "        Labs        Pertinent Labs Reviewed, listed below     Results from last 7 days   Lab Units 11/16/22  0304 11/15/22  0259 11/14/22  0255   SODIUM mmol/L 137 139 133*   POTASSIUM mmol/L 3.6 4.4 4.4   CHLORIDE mmol/L 105 107 104   CO2 mmol/L 22.2 21.3* 17.7*   BUN mg/dL 34* 47* 49*   CREATININE mg/dL 0.82 1.02* 1.09*   CALCIUM mg/dL 8.7 9.0 8.5*   GLUCOSE mg/dL 114* 126* 154*     Results from last 7 days   Lab Units 11/16/22  0304 11/11/22 1741 11/11/22 0323 11/11/22  0009 11/10/22  2013 11/10/22  1649   MAGNESIUM mg/dL  --   --  3.1*  --  3.0* 3.2*   PHOSPHORUS mg/dL  --   --  5.8*  --  3.8 3.6   HEMOGLOBIN g/dL 9.8*   < > 7.5*   < >  --  9.4*   HEMATOCRIT % 31.8*   < > 23.5*   < >  --  29.3*   WBC 10*3/mm3 6.69   < > 8.45   < >  --  10.50   ALBUMIN g/dL  --   --  4.40  --  4.30 3.90    < > = values in this interval not displayed.     Results from last 7 days   Lab Units 11/16/22  0304 11/15/22  0259 11/14/22  0255 11/13/22  0310 11/12/22  0314 11/11/22  1741 11/11/22  0323 11/11/22  0009 11/10/22  1649   INR   --   --   --   --   --   --  1.45*  --  1.52*   APTT seconds  --   --   --   --   --   --   --   --  33.3   PLATELETS 10*3/mm3 251 202 165 146 136*   < > 159   < > 155    < > = values in this interval not displayed.     COVID19   Date Value Ref Range Status   11/09/2022 Not Detected Not Detected - Ref. Range Final     Lab Results   Component Value Date    HGBA1C 7.60 (H) 11/07/2022          Medications            Scheduled Medications aspirin, 81 mg, Oral, Daily  atorvastatin, 40 mg, Oral, Nightly  chlorhexidine, 15 mL, Mouth/Throat, Q12H  clopidogrel, 75 mg, Oral, Daily  enoxaparin, 40 mg, Subcutaneous, Daily  furosemide, 40 mg, Oral, Daily  insulin glargine, 15 Units, Subcutaneous, Nightly  insulin lispro, 0-7 Units, Subcutaneous, TID AC  insulin lispro, 5 Units, Subcutaneous, TID With Meals  levothyroxine, 25 mcg, Oral, Q AM  metoprolol succinate XL, 50 mg, Oral, Q24H  mupirocin, , Each Nare,  BID  pantoprazole, 40 mg, Oral, QAM  senna-docusate sodium, 2 tablet, Oral, Nightly        Infusions clevidipine, 2-32 mg/hr        PRN Medications •  acetaminophen **OR** acetaminophen **OR** acetaminophen  •  ALPRAZolam  •  bisacodyl  •  bisacodyl  •  clevidipine  •  cyclobenzaprine  •  dextrose  •  dextrose  •  glucagon (human recombinant)  •  HYDROcodone-acetaminophen  •  magnesium hydroxide  •  midazolam  •  Morphine **AND** naloxone  •  Morphine  •  nitroglycerin  •  ondansetron  •  oxyCODONE  •  polyethylene glycol  •  potassium chloride **OR** potassium chloride  •  potassium chloride **OR** potassium chloride  •  potassium chloride  •  potassium chloride  •  potassium chloride     Physical Findings         Physical Appearance A&O x4, room air, LE 1+ trace edema    Skin  Surgical incisions    Other: Hypoactive BS, Last BM 11/15   --  PES STATEMENT / NUTRITION DIAGNOSIS      Nutrition Dx Problem  Problem: Nutrition Appropriate for Condition at this Time  Etiology: Medical Diagnosis, MNT for Treatment/Condition and Goals of Care  Signs/Symptoms: PO intake and Report/Observation    Comment:      PLAN OF CARE  Intervention Goal        Intervention Goal(s) Maintain nutrition status, Meet estimated needs, Disease management/therapy, Maintain intake and Appropriate weight loss     Nutrition Intervention        RD Action Follow Tx Progress and Care plan reviewed     Nutrition Prescription          Diet  HH/ConsCHO diet    Supplement/Snack    EN/PN      Prescription Ordered      Monitor/Evaluation        Monitor Per protocol     RD to follow up per protocol.    Electronically signed by:  Tiffanie Espinoza RD  11/16/22 16:11 EST

## 2022-11-16 NOTE — DISCHARGE PLACEMENT REQUEST
"Audra Marshall (71 y.o. Female)     Date of Birth   1950    Social Security Number       Address   [address] [place] [address]    Home Phone   [phone]    MRN   [id number]       S[employer]   Patient Refused    Marital Status                               Admission Date   11/7/22    Admission Type   Emergency    Admitting Provider   Joanna Marie MD    Attending Provider   Joanna Marie MD    Department, Room/Bed   [facility] CARDIOVASC UNIT, 2227/1       Discharge Date       Discharge Disposition       Discharge Destination                               Attending Provider: Joanna Marie MD    Allergies: Bydureon [Exenatide], Metformin And Related    Isolation: None   Infection: None   Code Status: CPR    Ht: 170.2 cm (67\")   Wt: 101 kg (222 lb 12.8 oz)    Admission Cmt: None   Principal Problem: ST elevation myocardial infarction (STEMI) ([facility]) [I21.3]                 Active Insurance as of 11/7/2022     Primary Coverage     Payor Plan Insurance Group Employer/Plan Group    [facility] MEDICARE REPLACEMENT [facility] MEDICARE REPLACEMENT [id number]     Payor Plan Address Payor Plan Phone Number Payor Plan Fax Number Effective Dates    [address] [address]   1/1/2017 - None Entered    [facility] [id number]       Subscriber Name Subscriber Birth Date Member ID       AUDRA MARSHALL 1950 [id number]                 Emergency Contacts      (Rel.) Home Phone Work Phone Mobile Phone    Bello Marshall (Son) [phone] -- [phone]    Bere Agarwal (Sister) [phone] -- [phone]    Marixa Marshall (Other) -- -- [phone]              "

## 2022-11-16 NOTE — PROGRESS NOTES
"Daily progress note    Referring physician  Dr. BECKFORD    Chief complaint  Doing same with no new complaints and wants to go home    History of present illness  71-year-old white female with history of diabetes hypertension hyperlipidemia hypothyroidism presented to [facility] emergency room with sudden onset of shortness of breath as she woke up with it.  Patient has no chest pain palpitation but does have nonproductive cough but no fever chills.  Patient found to be hypoxic while EMS arrived and brought to the emergency room which she found to have acute ST elevation MI and taken to the Cath Lab which showed multivessel coronary disease  angioplasty and stent placed to diagonal branch and I am asked to follow patient for medical problem.  At the time of review she is feeling better and has no more shortness of breath and also denies any chest pain.  Patient feels weak but feeling much better after angioplasty.     REVIEW OF SYSTEMS  Unremarkable     PHYSICAL EXAM         Blood pressure 125/70, pulse 72, temperature 97.7 °F (36.5 °C), temperature source Oral, resp. rate 18, height 170.2 cm (67\"), weight 101 kg (222 lb 12.8 oz), SpO2 95 %, not currently breastfeeding.    Constitutional:       General: She is not in acute distress.  HENT:      Head: Normocephalic and atraumatic.   Eyes:      Extraocular Movements: EOM normal.      Pupils: Pupils are equal, round, and reactive to light.   Cardiovascular:      Rate and Rhythm: Normal rate and regular rhythm.      Heart sounds: Normal heart sounds.   Pulmonary:      Effort: Pulmonary effort is normal. No respiratory distress.      Breath sounds: Moving air bilaterally  Abdominal:      Palpations: Abdomen is soft.      Tenderness: There is no abdominal tenderness. There is no guarding or rebound.   Musculoskeletal:         General: No edema. Normal range of motion.      Cervical back: Normal range of motion and neck supple.   Skin:     General: Skin is warm and " dry.      Findings: No rash.   Neurological:      Mental Status: She is alert and oriented to person, place, and time.      Sensory: Sensation is intact.      Motor: Motor strength is normal.   Psychiatric:         Mood and Affect: Mood and affect normal.      LAB RESULTS  Lab Results (last 24 hours)     Procedure Component Value Units Date/Time    POC Glucose Once [[id number]]  (Abnormal) Collected: 11/16/22 1557    Specimen: Blood Updated: 11/16/22 1613     Glucose 152 mg/dL      Comment: Meter: [id number] : [id number] Mo Nancy NA       POC Glucose Once [[id number]]  (Abnormal) Collected: 11/16/22 1057    Specimen: Blood Updated: 11/16/22 1105     Glucose 133 mg/dL      Comment: Meter: [id number] : [id number] Mo Nancy NA       POC Glucose Once [[id number]]  (Normal) Collected: 11/16/22 0612    Specimen: Blood Updated: 11/16/22 0614     Glucose 121 mg/dL      Comment: Meter: [id number] : [id number] John Hurtadolen NAZIA       Basic Metabolic Panel [[id number]]  (Abnormal) Collected: 11/16/22 0304    Specimen: Blood Updated: 11/16/22 0441     Glucose 114 mg/dL      BUN 34 mg/dL      Creatinine 0.82 mg/dL      Sodium 137 mmol/L      Potassium 3.6 mmol/L      Chloride 105 mmol/L      CO2 22.2 mmol/L      Calcium 8.7 mg/dL      BUN/Creatinine Ratio 41.5     Anion Gap 9.8 mmol/L      eGFR 76.6 mL/min/1.73      Comment: National Kidney Foundation and American Society of Nephrology (ASN) Task Force recommended calculation based on the Chronic Kidney Disease Epidemiology Collaboration (CKD-EPI) equation refit without adjustment for race.       Narrative:      GFR Normal >60  Chronic Kidney Disease <60  Kidney Failure <15    The GFR formula is only valid for adults with stable renal function between ages 18 and 70.    CBC & Differential [[id number]]  (Abnormal) Collected: 11/16/22 0304    Specimen: Blood Updated: 11/16/22 0351    Narrative:      The following orders were created for panel order CBC &  Differential.  Procedure                               Abnormality         Status                     ---------                               -----------         ------                     CBC Auto Differential[[id number]]        Abnormal            Final result                 Please view results for these tests on the individual orders.    CBC Auto Differential [[id number]]  (Abnormal) Collected: 11/16/22 0304    Specimen: Blood Updated: 11/16/22 0351     WBC 6.69 10*3/mm3      RBC 3.48 10*6/mm3      Hemoglobin 9.8 g/dL      Hematocrit 31.8 %      MCV 91.4 fL      MCH 28.2 pg      MCHC 30.8 g/dL      RDW 14.4 %      RDW-SD 48.1 fl      MPV 10.5 fL      Platelets 251 10*3/mm3      Neutrophil % 55.7 %      Lymphocyte % 24.4 %      Monocyte % 12.6 %      Eosinophil % 4.9 %      Basophil % 1.5 %      Immature Grans % 0.9 %      Neutrophils, Absolute 3.73 10*3/mm3      Lymphocytes, Absolute 1.63 10*3/mm3      Monocytes, Absolute 0.84 10*3/mm3      Eosinophils, Absolute 0.33 10*3/mm3      Basophils, Absolute 0.10 10*3/mm3      Immature Grans, Absolute 0.06 10*3/mm3      nRBC 0.1 /100 WBC     POC Glucose Once [[id number]]  (Abnormal) Collected: 11/15/22 2001    Specimen: Blood Updated: 11/15/22 2005     Glucose 167 mg/dL      Comment: Meter: [id number] : [id number] Zuhair MANDUJANO           Imaging Results (Last 24 Hours)     ** No results found for the last 24 hours. **           ECG 12 Lead          Component Ref Range & Units 01:44    QT Interval ms 358 P    Resulting Agency   ECG             HEART RATE= 116  bpm  RR Interval= 517  ms  PA Interval= 110  ms  P Horizontal Axis= -49  deg  P Front Axis= 64  deg  QRSD Interval= 97  ms  QT Interval= 358  ms  QRS Axis= 18  deg  T Wave Axis= 130  deg  - ABNORMAL ECG -  Sinus tachycardia  Probable left atrial enlargement  Lateral infarct, acute (LAD)  Probable anteroseptal infarct, recent             Current Facility-Administered Medications:   •  acetaminophen (TYLENOL)  tablet 650 mg, 650 mg, Oral, Q4H PRN, 650 mg at 11/11/22 1744 **OR** acetaminophen (TYLENOL) 160 MG/5ML solution 650 mg, 650 mg, Oral, Q4H PRN **OR** acetaminophen (TYLENOL) suppository 650 mg, 650 mg, Rectal, Q4H PRN, Martha Golden APRN  •  ALPRAZolam (XANAX) tablet 0.25 mg, 0.25 mg, Oral, Q8H PRN, Martha Golden APRN, 0.25 mg at 11/14/22 2154  •  aspirin EC tablet 81 mg, 81 mg, Oral, Daily, Martha Golden APRN, 81 mg at 11/16/22 0855  •  atorvastatin (LIPITOR) tablet 40 mg, 40 mg, Oral, Nightly, Martha Golden APRN, 40 mg at 11/15/22 2051  •  bisacodyl (DULCOLAX) EC tablet 10 mg, 10 mg, Oral, Daily PRN, Martha Golden APRN, 10 mg at 11/14/22 2154  •  bisacodyl (DULCOLAX) suppository 10 mg, 10 mg, Rectal, Daily PRN, Martha Golden APRN  •  chlorhexidine (PERIDEX) 0.12 % solution 15 mL, 15 mL, Mouth/Throat, Q12H, Martha Golden APRN, 15 mL at 11/15/22 2051  •  clevidipine (CLEVIPREX) infusion 0.5 mg/mL, 2-32 mg/hr, Intravenous, Continuous PRN, Martha Golden APRJAKE  •  clopidogrel (PLAVIX) tablet 75 mg, 75 mg, Oral, Daily, Le Edwards, APRN, 75 mg at 11/16/22 0855  •  cyclobenzaprine (FLEXERIL) tablet 10 mg, 10 mg, Oral, Q8H PRN, Martha Golden APRN, 10 mg at 11/14/22 2154  •  dextrose (D50W) (25 g/50 mL) IV injection 25 g, 25 g, Intravenous, Q15 Min PRN, Breann Giron MD  •  dextrose (GLUTOSE) oral gel 15 g, 15 g, Oral, Q15 Min PRN, Breann Giron MD  •  Enoxaparin Sodium (LOVENOX) syringe 40 mg, 40 mg, Subcutaneous, Daily, Martha Golden APRN, 40 mg at 11/15/22 1825  •  furosemide (LASIX) tablet 40 mg, 40 mg, Oral, Daily, Salima Johnson PA-C, 40 mg at 11/16/22 0855  •  glucagon (human recombinant) (GLUCAGEN DIAGNOSTIC) injection 1 mg, 1 mg, Intramuscular, Q15 Min PRN, Breann Giron MD  •  HYDROcodone-acetaminophen (NORCO) 5-325 MG per tablet 2 tablet, 2 tablet, Oral, Q4H PRN, Martha Golden APRN, 2 tablet at 11/16/22 1144  •  insulin glargine (LANTUS, SEMGLEE)  injection 15 Units, 15 Units, Subcutaneous, Nightly, Sammy Koch MD, 15 Units at 11/15/22 2051  •  insulin lispro (ADMELOG) injection 0-7 Units, 0-7 Units, Subcutaneous, TID AC, Breann Giron MD, 2 Units at 11/15/22 1140  •  insulin lispro (ADMELOG) injection 5 Units, 5 Units, Subcutaneous, TID With Meals, Sammy Koch MD, 5 Units at 11/16/22 1145  •  levothyroxine (SYNTHROID, LEVOTHROID) tablet 25 mcg, 25 mcg, Oral, Q AM, Martha Goledn APRN, 25 mcg at 11/16/22 0635  •  magnesium hydroxide (MILK OF MAGNESIA) suspension 10 mL, 10 mL, Oral, Daily PRN, Martha Golden APRN, 10 mL at 11/14/22 0932  •  metoprolol succinate XL (TOPROL-XL) 24 hr tablet 50 mg, 50 mg, Oral, Q24H, Martha Golden APRN, 50 mg at 11/16/22 0855  •  midazolam (VERSED) injection 2 mg, 2 mg, Intravenous, Q1H PRN, Martha Golden APRN  •  morphine injection 1 mg, 1 mg, Intravenous, Q4H PRN, 1 mg at 11/11/22 1000 **AND** naloxone (NARCAN) injection 0.4 mg, 0.4 mg, Intravenous, Q5 Min PRN, Martha Golden APRN  •  morphine injection 4 mg, 4 mg, Intravenous, Q30 Min PRN, Martha Golden APRN  •  mupirocin (BACTROBAN) 2 % nasal ointment, , Each Nare, BID, Martha Golden APRN, 1 application at 11/16/22 0855  •  nitroglycerin (NITROSTAT) SL tablet 0.4 mg, 0.4 mg, Sublingual, Q5 Min PRN, Joanna Marie MD  •  ondansetron (ZOFRAN) injection 4 mg, 4 mg, Intravenous, Q6H PRN, Martha Golden APRN, 4 mg at 11/11/22 0417  •  oxyCODONE (ROXICODONE) immediate release tablet 10 mg, 10 mg, Oral, Q4H PRN, Martha Golden APRN, 10 mg at 11/13/22 2220  •  [COMPLETED] pantoprazole (PROTONIX) injection 40 mg, 40 mg, Intravenous, Once, 40 mg at 11/10/22 1658 **FOLLOWED BY** pantoprazole (PROTONIX) EC tablet 40 mg, 40 mg, Oral, QAM, Martha Golden APRN, 40 mg at 11/16/22 0635  •  polyethylene glycol (MIRALAX) packet 17 g, 17 g, Oral, Daily PRN, Martha Golden APRN, 17 g at 11/12/22 1901  •  potassium chloride (K-DUR,KLOR-CON) ER  tablet 40 mEq, 40 mEq, Oral, PRN, 40 mEq at 11/16/22 1403 **OR** potassium chloride (KLOR-CON) packet 40 mEq, 40 mEq, Oral, PRN, Martha Golden, APRN  •  potassium chloride 10 mEq in 100 mL IVPB, 10 mEq, Intravenous, Q1H PRN **OR** potassium chloride 10 mEq in 100 mL IVPB, 10 mEq, Intravenous, Q1H PRN, Martha Golden, APRN  •  potassium chloride 20 mEq in 50 mL IVPB, 20 mEq, Intravenous, Q1H PRN, Martha Golden, APRN  •  potassium chloride 20 mEq in 50 mL IVPB, 20 mEq, Intravenous, Q1H PRN, Martha Golden, APRN  •  potassium chloride 20 mEq in 50 mL IVPB, 20 mEq, Intravenous, Q1H PRN, Martha Golden, APRN  •  sennosides-docusate (PERICOLACE) 8.6-50 MG per tablet 2 tablet, 2 tablet, Oral, Nightly, Martha Golden, GUEVARA, 2 tablet at 11/14/22 3861     ASSESSMENT  Multivessel coronary artery disease s/p CABG postop day 5  Acute ST relation MI s/p angioplasty and stent   Acute Klebsiella UTI  Diabetes mellitus  Hypertension  Hyperlipidemia  Hypothyroidism  Dysarthria resolved  Gastroesophageal reflux disease    PLAN  CPM  Postop care  Post PCI care  Completed antibiotic  Continue home medications  Stress ulcer DVT prophylaxis  Accu-Cheks sliding scale insulin  Supportive care  PT/OT  Discussed with nursing staff  Discharge planning    DEE FRENCH MD

## 2022-11-16 NOTE — THERAPY TREATMENT NOTE
Patient Name: Mayra Hirsch  : 1950    MRN: [id number]                              Today's Date: 2022       Admit Date: 2022    Visit Dx:     ICD-10-CM ICD-9-CM   1. ST elevation myocardial infarction (STEMI), unspecified artery (HCC)  I21.3 410.90   2. ST elevation myocardial infarction (STEMI) involving other coronary artery (HCC)  I21.29 410.10   3. Coronary artery disease involving native heart, unspecified vessel or lesion type, unspecified whether angina present  I25.10 414.01   4. Abnormal findings on diagnostic imaging of other specified body structures  R93.89 793.99     Patient Active Problem List   Diagnosis   • Vitamin D deficiency   • Primary hypothyroidism   • Dyslipidemia   • Essential hypertension   • Type 2 diabetes mellitus without complication, with long-term current use of insulin (HCC)   • Noncompliance with diabetes treatment   • ST elevation myocardial infarction (STEMI) (HCC)   • Coronary artery disease involving native heart     Past Medical History:   Diagnosis Date   • Depression    • Diabetes mellitus (HCC)    • Disease of thyroid gland    • Fibrocystic breast    • Hypertension    • Hypothyroidism    • PONV (postoperative nausea and vomiting)    • Type 2 diabetes mellitus (HCC)      Past Surgical History:   Procedure Laterality Date   • BREAST EXCISIONAL BIOPSY Right     at age 22; benign.   • CARDIAC CATHETERIZATION Left 2022    Procedure: Cardiac Catheterization/Vascular Study;  Surgeon: Joanna Marie MD;  Location: [facility] INVASIVE LOCATION;  Service: Cardiology;  Laterality: Left;   • CARDIAC CATHETERIZATION N/A 2022    Procedure: Percutaneous Coronary Intervention;  Surgeon: Joanna Marie MD;  Location: [facility] CATH INVASIVE LOCATION;  Service: Cardiology;  Laterality: N/A;   • CARDIAC CATHETERIZATION N/A 2022    Procedure: Left ventriculography;  Surgeon: Joanna Marie MD;  Location: [facility] INVASIVE LOCATION;   Service: Cardiology;  Laterality: N/A;   • CARDIAC CATHETERIZATION N/A 2022    Procedure: Stent MARTINEZ coronary;  Surgeon: Joanna Marie MD;  Location: [facility] CATH INVASIVE LOCATION;  Service: Cardiology;  Laterality: N/A;   • CARDIAC CATHETERIZATION N/A 2022    Procedure: Left Heart Cath;  Surgeon: Joanna Marie MD;  Location:  [facility] CATH INVASIVE LOCATION;  Service: Cardiology;  Laterality: N/A;   •  SECTION     • CORONARY ARTERY BYPASS GRAFT N/A 11/10/2022    Procedure: NIKKY, CORONARY ARTERY BYPASS GRAFTING TIMES 6 WITH LEFT JORDYN AND ENDOSCOPICALLY HARVESTED LEFT AND RIGHT GREATER SAPHENOUS VEIN, PRP TRANSESOPHAGEAL ECHOCARDIOGRAM WITH ANESTHESIA;  Surgeon: Jr Dong Isabel MD;  Location: [facility];  Service: Cardiothoracic;  Laterality: N/A;   • HAND SURGERY     • KNEE SURGERY     • OOPHORECTOMY      1 ovary removed due to ectopic pregnancy      General Information     Row Name 22 1123          Physical Therapy Time and Intention    Document Type therapy note (daily note)  - (r) ND (t) LH (c)     Mode of Treatment physical therapy  - (r) ND (t) LH (c)     Row Name 22 1123          General Information    Patient Profile Reviewed yes  -LH (r) ND (t) LH (c)     Existing Precautions/Restrictions fall;sternal  - (r) ND (t) LH (c)     Barriers to Rehab medically complex;physical barrier  - (r) ND (t) LH (c)     Row Name 22 1123          Cognition    Orientation Status (Cognition) oriented to;person  - (r) ND (t) LH (c)     Row Name 22 1123          Safety Issues, Functional Mobility    Impairments Affecting Function (Mobility) balance;coordination;shortness of breath;strength;endurance/activity tolerance  - (r) ND (t) LH (c)           User Key  (r) = Recorded By, (t) = Taken By, (c) = Cosigned By    Initials Name Provider Type     Tiffanie Infante, PT Physical Therapist    Lindsey Aguilar, PT Student PT Student               Mobility      Row Name 11/16/22 1125          Bed Mobility    Supine-Sit [place] (Bed Mobility) not tested  -LH (r) ND (t) LH (c)     Sit-Supine [place] (Bed Mobility) standby assist;contact guard  -LH (r) ND (t) LH (c)     Comment, (Bed Mobility) Pt was sitting EOB with nsg aide upon PT arrival.  -LH (r) ND (t) LH (c)     Row Name 11/16/22 1125          Sit-Stand Transfer    Sit-Stand [place] (Transfers) standby assist;contact guard  -LH (r) ND (t) LH (c)     Assistive Device (Sit-Stand Transfers) walker, front-wheeled  -LH (r) ND (t) LH (c)     Row Name 11/16/22 1125          Gait/Stairs (Locomotion)    [place] Level (Gait) standby assist;contact guard  -LH (r) ND (t) LH (c)     Assistive Device (Gait) walker, front-wheeled  -LH (r) ND (t) LH (c)     Distance in Feet (Gait) 100'  -LH (r) ND (t) LH (c)     Deviations/Abnormal Patterns (Gait) stride length decreased;gait speed decreased  -LH (r) ND (t) LH (c)     Bilateral Gait Deviations forward flexed posture  -LH (r) ND (t) LH (c)     [place] Level (Stairs) not tested  -LH (r) ND (t) LH (c)           User Key  (r) = Recorded By, (t) = Taken By, (c) = Cosigned By    Initials Name Provider Type     Tiffanie Infante, PT Physical Therapist    Lindsey Aguilar PT Student PT Student               Obj/Interventions     Row Name 11/16/22 1126          Balance    Balance Assessment sitting static balance;sitting dynamic balance;sit to stand dynamic balance;standing static balance;standing dynamic balance  -LH (r) ND (t) LH (c)     Static Sitting Balance standby assist  -LH (r) ND (t) LH (c)     Dynamic Sitting Balance standby assist  -LH (r) ND (t) LH (c)     Position, Sitting Balance sitting edge of bed  -LH (r) ND (t) LH (c)     Sit to Stand Dynamic Balance contact guard  -LH (r) ND (t) LH (c)     Static Standing Balance contact guard;standby assist  -LH (r) ND (t) LH (c)     Dynamic Standing Balance standby assist;contact guard  -LH (r) ND (t) LH (c)      Position/Device Used, Standing Balance walker, front-wheeled  -LH (r) ND (t) LH (c)     Balance Interventions sitting;standing;sit to stand;supported;static;dynamic  -LH (r) ND (t) LH (c)     Row Name 11/16/22 1126          Sensory Assessment (Somatosensory)    Sensory Assessment (Somatosensory) not tested  -LH (r) ND (t) LH (c)           User Key  (r) = Recorded By, (t) = Taken By, (c) = Cosigned By    Initials Name Provider Type     Tfifanie Infante, PT Physical Therapist    Lindsey Aguilar, PT Student PT Student               Goals/Plan    No documentation.                Clinical Impression     [facility] Name 11/16/22 1127          Plan of Care Review    Plan of Care Reviewed With patient  -LH (r) ND (t) LH (c)     Outcome Evaluation Pt agreeable to participate in therapy this date. Pt sitting EOB with nsg aide upon PT arrival. Pt performed sit to stand transfer CGA with RWx. Pt ambulated 100' SBA-CGA with RWx demonstrating decreased gait speed. Pt reported she was fatigued from lack of sleep. Pt reported she plans to go home with family following d/c and PT recommended that pt remain on first floor of the home upon initial d/c. PT is also recommending pt d/c with  PT. Pt currently requires skilled therapy to increase activity tolerance and functional mobility.  -LH (r) ND (t) LH (c)     [facility] Name 11/16/22 1127          Therapy Assessment/Plan (PT)    Criteria for Skilled Interventions Met (PT) yes;meets criteria;skilled treatment is necessary  -LH (r) ND (t) LH (c)     Therapy Frequency (PT) daily  -LH (r) ND (t) LH (c)     Row Name 11/16/22 1127          Vital Signs    O2 Delivery Pre Treatment room air  -LH (r) ND (t) LH (c)     O2 Delivery Intra Treatment room air  -LH (r) ND (t) LH (c)     O2 Delivery Post Treatment room air  -LH (r) ND (t) LH (c)     Pre Patient Position Sitting  -LH (r) ND (t) LH (c)     Intra Patient Position Standing  -LH (r) ND (t) LH (c)     Post Patient Position Supine  -LH (r) ND  (t) LH (c)     Row Name 11/16/22 1127          Positioning and Restraints    Pre-Treatment Position other (comment)  sitting EOB w nsg aide.  -LH (r) ND (t) LH (c)     Post Treatment Position bed  -LH (r) ND (t) LH (c)     In Bed supine;call light within reach;encouraged to call for assist;exit alarm on;side rails up x2  -LH (r) ND (t) LH (c)           User Key  (r) = Recorded By, (t) = Taken By, (c) = Cosigned By    Initials Name Provider Type     Tiffanie Infante, PT Physical Therapist    Lindsey Aguilar, PT Student PT Student               Outcome Measures     Row Name 11/16/22 1132 11/16/22 0859       How much help from another person do you currently need...    Turning from your back to your side while in flat bed without using bedrails? 3  -LH (r) ND (t) LH (c) 3  -PY    Moving from lying on back to sitting on the side of a flat bed without bedrails? 3  -LH (r) ND (t) LH (c) 3  -PY    Moving to and from a bed to a chair (including a wheelchair)? 3  -LH (r) ND (t) LH (c) 3  -PY    Standing up from a chair using your arms (e.g., wheelchair, bedside chair)? 3  -LH (r) ND (t) LH (c) 3  -PY    Climbing 3-5 steps with a railing? 2  -LH (r) ND (t) LH (c) 2  -PY    To walk in hospital room? 3  -LH (r) ND (t) LH (c) 3  -PY    AM-PAC 6 Clicks Score (PT) 17  -LH (r) ND (t) 17  -PY    Highest level of mobility 5 --> Static standing  -LH (r) ND (t) 5 --> Static standing  -PY          User Key  (r) = Recorded By, (t) = Taken By, (c) = Cosigned By    Initials Name Provider Type     Tiffanie Infante, PT Physical Therapist    Elizabeth Barr RN Registered Nurse    Lindsey Aguilar, PT Student PT Student                             Physical Therapy Education     Title: PT OT SLP Therapies (Done)     Topic: Physical Therapy (Done)     Point: Mobility training (Done)     Learning Progress Summary           Patient Acceptance, E,TB, VU by AIDAN at 11/15/2022 1302    Acceptance, E, VU,NR by RACHEL at 11/14/2022 1211     Acceptance, E, VU by  at 11/13/2022 1009    Acceptance, E, VU by  at 11/12/2022 1235    Acceptance, E, VU,NR by  at 11/11/2022 1049                   Point: Home exercise program (Done)     Learning Progress Summary           Patient Acceptance, E, VU,NR by  at 11/14/2022 1211    Acceptance, E, VU by  at 11/13/2022 1009    Acceptance, E, VU by  at 11/12/2022 1235    Acceptance, E, VU,NR by DJ at 11/11/2022 1049                   Point: Body mechanics (Done)     Learning Progress Summary           Patient Acceptance, E, VU,NR by  at 11/14/2022 1211    Acceptance, E, VU by  at 11/13/2022 1009    Acceptance, E, VU by  at 11/12/2022 1235    Acceptance, E, VU,NR by  at 11/11/2022 1049                   Point: Precautions (Done)     Learning Progress Summary           Patient Eager, E, VU by ND at 11/16/2022 1132    Acceptance, E, VU,NR by  at 11/14/2022 1211    Acceptance, E, VU by  at 11/13/2022 1009    Acceptance, E, VU by  at 11/12/2022 1235    Acceptance, E, VU,NR by  at 11/11/2022 1049                               User Key     Initials Effective Dates Name Provider Type Discipline     06/16/21 -  Geraldine Boyd, PT Physical Therapist PT     06/16/21 -  Zuleyka Smith, PT Physical Therapist PT     06/16/21 -  Jaxson Gaffney, PT Physical Therapist PT    DJ 10/25/19 -  Sraavni Downs, PT Physical Therapist PT    ND 10/24/22 -  Lindsey Pike, PT Student PT Student PT              PT Recommendation and Plan     Plan of Care Reviewed With: patient  Outcome Evaluation: Pt agreeable to participate in therapy this date. Pt sitting EOB with nsg aide upon PT arrival. Pt performed sit to stand transfer CGA with RWx. Pt ambulated 100' SBA-CGA with RWx demonstrating decreased gait speed. Pt reported she was fatigued from lack of sleep. Pt reported she plans to go home with family following d/c and PT recommended that pt remain on first floor of the home upon initial d/c. PT is also  recommending pt d/c with HH PT. Pt currently requires skilled therapy to increase activity tolerance and functional mobility.     Time Calculation:    PT Charges     Row Name 11/16/22 1122             Time Calculation    Start Time 0916  -LH (r) ND (t)  (c)      Stop Time 0926  -LH (r) ND (t)  (c)      Time Calculation (min) 10 min  -LH (r) ND (t)      PT Received On 11/16/22  -LH (r) ND (t)  (c)      PT - Next Appointment 11/17/22  -LH (r) ND (t)  (c)            User Key  (r) = Recorded By, (t) = Taken By, (c) = Cosigned By    Initials Name Provider Type     Tiffanie Infante, PT Physical Therapist    Lindsey Aguilar, PT Student PT Student              Therapy Charges for Today     Code Description Service Date Service Provider Modifiers Qty    40782717967 HC PT THER PROC EA 15 MIN 11/16/2022 Lindsey Pike, PT Student GP 1          PT G-Codes  Outcome Measure Options: AM-PAC 6 Clicks Basic Mobility (PT)  AM-PAC 6 Clicks Score (PT): 17  PT Discharge Summary  Anticipated Discharge Disposition (PT): home with assist, home with home health    Lindsey Pike, PT Student  11/16/2022

## 2022-11-16 NOTE — PROGRESS NOTES
LOS: 9 days   Patient Care Team:  Spencer Hua MD as PCP - General    Chief Complaint: post op    Subjective:  Symptoms:  No shortness of breath or chest pain.    Diet:  No nausea or vomiting.    Activity level: Impaired due to weakness.          Vital Signs  Temp:  [97.4 °F (36.3 °C)-98.6 °F (37 °C)] 98.6 °F (37 °C)  Heart Rate:  [66-78] 66  Resp:  [16-18] 18  BP: (122-152)/(62-85) 152/80  Body mass index is 34.9 kg/m².    Intake/Output Summary (Last 24 hours) at 11/16/2022 0817  Last data filed at 11/16/2022 0347  Gross per 24 hour   Intake 600 ml   Output 915 ml   Net -315 ml     No intake/output data recorded.    Chest tube drainage last 8 hours 5        11/14/22  0600 11/15/22  0500 11/16/22  0500   Weight: 104 kg (229 lb 8 oz) 103 kg (226 lb) 101 kg (222 lb 12.8 oz)         Objective    Results Review:        WBC WBC   Date Value Ref Range Status   11/16/2022 6.69 3.40 - 10.80 10*3/mm3 Final   11/15/2022 7.23 3.40 - 10.80 10*3/mm3 Final   11/14/2022 7.99 3.40 - 10.80 10*3/mm3 Final      HGB Hemoglobin   Date Value Ref Range Status   11/16/2022 9.8 (L) 12.0 - 15.9 g/dL Final   11/15/2022 9.6 (L) 12.0 - 15.9 g/dL Final   11/14/2022 10.0 (L) 12.0 - 15.9 g/dL Final      HCT Hematocrit   Date Value Ref Range Status   11/16/2022 31.8 (L) 34.0 - 46.6 % Final   11/15/2022 29.7 (L) 34.0 - 46.6 % Final   11/14/2022 31.2 (L) 34.0 - 46.6 % Final      Platelets Platelets   Date Value Ref Range Status   11/16/2022 251 140 - 450 10*3/mm3 Final   11/15/2022 202 140 - 450 10*3/mm3 Final   11/14/2022 165 140 - 450 10*3/mm3 Final        PT/INR:  No results found for: PROTIME/No results found for: INR    Sodium Sodium   Date Value Ref Range Status   11/16/2022 137 136 - 145 mmol/L Final   11/15/2022 139 136 - 145 mmol/L Final   11/14/2022 133 (L) 136 - 145 mmol/L Final      Potassium Potassium   Date Value Ref Range Status   11/16/2022 3.6 3.5 - 5.2 mmol/L Final   11/15/2022 4.4 3.5 - 5.2 mmol/L Final   11/14/2022 4.4 3.5 -  5.2 mmol/L Final      Chloride Chloride   Date Value Ref Range Status   11/16/2022 105 98 - 107 mmol/L Final   11/15/2022 107 98 - 107 mmol/L Final   11/14/2022 104 98 - 107 mmol/L Final      Bicarbonate CO2   Date Value Ref Range Status   11/16/2022 22.2 22.0 - 29.0 mmol/L Final   11/15/2022 21.3 (L) 22.0 - 29.0 mmol/L Final   11/14/2022 17.7 (L) 22.0 - 29.0 mmol/L Final      BUN BUN   Date Value Ref Range Status   11/16/2022 34 (H) 8 - 23 mg/dL Final   11/15/2022 47 (H) 8 - 23 mg/dL Final   11/14/2022 49 (H) 8 - 23 mg/dL Final      Creatinine Creatinine   Date Value Ref Range Status   11/16/2022 0.82 0.57 - 1.00 mg/dL Final   11/15/2022 1.02 (H) 0.57 - 1.00 mg/dL Final   11/14/2022 1.09 (H) 0.57 - 1.00 mg/dL Final      Calcium Calcium   Date Value Ref Range Status   11/16/2022 8.7 8.6 - 10.5 mg/dL Final   11/15/2022 9.0 8.6 - 10.5 mg/dL Final   11/14/2022 8.5 (L) 8.6 - 10.5 mg/dL Final      Magnesium No results found for: MG       aspirin, 81 mg, Oral, Daily  atorvastatin, 40 mg, Oral, Nightly  chlorhexidine, 15 mL, Mouth/Throat, Q12H  clopidogrel, 75 mg, Oral, Daily  enoxaparin, 40 mg, Subcutaneous, Daily  furosemide, 40 mg, Oral, Daily  insulin glargine, 15 Units, Subcutaneous, Nightly  insulin lispro, 0-7 Units, Subcutaneous, TID AC  insulin lispro, 5 Units, Subcutaneous, TID With Meals  levothyroxine, 25 mcg, Oral, Q AM  metoprolol succinate XL, 50 mg, Oral, Q24H  mupirocin, , Each Nare, BID  pantoprazole, 40 mg, Oral, QAM  senna-docusate sodium, 2 tablet, Oral, Nightly      clevidipine, 2-32 mg/hr            Patient Active Problem List   Diagnosis Code   • Vitamin D deficiency E55.9   • Primary hypothyroidism E03.9   • Dyslipidemia E78.5   • Essential hypertension I10   • Type 2 diabetes mellitus without complication, with long-term current use of insulin ([facility]) E11.9, Z79.4   • Noncompliance with diabetes treatment Z91.199   • ST elevation myocardial infarction (STEMI) ([facility]) I21.3   • Coronary artery disease  involving native heart I25.10       Assessment & Plan    - STEMI, Multivessel CAD- angioplasty and stent placed diagonal branch preop, s/p CABG x5 with LIMA, EVH left calf, right thigh ([place])- POD#6  - ICM, EF 40%----no ARB/ACE with MERCEDES  - Hypertension----controlled  - HL---statin  - Obesity  - Diabetes type 2- A1C 7----Internal medicine managing, resume home meds when taking adequate po  - Klebsiella UTI, preop asymptomatic  - Post op expected acute blood loss anemia---transfused 11/11/22  - MERCEDES---creatinine peak at 1.3 r/t recent hypotension  - Episode of dysarthria on POD#2---no recurrence, CT head ok     Looks good this morning  Up in the chair  Dizziness improved  Increase beta blocker  Encourage pulmonary toilet   Increase activity-- will ask OT to see as well  Making steady progress  May be able to go home with home health in the next few days     Martha Puga, GUEVARA  11/16/22  08:17 EST

## 2022-11-16 NOTE — CASE MANAGEMENT/SOCIAL WORK
Continued Stay Note  [facility]     Patient Name: Mayra Hirsch  MRN: [id number]  Today's Date: 11/16/2022    Admit Date: 11/7/2022    Plan: [facility] SNF eval pending.  Will require [facility] Precert   Discharge Plan     Row Name 11/16/22 1416       Plan    Plan [facility] SN[facility] eval pending.  Will require [facility] Precert    Plan Comments [facility] received a voicemail from Lexi, Pt daughter in law requesting a call back.  CCP s/w Pt at bedside and received permission from her to call dtr in law back.  Lexi arrived at Pt bedside before CCP could call her back.  Lexi voiced concerns that Pt lives alone and her only option is to stay with her and son with bedroom being up 15 stair steps.  Lexi is requesting Pt go to sub-acute rehab at discharge.  [facility] gave dtr in law a Road to Recovery print out and explained that Pt has [facility] plan and they will have to approve precert for Pt to go to rehab.  Lexi insistent that Pt not safe to go home or to her house at d/c.  Lexi reviewed the CMS compare SNF list and gave 4 rehab choices.  1- [facility] [place], 2- [facility], 3- [facility] [place] and 4- [facility].  CCP called Jovana/Rosa with rehab referrals.  [facility]ovana advised that [facility] is only facility that has available bed.  [facility] evaluating and will get back with [facility].  Pt will require [facility] precert.  CCP following..........Eulalia ARTEAGA/JAMESON STILES    Row Name 11/16/22 1215       Plan    Roadmap to Recovery Yes    Provided Post Acute Provider Quality & Resource List? Yes    Post Acute Provider Quality and Resource List Nursing Home    Delivered To Patient    Method of Delivery In person               Discharge Codes    No documentation.               Expected Discharge Date and Time     Expected Discharge Date Expected Discharge Time    Nov 18, 2022             Eulalia Harris RN

## 2022-11-16 NOTE — PLAN OF CARE
Goal Outcome Evaluation:  Plan of Care Reviewed With: patient           Outcome Evaluation: Pt agreeable to participate in therapy this date. Pt sitting EOB with nsg aide upon PT arrival. Pt performed sit to stand transfer CGA with RWx. Pt ambulated 100' SBA-CGA with RWx demonstrating decreased gait speed. Pt reported she was fatigued from lack of sleep. Pt reported she plans to go home with family following d/c and PT recommended that pt remain on first floor of the home upon initial d/c. PT is also recommending pt d/c with  PT. Pt currently requires skilled therapy to increase activity tolerance and functional mobility.

## 2022-11-16 NOTE — PLAN OF CARE
Goal Outcome Evaluation:  Plan of Care Reviewed With: patient        Progress: improving  Outcome Evaluation: pt evaluated for OT. pt admitted from home where she lives alone. she is s/p CABG x6. pt UIC upon OT arrival. pt completed sit to stand w CGA and walked to BR w walker and tsf to toilet. pt was SBA w toileting skills and grooming skills. pt demo ability to cross LE over other LE to simulate don/doff socks. pt has decr endurance , strength, balance and ADL and cont to benefit from OT. pt wants rehab as she had 15 steps to get to her bathroom and bedroom.

## 2022-11-17 LAB
ANION GAP SERPL CALCULATED.3IONS-SCNC: 11.9 MMOL/L (ref 5–15)
BASOPHILS # BLD AUTO: 0.11 10*3/MM3 (ref 0–0.2)
BASOPHILS NFR BLD AUTO: 1.5 % (ref 0–1.5)
BUN SERPL-MCNC: 25 MG/DL (ref 8–23)
BUN/CREAT SERPL: 33.8 (ref 7–25)
CALCIUM SPEC-SCNC: 8.9 MG/DL (ref 8.6–10.5)
CHLORIDE SERPL-SCNC: 109 MMOL/L (ref 98–107)
CO2 SERPL-SCNC: 19.1 MMOL/L (ref 22–29)
CREAT SERPL-MCNC: 0.74 MG/DL (ref 0.57–1)
DEPRECATED RDW RBC AUTO: 49 FL (ref 37–54)
EGFRCR SERPLBLD CKD-EPI 2021: 86.6 ML/MIN/1.73
EOSINOPHIL # BLD AUTO: 0.37 10*3/MM3 (ref 0–0.4)
EOSINOPHIL NFR BLD AUTO: 5.1 % (ref 0.3–6.2)
ERYTHROCYTE [DISTWIDTH] IN BLOOD BY AUTOMATED COUNT: 14.5 % (ref 12.3–15.4)
GLUCOSE BLDC GLUCOMTR-MCNC: 118 MG/DL (ref 70–130)
GLUCOSE BLDC GLUCOMTR-MCNC: 149 MG/DL (ref 70–130)
GLUCOSE BLDC GLUCOMTR-MCNC: 159 MG/DL (ref 70–130)
GLUCOSE BLDC GLUCOMTR-MCNC: 92 MG/DL (ref 70–130)
GLUCOSE SERPL-MCNC: 121 MG/DL (ref 65–99)
HCT VFR BLD AUTO: 31.8 % (ref 34–46.6)
HGB BLD-MCNC: 10.2 G/DL (ref 12–15.9)
IMM GRANULOCYTES # BLD AUTO: 0.07 10*3/MM3 (ref 0–0.05)
IMM GRANULOCYTES NFR BLD AUTO: 1 % (ref 0–0.5)
LYMPHOCYTES # BLD AUTO: 2.04 10*3/MM3 (ref 0.7–3.1)
LYMPHOCYTES NFR BLD AUTO: 28.1 % (ref 19.6–45.3)
MCH RBC QN AUTO: 29.3 PG (ref 26.6–33)
MCHC RBC AUTO-ENTMCNC: 32.1 G/DL (ref 31.5–35.7)
MCV RBC AUTO: 91.4 FL (ref 79–97)
MONOCYTES # BLD AUTO: 0.78 10*3/MM3 (ref 0.1–0.9)
MONOCYTES NFR BLD AUTO: 10.7 % (ref 5–12)
NEUTROPHILS NFR BLD AUTO: 3.9 10*3/MM3 (ref 1.7–7)
NEUTROPHILS NFR BLD AUTO: 53.6 % (ref 42.7–76)
NRBC BLD AUTO-RTO: 0 /100 WBC (ref 0–0.2)
PLATELET # BLD AUTO: 266 10*3/MM3 (ref 140–450)
PMV BLD AUTO: 10 FL (ref 6–12)
POTASSIUM SERPL-SCNC: 4.6 MMOL/L (ref 3.5–5.2)
RBC # BLD AUTO: 3.48 10*6/MM3 (ref 3.77–5.28)
SODIUM SERPL-SCNC: 140 MMOL/L (ref 136–145)
WBC NRBC COR # BLD: 7.27 10*3/MM3 (ref 3.4–10.8)

## 2022-11-17 PROCEDURE — 85025 COMPLETE CBC W/AUTO DIFF WBC: CPT | Performed by: HOSPITALIST

## 2022-11-17 PROCEDURE — 99232 SBSQ HOSP IP/OBS MODERATE 35: CPT

## 2022-11-17 PROCEDURE — 97530 THERAPEUTIC ACTIVITIES: CPT

## 2022-11-17 PROCEDURE — 82962 GLUCOSE BLOOD TEST: CPT

## 2022-11-17 PROCEDURE — 97110 THERAPEUTIC EXERCISES: CPT | Performed by: OCCUPATIONAL THERAPIST

## 2022-11-17 PROCEDURE — 97535 SELF CARE MNGMENT TRAINING: CPT | Performed by: OCCUPATIONAL THERAPIST

## 2022-11-17 PROCEDURE — 80048 BASIC METABOLIC PNL TOTAL CA: CPT | Performed by: NURSE PRACTITIONER

## 2022-11-17 PROCEDURE — 63710000001 INSULIN LISPRO (HUMAN) PER 5 UNITS: Performed by: THORACIC SURGERY (CARDIOTHORACIC VASCULAR SURGERY)

## 2022-11-17 PROCEDURE — 25010000002 ENOXAPARIN PER 10 MG: Performed by: NURSE PRACTITIONER

## 2022-11-17 PROCEDURE — 63710000001 INSULIN LISPRO (HUMAN) PER 5 UNITS: Performed by: HOSPITALIST

## 2022-11-17 RX ADMIN — LEVOTHYROXINE SODIUM 25 MCG: 0.03 TABLET ORAL at 06:50

## 2022-11-17 RX ADMIN — INSULIN GLARGINE-YFGN 15 UNITS: 100 INJECTION, SOLUTION SUBCUTANEOUS at 22:02

## 2022-11-17 RX ADMIN — INSULIN LISPRO 2 UNITS: 100 INJECTION, SOLUTION INTRAVENOUS; SUBCUTANEOUS at 11:48

## 2022-11-17 RX ADMIN — METOPROLOL SUCCINATE 50 MG: 50 TABLET, FILM COATED, EXTENDED RELEASE ORAL at 09:07

## 2022-11-17 RX ADMIN — ASPIRIN 81 MG: 81 TABLET, COATED ORAL at 09:07

## 2022-11-17 RX ADMIN — ENOXAPARIN SODIUM 40 MG: 100 INJECTION SUBCUTANEOUS at 17:52

## 2022-11-17 RX ADMIN — CLOPIDOGREL 75 MG: 75 TABLET, FILM COATED ORAL at 09:07

## 2022-11-17 RX ADMIN — ALPRAZOLAM 0.25 MG: 0.25 TABLET ORAL at 22:02

## 2022-11-17 RX ADMIN — INSULIN LISPRO 5 UNITS: 100 INJECTION, SOLUTION INTRAVENOUS; SUBCUTANEOUS at 11:49

## 2022-11-17 RX ADMIN — CHLORHEXIDINE GLUCONATE 15 ML: 1.2 SOLUTION ORAL at 22:01

## 2022-11-17 RX ADMIN — INSULIN LISPRO 5 UNITS: 100 INJECTION, SOLUTION INTRAVENOUS; SUBCUTANEOUS at 17:52

## 2022-11-17 RX ADMIN — ATORVASTATIN CALCIUM 40 MG: 20 TABLET, FILM COATED ORAL at 22:01

## 2022-11-17 RX ADMIN — PANTOPRAZOLE SODIUM 40 MG: 40 TABLET, DELAYED RELEASE ORAL at 07:36

## 2022-11-17 RX ADMIN — DOCUSATE SODIUM 50MG AND SENNOSIDES 8.6MG 2 TABLET: 8.6; 5 TABLET, FILM COATED ORAL at 22:01

## 2022-11-17 RX ADMIN — CYCLOBENZAPRINE 10 MG: 10 TABLET, FILM COATED ORAL at 22:01

## 2022-11-17 RX ADMIN — MUPIROCIN: 20 OINTMENT TOPICAL at 22:02

## 2022-11-17 RX ADMIN — FUROSEMIDE 40 MG: 40 TABLET ORAL at 09:07

## 2022-11-17 NOTE — PLAN OF CARE
Goal Outcome Evaluation:  Plan of Care Reviewed With: patient         Outcome Evaluation: POD 7 CABG. A&Ox4, no c/o pain. NSR on tele, all other VSS. Wenceslao tube remains to TRICIA drain, very little output. CRISTIAN dressing to midsternal incision c/d/i. Ambulating x1 assist with walker to bathroom. Tentative d/c to [facility] SNF pending precert per CCP notes. Will continue with current POC and update as needed.

## 2022-11-17 NOTE — PROGRESS NOTES
[facility]  Cardiology Progress Note    Patient Identification:  Name: Mayra Hirsch  Age: 71 y.o.  Sex: female  :  1950  MRN: [id number]                 Follow Up / Chief Complaint: follow up for CAD    Interval History:  CABG x5 11/10/22 with Dr Isabel. Sitting up in chair, no complaints, feels well today. She is working with therapy.        Objective:    Past Medical History:  Past Medical History:   Diagnosis Date   • Depression    • Diabetes mellitus (HCC)    • Disease of thyroid gland    • Fibrocystic breast    • Hypertension    • Hypothyroidism    • PONV (postoperative nausea and vomiting)    • Type 2 diabetes mellitus (HCC)      Past Surgical History:  Past Surgical History:   Procedure Laterality Date   • BREAST EXCISIONAL BIOPSY Right     at age 22; benign.   • CARDIAC CATHETERIZATION Left 2022    Procedure: Cardiac Catheterization/Vascular Study;  Surgeon: Joanna Marie MD;  Location:  [facility] CATH INVASIVE LOCATION;  Service: Cardiology;  Laterality: Left;   • CARDIAC CATHETERIZATION N/A 2022    Procedure: Percutaneous Coronary Intervention;  Surgeon: Joanna Marie MD;  Location: [facility] CATH [facility]VASIVE LOCATION;  Service: Cardiology;  Laterality: N/A;   • CARDIAC CATHETERIZATION N/A 2022    Procedure: Left ventriculography;  Surgeon: Joanna Marie MD;  Location:  [facility] CATH INVASIVE LOCATION;  Service: Cardiology;  Laterality: N/A;   • CARDIAC CATHETERIZATION N/A 2022    Procedure: Stent MARTINEZ coronary;  Surgeon: Joanna Marie MD;  [facility];  Service: Cardiology;  Laterality: N/A;   • CARDIAC CATHETERIZATION N/A 2022    Procedure: Left Heart Cath;  Surgeon: Joanna Marie MD;  [facility] C[facility];  Service: Cardiology;  Laterality: N/A;   •  SECTION     • CORONARY ARTERY BYPASS GRAFT N/A 11/10/2022    Procedure: NIKKY, CORONARY ARTERY BYPASS GRAFTING TIMES  "6 WITH LEFT JORDYN AND ENDOSCOPICALLY HARVESTED LEFT AND RIGHT GREATER SAPHENOUS VEIN, PRP TRANSESOPHAGEAL ECHOCARDIOGRAM WITH ANESTHESIA;  Surgeon: Jr Dong Isabel MD;  Location: [facility];  Service: Cardiothoracic;  Laterality: N/A;   • HAND SURGERY     • KNEE SURGERY     • OOPHORECTOMY      1 ovary removed due to ectopic pregnancy        Social History:   Social History     Tobacco Use   • Smoking status: Never   • Smokeless tobacco: Never   Substance Use Topics   • Alcohol use: No      Family History:  Family History   Problem Relation Age of Onset   • Coronary artery disease Mother    • Alzheimer's disease Mother    • Coronary artery disease Father    • Cancer Father    • Thyroid disease Sister    • Malig Hyperthermia Neg Hx           Allergies:  Allergies   Allergen Reactions   • Bydureon [Exenatide] Diarrhea   • Metformin And Related Nausea And Vomiting     Scheduled Meds:  aspirin, 81 mg, Daily  atorvastatin, 40 mg, Nightly  chlorhexidine, 15 mL, Q12H  clopidogrel, 75 mg, Daily  enoxaparin, 40 mg, Daily  furosemide, 40 mg, Daily  insulin glargine, 15 Units, Nightly  insulin lispro, 0-7 Units, TID AC  insulin lispro, 5 Units, TID With Meals  levothyroxine, 25 mcg, Q AM  metoprolol succinate XL, 50 mg, Q24H  mupirocin, , BID  pantoprazole, 40 mg, QAM  senna-docusate sodium, 2 tablet, Nightly            INTAKE AND OUTPUT:    Intake/Output Summary (Last 24 hours) at 11/17/2022 0949  Last data filed at 11/17/2022 0850  Gross per 24 hour   Intake 840 ml   Output 480 ml   Net 360 ml     ROS  Constitutional: awake and alert, no fever. No  nosebleeds  Abdomen           no abdominal pain   Cardiac              no chest pain  Pulmonary          no shortness of breath      /71 (BP Location: Right arm, Patient Position: Lying)   Pulse 66   Temp 98.3 °F (36.8 °C) (Oral)   Resp 18   Ht 170.2 cm (67\")   Wt 100 kg (221 lb 3.2 oz)   SpO2 98%   BMI 34.64 kg/m²     Physical Exam:  General:  Appears in no acute " distress  Eyes: EOM normal no conjunctival drainage  HEENT:  No JVD. Thyroid not visibly enlarged. No mucosal pallor or cyanosis  Respiratory: Respirations regular and unlabored at rest. BBS with good air entry in all fields. No crackles, rubs or wheezes auscultated  Cardiovascular: S1S2 Regular rate and rhythm. No murmur, rub or gallop. No carotid bruits. DP/PT pulses  2+   . No pretibial pitting edema  Gastrointestinal: Abdomen soft,  non tender. Bowel sounds present.   Musculoskeletal: ROSALES x4. No abnormal movements  Neuro: AAO x3 CN II-XII grossly intact  Psych: Mood and affect normal, pleasant and cooperative        I reviewed the patient's new clinical results, and personally reviewed and interpreted the patient's ECG and telemetry data from the last 24 hours        Cardiographics     Telemetry:    sr occasional PACs      Interpretation Summary       •  The left ventricular cavity is mildly dilated.  •  The following left ventricular wall segments are hypokinetic: apical anterior, apical lateral, apical inferior, apical septal, apex hypokinetic and mid anteroseptal.  •  There is calcification of the aortic valve.  •  Estimated right ventricular systolic pressure from tricuspid regurgitation is mildly elevated (35-45 mmHg).         Lab Review       Results from last 7 days   Lab Units 11/11/22  0323   MAGNESIUM mg/dL 3.1*     Results from last 7 days   Lab Units 11/17/22  0313   SODIUM mmol/L 140   POTASSIUM mmol/L 4.6   BUN mg/dL 25*   CREATININE mg/dL 0.74   CALCIUM mg/dL 8.9     @[id number]@  Results from last 7 days   Lab Units 11/17/22  0313 11/16/22  0304 11/15/22  0259   WBC 10*3/mm3 7.27 6.69 7.23   HEMOGLOBIN g/dL 10.2* 9.8* 9.6*   HEMATOCRIT % 31.8* 31.8* 29.7*   PLATELETS 10*3/mm3 266 251 202     Results from last 7 days   Lab Units 11/11/22  0323 11/10/22  1649   INR  1.45* 1.52*   APTT seconds  --  33.3     The following medical decision was discussed in detail with   "Frank      Assessment:  CABG x5 11/10/22 with Dr Isabel  STEMI   Hypertension   diabetes mellitus: Internal medicine following  hypothyroidism       Plan:  BP and HR stable, tele reviewed, remains SR, occasional PACs-continue beta blockade  Sitting up in chair working with OT, on room air, no shortness of breath.   Encouraged IS  Cr stable, Hgb stable   Continue aspirin, statin, metoprolol, plavix.   Discharge planning-home vs rehab when ready. She has ~15 steps to go to bedroom/bathroom at her sons home.       Patricia Munguia, APRN  )11/17/2022       EMR Dragon/Transcription:   \"Dictated utilizing Dragon dictation\".     "

## 2022-11-17 NOTE — CASE MANAGEMENT/SOCIAL WORK
Continued Stay Note  [facility]     Patient Name: Mayra Hirsch  MRN: [id number]  Today's Date: 11/17/2022    Admit Date: 11/7/2022    Plan: Precert started for [facility] at [facility].   Discharge Plan     Row Name 11/17/22 0955       Plan    Plan Precert started for [facility] at [facility].    Plan Comments J[facility] liaison has initiated precert for sub-acute rehab.  Needs packet...............Eulalia SHINE/JAMESON CM               Discharge Codes    No documentation.               Expected Discharge Date and Time     Expected Discharge Date Expected Discharge Time    Nov 18, 2022             Eulalia Harris RN

## 2022-11-17 NOTE — PROGRESS NOTES
"Daily progress note    Referring physician  Dr. BECKFORD    Chief complaint  Doing much better with no new complaints and wants to go home.  Patient family at bedside.    History of present illness  71-year-old white female with history of diabetes hypertension hyperlipidemia hypothyroidism presented to [facility] emergency room with sudden onset of shortness of breath as she woke up with it.  Patient has no chest pain palpitation but does have nonproductive cough but no fever chills.  Patient found to be hypoxic while EMS arrived and brought to the emergency room which she found to have acute ST elevation MI and taken to the Cath Lab which showed multivessel coronary disease  angioplasty and stent placed to diagonal branch and I am asked to follow patient for medical problem.  At the time of review she is feeling better and has no more shortness of breath and also denies any chest pain.  Patient feels weak but feeling much better after angioplasty.     REVIEW OF SYSTEMS  Unremarkable     PHYSICAL EXAM         Blood pressure 123/65, pulse 77, temperature 98.1 °F (36.7 °C), temperature source Oral, resp. rate 16, height 170.2 cm (67\"), weight 100 kg (221 lb 3.2 oz), SpO2 98 %, not currently breastfeeding.    Constitutional:       General: She is not in acute distress.  HENT:      Head: Normocephalic and atraumatic.   Eyes:      Extraocular Movements: EOM normal.      Pupils: Pupils are equal, round, and reactive to light.   Cardiovascular:      Rate and Rhythm: Normal rate and regular rhythm.      Heart sounds: Normal heart sounds.   Pulmonary:      Effort: Pulmonary effort is normal. No respiratory distress.      Breath sounds: Moving air bilaterally  Abdominal:      Palpations: Abdomen is soft.      Tenderness: There is no abdominal tenderness. There is no guarding or rebound.   Musculoskeletal:         General: No edema. Normal range of motion.      Cervical back: Normal range of motion and neck supple. "   Skin:     General: Skin is warm and dry.      Findings: No rash.   Neurological:      Mental Status: She is alert and oriented to person, place, and time.      Sensory: Sensation is intact.      Motor: Motor strength is normal.   Psychiatric:         Mood and Affect: Mood and affect normal.      LAB RESULTS  Lab Results (last 24 hours)     Procedure Component Value Units Date/Time    POC Glucose Once [[id number]]  (Abnormal) Collected: 11/17/22 1038    Specimen: Blood Updated: 11/17/22 1040     Glucose 159 mg/dL      Comment: Meter: [id number] : [id number] Guy Sara NA       POC Glucose Once [[id number]]  (Normal) Collected: 11/17/22 0547    Specimen: Blood Updated: 11/17/22 0548     Glucose 118 mg/dL      Comment: Meter: [id number] : [id number] Dian Horne RN       Basic Metabolic Panel [[id number]]  (Abnormal) Collected: 11/17/22 0313    Specimen: Blood Updated: 11/17/22 0504     Glucose 121 mg/dL      BUN 25 mg/dL      Creatinine 0.74 mg/dL      Sodium 140 mmol/L      Potassium 4.6 mmol/L      Chloride 109 mmol/L      CO2 19.1 mmol/L      Calcium 8.9 mg/dL      BUN/Creatinine Ratio 33.8     Anion Gap 11.9 mmol/L      eGFR 86.6 mL/min/1.73      Comment: National Kidney Foundation and American Society of Nephrology (ASN) Task Force recommended calculation based on the Chronic Kidney Disease Epidemiology Collaboration (CKD-EPI) equation refit without adjustment for race.       Narrative:      GFR Normal >60  Chronic Kidney Disease <60  Kidney Failure <15    The GFR formula is only valid for adults with stable renal function between ages 18 and 70.    CBC & Differential [[id number]]  (Abnormal) Collected: 11/17/22 0313    Specimen: Blood Updated: 11/17/22 0402    Narrative:      The following orders were created for panel order CBC & Differential.  Procedure                               Abnormality         Status                     ---------                               -----------         ------                      CBC Auto Differential[[id number]]        Abnormal            Final result                 Please view results for these tests on the individual orders.    CBC Auto Differential [[id number]]  (Abnormal) Collected: 11/17/22 0313    Specimen: Blood Updated: 11/17/22 0402     WBC 7.27 10*3/mm3      RBC 3.48 10*6/mm3      Hemoglobin 10.2 g/dL      Hematocrit 31.8 %      MCV 91.4 fL      MCH 29.3 pg      MCHC 32.1 g/dL      RDW 14.5 %      RDW-SD 49.0 fl      MPV 10.0 fL      Platelets 266 10*3/mm3      Neutrophil % 53.6 %      Lymphocyte % 28.1 %      Monocyte % 10.7 %      Eosinophil % 5.1 %      Basophil % 1.5 %      Immature Grans % 1.0 %      Neutrophils, Absolute 3.90 10*3/mm3      Lymphocytes, Absolute 2.04 10*3/mm3      Monocytes, Absolute 0.78 10*3/mm3      Eosinophils, Absolute 0.37 10*3/mm3      Basophils, Absolute 0.11 10*3/mm3      Immature Grans, Absolute 0.07 10*3/mm3      nRBC 0.0 /100 WBC     POC Glucose Once [[id number]]  (Normal) Collected: 11/16/22 2049    Specimen: Blood Updated: 11/16/22 2051     Glucose 108 mg/dL      Comment: Meter: [id number] : [id number] John MANDUJANO       POC Glucose Once [[id number]]  (Abnormal) Collected: 11/16/22 1557    Specimen: Blood Updated: 11/16/22 1613     Glucose 152 mg/dL      Comment: Meter: [id number] : [id number] Chepe MANDUJANO           Imaging Results (Last 24 Hours)     ** No results found for the last 24 hours. **           ECG 12 Lead          Component Ref Range & Units 01:44    QT Interval ms 358 P    Resulting Agency   ECG             HEART RATE= 116  bpm  RR Interval= 517  ms  DE Interval= 110  ms  P Horizontal Axis= -49  deg  P Front Axis= 64  deg  QRSD Interval= 97  ms  QT Interval= 358  ms  QRS Axis= 18  deg  T Wave Axis= 130  deg  - ABNORMAL ECG -  Sinus tachycardia  Probable left atrial enlargement  Lateral infarct, acute (LAD)  Probable anteroseptal infarct, recent             Current Facility-Administered Medications:   •   acetaminophen (TYLENOL) tablet 650 mg, 650 mg, Oral, Q4H PRN, 650 mg at 11/11/22 1744 **OR** acetaminophen (TYLENOL) 160 MG/5ML solution 650 mg, 650 mg, Oral, Q4H PRN **OR** acetaminophen (TYLENOL) suppository 650 mg, 650 mg, Rectal, Q4H PRN, Martha Golden APRN  •  ALPRAZolam (XANAX) tablet 0.25 mg, 0.25 mg, Oral, Q8H PRN, Martha Golden, APRN, 0.25 mg at 11/14/22 2154  •  aspirin EC tablet 81 mg, 81 mg, Oral, Daily, Martha Golden APRN, 81 mg at 11/17/22 0907  •  atorvastatin (LIPITOR) tablet 40 mg, 40 mg, Oral, Nightly, Martha Golden, APRN, 40 mg at 11/16/22 2141  •  bisacodyl (DULCOLAX) EC tablet 10 mg, 10 mg, Oral, Daily PRN, Martha Golden, APRN, 10 mg at 11/14/22 2154  •  bisacodyl (DULCOLAX) suppository 10 mg, 10 mg, Rectal, Daily PRN, Martha Golden APRN  •  chlorhexidine (PERIDEX) 0.12 % solution 15 mL, 15 mL, Mouth/Throat, Q12H, Martha Golden APRN, 15 mL at 11/16/22 2141  •  clevidipine (CLEVIPREX) infusion 0.5 mg/mL, 2-32 mg/hr, Intravenous, Continuous PRN, Martha Golden APRN  •  clopidogrel (PLAVIX) tablet 75 mg, 75 mg, Oral, Daily, Le Edwards, APRN, 75 mg at 11/17/22 0907  •  cyclobenzaprine (FLEXERIL) tablet 10 mg, 10 mg, Oral, Q8H PRN, Martha Golden, APRN, 10 mg at 11/14/22 2154  •  dextrose (D50W) (25 g/50 mL) IV injection 25 g, 25 g, Intravenous, Q15 Min PRN, Breann Giron MD  •  dextrose (GLUTOSE) oral gel 15 g, 15 g, Oral, Q15 Min PRN, Breann Giron MD  •  Enoxaparin Sodium (LOVENOX) syringe 40 mg, 40 mg, Subcutaneous, Daily, Martha Golden APRN, 40 mg at 11/16/22 1742  •  furosemide (LASIX) tablet 40 mg, 40 mg, Oral, Daily, Salima Johnson PA-C, 40 mg at 11/17/22 0907  •  glucagon (human recombinant) (GLUCAGEN DIAGNOSTIC) injection 1 mg, 1 mg, Intramuscular, Q15 Min PRN, Breann Giron MD  •  HYDROcodone-acetaminophen (NORCO) 5-325 MG per tablet 2 tablet, 2 tablet, Oral, Q4H PRN, Martha Golden APRN, 2 tablet at 11/16/22 2141  •  insulin glargine  (LANTUS, SEMGLEE) injection 15 Units, 15 Units, Subcutaneous, Nightly, Sammy Koch MD, 15 Units at 11/16/22 2141  •  insulin lispro (ADMELOG) injection 0-7 Units, 0-7 Units, Subcutaneous, TID AC, Breann Giron MD, 2 Units at 11/17/22 1148  •  insulin lispro (ADMELOG) injection 5 Units, 5 Units, Subcutaneous, TID With Meals, Sammy Koch MD, 5 Units at 11/17/22 1149  •  levothyroxine (SYNTHROID, LEVOTHROID) tablet 25 mcg, 25 mcg, Oral, Q AM, Martha Golden APRN, 25 mcg at 11/17/22 0650  •  magnesium hydroxide (MILK OF MAGNESIA) suspension 10 mL, 10 mL, Oral, Daily PRN, Martha Golden APRN, 10 mL at 11/14/22 0932  •  metoprolol succinate XL (TOPROL-XL) 24 hr tablet 50 mg, 50 mg, Oral, Q24H, Martha Golden APRN, 50 mg at 11/17/22 0907  •  midazolam (VERSED) injection 2 mg, 2 mg, Intravenous, Q1H PRN, Martha Golden APRN  •  morphine injection 4 mg, 4 mg, Intravenous, Q30 Min PRN, Martha Golden APRN  •  mupirocin (BACTROBAN) 2 % nasal ointment, , Each Nare, BID, Martha Golden APRN, 1 application at 11/16/22 2141  •  nitroglycerin (NITROSTAT) SL tablet 0.4 mg, 0.4 mg, Sublingual, Q5 Min PRN, Joanna Marie MD  •  ondansetron (ZOFRAN) injection 4 mg, 4 mg, Intravenous, Q6H PRN, Martha Golden APRN, 4 mg at 11/11/22 0417  •  oxyCODONE (ROXICODONE) immediate release tablet 10 mg, 10 mg, Oral, Q4H PRN, Martha Golden APRN, 10 mg at 11/13/22 2220  •  [COMPLETED] pantoprazole (PROTONIX) injection 40 mg, 40 mg, Intravenous, Once, 40 mg at 11/10/22 1658 **FOLLOWED BY** pantoprazole (PROTONIX) EC tablet 40 mg, 40 mg, Oral, QAM, Martha Golden, APRN, 40 mg at 11/17/22 0736  •  polyethylene glycol (MIRALAX) packet 17 g, 17 g, Oral, Daily PRN, Martha Golden APRN, 17 g at 11/12/22 1901  •  potassium chloride (K-DUR,KLOR-CON) ER tablet 40 mEq, 40 mEq, Oral, PRN, 40 mEq at 11/16/22 1403 **OR** potassium chloride (KLOR-CON) packet 40 mEq, 40 mEq, Oral, PRN, Martha Golden, APRN  •   potassium chloride 10 mEq in 100 mL IVPB, 10 mEq, Intravenous, Q1H PRN **OR** potassium chloride 10 mEq in 100 mL IVPB, 10 mEq, Intravenous, Q1H PRN, Martha Golden APRN  •  potassium chloride 20 mEq in 50 mL IVPB, 20 mEq, Intravenous, Q1H PRN, Martha Golden APRN  •  potassium chloride 20 mEq in 50 mL IVPB, 20 mEq, Intravenous, Q1H PRN, Martha Golden APRN  •  potassium chloride 20 mEq in 50 mL IVPB, 20 mEq, Intravenous, Q1H PRN, Martha Golden APRN  •  sennosides-docusate (PERICOLACE) 8.6-50 MG per tablet 2 tablet, 2 tablet, Oral, Nightly, Martha Golden APRN, 2 tablet at 11/16/22 2614     ASSESSMENT  Multivessel coronary artery disease s/p CABG postop day 6  Acute ST relation MI s/p angioplasty and stent   Acute Klebsiella UTI  Diabetes mellitus  Hypertension  Hyperlipidemia  Hypothyroidism  Dysarthria resolved  Gastroesophageal reflux disease    PLAN  CPM  Postop care  Post PCI care  Completed antibiotic  Continue home medications  Stress ulcer DVT prophylaxis  Accu-Cheks sliding scale insulin  Supportive care  PT/OT  Discussed with nursing staff  Discharge planning    DEE FRENCH MD

## 2022-11-17 NOTE — THERAPY TREATMENT NOTE
Patient Name: Mayra Hirsch  : 1950    MRN: [id number]                              Today's Date: 2022       Admit Date: 2022    Visit Dx:     ICD-10-CM ICD-9-CM   1. ST elevation myocardial infarction (STEMI), unspecified artery (HCC)  I21.3 410.90   2. ST elevation myocardial infarction (STEMI) involving other coronary artery (HCC)  I21.29 410.10   3. Coronary artery disease involving native heart, unspecified vessel or lesion type, unspecified whether angina present  I25.10 414.01   4. Abnormal findings on diagnostic imaging of other specified body structures  R93.89 793.99     Patient Active Problem List   Diagnosis   • Vitamin D deficiency   • Primary hypothyroidism   • Dyslipidemia   • Essential hypertension   • Type 2 diabetes mellitus without complication, with long-term current use of insulin (HCC)   • Noncompliance with diabetes treatment   • ST elevation myocardial infarction (STEMI) (HCC)   • Coronary artery disease involving native heart     Past Medical History:   Diagnosis Date   • Depression    • Diabetes mellitus (HCC)    • Disease of thyroid gland    • Fibrocystic breast    • Hypertension    • Hypothyroidism    • PONV (postoperative nausea and vomiting)    • Type 2 diabetes mellitus (HCC)      Past Surgical History:   Procedure Laterality Date   • BREAST EXCISIONAL BIOPSY Right     at age 22; benign.   • CARDIAC CATHETERIZATION Left 2022    Procedure: Cardiac Catheterization/Vascular Study;  Surgeon: Joanna Marie MD;  Location: [facility] INVASIVE LOCATION;  Service: Cardiology;  Laterality: Left;   • CARDIAC CATHETERIZATION N/A 2022    Procedure: Percutaneous Coronary Intervention;  Surgeon: Joanna Marie MD;  Location: [facility] CATH INVASIVE LOCATION;  Service: Cardiology;  Laterality: N/A;   • CARDIAC CATHETERIZATION N/A 2022    Procedure: Left ventriculography;  Surgeon: Joanna Marie MD;  Location: [facility] INVASIVE LOCATION;   Service: Cardiology;  Laterality: N/A;   • CARDIAC CATHETERIZATION N/A 2022    Procedure: Stent MARTINEZ coronary;  Surgeon: Joanna Marie MD;  Location: [facility] CATH INVASIVE LOCATION;  Service: Cardiology;  Laterality: N/A;   • CARDIAC CATHETERIZATION N/A 2022    Procedure: Left Heart Cath;  Surgeon: Joanna Marie MD;  Location:  [facility] CATH INVASIVE LOCATION;  Service: Cardiology;  Laterality: N/A;   •  SECTION     • CORONARY ARTERY BYPASS GRAFT N/A 11/10/2022    Procedure: NIKKY, CORONARY ARTERY BYPASS GRAFTING TIMES 6 WITH LEFT JORDYN AND ENDOSCOPICALLY HARVESTED LEFT AND RIGHT GREATER SAPHENOUS VEIN, PRP TRANSESOPHAGEAL ECHOCARDIOGRAM WITH ANESTHESIA;  Surgeon: Jr Dong Isabel MD;  Location: [facility];  Service: Cardiothoracic;  Laterality: N/A;   • HAND SURGERY     • KNEE SURGERY     • OOPHORECTOMY      1 ovary removed due to ectopic pregnancy      General Information     Row Name 22 1234          OT Time and Intention    Document Type therapy note (daily note)  -     Mode of Treatment occupational therapy;individual therapy  -     Row Name 22 1234          General Information    Existing Precautions/Restrictions cardiac;fall;sternal  -     Row Name 22 1234          Cognition    Orientation Status (Cognition) oriented x 4  -     Row Name 22 1234          Safety Issues, Functional Mobility    Impairments Affecting Function (Mobility) balance;endurance/activity tolerance;strength  -           User Key  (r) = Recorded By, (t) = Taken By, (c) = Cosigned By    Initials Name Provider Type     Zoey Gray, OTR Occupational Therapist                 Mobility/ADL's     Row Name 22 1234          Bed Mobility    Comment, (Bed Mobility) NT [facility]  -     Row Name 22 1234          Transfers    Transfers stand-sit transfer;toilet transfer;sit-stand transfer  -     Row Name 22 1234          Sit-Stand Transfer    Sit-Stand  Bock (Transfers) set up;contact guard  -     Assistive Device (Sit-Stand Transfers) walker, front-wheeled  -KP     Row Name 11/17/22 1234          Stand-Sit Transfer    Stand-Sit Bock (Transfers) set up;contact guard  -KP     Assistive Device (Stand-Sit Transfers) walker, front-wheeled  -KP     Row Name 11/17/22 1234          Toilet Transfer    Type (Toilet Transfer) stand-sit;sit-stand  -     Bock Level (Toilet Transfer) set up;standby assist  -     Assistive Device (Toilet Transfer) walker, front-wheeled  -KP     Row Name 11/17/22 1234          Functional Mobility    Functional Mobility- Ind. Level set up required;contact guard assist  -     Functional Mobility- Device walker, front-wheeled  -KP     Functional Mobility- Comment in room, to BR, to chair. to sink  -     Row Name 11/17/22 1234          Grooming Assessment/Training    [place]ock Level (Grooming) grooming skills;wash face, hands;standby assist;set up  -     Position (Grooming) sink side  -     Row Name 11/17/22 1234          Toileting Assessment/Training    Bock Level (Toileting) toileting skills;perform perineal hygiene;supervision  -     Position (Toileting) supported sitting;supported standing  -           User Key  (r) = Recorded By, (t) = Taken By, (c) = Cosigned By    Initials Name Provider Type    Zoey Kincaid OTR Occupational Therapist               Obj/Interventions     Row Name 11/17/22 1235          Shoulder (Therapeutic Exercise)    Shoulder (Therapeutic Exercise) AROM (active range of motion)  -     Shoulder AROM (Therapeutic Exercise) left;right;flexion;extension;scapular protraction;scapular retraction;10 repetitions;2 sets;sitting  -     Row Name 11/17/22 1235          Elbow/Forearm (Therapeutic Exercise)    Elbow/Forearm (Therapeutic Exercise) AROM (active range of motion)  -     Elbow/Forearm AROM (Therapeutic Exercise)  right;left;flexion;extension;supination;pronation;10 repetitions;2 sets;sitting  -KP     Row Name 11/17/22 1235          Motor Skills    Therapeutic Exercise elbow/forearm;shoulder  -KP     Row Name 11/17/22 1235          Balance    Static Sitting Balance set-up;standby assist  -KP     Dynamic Sitting Balance set-up;standby assist  -KP     Static Standing Balance contact guard  -KP     Dynamic Standing Balance contact guard  -KP     Position/Device Used, Standing Balance walker, front-wheeled  -KP     Balance Interventions standing;sitting;supported;sit to stand;static;occupation based/functional task  -KP           User Key  (r) = Recorded By, (t) = Taken By, (c) = Cosigned By    Initials Name Provider Type    Zoey Kincaid, SULTANAR Occupational Therapist               Goals/Plan    No documentation.                Clinical Impression     Row Name 11/17/22 1236          Pain Assessment    Pretreatment Pain Rating 0/10 - no pain  -KP     Posttreatment Pain Rating 0/10 - no pain  -KP     Row Name 11/17/22 1236          Plan of Care Review    Plan of Care Reviewed With patient  -KP     Progress improving  -KP     Outcome Evaluation pt worked w OT in tx session. pt completed AROM 10x2 w rest breaks. pt stood w CGA and walked to BR w walker. pt tsf to toilet w SBA and walker. pt completed toileting skills w SBA. pt demo improved endurance compared to yesterdays session. cont therapy to incr ADl, strength, balance, and tsf.  -KP     Row Name 11/17/22 1236          Positioning and Restraints    Pre-Treatment Position sitting in chair/recliner  -KP     Post Treatment Position chair  -KP     In Chair reclined;call light within reach;encouraged to call for assist;exit alarm on  -KP           User Key  (r) = Recorded By, (t) = Taken By, (c) = Cosigned By    Initials Name Provider Type    Zoey Kincaid OTR Occupational Therapist               Outcome Measures     Row Name 11/17/22 1237          How much  help from another is currently needed...    Putting on and taking off regular lower body clothing? 3  -KP     Bathing (including washing, rinsing, and drying) 3  -KP     Toileting (which includes using toilet bed pan or urinal) 3  -KP     Putting on and taking off regular upper body clothing 3  -KP     Taking care of personal grooming (such as brushing teeth) 3  -KP     Eating meals 4  -KP     AM-PAC 6 Clicks Score (OT) 19  -KP     Row Name 11/17/22 1123 11/17/22 0850       How much help from another person do you currently need...    Turning from your back to your side while in flat bed without using bedrails? 3  -SM 3  -SD    Moving from lying on back to sitting on the side of a flat bed without bedrails? 3  -SM 3  -SD    Moving to and from a bed to a chair (including a wheelchair)? 3  -SM 3  -SD    Standing up from a chair using your arms (e.g., wheelchair, bedside chair)? 3  -SM 3  -SD    Climbing 3-5 steps with a railing? 2  -SM 2  -SD    To walk in hospital room? 3  -SM 3  -SD    AM-PAC 6 Clicks Score (PT) 17  -SM 17  -SD    Highest level of mobility 5 --> Static standing  -SM 5 --> Static standing  -SD    Row Name 11/17/22 1237 11/17/22 1123       Functional Assessment    Outcome Measure Options AM-PAC 6 Clicks Daily Activity (OT)  - AM-PAC 6 Clicks Basic Mobility (PT)  -          User Key  (r) = Recorded By, (t) = Taken By, (c) = Cosigned By    Initials Name Provider Type    Zoey Kincaid, OTDASIA Occupational Therapist    Audra Rodriguez, RN Registered Nurse    Mirna Morales, PT Physical Therapist                Occupational Therapy Education     Title: PT OT SLP Therapies (Done)     Topic: Occupational Therapy (Done)     Point: ADL training (Done)     Description:   Instruct learner(s) on proper safety adaptation and remediation techniques during self care or transfers.   Instruct in proper use of assistive devices.              Learning Progress Summary           Patient Acceptance,  E,TB,D, VU,DU by  at 11/16/2022 1520    Comment: ed pt and family on role of OT. benefit of therapy, POC w. OT. pt [facility]   Family Acceptance, E,TB,D, VU,DU by  at 11/16/2022 1520    Comment: ed pt and family on role of OT. benefit of therapy, POC w. OT. pt [facility]                   Point: Home exercise program (Done)     Description:   Instruct learner(s) on appropriate technique for monitoring, assisting and/or progressing therapeutic exercises/activities.              Learning Progress Summary           Patient Acceptance, E,TB,D, VU,DU by  at 11/16/2022 1520    Comment: ed pt and family on role of OT. benefit of therapy, POC w. OT. pt [facility]   Family Acceptance, E,TB,D, VU,DU by  at 11/16/2022 1520    Comment: ed pt and family on role of OT. benefit of therapy, POC w. OT. pt [facility]                   Point: Precautions (Done)     Description:   Instruct learner(s) on prescribed precautions during self-care and functional transfers.              Learning Progress Summary           Patient Acceptance, E,TB,D, VU,DU by  at 11/16/2022 1520    Comment: ed pt and family on role of OT. benefit of therapy, POC w. OT. pt [facility]   Family Acceptance, E,TB,D, VU,DU by  at 11/16/2022 1520    Comment: ed pt and family on role of OT. benefit of therapy, POC w. OT. pt [facility]                   Point: Body mechanics (Done)     Description:   Instruct learner(s) on proper positioning and spine alignment during self-care, functional mobility activities and/or exercises.              Learning Progress Summary           Patient Acceptance, E,TB,D, VU,DU by  at 11/16/2022 1520    Comment: ed pt and family on role of OT. benefit of therapy, POC w. OT. pt [facility]   Family Acceptance, E,TB,D, VU,DU by  at 11/16/2022 1520    Comment: ed pt and family on role of OT. benefit of therapy, POC w. OT. pt [facility]                               User Key     Initials  Effective Dates Name Provider Type Discipline     06/16/21 -  Zoey Gray OTR Occupational Therapist OT              OT Recommendation and Plan  Planned Therapy Interventions (OT): activity tolerance training, functional balance retraining, occupation/activity based interventions, ROM/therapeutic exercise, strengthening exercise, transfer/mobility retraining, patient/caregiver education/training, BADL retraining  Therapy Frequency (OT): 5 times/wk  Plan of Care Review  Plan of Care Reviewed With: patient  Progress: improving  Outcome Evaluation: pt worked w OT in tx session. pt completed AROM 10x2 w rest breaks. pt stood w CGA and walked to BR w walker. pt tsf to toilet w SBA and walker. pt completed toileting skills w SBA. pt demo improved endurance compared to yesterdays session. cont therapy to incr ADl, strength, balance, and tsf.     Time Calculation:    Time Calculation- OT     Row Name 11/17/22 1238             Time Calculation- OT    OT Start Time 0857  -      OT Stop Time 0922  -      OT Time Calculation (min) 25 min  -      Total Timed Code Minutes- OT 25 minute(s)  -      OT Received On 11/17/22  -      OT - Next Appointment 11/18/22  -         Timed Charges    57128 - OT Therapeutic Exercise Minutes 15  -KP      16258 - OT Self Care/Mgmt Minutes 10  -KP         Total Minutes    Timed Charges Total Minutes 25  -KP       Total Minutes 25  -KP            User Key  (r) = Recorded By, (t) = Taken By, (c) = Cosigned By    Initials Name Provider Type     Zoey Gray OTR Occupational Therapist              Therapy Charges for Today     Code Description Service Date Service Provider Modifiers Qty    31531777932 HC OT SELF CARE/MGMT/TRAIN EA 15 MIN 11/16/2022 Zoey Gray OTR GO 1    48503608736 HC OT EVAL MOD COMPLEXITY 2 11/16/2022 Zoey Gray OTR GO 1    68054688873 HC OT THER PROC EA 15 MIN 11/17/2022 Zoey Gray OTR  1    [id number]  HC OT SELF CARE/MGMT/TRAIN EA 15 MIN 11/17/2022 Zoey Gray, OTR  1               Zoey Gray, OTR  11/17/2022

## 2022-11-17 NOTE — PLAN OF CARE
Goal Outcome Evaluation:  Plan of Care Reviewed With: patient           Outcome Evaluation: Patient seen for PT treatment this AM. Patient [facility] upon arrival. Patient completed cardiac post post op protocol while [facility] this date. Patient performed STS from chair with CGA-Sid. VCs required to maintain sternal precautions as patient attempting to push through arms. Patient ambulated a total pf 120ft this date with CGA and HHA. Gait was unsteady with patient fatiguing quickly and requiring multiple standing rest breaks. Frequent reminders for up right posture throughout. Patient returned to supine in bed at EOB with CGA-Sid and VCs for sequencing. Patient would continue to benefit from skilled PT intervention to address deficits in strength, balance, and activity endurance. Feel at this time patient is a good canidate for SNF at d/c to maximize safety and independence. Will continue to monitor.

## 2022-11-17 NOTE — THERAPY TREATMENT NOTE
Patient Name: Mayra Hirsch  : 1950    MRN: [id number]                              Today's Date: 2022       Admit Date: 2022    Visit Dx:     ICD-10-CM ICD-9-CM   1. ST elevation myocardial infarction (STEMI), unspecified artery (HCC)  I21.3 410.90   2. ST elevation myocardial infarction (STEMI) involving other coronary artery (HCC)  I21.29 410.10   3. Coronary artery disease involving native heart, unspecified vessel or lesion type, unspecified whether angina present  I25.10 414.01   4. Abnormal findings on diagnostic imaging of other specified body structures  R93.89 793.99     Patient Active Problem List   Diagnosis   • Vitamin D deficiency   • Primary hypothyroidism   • Dyslipidemia   • Essential hypertension   • Type 2 diabetes mellitus without complication, with long-term current use of insulin (HCC)   • Noncompliance with diabetes treatment   • ST elevation myocardial infarction (STEMI) (HCC)   • Coronary artery disease involving native heart     Past Medical History:   Diagnosis Date   • Depression    • Diabetes mellitus (HCC)    • Disease of thyroid gland    • Fibrocystic breast    • Hypertension    • Hypothyroidism    • PONV (postoperative nausea and vomiting)    • Type 2 diabetes mellitus (HCC)      Past Surgical History:   Procedure Laterality Date   • BREAST EXCISIONAL BIOPSY Right     at age 22; benign.   • CARDIAC CATHETERIZATION Left 2022    Procedure: Cardiac Catheterization/Vascular Study;  Surgeon: Joanna Marie MD;  Location: [facility] INVASIVE LOCATION;  Service: Cardiology;  Laterality: Left;   • CARDIAC CATHETERIZATION N/A 2022    Procedure: Percutaneous Coronary Intervention;  Surgeon: Joanna Marie MD;  Location: [facility] CATH INVASIVE LOCATION;  Service: Cardiology;  Laterality: N/A;   • CARDIAC CATHETERIZATION N/A 2022    Procedure: Left ventriculography;  Surgeon: Joanna Marie MD;  Location: [facility] INVASIVE LOCATION;   Service: Cardiology;  Laterality: N/A;   • CARDIAC CATHETERIZATION N/A 2022    Procedure: Stent MARTINEZ coronary;  Surgeon: Joanna Marie MD;  Location: [facility] CATH INVASIVE LOCATION;  Service: Cardiology;  Laterality: N/A;   • CARDIAC CATHETERIZATION N/A 2022    Procedure: Left Heart Cath;  Surgeon: Joanna Marie MD;  Location: [facility] CATH INVASIVE LOCATION;  Service: Cardiology;  Laterality: N/A;   •  SECTION     • CORONARY ARTERY BYPASS GRAFT N/A 11/10/2022    Procedure: NIKKY, CORONARY ARTERY BYPASS GRAFTING TIMES 6 WITH LEFT JORDYN AND ENDOSCOPICALLY HARVESTED LEFT AND RIGHT GREATER SAPHENOUS VEIN, PRP TRANSESOPHAGEAL ECHOCARDIOGRAM WITH ANESTHESIA;  Surgeon: Jr Dong Isabel MD;  Location: [facility];  Service: Cardiothoracic;  Laterality: N/A;   • HAND SURGERY     • KNEE SURGERY     • OOPHORECTOMY      1 ovary removed due to ectopic pregnancy      General Information     Row Name 22 111          Physical Therapy Time and Intention    Document Type therapy note (daily note)  -     Mode of Treatment individual therapy;physical therapy  -     Row Name 22 111          General Information    Patient Profile Reviewed yes  -SM     Prior Level of Function independent:  -SM     Existing Precautions/Restrictions fall;cardiac;sternal  -     Row Name 22 111          Cognition    Orientation Status (Cognition) oriented x 3  -     Row Name 22          Safety Issues, Functional Mobility    Safety Issues Affecting Function (Mobility) safety precaution awareness;judgment  -     Impairments Affecting Function (Mobility) balance;endurance/activity tolerance;strength  -SM           User Key  (r) = Recorded By, (t) = Taken By, (c) = Cosigned By    Initials Name Provider Type     Mirna Parra PT Physical Therapist               Mobility     Row Name 22 1115          Bed Mobility    Bed Mobility sit-supine  -SM     Sit-Supine [place] (Bed  Mobility) contact guard;verbal cues  -     Assistive Device (Bed Mobility) bed rails  -     Comment, (Bed Mobility) [facility] upon arrival  -     Row Name 11/17/22 1115          Transfers    Comment, (Transfers) STS x3 from chair and EOB  -     Row Name 11/17/22 1115          Sit-Stand Transfer    Sit-Stand Day (Transfers) contact guard;minimum assist (75% patient effort)  -     Assistive Device (Sit-Stand Transfers) other (see comments)  HHA  -     Comment, (Sit-Stand Transfer) Reminders to d/c wb through B UEs  -     Row Name 11/17/22 1115          Gait/Stairs (Locomotion)    Day Level (Gait) contact guard  -     Assistive Device (Gait) other (see comments)  HHA  -     Distance in Feet (Gait) 120ft  -     Deviations/Abnormal Patterns (Gait) stride length decreased;gait speed decreased  -     Bilateral Gait Deviations forward flexed posture  -     Day Level (Stairs) not tested  -     Comment, (Gait/Stairs) Patient required multiple standing rest breaks due to fatigue  -           User Key  (r) = Recorded By, (t) = Taken By, (c) = Cosigned By    Initials Name Provider Type     Mirna Parra PT Physical Therapist               Obj/Interventions     Row Name 11/17/22 1117          Motor Skills    Therapeutic Exercise other (see comments)  Cardiac post op protocol  -     Row Name 11/17/22 1117          Balance    Balance Assessment sitting static balance;sitting dynamic balance;sit to stand dynamic balance;standing static balance;standing dynamic balance  -     Static Sitting Balance standby assist  -     Dynamic Sitting Balance standby assist;contact guard  -     Position, Sitting Balance sitting edge of bed;sitting in chair  -     Sit to Stand Dynamic Balance contact guard;minimal assist;verbal cues  -     Static Standing Balance contact guard  -     Dynamic Standing Balance contact guard  -     Position/Device Used, Standing Balance  supported;other (see comments)  HHA  -     Balance Interventions sitting;standing;sit to stand;supported;static;dynamic  -           User Key  (r) = Recorded By, (t) = Taken By, (c) = Cosigned By    Initials Name Provider Type     Mirna Parra, PT Physical Therapist               Goals/Plan    No documentation.                Clinical Impression     Row Name 11/17/22 1118          Pain    Pain Location incisional  -     Pre/Posttreatment Pain Comment Patient did not verbalize number  -     Pain Intervention(s) Rest;Repositioned;Ambulation/increased activity  -     Row Name 11/17/22 1118          Plan of Care Review    Plan of Care Reviewed With patient  -     Outcome Evaluation Patient seen for PT treatment this AM. Patient [facility] upon arrival. Patient completed cardiac post post op protocol while [facility] this date. Patient performed STS from chair with CGA-Sid. VCs required to maintain sternal precautions as patient attempting to push through arms. Patient ambulated a total pf 120ft this date with CGA and HHA. Gait was unsteady with patient fatiguing quickly and requiring multiple standing rest breaks. Frequent reminders for up right posture throughout. Patient returned to supine in bed at EOB with CGA-Sid and VCs for sequencing. Patient would continue to benefit from skilled PT intervention to address deficits in strength, balance, and activity endurance. Feel at this time patient is a good canidate for SNF at d/c to maximize safety and independence. Will continue to monitor.  -     Row Name 11/17/22 1118          Therapy Assessment/Plan (PT)    Rehab Potential (PT) good, to achieve stated therapy goals  -     Criteria for Skilled Interventions Met (PT) yes;meets criteria;skilled treatment is necessary  -     Row Name 11/17/22 1118          Vital Signs    O2 Delivery Pre Treatment room air  -     O2 Delivery Intra Treatment room air  -SM     O2 Delivery Post Treatment room air  -SM     Pre  Patient Position Sitting  -SM     Intra Patient Position Standing  -SM     Post Patient Position Supine  -SM     Row Name 11/17/22 1118          Positioning and Restraints    Pre-Treatment Position sitting in chair/recliner  -SM     Post Treatment Position bed  -SM     In Bed notified nsg;call light within reach;encouraged to call for assist;exit alarm on  -SM           User Key  (r) = Recorded By, (t) = Taken By, (c) = Cosigned By    Initials Name Provider Type    Mirna Morales PT Physical Therapist               Outcome Measures     Row Name 11/17/22 1123 11/17/22 0850       How much help from another person do you currently need...    Turning from your back to your side while in flat bed without using bedrails? 3  -SM 3  -SD    Moving from lying on back to sitting on the side of a flat bed without bedrails? 3  -SM 3  -SD    Moving to and from a bed to a chair (including a wheelchair)? 3  -SM 3  -SD    Standing up from a chair using your arms (e.g., wheelchair, bedside chair)? 3  -SM 3  -SD    Climbing 3-5 steps with a railing? 2  -SM 2  -SD    To walk in hospital room? 3  -SM 3  -SD    AM-PAC 6 Clicks Score (PT) 17  -SM 17  -SD    Highest level of mobility 5 --> Static standing  -SM 5 --> Static standing  -SD    Row Name 11/17/22 1123          Functional Assessment    Outcome Measure Options AM-PAC 6 Clicks Basic Mobility (PT)  -           User Key  (r) = Recorded By, (t) = Taken By, (c) = Cosigned By    Initials Name Provider Type    Audra Rodriguez, RN Registered Nurse    Mirna Morales, FRANCISCA Physical Therapist                             Physical Therapy Education     Title: PT OT SLP Therapies (Done)     Topic: Physical Therapy (Done)     Point: Mobility training (Done)     Learning Progress Summary           Patient Acceptance, E, VU by ANGÉLICA at 11/17/2022 1123    Acceptance, E,TB, VU by AIDAN at 11/15/2022 1302    Acceptance, E, VU,NR by RACHEL at 11/14/2022 1211    Acceptance, E, VU by  at  11/13/2022 1009    Acceptance, E, VU by  at 11/12/2022 1235    Acceptance, E, VU,NR by DJ at 11/11/2022 1049                   Point: Home exercise program (Done)     Learning Progress Summary           Patient Acceptance, E, VU by  at 11/17/2022 1123    Acceptance, E, VU,NR by KH at 11/14/2022 1211    Acceptance, E, VU by  at 11/13/2022 1009    Acceptance, E, VU by  at 11/12/2022 1235    Acceptance, E, VU,NR by DJ at 11/11/2022 1049                   Point: Body mechanics (Done)     Learning Progress Summary           Patient Acceptance, E, VU by  at 11/17/2022 1123    Acceptance, E, VU,NR by  at 11/14/2022 1211    Acceptance, E, VU by  at 11/13/2022 1009    Acceptance, E, VU by  at 11/12/2022 1235    Acceptance, E, VU,NR by DJ at 11/11/2022 1049                   Point: Precautions (Done)     Learning Progress Summary           Patient Acceptance, E, VU by  at 11/17/2022 1123    Eager, E, VU by ND at 11/16/2022 1132    Acceptance, E, VU,NR by  at 11/14/2022 1211    Acceptance, E, VU by  at 11/13/2022 1009    Acceptance, E, VU by  at 11/12/2022 1235    Acceptance, E, VU,NR by  at 11/11/2022 1049                               User Key     Initials Effective Dates Name Provider Type Discipline     06/16/21 -  Geraldine Boyd, PT Physical Therapist PT    LB 06/16/21 -  Zuleyka Smith, PT Physical Therapist PT     06/16/21 -  Jaxson Gaffney, PT Physical Therapist PT    DJ 10/25/19 -  Sravani Downs, PT Physical Therapist PT    SM 05/02/22 -  Mirna Parra, PT Physical Therapist PT    ND 10/24/22 -  Lindsey Pike, PT Student PT Student PT              PT Recommendation and Plan     Plan of Care Reviewed With: patient  Outcome Evaluation: Patient seen for PT treatment this AM. Patient [facility] upon arrival. Patient completed cardiac post post op protocol while [facility] this date. Patient performed STS from chair with CGA-Sid. VCs required to maintain sternal precautions as patient  attempting to push through arms. Patient ambulated a total pf 120ft this date with CGA and HHA. Gait was unsteady with patient fatiguing quickly and requiring multiple standing rest breaks. Frequent reminders for up right posture throughout. Patient returned to supine in bed at EOB with CGA-Sid and VCs for sequencing. Patient would continue to benefit from skilled PT intervention to address deficits in strength, balance, and activity endurance. Feel at this time patient is a good canidate for SNF at d/c to maximize safety and independence. Will continue to monitor.     Time Calculation:    PT Charges     Row Name 11/17/22 1124             Time Calculation    Start Time 0945  -SM      Stop Time 0957  -SM      Time Calculation (min) 12 min  -SM      PT Received On 11/17/22  -      PT - Next Appointment 11/18/22  -         Time Calculation- PT    Total Timed Code Minutes- PT 12 minute(s)  -SM         Timed Charges    15508 - PT Therapeutic Exercise Minutes 4  -SM      91360 - PT Therapeutic Activity Minutes 8  -SM         Total Minutes    Timed Charges Total Minutes 12  -SM       Total Minutes 12  -SM            User Key  (r) = Recorded By, (t) = Taken By, (c) = Cosigned By    Initials Name Provider Type     Mirna Parra PT Physical Therapist              Therapy Charges for Today     Code Description Service Date Service Provider Modifiers Qty    [id number]  PT THERAPEUTIC ACT EA 15 MIN 11/17/2022 Mirna Parra PT GP 1          PT G-Codes  Outcome Measure Options: AM-PAC 6 Clicks Basic Mobility (PT)  AM-PAC 6 Clicks Score (PT): 17  AM-PAC 6 Clicks Score (OT): 18  PT Discharge Summary  Anticipated Discharge Disposition (PT): skilled nursing facility  Patient was intermittently wearing a face mask during this therapy encounter. Therapist used appropriate personal protective equipment including mask and gloves.  Mask used was standard procedure mask. Appropriate PPE was worn during the entire therapy  session. Hand hygiene was completed before and after therapy session. Patient is not in enhanced droplet precautions.     Mirna Parra, PT  11/17/2022

## 2022-11-17 NOTE — PLAN OF CARE
Goal Outcome Evaluation:  Plan of Care Reviewed With: patient        Progress: improving  Outcome Evaluation: pt worked w OT in tx session. pt completed AROM 10x2 w rest breaks. pt stood w CGA and walked to BR w walker. pt tsf to toilet w SBA and walker. pt completed toileting skills w SBA. pt demo improved endurance compared to yesterdays session. cont therapy to incr ADl, strength, balance, and tsf.

## 2022-11-17 NOTE — PLAN OF CARE
Goal Outcome Evaluation:  Plan of Care Reviewed With: patient        Progress: improving  Outcome Evaluation: Pt progressing well post-operatively. Displayed increased Activity tolerance, ambulating with walker to bathroom, pain well controlled with current pain management orders. Sleeping without supplemenal oxygen, improved tolerance of IS, currently up in chair, denies pain. Will continue to monitor

## 2022-11-17 NOTE — PROGRESS NOTES
LOS: 10 days   Patient Care Team:  Spencer Hua MD as PCP - General    Chief Complaint: post op    Subjective      Vital Signs  Temp:  [97.7 °F (36.5 °C)-98.9 °F (37.2 °C)] 98.3 °F (36.8 °C)  Heart Rate:  [65-75] 66  Resp:  [18] 18  BP: (109-130)/(59-71) 130/71  Body mass index is 34.64 kg/m².    Intake/Output Summary (Last 24 hours) at 11/17/2022 1021  Last data filed at 11/17/2022 0850  Gross per 24 hour   Intake 840 ml   Output 480 ml   Net 360 ml     I/O this shift:  In: 240 [P.O.:240]  Out: 10 [Chest Tube:10]    Chest tube drainage last 8 hours         11/15/22  0500 11/16/22  0500 11/17/22  0500   Weight: 103 kg (226 lb) 101 kg (222 lb 12.8 oz) 100 kg (221 lb 3.2 oz)         Objective    Results Review:        WBC WBC   Date Value Ref Range Status   11/17/2022 7.27 3.40 - 10.80 10*3/mm3 Final   11/16/2022 6.69 3.40 - 10.80 10*3/mm3 Final   11/15/2022 7.23 3.40 - 10.80 10*3/mm3 Final      HGB Hemoglobin   Date Value Ref Range Status   11/17/2022 10.2 (L) 12.0 - 15.9 g/dL Final   11/16/2022 9.8 (L) 12.0 - 15.9 g/dL Final   11/15/2022 9.6 (L) 12.0 - 15.9 g/dL Final      HCT Hematocrit   Date Value Ref Range Status   11/17/2022 31.8 (L) 34.0 - 46.6 % Final   11/16/2022 31.8 (L) 34.0 - 46.6 % Final   11/15/2022 29.7 (L) 34.0 - 46.6 % Final      Platelets Platelets   Date Value Ref Range Status   11/17/2022 266 140 - 450 10*3/mm3 Final   11/16/2022 251 140 - 450 10*3/mm3 Final   11/15/2022 202 140 - 450 10*3/mm3 Final        PT/INR:  No results found for: PROTIME/No results found for: INR    Sodium Sodium   Date Value Ref Range Status   11/17/2022 140 136 - 145 mmol/L Final   11/16/2022 137 136 - 145 mmol/L Final   11/15/2022 139 136 - 145 mmol/L Final      Potassium Potassium   Date Value Ref Range Status   11/17/2022 4.6 3.5 - 5.2 mmol/L Final   11/16/2022 3.6 3.5 - 5.2 mmol/L Final   11/15/2022 4.4 3.5 - 5.2 mmol/L Final      Chloride Chloride   Date Value Ref Range Status   11/17/2022 109 (H) 98 - 107  mmol/L Final   11/16/2022 105 98 - 107 mmol/L Final   11/15/2022 107 98 - 107 mmol/L Final      Bicarbonate CO2   Date Value Ref Range Status   11/17/2022 19.1 (L) 22.0 - 29.0 mmol/L Final   11/16/2022 22.2 22.0 - 29.0 mmol/L Final   11/15/2022 21.3 (L) 22.0 - 29.0 mmol/L Final      BUN BUN   Date Value Ref Range Status   11/17/2022 25 (H) 8 - 23 mg/dL Final   11/16/2022 34 (H) 8 - 23 mg/dL Final   11/15/2022 47 (H) 8 - 23 mg/dL Final      Creatinine Creatinine   Date Value Ref Range Status   11/17/2022 0.74 0.57 - 1.00 mg/dL Final   11/16/2022 0.82 0.57 - 1.00 mg/dL Final   11/15/2022 1.02 (H) 0.57 - 1.00 mg/dL Final      Calcium Calcium   Date Value Ref Range Status   11/17/2022 8.9 8.6 - 10.5 mg/dL Final   11/16/2022 8.7 8.6 - 10.5 mg/dL Final   11/15/2022 9.0 8.6 - 10.5 mg/dL Final      Magnesium No results found for: MG       aspirin, 81 mg, Oral, Daily  atorvastatin, 40 mg, Oral, Nightly  chlorhexidine, 15 mL, Mouth/Throat, Q12H  clopidogrel, 75 mg, Oral, Daily  enoxaparin, 40 mg, Subcutaneous, Daily  furosemide, 40 mg, Oral, Daily  insulin glargine, 15 Units, Subcutaneous, Nightly  insulin lispro, 0-7 Units, Subcutaneous, TID AC  insulin lispro, 5 Units, Subcutaneous, TID With Meals  levothyroxine, 25 mcg, Oral, Q AM  metoprolol succinate XL, 50 mg, Oral, Q24H  mupirocin, , Each Nare, BID  pantoprazole, 40 mg, Oral, QAM  senna-docusate sodium, 2 tablet, Oral, Nightly      clevidipine, 2-32 mg/hr            Patient Active Problem List   Diagnosis Code   • Vitamin D deficiency E55.9   • Primary hypothyroidism E03.9   • Dyslipidemia E78.5   • Essential hypertension I10   • Type 2 diabetes mellitus without complication, with long-term current use of insulin ([facility]) E11.9, Z79.4   • Noncompliance with diabetes treatment Z91.199   • ST elevation myocardial infarction (STEMI) ([facility]) I21.3   • Coronary artery disease involving native heart I25.10       Assessment & Plan    - STEMI, Multivessel CAD- angioplasty and stent  placed diagonal branch preop, s/p CABG x5 with LIMA, EVH left calf, right thigh ([place])- POD#7  - ICM, EF 40%----no ARB/ACE with MERCEDES  - Hypertension----controlled  - HL---statin  - Obesity  - Diabetes type 2- A1C 7----Internal medicine managing, resume home meds when taking adequate po  - Klebsiella UTI, preop asymptomatic  - Post op expected acute blood loss anemia---transfused 11/11/22  - MERCEDES---creatinine peak at 1.3 r/t recent hypotension  - Episode of dysarthria on POD#2---no recurrence, CT head ok     Looks good this morning  Up in the chair  Encourage pulmonary toilet   Increase activity  Making steady progress  precert started for rehab      Martha Puga, GUEVARA  11/17/22  10:21 EST

## 2022-11-18 LAB
ANION GAP SERPL CALCULATED.3IONS-SCNC: 10 MMOL/L (ref 5–15)
BASOPHILS # BLD AUTO: 0.12 10*3/MM3 (ref 0–0.2)
BASOPHILS NFR BLD AUTO: 1.4 % (ref 0–1.5)
BUN SERPL-MCNC: 21 MG/DL (ref 8–23)
BUN/CREAT SERPL: 25.3 (ref 7–25)
CALCIUM SPEC-SCNC: 8.4 MG/DL (ref 8.6–10.5)
CHLORIDE SERPL-SCNC: 106 MMOL/L (ref 98–107)
CO2 SERPL-SCNC: 21 MMOL/L (ref 22–29)
CREAT SERPL-MCNC: 0.83 MG/DL (ref 0.57–1)
DEPRECATED RDW RBC AUTO: 50.4 FL (ref 37–54)
EGFRCR SERPLBLD CKD-EPI 2021: 75 ML/MIN/1.73
EOSINOPHIL # BLD AUTO: 0.39 10*3/MM3 (ref 0–0.4)
EOSINOPHIL NFR BLD AUTO: 4.6 % (ref 0.3–6.2)
ERYTHROCYTE [DISTWIDTH] IN BLOOD BY AUTOMATED COUNT: 14.8 % (ref 12.3–15.4)
GLUCOSE BLDC GLUCOMTR-MCNC: 114 MG/DL (ref 70–130)
GLUCOSE BLDC GLUCOMTR-MCNC: 128 MG/DL (ref 70–130)
GLUCOSE BLDC GLUCOMTR-MCNC: 186 MG/DL (ref 70–130)
GLUCOSE BLDC GLUCOMTR-MCNC: 86 MG/DL (ref 70–130)
GLUCOSE SERPL-MCNC: 151 MG/DL (ref 65–99)
HCT VFR BLD AUTO: 32.3 % (ref 34–46.6)
HGB BLD-MCNC: 10.1 G/DL (ref 12–15.9)
IMM GRANULOCYTES # BLD AUTO: 0.08 10*3/MM3 (ref 0–0.05)
IMM GRANULOCYTES NFR BLD AUTO: 1 % (ref 0–0.5)
LYMPHOCYTES # BLD AUTO: 2.37 10*3/MM3 (ref 0.7–3.1)
LYMPHOCYTES NFR BLD AUTO: 28.2 % (ref 19.6–45.3)
MCH RBC QN AUTO: 28.9 PG (ref 26.6–33)
MCHC RBC AUTO-ENTMCNC: 31.3 G/DL (ref 31.5–35.7)
MCV RBC AUTO: 92.3 FL (ref 79–97)
MONOCYTES # BLD AUTO: 0.86 10*3/MM3 (ref 0.1–0.9)
MONOCYTES NFR BLD AUTO: 10.2 % (ref 5–12)
NEUTROPHILS NFR BLD AUTO: 4.58 10*3/MM3 (ref 1.7–7)
NEUTROPHILS NFR BLD AUTO: 54.6 % (ref 42.7–76)
NRBC BLD AUTO-RTO: 0 /100 WBC (ref 0–0.2)
PLATELET # BLD AUTO: 291 10*3/MM3 (ref 140–450)
PMV BLD AUTO: 10 FL (ref 6–12)
POTASSIUM SERPL-SCNC: 4 MMOL/L (ref 3.5–5.2)
RBC # BLD AUTO: 3.5 10*6/MM3 (ref 3.77–5.28)
SODIUM SERPL-SCNC: 137 MMOL/L (ref 136–145)
WBC NRBC COR # BLD: 8.4 10*3/MM3 (ref 3.4–10.8)

## 2022-11-18 PROCEDURE — 63710000001 INSULIN LISPRO (HUMAN) PER 5 UNITS: Performed by: HOSPITALIST

## 2022-11-18 PROCEDURE — 82962 GLUCOSE BLOOD TEST: CPT

## 2022-11-18 PROCEDURE — 25010000002 ENOXAPARIN PER 10 MG: Performed by: NURSE PRACTITIONER

## 2022-11-18 PROCEDURE — 80048 BASIC METABOLIC PNL TOTAL CA: CPT | Performed by: NURSE PRACTITIONER

## 2022-11-18 PROCEDURE — 97530 THERAPEUTIC ACTIVITIES: CPT

## 2022-11-18 PROCEDURE — 85025 COMPLETE CBC W/AUTO DIFF WBC: CPT | Performed by: HOSPITALIST

## 2022-11-18 PROCEDURE — 99232 SBSQ HOSP IP/OBS MODERATE 35: CPT | Performed by: INTERNAL MEDICINE

## 2022-11-18 RX ORDER — HYDROCODONE BITARTRATE AND ACETAMINOPHEN 5; 325 MG/1; MG/1
1 TABLET ORAL EVERY 4 HOURS PRN
Qty: 42 TABLET | Refills: 0 | Status: SHIPPED | OUTPATIENT
Start: 2022-11-18 | End: 2022-11-26

## 2022-11-18 RX ADMIN — FUROSEMIDE 40 MG: 40 TABLET ORAL at 08:08

## 2022-11-18 RX ADMIN — ATORVASTATIN CALCIUM 40 MG: 20 TABLET, FILM COATED ORAL at 20:29

## 2022-11-18 RX ADMIN — PANTOPRAZOLE SODIUM 40 MG: 40 TABLET, DELAYED RELEASE ORAL at 06:56

## 2022-11-18 RX ADMIN — ACETAMINOPHEN 650 MG: 325 TABLET, FILM COATED ORAL at 20:29

## 2022-11-18 RX ADMIN — OXYCODONE HYDROCHLORIDE 10 MG: 5 TABLET ORAL at 22:37

## 2022-11-18 RX ADMIN — INSULIN LISPRO 5 UNITS: 100 INJECTION, SOLUTION INTRAVENOUS; SUBCUTANEOUS at 11:45

## 2022-11-18 RX ADMIN — LEVOTHYROXINE SODIUM 25 MCG: 0.03 TABLET ORAL at 06:56

## 2022-11-18 RX ADMIN — CLOPIDOGREL 75 MG: 75 TABLET, FILM COATED ORAL at 08:08

## 2022-11-18 RX ADMIN — ENOXAPARIN SODIUM 40 MG: 100 INJECTION SUBCUTANEOUS at 17:24

## 2022-11-18 RX ADMIN — ASPIRIN 81 MG: 81 TABLET, COATED ORAL at 08:08

## 2022-11-18 RX ADMIN — DOCUSATE SODIUM 50MG AND SENNOSIDES 8.6MG 2 TABLET: 8.6; 5 TABLET, FILM COATED ORAL at 20:29

## 2022-11-18 RX ADMIN — INSULIN LISPRO 5 UNITS: 100 INJECTION, SOLUTION INTRAVENOUS; SUBCUTANEOUS at 08:08

## 2022-11-18 RX ADMIN — MUPIROCIN 1 APPLICATION: 20 OINTMENT TOPICAL at 08:08

## 2022-11-18 RX ADMIN — CHLORHEXIDINE GLUCONATE 15 ML: 1.2 SOLUTION ORAL at 08:09

## 2022-11-18 RX ADMIN — INSULIN GLARGINE-YFGN 15 UNITS: 100 INJECTION, SOLUTION SUBCUTANEOUS at 20:30

## 2022-11-18 RX ADMIN — METOPROLOL SUCCINATE 50 MG: 50 TABLET, FILM COATED, EXTENDED RELEASE ORAL at 08:08

## 2022-11-18 NOTE — PROGRESS NOTES
[facility]  Cardiology Progress Note    Patient Identification:  Name: Mayra Hirsch  Age: 72 y.o.  Sex: female  :  1950  MRN: [id number]                 Follow Up / Chief Complaint: follow up for CAD    Interval History:  CABG x5 11/10/22 with Dr Isabel. Sitting up in chair, no complaints, feels well today. She is working with therapy.        Objective:    Past Medical History:  Past Medical History:   Diagnosis Date   • Depression    • Diabetes mellitus (HCC)    • Disease of thyroid gland    • Fibrocystic breast    • Hypertension    • Hypothyroidism    • PONV (postoperative nausea and vomiting)    • Type 2 diabetes mellitus (HCC)      Past Surgical History:  Past Surgical History:   Procedure Laterality Date   • BREAST EXCISIONAL BIOPSY Right     at age 22; benign.   • CARDIAC CATHETERIZATION Left 2022    Procedure: Cardiac Catheterization/Vascular Study;  Surgeon: Joanna Marie MD;  Location: [facility] CATH INVASIVE LOCATION;  Service: Cardiology;  Laterality: Left;   • CARDIAC CATHETERIZATION N/A 2022    Procedure: Percutaneous Coronary Intervention;  Surgeon: Joanna Marie MD;  Location: [facility] C[facility] LOC[facility];  Service: Cardiology;  Laterality: N/A;   • CARDIAC CATHETERIZATION N/A 2022    Procedure: Left ventriculography;  Surgeon: Joanna Marie MD;  Location:  [facility] CATH INVASIVE LOCATION;  Service: Cardiology;  Laterality: N/A;   • CARDIAC CATHETERIZATION N/A 2022    Procedure: Stent MARTINEZ coronary;  Surgeon: Joanna Marie MD;  Location: [facility] CATH [facility]VASIVE LOCATION;  Service: Cardiology;  Laterality: N/A;   • CARDIAC CATHETERIZATION N/A 2022    Procedure: Left Heart Cath;  Surgeon: Joanna Marie MD;  Location: [facility] CATH [facility]VASIVE LOCATION;  Service: Cardiology;  Laterality: N/A;   •  SECTION     • CORONARY ARTERY BYPASS GRAFT N/A 11/10/2022    Procedure: NIKKY, CORONARY ARTERY BYPASS GRAFTING TIMES  "6 WITH LEFT JORDYN AND ENDOSCOPICALLY HARVESTED LEFT AND RIGHT GREATER SAPHENOUS VEIN, PRP TRANSESOPHAGEAL ECHOCARDIOGRAM WITH ANESTHESIA;  Surgeon: Jr Dong Isabel MD;  Location: [facility];  Service: Cardiothoracic;  Laterality: N/A;   • HAND SURGERY     • KNEE SURGERY     • OOPHORECTOMY      1 ovary removed due to ectopic pregnancy        Social History:   Social History     Tobacco Use   • Smoking status: Never   • Smokeless tobacco: Never   Substance Use Topics   • Alcohol use: No      Family History:  Family History   Problem Relation Age of Onset   • Coronary artery disease Mother    • Alzheimer's disease Mother    • Coronary artery disease Father    • Cancer Father    • Thyroid disease Sister    • Malig Hyperthermia Neg Hx           Allergies:  Allergies   Allergen Reactions   • Bydureon [Exenatide] Diarrhea   • Metformin And Related Nausea And Vomiting     Scheduled Meds:  aspirin, 81 mg, Daily  atorvastatin, 40 mg, Nightly  chlorhexidine, 15 mL, Q12H  clopidogrel, 75 mg, Daily  enoxaparin, 40 mg, Daily  furosemide, 40 mg, Daily  insulin glargine, 15 Units, Nightly  insulin lispro, 0-7 Units, TID AC  insulin lispro, 5 Units, TID With Meals  levothyroxine, 25 mcg, Q AM  metoprolol succinate XL, 50 mg, Q24H  mupirocin, , BID  pantoprazole, 40 mg, QAM  senna-docusate sodium, 2 tablet, Nightly            INTAKE AND OUTPUT:    Intake/Output Summary (Last 24 hours) at 11/18/2022 1055  Last data filed at 11/18/2022 0821  Gross per 24 hour   Intake 650 ml   Output 955 ml   Net -305 ml     ROS  Constitutional: awake and alert, no fever. No  nosebleeds  Abdomen           no abdominal pain   Cardiac              no chest pain  Pulmonary          no shortness of breath      /73 (BP Location: Right arm, Patient Position: Sitting)   Pulse 70   Temp 98.3 °F (36.8 °C) (Oral)   Resp 16   Ht 170.2 cm (67\")   Wt 99.7 kg (219 lb 14.4 oz)   SpO2 98%   BMI 34.44 kg/m²     Physical Exam:  General:  Appears in no " acute distress  Eyes: EOM normal no conjunctival drainage  HEENT:  No JVD. Thyroid not visibly enlarged. No mucosal pallor or cyanosis  Respiratory: Respirations regular and unlabored at rest. BBS with good air entry in all fields. No crackles, rubs or wheezes auscultated  Cardiovascular: S1S2 Regular rate and rhythm. No murmur, rub or gallop. No carotid bruits. DP/PT pulses  2+   . No pretibial pitting edema  Gastrointestinal: Abdomen soft,  non tender. Bowel sounds present.   Musculoskeletal: ROSALES x4. No abnormal movements  Neuro: AAO x3 CN II-XII grossly intact  Psych: Mood and affect normal, pleasant and cooperative        I reviewed the patient's new clinical results, and personally reviewed and interpreted the patient's ECG and telemetry data from the last 24 hours        Cardiographics     Telemetry:    sr occasional PACs      Interpretation Summary       •  The left ventricular cavity is mildly dilated.  •  The following left ventricular wall segments are hypokinetic: apical anterior, apical lateral, apical inferior, apical septal, apex hypokinetic and mid anteroseptal.  •  There is calcification of the aortic valve.  •  Estimated right ventricular systolic pressure from tricuspid regurgitation is mildly elevated (35-45 mmHg).         Lab Review           Results from last 7 days   Lab Units 11/18/22  0317   SODIUM mmol/L 137   POTASSIUM mmol/L 4.0   BUN mg/dL 21   CREATININE mg/dL 0.83   CALCIUM mg/dL 8.4*     @[id number]@  Results from last 7 days   Lab Units 11/18/22  0317 11/17/22  0313 11/16/22  0304   WBC 10*3/mm3 8.40 7.27 6.69   HEMOGLOBIN g/dL 10.1* 10.2* 9.8*   HEMATOCRIT % 32.3* 31.8* 31.8*   PLATELETS 10*3/mm3 291 266 251         The following medical decision was discussed in detail with Dr. Marie      Assessment:  CABG x5 11/10/22 with Dr Isabel  STEMI   Hypertension   diabetes mellitus: Internal medicine following  hypothyroidism       Plan:  BP and HR stable, tele reviewed, remains  "SR, occasional PACs-continue beta blockade  Sitting up in chair working with OT, on room air, no shortness of breath.   Encouraged IS  Cr stable, Hgb stable   Continue aspirin, statin, metoprolol, plavix.   Discharge planning-home vs rehab when ready. She has ~15 steps to go to bedroom/bathroom at her sons home.   Await rehab      Joanna Marie MD  )11/18/2022       EMR Dragon/Transcription:   \"Dictated utilizing Dragon dictation\".     "

## 2022-11-18 NOTE — NURSING NOTE
Wound/Ostomy: Consult received about skin problems in Coccyx site. Patient is alert, oriented, able to repositioning, upon assessment intact skin, red discoloration and blanchable are is observed to rt buttock, no relate to pressure injury, Z Guard is ordered. Nursing interventions have been implement. Please re-consult for any additional needs.

## 2022-11-18 NOTE — PLAN OF CARE
Goal Outcome Evaluation:  Plan of Care Reviewed With: patient        Progress: improving  Outcome Evaluation: Pt POD8 CABG, with increased functional ability. Pt has more balance & steady gait when using walker. Pt has been weaned off oxygen, is now sleeping without oxygen, however does report shortness of breath with activity. Lungs sound Diminished, with fine crackles appreciated on right. Pt denies pain, takes medications whole without incidence. NSR, A&Ox4, plan for discharge to rehab once precertification approved. Will continue to monitor

## 2022-11-18 NOTE — PLAN OF CARE
Goal Outcome Evaluation:  Plan of Care Reviewed With: patient           Outcome Evaluation: Pt tolerated ambulation 100ft CGA rwx, pt c/o SOA, decreased endurance this session, Rec SNU at DC for optimal independence and progression w functional mobility.

## 2022-11-18 NOTE — CASE MANAGEMENT/SOCIAL WORK
"Continued Stay Note  [facility]     Patient Name: Mayra Hirsch  MRN: [id number]  Today's Date: 11/18/2022    Admit Date: 11/7/2022    Plan: Physical therapy to have Pt practice on stairs tomorrow BEFORE D/C home.  Pt plans to d/c son's home at [address] [place] [address] with Jew .  [employer] to provide rolling walker.   Discharge Plan     Row Name 11/18/22 1258       Plan    Plan Physical therapy to have Pt practice on stairs tomorrow BEFORE D/C home.  Pt plans to d/c son's home at [address] [place] [address] with Jew .  [employer] to provide rolling walker.    Plan Comments Physician Advisor opinion- Pt to d/c home with .  [facility] denied cert for rehab.  CCP updated PT at bedside.  Pt advised she is, \"OK with that.\"  She voiced she is fine with discharging to son's home.  Pt will need to work with physical therapy tomorrow and perform the stair steps.  Rafi to provide rolling walker ([place]).  Nette notified with [facility] that plan is for Pt to d/c home tomorrow (Sat).  CCP following............Eulalia ARTEAGA/JAMESON CM               Discharge Codes    No documentation.               Expected Discharge Date and Time     Expected Discharge Date Expected Discharge Time    Nov 18, 2022             Eulalia Harris RN    "

## 2022-11-18 NOTE — THERAPY TREATMENT NOTE
Acute Care - Physical Therapy Treatment Note  [facility]     Patient Name: Mayra Hirsch  : 1950  MRN: [id number]  Today's Date: 2022      Visit Dx:     ICD-10-CM ICD-9-CM   1. ST elevation myocardial infarction (STEMI), unspecified artery (HCC)  I21.3 410.90   2. ST elevation myocardial infarction (STEMI) involving other coronary artery (HCC)  I21.29 410.10   3. Coronary artery disease involving native heart, unspecified vessel or lesion type, unspecified whether angina present  I25.10 414.01   4. Abnormal findings on diagnostic imaging of other specified body structures  R93.89 793.99     Patient Active Problem List   Diagnosis   • Vitamin D deficiency   • Primary hypothyroidism   • Dyslipidemia   • Essential hypertension   • Type 2 diabetes mellitus without complication, with long-term current use of insulin ([facility])   • Noncompliance with diabetes treatment   • ST elevation myocardial infarction (STEMI) (HCC)   • Coronary artery disease involving native heart     Past Medical History:   Diagnosis Date   • Depression    • Diabetes mellitus ([facility])    • Disease of thyroid gland    • Fibrocystic breast    • Hypertension    • Hypothyroidism    • PONV (postoperative nausea and vomiting)    • Type 2 diabetes mellitus (HCC)      Past Surgical History:   Procedure Laterality Date   • BREAST EXCISIONAL BIOPSY Right     at age 22; benign.   • CARDIAC CATHETERIZATION Left 2022    Procedure: Cardiac Catheterization/Vascular Study;  Surgeon: Joanna Marie MD;  Location: [facility] CATH INVASIVE LOCATION;  Service: Cardiology;  Laterality: Left;   • CARDIAC CATHETERIZATION N/A 2022    Procedure: Percutaneous Coronary Intervention;  Surgeon: Joanna Marie MD;  Location: [facility] CATH INVASIVE LOCATION;  Service: Cardiology;  Laterality: N/A;   • CARDIAC CATHETERIZATION N/A 2022    Procedure: Left ventriculography;  Surgeon: Joanna Marie MD;  [facility];  Service: Cardiology;  Laterality: N/A;   • CARDIAC CATHETERIZATION N/A 2022    Procedure: Stent MARTINEZ coronary;  Surgeon: Joanna Marie MD;  Location: [facility] CATH INVASIVE LOCATION;  Service: Cardiology;  Laterality: N/A;   • CARDIAC CATHETERIZATION N/A 2022    Procedure: Left Heart Cath;  Surgeon: Joanna Marie MD;  Location:  [facility] CATH INVASIVE LOCATION;  Service: Cardiology;  Laterality: N/A;   •  SECTION     • CORONARY ARTERY BYPASS GRAFT N/A 11/10/2022    Procedure: NIKKY, CORONARY ARTERY BYPASS GRAFTING TIMES 6 WITH LEFT JORDYN AND ENDOSCOPICALLY HARVESTED LEFT AND RIGHT GREATER SAPHENOUS VEIN, PRP TRANSESOPHAGEAL ECHOCARDIOGRAM WITH ANESTHESIA;  Surgeon: Jr Dong Isabel MD;  Location: [facility];  Service: Cardiothoracic;  Laterality: N/A;   • HAND SURGERY     • KNEE SURGERY     • OOPHORECTOMY      1 ovary removed due to ectopic pregnancy     PT Assessment (last 12 hours)     PT Evaluation and Treatment     [facility] Name 22          Physical Therapy Time and Intention    Subjective Information no complaints  -     Document Type therapy note (daily note)  -     Mode of Treatment individual therapy;physical therapy  -     Patient Effort good  -On license of [facility] Name 22          General Information    Patient Profile Reviewed yes  -     Existing Precautions/Restrictions cardiac;fall;sternal  -On license of [facility] Name 22          Pain    Pretreatment Pain Rating 0/10 - no pain  -     Posttreatment Pain Rating 0/10 - no pain  -On license of [facility] Name 22          Cognition    Orientation Status (Cognition) oriented x 4  -On license of [facility] Name 22          Bed Mobility    Comment, (Bed Mobility) NT in chair  -On license of [facility] Name 22          Sit-Stand Transfer    Sit-Stand [place] (Transfers) contact guard  -     Assistive Device (Sit-Stand Transfers) walker, front-wheeled  -     Row Name 22          Stand-Sit  Transfer    Stand-Sit [place] (Transfers) contact guard  -     Assistive Device (Stand-Sit Transfers) walker, front-wheeled  -     Row Name 11/18/22 0900          Gait/Stairs (Locomotion)    [place] Level (Gait) contact guard  -     Assistive Device (Gait) walker, front-wheeled  -     Distance in Feet (Gait) 100  -LH     Deviations/Abnormal Patterns (Gait) bilateral deviations;abelardo decreased  -LH     Bilateral Gait Deviations forward flexed posture;heel strike decreased  -LH     Comment, (Gait/Stairs) increase SOA this date, decreased endurance  -LH     Row Name             Wound 11/10/22 1242 midline sternal Incision    Wound - Properties Group Placement Date: 11/10/22  -JT Placement Time: 1242  -JT Present on Hospital Admission: N  -JT Orientation: midline  -JT Location: sternal  -JT Primary Wound Type: Incision  -JT    Retired Wound - Properties Group Placement Date: 11/10/22  -JT Placement Time: 1242  -JT Present on Hospital Admission: N  -JT Orientation: midline  -JT Location: sternal  -JT Primary Wound Type: Incision  -JT    Retired Wound - Properties Group Date first assessed: 11/10/22  -JT Time first assessed: 1242  -JT Present on Hospital Admission: N  -JT Location: sternal  -JT Primary Wound Type: Incision  -JT    Row Name             Wound 11/10/22 1243 Left distal thigh Incision    Wound - Properties Group Placement Date: 11/10/22  -JT Placement Time: 1243  -JT Present on Hospital Admission: N  -JT Side: Left  -JT Orientation: distal  -JT Location: thigh  -JT Primary Wound Type: Incision  -JT    Retired Wound - Properties Group Placement Date: 11/10/22  -JT Placement Time: 1243  -JT Present on Hospital Admission: N  -JT Side: Left  -JT Orientation: distal  -JT Location: thigh  -JT Primary Wound Type: Incision  -JT    Retired Wound - Properties Group Date first assessed: 11/10/22  -JT Time first assessed: 1243  -JT Present on Hospital Admission: N  -JT Side: Left  -JT Location: thigh   -JT Primary Wound Type: Incision  -JT    Row Name             Wound 11/10/22 1335 Right anterior thigh Incision    Wound - Properties Group Placement Date: 11/10/22  -OD Placement Time: 1335  -OD Present on Hospital Admission: Y  -OD Side: Right  -OD Orientation: anterior  -OD Location: thigh  -OD Primary Wound Type: Incision  -OD    Retired Wound - Properties Group Placement Date: 11/10/22  -OD Placement Time: 1335  -OD Present on Hospital Admission: Y  -OD Side: Right  -OD Orientation: anterior  -OD Location: thigh  -OD Primary Wound Type: Incision  -OD    Retired Wound - Properties Group Date first assessed: 11/10/22  -OD Time first assessed: 1335  -OD Present on Hospital Admission: Y  -OD Side: Right  -OD Location: thigh  -OD Primary Wound Type: Incision  -OD    Row Name 11/18/22 0900          Plan of Care Review    Plan of Care Reviewed With patient  -     Outcome Evaluation Pt tolerated ambulation 100ft CGA rwx, pt c/o SOA, decreased endurance this session, Rec SNU at DC for optimal independence and progression w functional mobility.  -     Row Name 11/18/22 0900          Positioning and Restraints    Pre-Treatment Position sitting in chair/recliner  -     Post Treatment Position chair  -     In Chair reclined;call light within reach;encouraged to call for assist;exit alarm on;notified nsg  pt needs pulse ox  -           User Key  (r) = Recorded By, (t) = Taken By, (c) = Cosigned By    Initials Name Provider Type     Tiffanie Infante, PT Physical Therapist    Floyd Morrow Jr. RN Registered Nurse    Manuel Baker RN Registered Nurse                Physical Therapy Education     Title: PT OT SLP Therapies (In Progress)     Topic: Physical Therapy (In Progress)     Point: Mobility training (In Progress)     Learning Progress Summary           Patient Acceptance, E, NR by  at 11/18/2022 0959    Acceptance, E, VU by ANGÉLICA at 11/17/2022 1123    Acceptance, E,TB, VU by AIDAN at 11/15/2022 1302     Acceptance, E, VU,NR by  at 11/14/2022 1211    Acceptance, E, VU by  at 11/13/2022 1009    Acceptance, E, VU by  at 11/12/2022 1235    Acceptance, E, VU,NR by DJ at 11/11/2022 1049                   Point: Home exercise program (In Progress)     Learning Progress Summary           Patient Acceptance, E, NR by  at 11/18/2022 0959    Acceptance, E, VU by SM at 11/17/2022 1123    Acceptance, E, VU,NR by KH at 11/14/2022 1211    Acceptance, E, VU by  at 11/13/2022 1009    Acceptance, E, VU by  at 11/12/2022 1235    Acceptance, E, VU,NR by DJ at 11/11/2022 1049                   Point: Body mechanics (In Progress)     Learning Progress Summary           Patient Acceptance, E, NR by  at 11/18/2022 0959    Acceptance, E, VU by SM at 11/17/2022 1123    Acceptance, E, VU,NR by KH at 11/14/2022 1211    Acceptance, E, VU by  at 11/13/2022 1009    Acceptance, E, VU by  at 11/12/2022 1235    Acceptance, E, VU,NR by DJ at 11/11/2022 1049                   Point: Precautions (In Progress)     Learning Progress Summary           Patient Acceptance, E, NR by  at 11/18/2022 0959    Acceptance, E, VU by SM at 11/17/2022 1123    Eager, E, VU by ND at 11/16/2022 1132    Acceptance, E, VU,NR by  at 11/14/2022 1211    Acceptance, E, VU by  at 11/13/2022 1009    Acceptance, E, VU by  at 11/12/2022 1235    Acceptance, E, VU,NR by DJ at 11/11/2022 1049                               User Key     Initials Effective Dates Name Provider Type Discipline     06/16/21 -  Geraldine Boyd, PT Physical Therapist PT    LB 06/16/21 -  Zuleyka Smith, PT Physical Therapist PT    LH 06/16/21 -  Tiffanie Infante, PT Physical Therapist PT    CH 06/16/21 -  Jaxson Gaffney, PT Physical Therapist PT    DJ 10/25/19 -  Sravani Downs, PT Physical Therapist PT    SM 05/02/22 -  Mirna Parra, PT Physical Therapist PT    ND 10/24/22 -  Lindsey Pike, FRANCISCA Student PT Student PT              PT Recommendation and  Plan  Anticipated Discharge Disposition (PT): skilled nursing facility  Plan of Care Reviewed With: patient  Outcome Evaluation: Pt tolerated ambulation 100ft CGA rwx, pt c/o SOA, decreased endurance this session, Rec SNU at ID for optimal independence and progression w functional mobility.   Outcome Measures     Row Name 11/18/22 0900             How much help from another person do you currently need...    Turning from your back to your side while in flat bed without using bedrails? 3  -LH      Moving from lying on back to sitting on the side of a flat bed without bedrails? 3  -LH      Moving to and from a bed to a chair (including a wheelchair)? 3  -LH      Standing up from a chair using your arms (e.g., wheelchair, bedside chair)? 3  -LH      Climbing 3-5 steps with a railing? 2  -LH      To walk in hospital room? 3  -      AM-PAC 6 Clicks Score (PT) 17  -         Functional Assessment    Outcome Measure Options AM-PAC 6 Clicks Basic Mobility (PT)  -            User Key  (r) = Recorded By, (t) = Taken By, (c) = Cosigned By    Initials Name Provider Type     Tiffanie Infante, PT Physical Therapist                 Time Calculation:    PT Charges     Row Name 11/18/22 1000             Time Calculation    Start Time 0915  -      Stop Time 0928  -      Time Calculation (min) 13 min  -      PT Received On 11/18/22  -      PT - Next Appointment 11/19/22  -            User Key  (r) = Recorded By, (t) = Taken By, (c) = Cosigned By    Initials Name Provider Type     Tiffanie Infante, PT Physical Therapist              Therapy Charges for Today     Code Description Service Date Service Provider Modifiers Qty    [id number]  PT THERAPEUTIC ACT EA 15 MIN 11/18/2022 Tiffanie Infante, PT GP 1          PT G-Codes  Outcome Measure Options: AM-PAC 6 Clicks Basic Mobility (PT)  AM-PAC 6 Clicks Score (PT): 17  AM-PAC 6 Clicks Score (OT): 19    Tiffanie Infante PT  11/18/2022

## 2022-11-18 NOTE — PLAN OF CARE
Goal Outcome Evaluation:  Plan of Care Reviewed With: patient  Progress: improving  Outcome Evaluation: Pt is POD 8 today, A&Ox4, NSR, VSS. Denies pain. Ambulating SBA with walker. Does get SOA with activity. WCTM.

## 2022-11-18 NOTE — PROGRESS NOTES
LOS: 11 days   Patient Care Team:  Spencer Hua MD as PCP - General    Chief Complaint: post op    Subjective:  Symptoms:  No shortness of breath.    Diet:  No nausea or vomiting.          Vital Signs  Temp:  [98 °F (36.7 °C)-99 °F (37.2 °C)] 98.3 °F (36.8 °C)  Heart Rate:  [69-84] 70  Resp:  [16-17] 16  BP: (118-129)/(65-73) 118/73  Body mass index is 34.44 kg/m².    Intake/Output Summary (Last 24 hours) at 11/18/2022 0909  Last data filed at 11/18/2022 0821  Gross per 24 hour   Intake 650 ml   Output 955 ml   Net -305 ml     I/O this shift:  In: 410 [P.O.:410]  Out: 0     Chest tube drainage last 8 hour        11/16/22  0500 11/17/22  0500 11/18/22  0500   Weight: 101 kg (222 lb 12.8 oz) 100 kg (221 lb 3.2 oz) 99.7 kg (219 lb 14.4 oz)         Objective    Results Review:        WBC WBC   Date Value Ref Range Status   11/18/2022 8.40 3.40 - 10.80 10*3/mm3 Final   11/17/2022 7.27 3.40 - 10.80 10*3/mm3 Final   11/16/2022 6.69 3.40 - 10.80 10*3/mm3 Final      HGB Hemoglobin   Date Value Ref Range Status   11/18/2022 10.1 (L) 12.0 - 15.9 g/dL Final   11/17/2022 10.2 (L) 12.0 - 15.9 g/dL Final   11/16/2022 9.8 (L) 12.0 - 15.9 g/dL Final      HCT Hematocrit   Date Value Ref Range Status   11/18/2022 32.3 (L) 34.0 - 46.6 % Final   11/17/2022 31.8 (L) 34.0 - 46.6 % Final   11/16/2022 31.8 (L) 34.0 - 46.6 % Final      Platelets Platelets   Date Value Ref Range Status   11/18/2022 291 140 - 450 10*3/mm3 Final   11/17/2022 266 140 - 450 10*3/mm3 Final   11/16/2022 251 140 - 450 10*3/mm3 Final        PT/INR:  No results found for: PROTIME/No results found for: INR    Sodium Sodium   Date Value Ref Range Status   11/18/2022 137 136 - 145 mmol/L Final   11/17/2022 140 136 - 145 mmol/L Final   11/16/2022 137 136 - 145 mmol/L Final      Potassium Potassium   Date Value Ref Range Status   11/18/2022 4.0 3.5 - 5.2 mmol/L Final   11/17/2022 4.6 3.5 - 5.2 mmol/L Final   11/16/2022 3.6 3.5 - 5.2 mmol/L Final      Chloride Chloride    Date Value Ref Range Status   11/18/2022 106 98 - 107 mmol/L Final   11/17/2022 109 (H) 98 - 107 mmol/L Final   11/16/2022 105 98 - 107 mmol/L Final      Bicarbonate CO2   Date Value Ref Range Status   11/18/2022 21.0 (L) 22.0 - 29.0 mmol/L Final   11/17/2022 19.1 (L) 22.0 - 29.0 mmol/L Final   11/16/2022 22.2 22.0 - 29.0 mmol/L Final      BUN BUN   Date Value Ref Range Status   11/18/2022 21 8 - 23 mg/dL Final   11/17/2022 25 (H) 8 - 23 mg/dL Final   11/16/2022 34 (H) 8 - 23 mg/dL Final      Creatinine Creatinine   Date Value Ref Range Status   11/18/2022 0.83 0.57 - 1.00 mg/dL Final   11/17/2022 0.74 0.57 - 1.00 mg/dL Final   11/16/2022 0.82 0.57 - 1.00 mg/dL Final      Calcium Calcium   Date Value Ref Range Status   11/18/2022 8.4 (L) 8.6 - 10.5 mg/dL Final   11/17/2022 8.9 8.6 - 10.5 mg/dL Final   11/16/2022 8.7 8.6 - 10.5 mg/dL Final      Magnesium No results found for: MG       aspirin, 81 mg, Oral, Daily  atorvastatin, 40 mg, Oral, Nightly  chlorhexidine, 15 mL, Mouth/Throat, Q12H  clopidogrel, 75 mg, Oral, Daily  enoxaparin, 40 mg, Subcutaneous, Daily  furosemide, 40 mg, Oral, Daily  insulin glargine, 15 Units, Subcutaneous, Nightly  insulin lispro, 0-7 Units, Subcutaneous, TID AC  insulin lispro, 5 Units, Subcutaneous, TID With Meals  levothyroxine, 25 mcg, Oral, Q AM  metoprolol succinate XL, 50 mg, Oral, Q24H  mupirocin, , Each Nare, BID  pantoprazole, 40 mg, Oral, QAM  senna-docusate sodium, 2 tablet, Oral, Nightly      clevidipine, 2-32 mg/hr            Patient Active Problem List   Diagnosis Code   • Vitamin D deficiency E55.9   • Primary hypothyroidism E03.9   • Dyslipidemia E78.5   • Essential hypertension I10   • Type 2 diabetes mellitus without complication, with long-term current use of insulin ([facility]) E11.9, Z79.4   • Noncompliance with diabetes treatment Z91.199   • ST elevation myocardial infarction (STEMI) ([facility]) I21.3   • Coronary artery disease involving native heart I25.10       Assessment &  Plan    - STEMI, Multivessel CAD- angioplasty and stent placed diagonal branch preop, s/p CABG x5 with LIMA, EVH left calf, right thigh ([place])- POD#8  - ICM, EF 40%----no ARB/ACE with MERCEDES  - Hypertension----controlled  - HL---statin  - Obesity  - Diabetes type 2- A1C 7----Internal medicine managing, resume home meds when taking adequate po  - Klebsiella UTI, preop asymptomatic  - Post op expected acute blood loss anemia---transfused 11/11/22  - MERCEDES---creatinine peak at 1.3 r/t recent hypotension  - Episode of dysarthria on POD#2---no recurrence, CT head ok     Looks good this morning  Up in the chair  Encourage pulmonary toilet   Increase activity  Making steady progress  precert denied for rehab  Okay for discharge tomorrow after navigating stairs with PT      Martha Puga, GUEVARA  11/18/22  09:09 EST

## 2022-11-18 NOTE — PROGRESS NOTES
"Daily progress note    Referring physician  Dr. BECKFORD    Chief complaint  Doing much better with no new complaints and wants to go home.      History of present illness  71-year-old white female with history of diabetes hypertension hyperlipidemia hypothyroidism presented to [facility] emergency room with sudden onset of shortness of breath as she woke up with it.  Patient has no chest pain palpitation but does have nonproductive cough but no fever chills.  Patient found to be hypoxic while EMS arrived and brought to the emergency room which she found to have acute ST elevation MI and taken to the Cath Lab which showed multivessel coronary disease  angioplasty and stent placed to diagonal branch and I am asked to follow patient for medical problem.  At the time of review she is feeling better and has no more shortness of breath and also denies any chest pain.  Patient feels weak but feeling much better after angioplasty.     REVIEW OF SYSTEMS  Unremarkable     PHYSICAL EXAM         Blood pressure 118/73, pulse 70, temperature 98.3 °F (36.8 °C), temperature source Oral, resp. rate 16, height 170.2 cm (67\"), weight 99.7 kg (219 lb 14.4 oz), SpO2 98 %, not currently breastfeeding.    Constitutional:       General: She is not in acute distress.  HENT:      Head: Normocephalic and atraumatic.   Eyes:      Extraocular Movements: EOM normal.      Pupils: Pupils are equal, round, and reactive to light.   Cardiovascular:      Rate and Rhythm: Normal rate and regular rhythm.      Heart sounds: Normal heart sounds.   Pulmonary:      Effort: Pulmonary effort is normal. No respiratory distress.      Breath sounds: Moving air bilaterally  Abdominal:      Palpations: Abdomen is soft.      Tenderness: There is no abdominal tenderness. There is no guarding or rebound.   Musculoskeletal:         General: No edema. Normal range of motion.      Cervical back: Normal range of motion and neck supple.   Skin:     General: Skin is " warm and dry.      Findings: No rash.   Neurological:      Mental Status: She is alert and oriented to person, place, and time.      Sensory: Sensation is intact.      Motor: Motor strength is normal.   Psychiatric:         Mood and Affect: Mood and affect normal.      LAB RESULTS  Lab Results (last 24 hours)     Procedure Component Value Units Date/Time    POC Glucose Once [[id number]]  (Normal) Collected: 11/18/22 0603    Specimen: Blood Updated: 11/18/22 0604     Glucose 114 mg/dL      Comment: Meter: [id number] : [id number] Parminder MANDUJANO       Basic Metabolic Panel [[id number]]  (Abnormal) Collected: 11/18/22 0317    Specimen: Blood Updated: 11/18/22 0425     Glucose 151 mg/dL      BUN 21 mg/dL      Creatinine 0.83 mg/dL      Sodium 137 mmol/L      Potassium 4.0 mmol/L      Chloride 106 mmol/L      CO2 21.0 mmol/L      Calcium 8.4 mg/dL      BUN/Creatinine Ratio 25.3     Anion Gap 10.0 mmol/L      eGFR 75.0 mL/min/1.73      Comment: National Kidney Foundation and American Society of Nephrology (ASN) Task Force recommended calculation based on the Chronic Kidney Disease Epidemiology Collaboration (CKD-EPI) equation refit without adjustment for race.       Narrative:      GFR Normal >60  Chronic Kidney Disease <60  Kidney Failure <15    The GFR formula is only valid for adults with stable renal function between ages 18 and 70.    CBC & Differential [[id number]]  (Abnormal) Collected: 11/18/22 0317    Specimen: Blood Updated: 11/18/22 0407    Narrative:      The following orders were created for panel order CBC & Differential.  Procedure                               Abnormality         Status                     ---------                               -----------         ------                     CBC Auto Differential[[id number]]        Abnormal            Final result                 Please view results for these tests on the individual orders.    CBC Auto Differential [[id number]]  (Abnormal) Collected:  11/18/22 0317    Specimen: Blood Updated: 11/18/22 0407     WBC 8.40 10*3/mm3      RBC 3.50 10*6/mm3      Hemoglobin 10.1 g/dL      Hematocrit 32.3 %      MCV 92.3 fL      MCH 28.9 pg      MCHC 31.3 g/dL      RDW 14.8 %      RDW-SD 50.4 fl      MPV 10.0 fL      Platelets 291 10*3/mm3      Neutrophil % 54.6 %      Lymphocyte % 28.2 %      Monocyte % 10.2 %      Eosinophil % 4.6 %      Basophil % 1.4 %      Immature Grans % 1.0 %      Neutrophils, Absolute 4.58 10*3/mm3      Lymphocytes, Absolute 2.37 10*3/mm3      Monocytes, Absolute 0.86 10*3/mm3      Eosinophils, Absolute 0.39 10*3/mm3      Basophils, Absolute 0.12 10*3/mm3      Immature Grans, Absolute 0.08 10*3/mm3      nRBC 0.0 /100 WBC     POC Glucose Once [[id number]]  (Normal) Collected: 11/17/22 2024    Specimen: Blood Updated: 11/17/22 2025     Glucose 92 mg/dL      Comment: Meter: [id number] : [id number] John Suresh MANDUJANO       POC Glucose Once [[id number]]  (Abnormal) Collected: 11/17/22 1545    Specimen: Blood Updated: 11/17/22 1546     Glucose 149 mg/dL      Comment: Meter: [id number] : [id number] Guy Sara NA           Imaging Results (Last 24 Hours)     ** No results found for the last 24 hours. **           ECG 12 Lead          Component Ref Range & Units 01:44    QT Interval ms 358 P    Resulting Agency   ECG             HEART RATE= 116  bpm  RR Interval= 517  ms  IN Interval= 110  ms  P Horizontal Axis= -49  deg  P Front Axis= 64  deg  QRSD Interval= 97  ms  QT Interval= 358  ms  QRS Axis= 18  deg  T Wave Axis= 130  deg  - ABNORMAL ECG -  Sinus tachycardia  Probable left atrial enlargement  Lateral infarct, acute (LAD)  Probable anteroseptal infarct, recent             Current Facility-Administered Medications:   •  acetaminophen (TYLENOL) tablet 650 mg, 650 mg, Oral, Q4H PRN, 650 mg at 11/11/22 1744 **OR** acetaminophen (TYLENOL) 160 MG/5ML solution 650 mg, 650 mg, Oral, Q4H PRN **OR** acetaminophen (TYLENOL) suppository 650 mg, 650 mg,  Rectal, Q4H PRN, Martha Golden APRN  •  ALPRAZolam (XANAX) tablet 0.25 mg, 0.25 mg, Oral, Q8H PRN, Martha Golden APRN, 0.25 mg at 11/17/22 2202  •  aspirin EC tablet 81 mg, 81 mg, Oral, Daily, Martha Golden APRN, 81 mg at 11/18/22 0808  •  atorvastatin (LIPITOR) tablet 40 mg, 40 mg, Oral, Nightly, Martha Golden, APRN, 40 mg at 11/17/22 2201  •  bisacodyl (DULCOLAX) EC tablet 10 mg, 10 mg, Oral, Daily PRN, Martha Golden APRN, 10 mg at 11/14/22 2154  •  bisacodyl (DULCOLAX) suppository 10 mg, 10 mg, Rectal, Daily PRN, Martha Golden APRN  •  chlorhexidine (PERIDEX) 0.12 % solution 15 mL, 15 mL, Mouth/Throat, Q12H, Martha Golden APRN, 15 mL at 11/18/22 0809  •  clevidipine (CLEVIPREX) infusion 0.5 mg/mL, 2-32 mg/hr, Intravenous, Continuous PRN, Martha Golden APRN  •  clopidogrel (PLAVIX) tablet 75 mg, 75 mg, Oral, Daily, Le Edwards, APRN, 75 mg at 11/18/22 0808  •  cyclobenzaprine (FLEXERIL) tablet 10 mg, 10 mg, Oral, Q8H PRN, Martha Golden APRN, 10 mg at 11/17/22 2201  •  dextrose (D50W) (25 g/50 mL) IV injection 25 g, 25 g, Intravenous, Q15 Min PRN, Breann Giron MD  •  dextrose (GLUTOSE) oral gel 15 g, 15 g, Oral, Q15 Min PRN, Breann Giron MD  •  Enoxaparin Sodium (LOVENOX) syringe 40 mg, 40 mg, Subcutaneous, Daily, Martha Golden APRN, 40 mg at 11/17/22 1752  •  furosemide (LASIX) tablet 40 mg, 40 mg, Oral, Daily, Salima Johnson PA-C, 40 mg at 11/18/22 0808  •  glucagon (human recombinant) (GLUCAGEN DIAGNOSTIC) injection 1 mg, 1 mg, Intramuscular, Q15 Min PRN, Breann Giron MD  •  HYDROcodone-acetaminophen (NORCO) 5-325 MG per tablet 2 tablet, 2 tablet, Oral, Q4H PRN, Martha Golden APRN, 2 tablet at 11/16/22 2141  •  insulin glargine (LANTUS, SEMGLEE) injection 15 Units, 15 Units, Subcutaneous, Nightly, Sammy Koch MD, 15 Units at 11/17/22 2202  •  insulin lispro (ADMELOG) injection 0-7 Units, 0-7 Units, Subcutaneous, TID AC, Breann Giron MD, 2  Units at 11/17/22 1148  •  insulin lispro (ADMELOG) injection 5 Units, 5 Units, Subcutaneous, TID With Meals, Sammy Koch MD, 5 Units at 11/18/22 0808  •  levothyroxine (SYNTHROID, LEVOTHROID) tablet 25 mcg, 25 mcg, Oral, Q AM, Martha Golden APRN, 25 mcg at 11/18/22 0656  •  magnesium hydroxide (MILK OF MAGNESIA) suspension 10 mL, 10 mL, Oral, Daily PRN, Martha Golden APRN, 10 mL at 11/14/22 0932  •  metoprolol succinate XL (TOPROL-XL) 24 hr tablet 50 mg, 50 mg, Oral, Q24H, Martha Golden APRN, 50 mg at 11/18/22 0808  •  midazolam (VERSED) injection 2 mg, 2 mg, Intravenous, Q1H PRN, Martha Golden APRN  •  morphine injection 4 mg, 4 mg, Intravenous, Q30 Min PRN, Martha Golden APRN  •  mupirocin (BACTROBAN) 2 % nasal ointment, , Each Nare, BID, Martha Golden APRN, 1 application at 11/18/22 0808  •  nitroglycerin (NITROSTAT) SL tablet 0.4 mg, 0.4 mg, Sublingual, Q5 Min PRN, Joanna Marie MD  •  ondansetron (ZOFRAN) injection 4 mg, 4 mg, Intravenous, Q6H PRN, Martha Golden APRN, 4 mg at 11/11/22 0417  •  oxyCODONE (ROXICODONE) immediate release tablet 10 mg, 10 mg, Oral, Q4H PRN, Martha Golden APRN, 10 mg at 11/13/22 2220  •  [COMPLETED] pantoprazole (PROTONIX) injection 40 mg, 40 mg, Intravenous, Once, 40 mg at 11/10/22 1658 **FOLLOWED BY** pantoprazole (PROTONIX) EC tablet 40 mg, 40 mg, Oral, QAM, Martha Golden APRN, 40 mg at 11/18/22 0656  •  polyethylene glycol (MIRALAX) packet 17 g, 17 g, Oral, Daily PRN, Martha Golden, GUEVARA, 17 g at 11/12/22 1901  •  potassium chloride (K-DUR,KLOR-CON) ER tablet 40 mEq, 40 mEq, Oral, PRN, 40 mEq at 11/16/22 1403 **OR** potassium chloride (KLOR-CON) packet 40 mEq, 40 mEq, Oral, PRN, Martha Golden, APRN  •  potassium chloride 10 mEq in 100 mL IVPB, 10 mEq, Intravenous, Q1H PRN **OR** potassium chloride 10 mEq in 100 mL IVPB, 10 mEq, Intravenous, Q1H PRN, Martha Golden, GUEVARA  •  potassium chloride 20 mEq in 50 mL IVPB, 20 mEq,  Intravenous, Q1H PRN, Martha Golden APRN  •  potassium chloride 20 mEq in 50 mL IVPB, 20 mEq, Intravenous, Q1H PRN, Martha Golden APRN  •  potassium chloride 20 mEq in 50 mL IVPB, 20 mEq, Intravenous, Q1H PRN, Martha Golden APRN  •  sennosides-docusate (PERICOLACE) 8.6-50 MG per tablet 2 tablet, 2 tablet, Oral, Nightly, Martha Golden APRN, 2 tablet at 11/17/22 2201     ASSESSMENT  Multivessel coronary artery disease s/p CABG postop day 7  Acute ST relation MI s/p angioplasty and stent   Acute Klebsiella UTI  Diabetes mellitus  Hypertension  Hyperlipidemia  Hypothyroidism  Dysarthria resolved  Gastroesophageal reflux disease    PLAN  CPM  Postop care  Post PCI care  Continue home medications  Stress ulcer DVT prophylaxis  Accu-Cheks sliding scale insulin  Supportive care  PT/OT  Discussed with nursing staff  Okay to discharge    DEE FRENCH MD

## 2022-11-19 ENCOUNTER — READMISSION MANAGEMENT (OUTPATIENT)
Dept: CALL CENTER | Facility: HOSPITAL | Age: 72
End: 2022-11-19

## 2022-11-19 ENCOUNTER — HOME HEALTH ADMISSION (OUTPATIENT)
Dept: HOME HEALTH SERVICES | Facility: HOME HEALTHCARE | Age: 72
End: 2022-11-19
Payer: MEDICARE

## 2022-11-19 VITALS
OXYGEN SATURATION: 96 % | HEART RATE: 76 BPM | WEIGHT: 218.5 LBS | BODY MASS INDEX: 34.29 KG/M2 | TEMPERATURE: 98.2 F | HEIGHT: 67 IN | SYSTOLIC BLOOD PRESSURE: 118 MMHG | RESPIRATION RATE: 16 BRPM | DIASTOLIC BLOOD PRESSURE: 77 MMHG

## 2022-11-19 LAB
ANION GAP SERPL CALCULATED.3IONS-SCNC: 11.5 MMOL/L (ref 5–15)
BUN SERPL-MCNC: 21 MG/DL (ref 8–23)
BUN/CREAT SERPL: 21.4 (ref 7–25)
CALCIUM SPEC-SCNC: 9.1 MG/DL (ref 8.6–10.5)
CHLORIDE SERPL-SCNC: 104 MMOL/L (ref 98–107)
CO2 SERPL-SCNC: 23.5 MMOL/L (ref 22–29)
CREAT SERPL-MCNC: 0.98 MG/DL (ref 0.57–1)
EGFRCR SERPLBLD CKD-EPI 2021: 61.5 ML/MIN/1.73
GLUCOSE BLDC GLUCOMTR-MCNC: 105 MG/DL (ref 70–130)
GLUCOSE BLDC GLUCOMTR-MCNC: 135 MG/DL (ref 70–130)
GLUCOSE SERPL-MCNC: 111 MG/DL (ref 65–99)
POTASSIUM SERPL-SCNC: 4.2 MMOL/L (ref 3.5–5.2)
SODIUM SERPL-SCNC: 139 MMOL/L (ref 136–145)

## 2022-11-19 PROCEDURE — 63710000001 INSULIN LISPRO (HUMAN) PER 5 UNITS: Performed by: HOSPITALIST

## 2022-11-19 PROCEDURE — 97530 THERAPEUTIC ACTIVITIES: CPT

## 2022-11-19 PROCEDURE — 99239 HOSP IP/OBS DSCHRG MGMT >30: CPT | Performed by: NURSE PRACTITIONER

## 2022-11-19 PROCEDURE — 82962 GLUCOSE BLOOD TEST: CPT

## 2022-11-19 PROCEDURE — 80048 BASIC METABOLIC PNL TOTAL CA: CPT | Performed by: NURSE PRACTITIONER

## 2022-11-19 RX ORDER — ASPIRIN 81 MG/1
81 TABLET ORAL DAILY
Qty: 30 TABLET | Refills: 0 | Status: SHIPPED | OUTPATIENT
Start: 2022-11-20 | End: 2022-12-15 | Stop reason: SDUPTHER

## 2022-11-19 RX ORDER — ATORVASTATIN CALCIUM 40 MG/1
40 TABLET, FILM COATED ORAL NIGHTLY
Qty: 90 TABLET | Refills: 0 | Status: SHIPPED | OUTPATIENT
Start: 2022-11-19

## 2022-11-19 RX ORDER — FUROSEMIDE 40 MG/1
40 TABLET ORAL DAILY
Qty: 30 TABLET | Refills: 0 | Status: SHIPPED | OUTPATIENT
Start: 2022-11-20 | End: 2022-12-15 | Stop reason: SDUPTHER

## 2022-11-19 RX ORDER — CLOPIDOGREL BISULFATE 75 MG/1
75 TABLET ORAL DAILY
Qty: 30 TABLET | Refills: 0 | Status: SHIPPED | OUTPATIENT
Start: 2022-11-20 | End: 2022-12-15 | Stop reason: SDUPTHER

## 2022-11-19 RX ORDER — BISACODYL 5 MG/1
10 TABLET, DELAYED RELEASE ORAL DAILY PRN
Qty: 30 TABLET | Refills: 0 | Status: SHIPPED | OUTPATIENT
Start: 2022-11-19 | End: 2023-03-06 | Stop reason: HOSPADM

## 2022-11-19 RX ORDER — METOPROLOL SUCCINATE 50 MG/1
50 TABLET, EXTENDED RELEASE ORAL
Qty: 30 TABLET | Refills: 0 | Status: SHIPPED | OUTPATIENT
Start: 2022-11-20 | End: 2022-12-15 | Stop reason: SDUPTHER

## 2022-11-19 RX ORDER — CYCLOBENZAPRINE HCL 10 MG
10 TABLET ORAL EVERY 8 HOURS PRN
Qty: 30 TABLET | Refills: 0 | Status: SHIPPED | OUTPATIENT
Start: 2022-11-19

## 2022-11-19 RX ADMIN — MUPIROCIN 1 APPLICATION: 20 OINTMENT TOPICAL at 08:21

## 2022-11-19 RX ADMIN — ASPIRIN 81 MG: 81 TABLET, COATED ORAL at 08:22

## 2022-11-19 RX ADMIN — PANTOPRAZOLE SODIUM 40 MG: 40 TABLET, DELAYED RELEASE ORAL at 06:03

## 2022-11-19 RX ADMIN — FUROSEMIDE 40 MG: 40 TABLET ORAL at 08:21

## 2022-11-19 RX ADMIN — LEVOTHYROXINE SODIUM 25 MCG: 0.03 TABLET ORAL at 06:03

## 2022-11-19 RX ADMIN — CLOPIDOGREL 75 MG: 75 TABLET, FILM COATED ORAL at 08:21

## 2022-11-19 RX ADMIN — METOPROLOL SUCCINATE 50 MG: 50 TABLET, FILM COATED, EXTENDED RELEASE ORAL at 08:21

## 2022-11-19 RX ADMIN — INSULIN LISPRO 5 UNITS: 100 INJECTION, SOLUTION INTRAVENOUS; SUBCUTANEOUS at 08:21

## 2022-11-19 NOTE — PROGRESS NOTES
LOS: 12 days   Patient Care Team:  Spencer Hua MD as PCP - General    Chief Complaint: post op    Subjective      Vital Signs  Temp:  [97.4 °F (36.3 °C)-98 °F (36.7 °C)] 97.6 °F (36.4 °C)  Heart Rate:  [66-76] 66  Resp:  [16] 16  BP: (110-128)/(62-70) 118/66  Body mass index is 34.22 kg/m².    Intake/Output Summary (Last 24 hours) at 11/19/2022 1049  Last data filed at 11/19/2022 0827  Gross per 24 hour   Intake 960 ml   Output 800 ml   Net 160 ml     I/O this shift:  In: 120 [P.O.:120]  Out: -             11/17/22  0500 11/18/22  0500 11/19/22  0600   Weight: 100 kg (221 lb 3.2 oz) 99.7 kg (219 lb 14.4 oz) 99.1 kg (218 lb 8 oz)         Objective    Results Review:        WBC WBC   Date Value Ref Range Status   11/18/2022 8.40 3.40 - 10.80 10*3/mm3 Final   11/17/2022 7.27 3.40 - 10.80 10*3/mm3 Final      HGB Hemoglobin   Date Value Ref Range Status   11/18/2022 10.1 (L) 12.0 - 15.9 g/dL Final   11/17/2022 10.2 (L) 12.0 - 15.9 g/dL Final      HCT Hematocrit   Date Value Ref Range Status   11/18/2022 32.3 (L) 34.0 - 46.6 % Final   11/17/2022 31.8 (L) 34.0 - 46.6 % Final      Platelets Platelets   Date Value Ref Range Status   11/18/2022 291 140 - 450 10*3/mm3 Final   11/17/2022 266 140 - 450 10*3/mm3 Final        PT/INR:  No results found for: PROTIME/No results found for: INR    Sodium Sodium   Date Value Ref Range Status   11/19/2022 139 136 - 145 mmol/L Final   11/18/2022 137 136 - 145 mmol/L Final   11/17/2022 140 136 - 145 mmol/L Final      Potassium Potassium   Date Value Ref Range Status   11/19/2022 4.2 3.5 - 5.2 mmol/L Final   11/18/2022 4.0 3.5 - 5.2 mmol/L Final   11/17/2022 4.6 3.5 - 5.2 mmol/L Final      Chloride Chloride   Date Value Ref Range Status   11/19/2022 104 98 - 107 mmol/L Final   11/18/2022 106 98 - 107 mmol/L Final   11/17/2022 109 (H) 98 - 107 mmol/L Final      Bicarbonate CO2   Date Value Ref Range Status   11/19/2022 23.5 22.0 - 29.0 mmol/L Final   11/18/2022 21.0 (L) 22.0 - 29.0  mmol/L Final   11/17/2022 19.1 (L) 22.0 - 29.0 mmol/L Final      BUN BUN   Date Value Ref Range Status   11/19/2022 21 8 - 23 mg/dL Final   11/18/2022 21 8 - 23 mg/dL Final   11/17/2022 25 (H) 8 - 23 mg/dL Final      Creatinine Creatinine   Date Value Ref Range Status   11/19/2022 0.98 0.57 - 1.00 mg/dL Final   11/18/2022 0.83 0.57 - 1.00 mg/dL Final   11/17/2022 0.74 0.57 - 1.00 mg/dL Final      Calcium Calcium   Date Value Ref Range Status   11/19/2022 9.1 8.6 - 10.5 mg/dL Final   11/18/2022 8.4 (L) 8.6 - 10.5 mg/dL Final   11/17/2022 8.9 8.6 - 10.5 mg/dL Final      Magnesium No results found for: MG       aspirin, 81 mg, Oral, Daily  atorvastatin, 40 mg, Oral, Nightly  chlorhexidine, 15 mL, Mouth/Throat, Q12H  clopidogrel, 75 mg, Oral, Daily  enoxaparin, 40 mg, Subcutaneous, Daily  furosemide, 40 mg, Oral, Daily  insulin glargine, 15 Units, Subcutaneous, Nightly  insulin lispro, 0-7 Units, Subcutaneous, TID AC  insulin lispro, 5 Units, Subcutaneous, TID With Meals  levothyroxine, 25 mcg, Oral, Q AM  metoprolol succinate XL, 50 mg, Oral, Q24H  mupirocin, , Each Nare, BID  pantoprazole, 40 mg, Oral, QAM  senna-docusate sodium, 2 tablet, Oral, Nightly      clevidipine, 2-32 mg/hr            Patient Active Problem List   Diagnosis Code   • Vitamin D deficiency E55.9   • Primary hypothyroidism E03.9   • Dyslipidemia E78.5   • Essential hypertension I10   • Type 2 diabetes mellitus without complication, with long-term current use of insulin ([facility]) E11.9, Z79.4   • Noncompliance with diabetes treatment Z91.199   • ST elevation myocardial infarction (STEMI) ([facility]) I21.3   • Coronary artery disease involving native heart I25.10       Assessment & Plan      Okay from our standpoint for discharge  Home health ordered  Follow up with our office on 12/21 at 9:15      GUEVARA Booth  11/19/22  10:49 EST

## 2022-11-19 NOTE — PROGRESS NOTES
[facility] to provide SN and PT per order in epic.  All info was verified with son prior to D/C.  Patient will be staying at son's at D/C at [phone] [phone].  Call [facility] to schedule visits at [phone].

## 2022-11-19 NOTE — THERAPY TREATMENT NOTE
Patient Name: Mayra Hirsch  : 1950    MRN: [id number]                              Today's Date: 2022       Admit Date: 2022    Visit Dx:     ICD-10-CM ICD-9-CM   1. ST elevation myocardial infarction (STEMI), unspecified artery (HCC)  I21.3 410.90   2. ST elevation myocardial infarction (STEMI) involving other coronary artery (HCC)  I21.29 410.10   3. Coronary artery disease involving native heart, unspecified vessel or lesion type, unspecified whether angina present  I25.10 414.01   4. Abnormal findings on diagnostic imaging of other specified body structures  R93.89 793.99   5. S/P CABG (coronary artery bypass graft)  Z95.1 V45.81     Patient Active Problem List   Diagnosis   • Vitamin D deficiency   • Primary hypothyroidism   • Dyslipidemia   • Essential hypertension   • Type 2 diabetes mellitus without complication, with long-term current use of insulin (HCC)   • Noncompliance with diabetes treatment   • ST elevation myocardial infarction (STEMI) (HCC)   • Coronary artery disease involving native heart     Past Medical History:   Diagnosis Date   • Depression    • Diabetes mellitus (HCC)    • Disease of thyroid gland    • Fibrocystic breast    • Hypertension    • Hypothyroidism    • PONV (postoperative nausea and vomiting)    • Type 2 diabetes mellitus (HCC)      Past Surgical History:   Procedure Laterality Date   • BREAST EXCISIONAL BIOPSY Right     at age 22; benign.   • CARDIAC CATHETERIZATION Left 2022    Procedure: Cardiac Catheterization/Vascular Study;  Surgeon: Joanna Marie MD;  Location: [facility] CATH INVASIVE LOCATION;  Service: Cardiology;  Laterality: Left;   • CARDIAC CATHETERIZATION N/A 2022    Procedure: Percutaneous Coronary Intervention;  Surgeon: Joanna Marie MD;  Location: [facility] CATH INVASIVE LOCATION;  Service: Cardiology;  Laterality: N/A;   • CARDIAC CATHETERIZATION N/A 2022    Procedure: Left ventriculography;  Surgeon:  Joanna Marie MD;  Location: [facility] CATH INVASIVE LOCATION;  Service: Cardiology;  Laterality: N/A;   • CARDIAC CATHETERIZATION N/A 2022    Procedure: Stent MARTINEZ coronary;  Surgeon: Joanna Marie MD;  Location: [facility] CATH INVASIVE LOCATION;  Service: Cardiology;  Laterality: N/A;   • CARDIAC CATHETERIZATION N/A 2022    Procedure: Left Heart Cath;  Surgeon: Joanna Marie MD;  Location: [facility] CATH INVASIVE LOCATION;  Service: Cardiology;  Laterality: N/A;   •  SECTION     • CORONARY ARTERY BYPASS GRAFT N/A 11/10/2022    Procedure: NIKKY, CORONARY ARTERY BYPASS GRAFTING TIMES 6 WITH LEFT JORDYN AND ENDOSCOPICALLY HARVESTED LEFT AND RIGHT GREATER SAPHENOUS VEIN, PRP TRANSESOPHAGEAL ECHOCARDIOGRAM WITH ANESTHESIA;  Surgeon: Jr Dong Isabel MD;  Location: [facility];  Service: Cardiothoracic;  Laterality: N/A;   • HAND SURGERY     • KNEE SURGERY     • OOPHORECTOMY      1 ovary removed due to ectopic pregnancy      General Information     Row Name 22          Physical Therapy Time and Intention    Document Type therapy note (daily note)  -SV     Mode of Treatment individual therapy;physical therapy  -SV     Row Name 22          General Information    Patient Profile Reviewed yes  -SV           User Key  (r) = Recorded By, (t) = Taken By, (c) = Cosigned By    Initials Name Provider Type    SV Geneva Chiang, PT Physical Therapist               Mobility     Row Name 22          Bed Mobility    Comment, (Bed Mobility) uic  -SV     Row Name 22          Sit-Stand Transfer    Sit-Stand [place] (Transfers) verbal cues;standby assist  -SV     Comment, (Sit-Stand Transfer) no rwx and with rwx  -SV     Row Name 2239          Gait/Stairs (Locomotion)    [place] Level (Gait) contact guard;standby assist  -SV     Distance in Feet (Gait) 100' shuffle pattern seated rest before steps then another 100', soa noted , guarded but  no LOB  -SV           User Key  (r) = Recorded By, (t) = Taken By, (c) = Cosigned By    Initials Name Provider Type    Geneva Grajeda, PT Physical Therapist               Obj/Interventions     Row Name 11/19/22 0946          Motor Skills    Therapeutic Exercise --  5 reps cardiac ex, rev MEP/Walking and overexertion signs  -SV           User Key  (r) = Recorded By, (t) = Taken By, (c) = Cosigned By    Initials Name Provider Type    Geneva Grajeda, PT Physical Therapist               Goals/Plan    No documentation.                Clinical Impression     Row Name 11/19/22 0947          Pain    Pretreatment Pain Rating 0/10 - no pain  -SV     Row Name 11/19/22 0947          Vital Signs    Pre Systolic BP Rehab 118  -SV     Pre Treatment Diastolic BP 66  -SV     Post Systolic BP Rehab 139  -SV     Post Treatment Diastolic BP 73  -SV     Pretreatment Heart Rate (beats/min) 66  -SV     Posttreatment Heart Rate (beats/min) 87  -SV     Pre SpO2 (%) 96  -SV     O2 Delivery Pre Treatment room air  -SV     Post SpO2 (%) 100  -SV     O2 Delivery Post Treatment room air  -SV     Pre Patient Position Sitting  -SV     Intra Patient Position Standing  -SV     Post Patient Position Sitting  -SV     Row Name 11/19/22 0947          Positioning and Restraints    Post Treatment Position chair  -SV     In Bed notified nsg;call light within reach;encouraged to call for assist  -SV           User Key  (r) = Recorded By, (t) = Taken By, (c) = Cosigned By    Initials Name Provider Type    Geneva Grajeda, PT Physical Therapist               Outcome Measures     Row Name 11/19/22 0949          How much help from another person do you currently need...    Turning from your back to your side while in flat bed without using bedrails? 4  -SV     Moving from lying on back to sitting on the side of a flat bed without bedrails? 4  -SV     Moving to and from a bed to a chair (including a wheelchair)? 4  -SV     Standing up from a chair  using your arms (e.g., wheelchair, bedside chair)? 4  -SV     Climbing 3-5 steps with a railing? 3  -SV     To walk in hospital room? 3  -SV     AM-PAC 6 Clicks Score (PT) 22  -SV     Highest level of mobility 7 --> Walked 25 feet or more  -SV           User Key  (r) = Recorded By, (t) = Taken By, (c) = Cosigned By    Initials Name Provider Type    SV Geneva Chiang, PT Physical Therapist                             Physical Therapy Education     Title: PT OT SLP Therapies (In Progress)     Topic: Physical Therapy (In Progress)     Point: Mobility training (In Progress)     Learning Progress Summary           Patient Acceptance, E, NR by  at 11/18/2022 0959    Acceptance, E, VU by SM at 11/17/2022 1123    Acceptance, E,TB, VU by  at 11/15/2022 1302    Acceptance, E, VU,NR by RACHEL at 11/14/2022 1211    Acceptance, E, VU by  at 11/13/2022 1009    Acceptance, E, VU by  at 11/12/2022 1235    Acceptance, E, VU,NR by MILTON at 11/11/2022 1049                   Point: Home exercise program (In Progress)     Learning Progress Summary           Patient Acceptance, E, NR by  at 11/18/2022 0959    Acceptance, E, VU by SM at 11/17/2022 1123    Acceptance, E, VU,NR by RACHEL at 11/14/2022 1211    Acceptance, E, VU by  at 11/13/2022 1009    Acceptance, E, VU by  at 11/12/2022 1235    Acceptance, E, VU,NR by DJ at 11/11/2022 1049                   Point: Body mechanics (In Progress)     Learning Progress Summary           Patient Acceptance, E, NR by  at 11/18/2022 0959    Acceptance, E, VU by SM at 11/17/2022 1123    Acceptance, E, VU,NR by RACHEL at 11/14/2022 1211    Acceptance, E, VU by  at 11/13/2022 1009    Acceptance, E, VU by  at 11/12/2022 1235    Acceptance, E, VU,NR by DJ at 11/11/2022 1049                   Point: Precautions (In Progress)     Learning Progress Summary           Patient Acceptance, E, NR by  at 11/18/2022 0959    Acceptance, E, VU by ANGÉLICA at 11/17/2022 1123    MILLIE Lin, TUTU by ND at 11/16/2022  1132    Acceptance, E, VU,NR by  at 11/14/2022 1211    Acceptance, E, VU by  at 11/13/2022 1009    Acceptance, E, VU by  at 11/12/2022 1235    Acceptance, E, VU,NR by  at 11/11/2022 1049                               User Key     Initials Effective Dates Name Provider Type Discipline     06/16/21 -  Geraldine Boyd, PT Physical Therapist PT    LB 06/16/21 -  Zuleyka Smith, PT Physical Therapist PT     06/16/21 -  Tiffanie Infante, PT Physical Therapist PT    CH 06/16/21 -  Jaxson Gaffney, PT Physical Therapist PT    DJ 10/25/19 -  Sravani Downs, PT Physical Therapist PT     05/02/22 -  Mirna Parra, PT Physical Therapist PT    ND 10/24/22 -  Lindsey Pike, PT Student PT Student PT              PT Recommendation and Plan           Time Calculation:    PT Charges     Row Name 11/19/22 0953             Time Calculation    Start Time 0918  -SV      Stop Time 0939  -SV      Time Calculation (min) 21 min  -SV      PT Received On 11/19/22  -SV      PT - Next Appointment 11/20/22  -            User Key  (r) = Recorded By, (t) = Taken By, (c) = Cosigned By    Initials Name Provider Type    SV Geneva Chiang, PT Physical Therapist              Therapy Charges for Today     Code Description Service Date Service Provider Modifiers Qty    91819682531 HC PT THERAPEUTIC ACT EA 15 MIN 11/19/2022 Geneva Chiang, PT GP 1          PT G-Codes  Outcome Measure Options: AM-PAC 6 Clicks Basic Mobility (PT)  AM-PAC 6 Clicks Score (PT): 22  AM-PAC 6 Clicks Score (OT): 19       Geneva Chiang PT  11/19/2022

## 2022-11-19 NOTE — DISCHARGE SUMMARY
[facility]      Patient Name: Mayra Hirsch  :1950  72 y.o.  LOS: 12  Encounter Provider: GUEVARA Eason      Date of Admission: 2022    Date of Discharge:  2022    Treatment Team:  Patient Care Team:  Spencer Hua MD as PCP - General    Discharge Condition: Stable  Discharge Diagnosis:    ST elevation myocardial infarction (STEMI) (HCC)    Dyslipidemia    Essential hypertension    Type 2 diabetes mellitus without complication, with long-term current use of insulin (HCC)    Coronary artery disease involving native heart      History of Present Illness:  Mayra Hirsch is a 72 y.o. female who was admitted on 2022 with dyspnea and productive cough.  Patient with known history to include hypertension, diabetes, depression, hypothyroidism.    Hospital Course:   Upon arrival to [facility] patient was found to have STEMI.  She underwent angioplasty and stent to the diagonal branch.  She has significant diffuse distal disease and left main disease.  Patient was consulted by CT surgery and recommended revascularization.  LIMA to LAD, SVG to RCA, SVG to OM1, SVG to OM 2, SVG to diagonal and LAD.  On 11/10/2022 patient underwent CABG x5 vessels with Skeletonized LIMA to LAD.  Vein graft to RCA.  Vein graft to OM1.  T graft vein graft to OM 2.  Vein graft to diagonal branch the LAD.  Patient made steady progress in the postoperative setting.  All wires and tubes were discharged in a timely fashion.  On date of discharge patient was able to climb steps with the assistance of physical therapy.  Patient will be discharged home with her son as well as home health therapy.      Objective:  Temp:  [97.4 °F (36.3 °C)-98.2 °F (36.8 °C)] 98.2 °F (36.8 °C)  Heart Rate:  [66-76] 76  Resp:  [16] 16  BP: (110-128)/(62-77) 118/77    Intake/Output Summary (Last 24 hours) at 2022 1059  Last data filed at 2022 0827  Gross per 24 hour   Intake 960 ml   Output  700 ml   Net 260 ml     Body mass index is 34.22 kg/m².      11/17/22  0500 11/18/22  0500 11/19/22  0600   Weight: 100 kg (221 lb 3.2 oz) 99.7 kg (219 lb 14.4 oz) 99.1 kg (218 lb 8 oz)     Weight change: -0.635 kg (-1 lb 6.4 oz)    Vitals and nursing note reviewed.   Constitutional:       Appearance: Normal appearance. Well-developed.   Eyes:      Conjunctiva/sclera: Conjunctivae normal.   Neck:      Vascular: No carotid bruit.   Pulmonary:      Breath sounds: Normal breath sounds.   Cardiovascular:      Normal rate. Regular rhythm. Normal S1 with normal intensity. Normal S2 with normal intensity.      Murmurs: There is no murmur.      No gallop. No click. No rub.      Comments: Midsternal incision is well approximated without signs of wound dehiscence, erythema, soft tissue swelling, drainage.    Edema:     Peripheral edema absent.   Musculoskeletal: Normal range of motion. Skin:     General: Skin is warm and dry.   Neurological:      Mental Status: Alert and oriented to person, place, and time.      GCS: GCS eye subscore is 4. GCS verbal subscore is 5. GCS motor subscore is 6.   Psychiatric:         Speech: Speech normal.         Behavior: Behavior normal.         Thought Content: Thought content normal.         Judgment: Judgment normal.         Procedures Performed:  Procedure(s):  NIKKY, CORONARY ARTERY BYPASS GRAFTING TIMES 6 WITH LEFT JORDYN AND ENDOSCOPICALLY HARVESTED LEFT AND RIGHT GREATER SAPHENOUS VEIN, PRP TRANSESOPHAGEAL ECHOCARDIOGRAM WITH ANESTHESIA         Pertinent Test Results:  Results from last 7 days   Lab Units 11/19/22  0306 11/18/22  0317 11/17/22  0313 11/16/22  0304 11/15/22  0259 11/14/22  0255 11/13/22  0310   SODIUM mmol/L 139 137 140 137 139 133* 135*   POTASSIUM mmol/L 4.2 4.0 4.6 3.6 4.4 4.4 4.3   CHLORIDE mmol/L 104 106 109* 105 107 104 104   CO2 mmol/L 23.5 21.0* 19.1* 22.2 21.3* 17.7* 19.0*   BUN mg/dL 21 21 25* 34* 47* 49* 42*   CREATININE mg/dL 0.98 0.83 0.74 0.82 1.02* 1.09* 1.33*  "  GLUCOSE mg/dL 111* 151* 121* 114* 126* 154* 140*   CALCIUM mg/dL 9.1 8.4* 8.9 8.7 9.0 8.5* 8.4*         Results from last 7 days   Lab Units 11/18/22  0317 11/17/22  0313 11/16/22  0304 11/15/22  0259 11/14/22  0255 11/13/22  0310   WBC 10*3/mm3 8.40 7.27 6.69 7.23 7.99 8.73   HEMOGLOBIN g/dL 10.1* 10.2* 9.8* 9.6* 10.0* 9.2*   HEMATOCRIT % 32.3* 31.8* 31.8* 29.7* 31.2* 28.0*   PLATELETS 10*3/mm3 291 266 251 202 165 146                   Invalid input(s): LDLCALC            Discharge Diet:    Dietary Orders (From admission, onward)     Start     Ordered    11/15/22 0126  Diet Regular Texture (IDDSI 7); Regular Consistency; Cardiac Diets, Diabetic Diets; Healthy Heart (2-3 Na+); Consistent Carbohydrate  Diet Effective Now        Comments: Comments entered here are not received by the  Department and are not considered part of the interpreted diet order. Please use the \"DIET MESSAGE\" order to communicate additional instructions to the diet office. If necessary, consult a registered dietitian.   References:    Diet Order [place]   Question Answer Comment   Texture: Regular Texture (IDDSI 7)    Fluid Consistency: Regular Consistency    Diets: Cardiac Diets    Diets: Diabetic Diets    Cardiac Diet: Healthy Heart (2-3 Na+)    Diabetic Diet: Consistent Carbohydrate        11/15/22 0126                Activity at Discharge:       1) No driving for 2 weeks and no longer taking narcotics.   2) May shower   3) Do not lift / push / pull more then 10 lbs.  4) Walk at least 10 minutes at lease 3 times daily    Instructions: Clean incision daily with soap and water, otherwise keep clean and dry. Remove chest tube sutures prior to discharge. Wear YANDEL hose daily for 2 weeks, may remove at night.       Discharge disposition: home     Discharge Medications:     Discharge Medications      New Medications      Instructions Start Date   aspirin 81 MG EC tablet   81 mg, Oral, Daily   Start Date: November 20, 2022   "   bisacodyl 5 MG EC tablet  Commonly known as: DULCOLAX   10 mg, Oral, Daily PRN      clopidogrel 75 MG tablet  Commonly known as: PLAVIX   75 mg, Oral, Daily   Start Date: November 20, 2022     cyclobenzaprine 10 MG tablet  Commonly known as: FLEXERIL   10 mg, Oral, Every 8 Hours PRN      furosemide 40 MG tablet  Commonly known as: LASIX   40 mg, Oral, Daily   Start Date: November 20, 2022     HYDROcodone-acetaminophen 5-325 MG per tablet  Commonly known as: NORCO   1 tablet, Oral, Every 4 Hours PRN      metoprolol succinate XL 50 MG 24 hr tablet  Commonly known as: TOPROL-XL   50 mg, Oral, Every 24 Hours Scheduled   Start Date: November 20, 2022        Changes to Medications      Instructions Start Date   atorvastatin 40 MG tablet  Commonly known as: LIPITOR  What changed:   · medication strength  · how much to take  · when to take this   40 mg, Oral, Nightly         Continue These Medications      Instructions Start Date   ergocalciferol 1.25 MG (55487 UT) capsule  Commonly known as: ERGOCALCIFEROL   1 capsule, Oral, Weekly      ezetimibe 10 MG tablet  Commonly known as: ZETIA   TAKE 1 TABLET DAILY      Farxiga 10 MG tablet  Generic drug: dapagliflozin Propanediol   TAKE 1 TABLET EVERY MORNING      FreeStyle Gene 2 [place] Systm device   1 Device, Does not apply, Daily      Januvia 100 MG tablet  Generic drug: SITagliptin   TAKE 1 TABLET DAILY      levothyroxine 125 MCG tablet  Commonly known as: SYNTHROID, LEVOTHROID   TAKE 2 TABLETS DAILY      NovoFine 32G X 6 MM misc  Generic drug: Insulin Pen Needle   USE FOR DAILY INSULIN INJECTION      PROZAC PO   40 mg, Oral      Toujeo SoloStar 300 UNIT/ML solution pen-injector injection  Generic drug: Insulin Glargine (1 Unit Dial)   36 Units, Subcutaneous, Daily         ASK your doctor about these medications      Instructions Start Date   FreeStyle Gene 2 Sensor misc  Ask about: Should I take this medication?   1 each, Subcutaneous, Daily      lisinopril 20 MG  tablet  Commonly known as: PRINIVIL,ZESTRIL   20 mg, Oral, Daily               Follow-up:   Contact information for follow-up providers     [facility] .    Specialty: Cardiac Rehabilitation  Contact information:  [address]  [facility] [id number] [phone]           Spencer Hua MD .    Specialty: Internal Medicine  Why: in 1 week  Contact information:  [address]  [address]  [facility] [address]  [phone]             Joanna Marie MD Follow up.    Specialty: Cardiology  Why: Follow-up with GUEVARA in 1 week, then follow-up with Dr. Marie in 1 month.  Contact information:  [address]  [facility] [address]  [phone]                   Contact information for after-discharge care     Durable Medical Equipment     [facility] .    Service: Durable Medical Equipment  Contact information:  [address]  [facility] [address]  [phone]                 Home Medical Care     [facility] .    Service: Home Health Services  Contact information:  [address]  [facility] [id number] [phone]                             Future Appointments   Date Time Provider Department Center   12/21/2022  9:15 AM Le Edwards APRN [facility]     Additional Instructions for the Follow-ups that You Need to Schedule     Ambulatory Referral to Cardiac Rehab   As directed      Ambulatory Referral to Home Health   As directed      Face to Face Visit Date: 11/18/2022    Follow-up provider for Plan of Care?: I will be treating the patient on an ongoing basis.  Please send me the Plan of Care for signature.    Follow-up provider: JR JILLIAN ANDREWS [[id number]]    Reason/Clinical Findings: post op open heart    Describe mobility limitations that make leaving home difficult: weakness    Nursing/Therapeutic Services Requested: Skilled Nursing Physical Therapy    Skilled  nursing orders: Post CABG care    PT orders: Strengthening    Frequency: 1 Week 1         Call MD With Problems / Concerns   As directed      Instructions:  Call office at [phone] for any drainage, increased redness, or fever over 100.5    Order Comments: Instructions:  Call office at [phone] for any drainage, increased redness, or fever over 100.5          Discharge Follow-up with PCP   As directed       Currently Documented PCP:    Spencer Hua MD    PCP Phone Number:    [phone]     Follow Up Details: in 1 week         Discharge Follow-up with Specialty: Cardiologist APRN/PA; 1 Week   As directed      Specialty: Cardiologist APRN/PA    Follow Up: 1 Week    Follow Up Details: bring all prescription bottles to appointment, call for appointment         Discharge Follow-up with Specified Provider: Cardiologist; 1 Month   As directed      To: Cardiologist    Follow Up: 1 Month    Follow Up Details: call for appointment, bring all medication bottles to appointment         Discharge Follow-up with Specified Provider: Le WATERMAN 12/21 at 9:50   As directed      To: Le WATERMAN 12/21 at 9:50    Follow Up Details: 4-6 weeks, bring all current medications to appointment               Time Spent on Discharge:  Greater than 30 min    Yusra Larios, GUEVARA  11/19/22  10:59 EST      EMR Dragon/Transcription disclaimer:   Much of this encounter note is an electronic transcription/translation of spoken language to printed text. The electronic translation of spoken language may permit erroneous, or at times, nonsensical words or phrases to be inadvertently transcribed; Although I have reviewed the note for such errors, some may still exist.

## 2022-11-19 NOTE — DISCHARGE INSTRUCTIONS
1) No driving for 2 weeks and no longer taking narcotics.   2) May shower   3) Do not lift / push / pull more then 10 lbs.  4) Walk at least 10 minutes at lease 3 times daily    Instructions: Clean incision daily with soap and water, otherwise keep clean and dry. Remove chest tube sutures prior to discharge. Wear YANDEL hose daily for 2 weeks, may remove at night.

## 2022-11-19 NOTE — PLAN OF CARE
Goal Outcome Evaluation:   AOX4, VSS, NSR, RA. Standby assist. Pain treated per MAR. D/c home today with son after PT eval with steps.

## 2022-11-19 NOTE — PROGRESS NOTES
"Daily progress note    Referring physician  Dr. BECKFORD    Chief complaint  Doing much better with no new complaints and wants to go home.      History of present illness  71-year-old white female with history of diabetes hypertension hyperlipidemia hypothyroidism presented to [facility] emergency room with sudden onset of shortness of breath as she woke up with it.  Patient has no chest pain palpitation but does have nonproductive cough but no fever chills.  Patient found to be hypoxic while EMS arrived and brought to the emergency room which she found to have acute ST elevation MI and taken to the Cath Lab which showed multivessel coronary disease  angioplasty and stent placed to diagonal branch and I am asked to follow patient for medical problem.  At the time of review she is feeling better and has no more shortness of breath and also denies any chest pain.  Patient feels weak but feeling much better after angioplasty.     REVIEW OF SYSTEMS  Unremarkable     PHYSICAL EXAM         Blood pressure 118/77, pulse 76, temperature 98.2 °F (36.8 °C), temperature source Oral, resp. rate 16, height 170.2 cm (67\"), weight 99.1 kg (218 lb 8 oz), SpO2 96 %, not currently breastfeeding.    Constitutional:       General: She is not in acute distress.  HENT:      Head: Normocephalic and atraumatic.   Eyes:      Extraocular Movements: EOM normal.      Pupils: Pupils are equal, round, and reactive to light.   Cardiovascular:      Rate and Rhythm: Normal rate and regular rhythm.      Heart sounds: Normal heart sounds.   Pulmonary:      Effort: Pulmonary effort is normal. No respiratory distress.      Breath sounds: Moving air bilaterally  Abdominal:      Palpations: Abdomen is soft.      Tenderness: There is no abdominal tenderness. There is no guarding or rebound.   Musculoskeletal:         General: No edema. Normal range of motion.      Cervical back: Normal range of motion and neck supple.   Skin:     General: Skin is warm " and dry.      Findings: No rash.   Neurological:      Mental Status: She is alert and oriented to person, place, and time.      Sensory: Sensation is intact.      Motor: Motor strength is normal.   Psychiatric:         Mood and Affect: Mood and affect normal.      LAB RESULTS  Lab Results (last 24 hours)     Procedure Component Value Units Date/Time    POC Glucose Once [[id number]]  (Abnormal) Collected: 11/19/22 1045    Specimen: Blood Updated: 11/19/22 1046     Glucose 135 mg/dL      Comment: Meter: [id number] : [id number] [facility]       POC Glucose Once [[id number]]  (Normal) Collected: 11/19/22 0551    Specimen: Blood Updated: 11/19/22 0552     Glucose 105 mg/dL      Comment: Meter: [id number] : [id number] Naida No CNA       Basic Metabolic Panel [[id number]]  (Abnormal) Collected: 11/19/22 0306    Specimen: Blood Updated: 11/19/22 0423     Glucose 111 mg/dL      BUN 21 mg/dL      Creatinine 0.98 mg/dL      Sodium 139 mmol/L      Potassium 4.2 mmol/L      Chloride 104 mmol/L      CO2 23.5 mmol/L      Calcium 9.1 mg/dL      BUN/Creatinine Ratio 21.4     Anion Gap 11.5 mmol/L      eGFR 61.5 mL/min/1.73      Comment: National Kidney Foundation and American Society of Nephrology (ASN) Task Force recommended calculation based on the Chronic Kidney Disease Epidemiology Collaboration (CKD-EPI) equation refit without adjustment for race.       Narrative:      GFR Normal >60  Chronic Kidney Disease <60  Kidney Failure <15    The GFR formula is only valid for adults with stable renal function between ages 18 and 70.    POC Glucose Once [[id number]]  (Abnormal) Collected: 11/18/22 2014    Specimen: Blood Updated: 11/18/22 2016     Glucose 186 mg/dL      Comment: Meter: [id number] : [id number] Naida No CNA       POC Glucose Once [[id number]]  (Normal) Collected: 11/18/22 1548    Specimen: Blood Updated: 11/18/22 1549     Glucose 86 mg/dL      Comment: Meter: [id number] : [id number] Guy Sara NAZIA            Imaging Results (Last 24 Hours)     ** No results found for the last 24 hours. **           ECG 12 Lead          Component Ref Range & Units 01:44    QT Interval ms 358 P    Resulting Agency   ECG             HEART RATE= 116  bpm  RR Interval= 517  ms  UT Interval= 110  ms  P Horizontal Axis= -49  deg  P Front Axis= 64  deg  QRSD Interval= 97  ms  QT Interval= 358  ms  QRS Axis= 18  deg  T Wave Axis= 130  deg  - ABNORMAL ECG -  Sinus tachycardia  Probable left atrial enlargement  Lateral infarct, acute (LAD)  Probable anteroseptal infarct, recent             Current Facility-Administered Medications:   •  acetaminophen (TYLENOL) tablet 650 mg, 650 mg, Oral, Q4H PRN, 650 mg at 11/18/22 2029 **OR** acetaminophen (TYLENOL) 160 MG/5ML solution 650 mg, 650 mg, Oral, Q4H PRN **OR** acetaminophen (TYLENOL) suppository 650 mg, 650 mg, Rectal, Q4H PRN, Martha Golden APRN  •  ALPRAZolam (XANAX) tablet 0.25 mg, 0.25 mg, Oral, Q8H PRN, Martha Golden APRN, 0.25 mg at 11/17/22 2202  •  aspirin EC tablet 81 mg, 81 mg, Oral, Daily, Martha Golden APRN, 81 mg at 11/19/22 0822  •  atorvastatin (LIPITOR) tablet 40 mg, 40 mg, Oral, Nightly, Martha Golden, APRN, 40 mg at 11/18/22 2029  •  bisacodyl (DULCOLAX) EC tablet 10 mg, 10 mg, Oral, Daily PRN, Martha Golden APRN, 10 mg at 11/14/22 2154  •  bisacodyl (DULCOLAX) suppository 10 mg, 10 mg, Rectal, Daily PRN, Martha Golden APRN  •  chlorhexidine (PERIDEX) 0.12 % solution 15 mL, 15 mL, Mouth/Throat, Q12H, Martha Golden APRN, 15 mL at 11/18/22 0809  •  clevidipine (CLEVIPREX) infusion 0.5 mg/mL, 2-32 mg/hr, Intravenous, Continuous PRN, Martha Golden APRN  •  clopidogrel (PLAVIX) tablet 75 mg, 75 mg, Oral, Daily, Le Edwards APRN, 75 mg at 11/19/22 0821  •  cyclobenzaprine (FLEXERIL) tablet 10 mg, 10 mg, Oral, Q8H PRN, Martha Golden APRN, 10 mg at 11/17/22 2201  •  dextrose (D50W) (25 g/50 mL) IV injection 25 g, 25 g, Intravenous, Q15 Min  PRN, Breann Giron MD  •  dextrose (GLUTOSE) oral gel 15 g, 15 g, Oral, Q15 Min PRN, Breann Giron MD  •  Enoxaparin Sodium (LOVENOX) syringe 40 mg, 40 mg, Subcutaneous, Daily, Martha Golden APRN, 40 mg at 11/18/22 1724  •  furosemide (LASIX) tablet 40 mg, 40 mg, Oral, Daily, Salima Johnson PA-C, 40 mg at 11/19/22 0821  •  glucagon (human recombinant) (GLUCAGEN DIAGNOSTIC) injection 1 mg, 1 mg, Intramuscular, Q15 Min PRN, Breann Giron MD  •  HYDROcodone-acetaminophen (NORCO) 5-325 MG per tablet 2 tablet, 2 tablet, Oral, Q4H PRN, Martha Golden APRN, 2 tablet at 11/16/22 2141  •  insulin glargine (LANTUS, SEMGLEE) injection 15 Units, 15 Units, Subcutaneous, Nightly, Sammy Koch MD, 15 Units at 11/18/22 2030  •  insulin lispro (ADMELOG) injection 0-7 Units, 0-7 Units, Subcutaneous, TID AC, Breann Giron MD, 2 Units at 11/17/22 1148  •  insulin lispro (ADMELOG) injection 5 Units, 5 Units, Subcutaneous, TID With Meals, Sammy Koch MD, 5 Units at 11/19/22 0821  •  levothyroxine (SYNTHROID, LEVOTHROID) tablet 25 mcg, 25 mcg, Oral, Q AM, Martha Golden APRN, 25 mcg at 11/19/22 0603  •  magnesium hydroxide (MILK OF MAGNESIA) suspension 10 mL, 10 mL, Oral, Daily PRN, Martha Golden APRN, 10 mL at 11/14/22 0932  •  metoprolol succinate XL (TOPROL-XL) 24 hr tablet 50 mg, 50 mg, Oral, Q24H, Martha Golden APRN, 50 mg at 11/19/22 0821  •  midazolam (VERSED) injection 2 mg, 2 mg, Intravenous, Q1H PRN, Martha Golden APRN  •  morphine injection 4 mg, 4 mg, Intravenous, Q30 Min PRN, Martha Golden APRN  •  mupirocin (BACTROBAN) 2 % nasal ointment, , Each Nare, BID, Martha Golden APRN, 1 application at 11/19/22 0821  •  nitroglycerin (NITROSTAT) SL tablet 0.4 mg, 0.4 mg, Sublingual, Q5 Min PRN, Joanna Marie MD  •  ondansetron (ZOFRAN) injection 4 mg, 4 mg, Intravenous, Q6H PRN, Martha Golden APRN, 4 mg at 11/11/22 0417  •  oxyCODONE (ROXICODONE) immediate release  tablet 10 mg, 10 mg, Oral, Q4H PRN, Martha Golden APRN, 10 mg at 11/18/22 2237  •  [COMPLETED] pantoprazole (PROTONIX) injection 40 mg, 40 mg, Intravenous, Once, 40 mg at 11/10/22 1658 **FOLLOWED BY** pantoprazole (PROTONIX) EC tablet 40 mg, 40 mg, Oral, QAM, Martha Golden APRN, 40 mg at 11/19/22 0603  •  polyethylene glycol (MIRALAX) packet 17 g, 17 g, Oral, Daily PRN, Martha Golden APRN, 17 g at 11/12/22 1901  •  potassium chloride (K-DUR,KLOR-CON) ER tablet 40 mEq, 40 mEq, Oral, PRN, 40 mEq at 11/16/22 1403 **OR** potassium chloride (KLOR-CON) packet 40 mEq, 40 mEq, Oral, PRN, Martha Golden APRN  •  potassium chloride 10 mEq in 100 mL IVPB, 10 mEq, Intravenous, Q1H PRN **OR** potassium chloride 10 mEq in 100 mL IVPB, 10 mEq, Intravenous, Q1H PRN, Martha Golden, APRN  •  potassium chloride 20 mEq in 50 mL IVPB, 20 mEq, Intravenous, Q1H PRN, Martha Golden APRN  •  potassium chloride 20 mEq in 50 mL IVPB, 20 mEq, Intravenous, Q1H PRN, Martha Golden, APRN  •  potassium chloride 20 mEq in 50 mL IVPB, 20 mEq, Intravenous, Q1H PRN, Martha Golden APRN  •  sennosides-docusate (PERICOLACE) 8.6-50 MG per tablet 2 tablet, 2 tablet, Oral, Nightly, Martha Golden APRN, 2 tablet at 11/18/22 2029     ASSESSMENT  Multivessel coronary artery disease s/p CABG postop day 7  Acute ST relation MI s/p angioplasty and stent   Acute Klebsiella UTI  Diabetes mellitus  Hypertension  Hyperlipidemia  Hypothyroidism  Dysarthria resolved  Gastroesophageal reflux disease    PLAN  CPM  Postop care  Post PCI care  Continue home medications  Stress ulcer DVT prophylaxis  Accu-Cheks sliding scale insulin  Supportive care  PT/OT  Discussed with nursing staff  Okay to discharge    DEECHARIS PHILLIPSMED MD

## 2022-11-19 NOTE — PLAN OF CARE
Goal Outcome Evaluation:      Pt d/c plan is home with son and HH. Pt amb with her rwx 100' shuffle pattern vc to PLB with seated rest prior to steps with min/cga of 1 rail on left descending. She amb another 100' back to room with cga/sba using rwx. 5 reps cardiac ex. Education performed on signs and symptoms, MEP/walking. Pt vitals recorded and stable following activity. Pt tolerated level 3 cardiac program. REcommend pt remains on first floor until pt endurance improves. FAll risk. Up with family asst until HHPT eval.

## 2022-11-20 ENCOUNTER — HOME CARE VISIT (OUTPATIENT)
Dept: HOME HEALTH SERVICES | Facility: HOME HEALTHCARE | Age: 72
End: 2022-11-20
Payer: MEDICARE

## 2022-11-20 VITALS
WEIGHT: 215 LBS | DIASTOLIC BLOOD PRESSURE: 64 MMHG | TEMPERATURE: 97.3 F | RESPIRATION RATE: 20 BRPM | HEIGHT: 68 IN | BODY MASS INDEX: 32.58 KG/M2 | OXYGEN SATURATION: 98 % | HEART RATE: 80 BPM | SYSTOLIC BLOOD PRESSURE: 112 MMHG

## 2022-11-20 PROCEDURE — G0299 HHS/HOSPICE OF RN EA 15 MIN: HCPCS

## 2022-11-20 NOTE — OUTREACH NOTE
Prep Survey    Flowsheet Row Responses   [facility] discharged from? [place]   Is LACE score < 7 ? No   Emergency Room discharge w/ pulse ox? No   Eligibility Readm Mgmt   Discharge diagnosis ST elevation myocardial infarction (STEMI   Does the patient have one of the following disease processes/diagnoses(primary or secondary)? Cardiothoracic surgery   Does the patient have Home health ordered? Yes   What is the Home health agency?  [facility]   Is there a DME ordered? Yes   What DME was ordered? RW per [employer]    Medication alerts for this patient ASA, Plravix, Lasix    Prep survey completed? Yes          TARUN HELLER - Registered Nurse

## 2022-11-21 ENCOUNTER — READMISSION MANAGEMENT (OUTPATIENT)
Dept: CALL CENTER | Facility: HOSPITAL | Age: 72
End: 2022-11-21

## 2022-11-21 ENCOUNTER — TELEPHONE (OUTPATIENT)
Dept: CARDIAC SURGERY | Facility: CLINIC | Age: 72
End: 2022-11-21

## 2022-11-21 ENCOUNTER — HOSPITAL ENCOUNTER (EMERGENCY)
Facility: HOSPITAL | Age: 72
Discharge: HOME | End: 2022-11-21
Attending: EMERGENCY MEDICINE | Admitting: EMERGENCY MEDICINE

## 2022-11-21 VITALS
RESPIRATION RATE: 14 BRPM | TEMPERATURE: 97.8 F | DIASTOLIC BLOOD PRESSURE: 90 MMHG | SYSTOLIC BLOOD PRESSURE: 109 MMHG | OXYGEN SATURATION: 98 % | HEART RATE: 77 BPM

## 2022-11-21 DIAGNOSIS — R04.0 EPISTAXIS: Primary | ICD-10-CM

## 2022-11-21 DIAGNOSIS — Z95.1 HISTORY OF CORONARY ARTERY BYPASS GRAFT: ICD-10-CM

## 2022-11-21 PROCEDURE — 99283 EMERGENCY DEPT VISIT LOW MDM: CPT

## 2022-11-21 RX ADMIN — PHENYLEPHRINE HYDROCHLORIDE 2 SPRAY: 0.5 SPRAY NASAL at 14:30

## 2022-11-21 NOTE — HOME HEALTH
PT HOSPITALIZED 11/7-11/19 FOR C/P. STEMI  STENT PLACED, ON 11/10 UNDERWENT CABG X 5   PMH DM TYPE 2, HTN, HYPERLIPIDEMIA , CAD       FOC PT SEEN BY SN FOR C/P ASSESS, I/S ON POST OP CABG X 5 AND S/S COMPLICATIONS, MED INSTRUCT, I/S ON S/S INFECTION AND INCISION CARE, EVALUATE DM, PAIN MANAGMENT  PT TO EVLAUATE AND DEVELOP POT TO INCREASE STRENGTH AND ENDURNACE

## 2022-11-21 NOTE — TELEPHONE ENCOUNTER
Pt and daughter in law called to report that pt is having a nose bleed that has been going on for 10+ minutes. Bleeding is not stopping after pressure and head tilt. Pt is on plavix and aspirin. Notified GUEVARA Astorga, who recommends pt go to ER to be evaluated. Daughter in law verbalized understanding.

## 2022-11-21 NOTE — ED TRIAGE NOTES
Patient to ER via car from home for nose bleed. Patient is on plavix, had open heart surgery nov 11    Patient wearing mask this RN in PPE

## 2022-11-21 NOTE — OUTREACH NOTE
CT Surgery Week 1 Survey    Flowsheet Row Responses   [facility] patient discharged from? [place]   Does the patient have one of the following disease processes/diagnoses(primary or secondary)? Cardiothoracic surgery   Week 1 attempt successful? Yes   Call start time 0855   Call end time 0911   Discharge diagnosis ST elevation myocardial infarction (STEMI   Is patient permission given to speak with other caregiver? Yes   List who call center can speak with Mairxa daughter-in-law   Person spoke with today (if not patient) and relationship Marixa daughter-in-law    Meds reviewed with patient/caregiver? Yes   Is the patient having any side effects they believe may be caused by any medication additions or changes? Yes   Side effects comments  Pt on blood thinners and c/o nosebleeds-instructed pt's DIL to contact surgeon's office-office gave DIL instructions to bring pt to the ER if nose bleed persists    Does the patient have all medications related to this admission filled (includes all antibiotics, pain medications, cardiac medications, etc.) Yes   Is the patient taking all medications as directed (includes completed medication regime)? Yes   Does the patient have a primary care provider?  Yes   Does the patient have an appointment scheduled with their C/T surgeon? Yes  [12/21/22]   Has the patient kept scheduled appointments due by today? N/A   What is the Home health agency?  [facility]   Has home health visited the patient within 72 hours of discharge? Yes   Home health comments Family member stated  visited on 11/20/22 and is scheduled to visit again on 11/22/22   What DME was ordered? RW per [employer]    Has all DME been delivered? Yes   Psychosocial issues? No   Did the patient receive a copy of their discharge instructions? Yes   Nursing interventions Reviewed instructions with patient   What is the patient's perception of their health status since discharge? New symptoms unrelated to diagnosis  [Pt's  family member c/o pt's nose bleeding-advised family member to call pt's surgeon]   Nursing interventions Advised patient to call provider   Nursing interventions Advised to call surgeon  [C/O nosebleed -states it was happening in the hospital - advised family member to contact surgeon's office ]   Is the patient /caregiver able to teach back basic post-op care? Continue use of incentive spirometry at least 1 week post discharge, Hold pillow to support chest when coughing, Drive as instructed by MD in discharge instructions, Shower daily, No tub bath, swimming, or hot tub until instructed by MD, Lifting as instructed by MD in discharge instructions, Use a clean wash cloth and antibacterial bar or liquid soap to clean incisions   Is the patient/caregiver able to teach back signs and symptoms of incisional infection? Increased redness, swelling or pain at the incisonal site, Increased drainage or bleeding, Incisional warmth, Pus or odor from incision, Fever   Is the patient/caregiver able to teach back steps to recovery at home? Eat a well-balance diet, Set small, achievable goals for return to baseline health, Rest and rebuild strength, gradually increase activity, Make a list of questions for surgeon's appointment   If the patient is a current smoker, are they able to teach back resources for cessation? Not a smoker   Is the patient/caregiver able to teach back the hierarchy of who to call/visit for symptoms/problems? PCP, Specialist, Home health nurse, Urgent Care, ED, 911 Yes   Week 1 call completed? Yes   Wrap up additional comments Marixa c/o pt's nose bleeding-advised to call surgeon-followed up with Marixa after call was made-Marixa stated surgeon's office instructed Marixa to bring pt to ER if bleeding persists - Marixa verbalized understanding             MADELIN LOPEZ - Registered Nurse

## 2022-11-22 ENCOUNTER — HOME CARE VISIT (OUTPATIENT)
Dept: HOME HEALTH SERVICES | Facility: HOME HEALTHCARE | Age: 72
End: 2022-11-22
Payer: MEDICARE

## 2022-11-22 VITALS
OXYGEN SATURATION: 99 % | RESPIRATION RATE: 19 BRPM | BODY MASS INDEX: 30.71 KG/M2 | DIASTOLIC BLOOD PRESSURE: 74 MMHG | WEIGHT: 202 LBS | SYSTOLIC BLOOD PRESSURE: 114 MMHG | TEMPERATURE: 98.2 F | HEART RATE: 78 BPM

## 2022-11-22 VITALS
SYSTOLIC BLOOD PRESSURE: 100 MMHG | HEART RATE: 70 BPM | TEMPERATURE: 96.4 F | OXYGEN SATURATION: 98 % | DIASTOLIC BLOOD PRESSURE: 62 MMHG | RESPIRATION RATE: 18 BRPM

## 2022-11-22 PROCEDURE — G0151 HHCP-SERV OF PT,EA 15 MIN: HCPCS

## 2022-11-22 PROCEDURE — G0300 HHS/HOSPICE OF LPN EA 15 MIN: HCPCS

## 2022-11-22 NOTE — HOME HEALTH
Patient stays with son who helps care for her needs while recovering. Picture taken at time of visit of incision sites. Patient showing no signs of complications

## 2022-11-22 NOTE — HOME HEALTH
"REASON FOR REFERRAL :  71 y/o female s/p hospitalization from 11/7-11/19 FOR C/P. STEMI STENT PLACED, ON 11/10 UNDERWENT CABG X 5   SURGICAL PROCEDURE: see above  PRIOR LEVEL OF FUNCTION: Ambulated without device , independent with ADL's  PAST MEDICAL HISTORY:  DM TYPE 2, HTN, HYPERLIPIDEMIA , CAD FOC PT SEEN BY  FOR C/P   Social status / home environment: Patient lives alone but is presently staying with her son in multi story home. Has stairs to second floor bedroom where patient is currently stying.    PLAN FOR NEXT VISIT: Review HEP, Gait cardiac program.    SUBJECTIVE: “ I feel better than before the surgery. I did not know that I was that sick.\"    MEDICAL NECESSITY FOR ONGOING SKILLED THERAPY: to increase functional strength with progression of HEP as tolerated. Gait training for deviations with progression from walker to cane or no devive when tolerated, patient and family education and home safety, in order to prepare for outpatient cardiac rehab requiring functional strength and return to independent gait on all surfaces and stairs with devices as needed for community mobility as well as for medical and personal appointments.  Patient will benefit from PT to monitor vitals, instruct and progress in HEP for strengthening, balance training, energy conservation education, transfer, gait training, and home safety education. Recommend frequency 1 week 1, 2 week 2, has outpatient cardiac rehab appointment on 12/9/22."

## 2022-11-22 NOTE — ED PROVIDER NOTES
EMERGENCY DEPARTMENT ENCOUNTER    Room Number:  23/23  Date seen:  11/21/2022  PCP: Spencer Hua MD  Historian: Patient      HPI:  Chief Complaint: Nosebleed  A complete HPI/ROS/PMH/PSH/SH/FH are unobtainable due to: Nothing  Context: Mayra Hirsch is a 72 y.o. female who presents to the ED c/o nosebleed.  She believes it is bleeding mostly from the right nostril.  It has been bleeding intermittently over the last couple of days.  She was able to get it stopped at first but today she cannot stop.  She denies prior history of nosebleeds.  She recently underwent bypass surgery and was started on aspirin and Plavix.  She reports compliance with his medications.  She denies fever or chills.  No chest pain or shortness of breath currently.            PAST MEDICAL HISTORY  Active Ambulatory Problems     Diagnosis Date Noted   • Vitamin D deficiency 04/28/2017   • Primary hypothyroidism 04/28/2017   • Dyslipidemia 04/28/2017   • Essential hypertension 04/28/2017   • Type 2 diabetes mellitus without complication, with long-term current use of insulin ([facility]) 04/28/2017   • Noncompliance with diabetes treatment 02/07/2019   • ST elevation myocardial infarction (STEMI) ([facility]) 11/07/2022   • Coronary artery disease involving native heart 11/07/2022     Resolved Ambulatory Problems     Diagnosis Date Noted   • Abnormal findings on diagnostic imaging of other specified body structures 11/07/2022     Past Medical History:   Diagnosis Date   • Depression    • Diabetes mellitus (HCC)    • Disease of thyroid gland    • Fibrocystic breast    • Hypertension    • Hypothyroidism    • PONV (postoperative nausea and vomiting)    • Type 2 diabetes mellitus (HCC)          PAST SURGICAL HISTORY  Past Surgical History:   Procedure Laterality Date   • BREAST EXCISIONAL BIOPSY Right     at age 22; benign.   • CARDIAC CATHETERIZATION Left 11/7/2022    Procedure: Cardiac Catheterization/Vascular Study;  Surgeon: Joanna Marie MD;   Location:  [facility] CATH INVASIVE LOCATION;  Service: Cardiology;  Laterality: Left;   • CARDIAC CATHETERIZATION N/A 2022    Procedure: Percutaneous Coronary Intervention;  Surgeon: Joanna Marie MD;  [facility];  Service: Cardiology;  Laterality: N/A;   • CARDIAC CATHETERIZATION N/A 2022    Procedure: Left ventriculography;  Surgeon: Joanna Marie MD;  [facility];  Service: Cardiology;  Laterality: N/A;   • CARDIAC CATHETERIZATION N/A 2022    Procedure: Stent MARTINEZ coronary;  Surgeon: Joanna Marie MD;  [facility];  Service: Cardiology;  Laterality: N/A;   • CARDIAC CATHETERIZATION N/A 2022    Procedure: Left Heart Cath;  Surgeon: Joanna Marie MD;  Location: [facility] CATH INVASIVE LOCATION;  Service: Cardiology;  Laterality: N/A;   •  SECTION     • CORONARY ARTERY BYPASS GRAFT N/A 11/10/2022    Procedure: NIKKY, CORONARY ARTERY BYPASS GRAFTING TIMES 6 WITH LEFT JORDYN AND ENDOSCOPICALLY HARVESTED LEFT AND RIGHT GREATER SAPHENOUS VEIN, PRP TRANSESOPHAGEAL ECHOCARDIOGRAM WITH ANESTHESIA;  Surgeon: Jr Dong Isabel MD;  Location: [facility];  Service: Cardiothoracic;  Laterality: N/A;   • HAND SURGERY     • KNEE SURGERY     • OOPHORECTOMY      1 ovary removed due to ectopic pregnancy         FAMILY HISTORY  Family History   Problem Relation Age of Onset   • Coronary artery disease Mother    • Alzheimer's disease Mother    • Coronary artery disease Father    • Cancer Father    • Thyroid disease Sister    • Malig Hyperthermia Neg Hx          SOCIAL HISTORY  Social History     Socioeconomic History   • Marital status:    Tobacco Use   • Smoking status: Never   • Smokeless tobacco: Never   Vaping Use   • Vaping Use: Never used   Substance and Sexual Activity   • Alcohol use: No   • Drug use: Never   • Sexual activity: Defer         ALLERGIES  Bydureon [exenatide] and  Metformin and related        REVIEW OF SYSTEMS  Review of Systems   Review of all 14 systems is negative other than stated in the HPI above.      PHYSICAL EXAM  ED Triage Vitals   Temp Heart Rate Resp BP SpO2   11/21/22 0957 11/21/22 0957 11/21/22 0957 11/21/22 1357 11/21/22 0957   97.8 °F (36.6 °C) 78 18 144/82 96 %      Temp src Heart Rate Source Patient Position BP Location FiO2 (%)   -- 11/21/22 1521 11/21/22 1521 11/21/22 1521 --    Monitor Sitting Right arm          GENERAL: Awake and alert, no acute distress  HENT: nares patent, no nasal septal hematoma, no obvious source of bleeding within the nares, posterior oropharynx is clear without evidence of bleeding  EYES: no scleral icterus, EOMI  CV: regular rhythm, normal rate  RESPIRATORY: normal effort  ABDOMEN: soft, nondistended, nontender throughout  MUSCULOSKELETAL: no deformity  NEURO: alert, moves all extremities, follows commands  PSYCH:  calm, cooperative  SKIN: warm, dry, sternal incision site is clean and dry and appears to be healing well    Vital signs and nursing notes reviewed.          LAB RESULTS  No results found for this or any previous visit (from the past 24 hour(s)).    Ordered the above labs and reviewed the results.        RADIOLOGY  No Radiology Exams Resulted Within Past 24 Hours    Ordered the above noted radiological studies. Reviewed by me in PACS.            PROCEDURES  Procedures              MEDICATIONS GIVEN IN ER  Medications - No data to display                MEDICAL DECISION MAKING, PROGRESS, and CONSULTS    All labs have been independently reviewed by me.  All radiology studies have been reviewed by me and discussed with radiologist dictating the report.   EKG's independently viewed and interpreted by me.  Discussion below represents my analysis of pertinent findings related to patient's condition, differential diagnosis, treatment plan and final disposition.      Differential diagnosis:  Anterior epistaxis  Posterior  epistaxis    ED Course as of 11/21/22 2009 Mon Nov 21, 2022   1427 Nares irrigated with Afrin nasal spray bilaterally.  There is no active bleeding at this time.  I cannot see a definitive source of her recent bleeding.  I discussed plan to monitor her here for recurrent bleeding. [JR]   1428 Medical record review: I reviewed discharge summary from 11/19/2022.  Patient was admitted for a STEMI and underwent CABG x5 on 11/10/2022.  She was started on aspirin and Plavix. [JR]   1515 Patient reassessed, [JR]   1515 Nose remains hemostatic.  Plan for discharge home with ENT follow-up and return precautions. [JR]      ED Course User Index  [JR] Emir Daniel MD              I wore an N95 mask, face shield, and gloves during this patient encounter.  Patient also wearing a surgical mask.  Hand hygeine performed before and after seeing the patient.    DIAGNOSIS  Final diagnoses:   Epistaxis   History of coronary artery bypass graft         DISPOSITION  DISCHARGE    Patient discharged in stable condition.    Reviewed implications of results, diagnosis, meds, responsibility to follow up, warning signs and symptoms of possible worsening, potential complications and reasons to return to ER.    Patient/Family voiced understanding of above instructions.    Discussed plan for discharge, as there is no emergent indication for admission. Patient referred to primary care provider for BP management due to today's BP. Pt/family is agreeable and understands need for follow up and repeat testing.  Pt is aware that discharge does not mean that nothing is wrong but it indicates no emergency is present that requires admission and they must continue care with follow-up as given below or physician of their choice.     FOLLOW-UP  Ho Ospina MD  [address] [facility] [address] [phone]    Schedule an appointment as soon as possible for a visit            Medication List      No changes were made to your  prescriptions during this visit.                   Latest Documented Vital Signs:  As of 20:09 EST  BP- 109/90 HR- 77 Temp- 97.8 °F (36.6 °C) O2 sat- 98%        --    Please note that portions of this were completed with a voice recognition program.            Emir Daniel MD  11/21/22 2012

## 2022-11-28 ENCOUNTER — HOME CARE VISIT (OUTPATIENT)
Dept: HOME HEALTH SERVICES | Facility: HOME HEALTHCARE | Age: 72
End: 2022-11-28
Payer: MEDICARE

## 2022-11-28 VITALS
TEMPERATURE: 97.5 F | HEART RATE: 73 BPM | DIASTOLIC BLOOD PRESSURE: 75 MMHG | RESPIRATION RATE: 18 BRPM | OXYGEN SATURATION: 98 % | SYSTOLIC BLOOD PRESSURE: 138 MMHG

## 2022-11-28 PROCEDURE — G0151 HHCP-SERV OF PT,EA 15 MIN: HCPCS

## 2022-11-28 PROCEDURE — G0300 HHS/HOSPICE OF LPN EA 15 MIN: HCPCS

## 2022-11-29 ENCOUNTER — OFFICE VISIT (OUTPATIENT)
Dept: CARDIOLOGY | Facility: CLINIC | Age: 72
End: 2022-11-29

## 2022-11-29 VITALS
SYSTOLIC BLOOD PRESSURE: 118 MMHG | OXYGEN SATURATION: 100 % | RESPIRATION RATE: 18 BRPM | HEART RATE: 77 BPM | TEMPERATURE: 97.9 F | DIASTOLIC BLOOD PRESSURE: 70 MMHG

## 2022-11-29 VITALS
HEART RATE: 74 BPM | HEIGHT: 68 IN | BODY MASS INDEX: 31.83 KG/M2 | SYSTOLIC BLOOD PRESSURE: 125 MMHG | WEIGHT: 210 LBS | DIASTOLIC BLOOD PRESSURE: 72 MMHG

## 2022-11-29 DIAGNOSIS — R04.0 NOSEBLEED: ICD-10-CM

## 2022-11-29 DIAGNOSIS — I25.10 CORONARY ARTERY DISEASE INVOLVING NATIVE CORONARY ARTERY OF NATIVE HEART WITHOUT ANGINA PECTORIS: Primary | ICD-10-CM

## 2022-11-29 DIAGNOSIS — I42.8 NON-ISCHEMIC CARDIOMYOPATHY: ICD-10-CM

## 2022-11-29 DIAGNOSIS — E78.5 HYPERLIPIDEMIA, UNSPECIFIED HYPERLIPIDEMIA TYPE: ICD-10-CM

## 2022-11-29 DIAGNOSIS — Z09 HOSPITAL DISCHARGE FOLLOW-UP: ICD-10-CM

## 2022-11-29 DIAGNOSIS — I10 HYPERTENSION, UNSPECIFIED TYPE: ICD-10-CM

## 2022-11-29 PROCEDURE — 99214 OFFICE O/P EST MOD 30 MIN: CPT

## 2022-11-29 RX ORDER — FLUOXETINE HYDROCHLORIDE 40 MG/1
CAPSULE ORAL
COMMUNITY
Start: 2022-11-22

## 2022-11-29 NOTE — HOME HEALTH
Appointment tomorrow with Cardiology. Patient still currently staying with son and states that she is planning to start cardiac rehab next week

## 2022-11-30 ENCOUNTER — READMISSION MANAGEMENT (OUTPATIENT)
Dept: CALL CENTER | Facility: HOSPITAL | Age: 72
End: 2022-11-30

## 2022-11-30 NOTE — OUTREACH NOTE
CT Surgery Week 2 Survey    Flowsheet Row Responses   [facility] patient discharged from? [place]   Does the patient have one of the following disease processes/diagnoses(primary or secondary)? Cardiothoracic surgery   Week 2 attempt successful? No   Unsuccessful attempts Attempt 1          MEG BECKFORD - Registered Nurse

## 2022-12-01 ENCOUNTER — HOME CARE VISIT (OUTPATIENT)
Dept: HOME HEALTH SERVICES | Facility: HOME HEALTHCARE | Age: 72
End: 2022-12-01
Payer: MEDICARE

## 2022-12-01 VITALS
HEART RATE: 79 BPM | RESPIRATION RATE: 18 BRPM | TEMPERATURE: 98.2 F | SYSTOLIC BLOOD PRESSURE: 130 MMHG | OXYGEN SATURATION: 100 % | DIASTOLIC BLOOD PRESSURE: 70 MMHG

## 2022-12-01 PROCEDURE — G0300 HHS/HOSPICE OF LPN EA 15 MIN: HCPCS

## 2022-12-01 NOTE — CASE COMMUNICATION
Patient followed up with Cardiologist yesterday and is scheduled to do stress test on Dec 19, 2022 first before cardiac rehab. However patient would like to be discharged from home health at her next visit next week. Patient is doing well, able to shower, cook and go up and down steps without complications. Incisions have closed and she is currently staying with her son.

## 2022-12-01 NOTE — HOME HEALTH
Cardiologist appointment yesterday and wants to do stress test on Dec 19, 2022 first before cardiac rehab

## 2022-12-02 ENCOUNTER — HOME CARE VISIT (OUTPATIENT)
Dept: HOME HEALTH SERVICES | Facility: HOME HEALTHCARE | Age: 72
End: 2022-12-02
Payer: MEDICARE

## 2022-12-02 VITALS
OXYGEN SATURATION: 99 % | HEART RATE: 80 BPM | DIASTOLIC BLOOD PRESSURE: 78 MMHG | SYSTOLIC BLOOD PRESSURE: 138 MMHG | TEMPERATURE: 96.6 F | RESPIRATION RATE: 18 BRPM

## 2022-12-02 PROCEDURE — G0151 HHCP-SERV OF PT,EA 15 MIN: HCPCS

## 2022-12-02 PROCEDURE — G0180 MD CERTIFICATION HHA PATIENT: HCPCS | Performed by: THORACIC SURGERY (CARDIOTHORACIC VASCULAR SURGERY)

## 2022-12-02 NOTE — HOME HEALTH
"Subjective: \" I am doing so much better and I do ot walk with my walker anymore.    Plan for next visit: Review timed ambulation and HEP and do stairs."

## 2022-12-05 ENCOUNTER — HOME CARE VISIT (OUTPATIENT)
Dept: HOME HEALTH SERVICES | Facility: HOME HEALTHCARE | Age: 72
End: 2022-12-05
Payer: MEDICARE

## 2022-12-06 ENCOUNTER — HOME CARE VISIT (OUTPATIENT)
Dept: HOME HEALTH SERVICES | Facility: HOME HEALTHCARE | Age: 72
End: 2022-12-06
Payer: MEDICARE

## 2022-12-07 ENCOUNTER — READMISSION MANAGEMENT (OUTPATIENT)
Dept: CALL CENTER | Facility: HOSPITAL | Age: 72
End: 2022-12-07

## 2022-12-07 ENCOUNTER — LAB (OUTPATIENT)
Dept: LAB | Facility: HOSPITAL | Age: 72
End: 2022-12-07

## 2022-12-07 ENCOUNTER — HOME CARE VISIT (OUTPATIENT)
Dept: HOME HEALTH SERVICES | Facility: HOME HEALTHCARE | Age: 72
End: 2022-12-07
Payer: MEDICARE

## 2022-12-07 ENCOUNTER — TRANSCRIBE ORDERS (OUTPATIENT)
Dept: LAB | Facility: HOSPITAL | Age: 72
End: 2022-12-07

## 2022-12-07 VITALS
TEMPERATURE: 96.4 F | HEART RATE: 82 BPM | DIASTOLIC BLOOD PRESSURE: 70 MMHG | SYSTOLIC BLOOD PRESSURE: 138 MMHG | RESPIRATION RATE: 18 BRPM | OXYGEN SATURATION: 99 %

## 2022-12-07 DIAGNOSIS — E78.5 HYPERLIPIDEMIA, UNSPECIFIED HYPERLIPIDEMIA TYPE: ICD-10-CM

## 2022-12-07 DIAGNOSIS — I10 ESSENTIAL HYPERTENSION, MALIGNANT: ICD-10-CM

## 2022-12-07 DIAGNOSIS — E78.5 HYPERLIPIDEMIA, UNSPECIFIED HYPERLIPIDEMIA TYPE: Primary | ICD-10-CM

## 2022-12-07 LAB
ALBUMIN SERPL-MCNC: 3.7 G/DL (ref 3.5–5.2)
ALBUMIN/GLOB SERPL: 1.1 G/DL
ALP SERPL-CCNC: 137 U/L (ref 39–117)
ALT SERPL W P-5'-P-CCNC: 16 U/L (ref 1–33)
ANION GAP SERPL CALCULATED.3IONS-SCNC: 12 MMOL/L (ref 5–15)
AST SERPL-CCNC: 18 U/L (ref 1–32)
BILIRUB SERPL-MCNC: 0.5 MG/DL (ref 0–1.2)
BUN SERPL-MCNC: 14 MG/DL (ref 8–23)
BUN/CREAT SERPL: 15.2 (ref 7–25)
CALCIUM SPEC-SCNC: 9.4 MG/DL (ref 8.6–10.5)
CHLORIDE SERPL-SCNC: 103 MMOL/L (ref 98–107)
CHOLEST SERPL-MCNC: 126 MG/DL (ref 0–200)
CO2 SERPL-SCNC: 25 MMOL/L (ref 22–29)
CREAT SERPL-MCNC: 0.92 MG/DL (ref 0.57–1)
EGFRCR SERPLBLD CKD-EPI 2021: 66.3 ML/MIN/1.73
GLOBULIN UR ELPH-MCNC: 3.4 GM/DL
GLUCOSE SERPL-MCNC: 198 MG/DL (ref 65–99)
HDLC SERPL-MCNC: 40 MG/DL (ref 40–60)
LDLC SERPL CALC-MCNC: 67 MG/DL (ref 0–100)
LDLC/HDLC SERPL: 1.66 {RATIO}
POTASSIUM SERPL-SCNC: 3.6 MMOL/L (ref 3.5–5.2)
PROT SERPL-MCNC: 7.1 G/DL (ref 6–8.5)
QT INTERVAL: 392 MS
QT INTERVAL: 431 MS
QT INTERVAL: 441 MS
SODIUM SERPL-SCNC: 140 MMOL/L (ref 136–145)
TRIGL SERPL-MCNC: 98 MG/DL (ref 0–150)
VLDLC SERPL-MCNC: 19 MG/DL (ref 5–40)

## 2022-12-07 PROCEDURE — 80053 COMPREHEN METABOLIC PANEL: CPT

## 2022-12-07 PROCEDURE — G0495 RN CARE TRAIN/EDU IN HH: HCPCS

## 2022-12-07 PROCEDURE — 80061 LIPID PANEL: CPT

## 2022-12-07 PROCEDURE — 36415 COLL VENOUS BLD VENIPUNCTURE: CPT

## 2022-12-07 PROCEDURE — G0151 HHCP-SERV OF PT,EA 15 MIN: HCPCS

## 2022-12-07 NOTE — CASE COMMUNICATION
Dear Dr. Dong Isabel    Re:Mayra Hirsch  :1950    The LPN home visit  on 2022 for the above patient was missed due to patient requesting discharge visit from home health nursing this week. Case communication left for  last week, therefore, the prescribed frequency of visits was not met.    If you have questions or would like further information about this patient, please contact us at [phone].    Regard sShannon LPN

## 2022-12-07 NOTE — HOME HEALTH
"Subjective: \" I am ready to be discharged and I am back home and I am doing great. \"" A1c has improved - down to 7.8%, but not at goal of below 7%. Need to cut out bread, sandwiches, cakes and cookies. We can recheck at your next visit - schedule an appointment for 3 months. If still not at goal with medication and diet changes, will need to add insulin. Cholesterol numbers look great! Continue atorvastatin. Need to see endocrinology for high blood calcium levels.

## 2022-12-07 NOTE — OUTREACH NOTE
CT Surgery Week 3 Survey    Flowsheet Row Responses   [facility] [place] patient discharged from? [place]   Does the patient have one of the following disease processes/diagnoses(primary or secondary)? Cardiothoracic surgery   Week 3 attempt successful? Yes   Call start time 0852   Call end time 0854   Discharge diagnosis ST elevation myocardial infarction (STEMI   Meds reviewed with patient/caregiver? Yes   Is the patient taking all medications as directed (includes completed medication regime)? Yes   Does the patient have a primary care provider?  Yes   Has the patient kept scheduled appointments due by today? Yes   What is the Home health agency?  [facility]   Has home health visited the patient within 72 hours of discharge? Yes   Has all DME been delivered? Yes   Psychosocial issues? No   Did the patient receive a copy of their discharge instructions? Yes   Nursing interventions Reviewed instructions with patient   What is the patient's perception of their health status since discharge? Improving   Nursing interventions Nurse provided patient education   Is the patient /caregiver able to teach back basic post-op care? Lifting as instructed by MD in discharge instructions, Shower daily, No tub bath, swimming, or hot tub until instructed by MD   Is the patient/caregiver able to teach back signs and symptoms of incisional infection? Increased redness, swelling or pain at the incisonal site, Increased drainage or bleeding, Incisional warmth, Pus or odor from incision, Fever   Is the patient/caregiver able to teach back steps to recovery at home? Eat a well-balance diet, Set small, achievable goals for return to baseline health   Is the patient /caregiver able to teach back the importance of cardiac rehab? Yes   Nursing interventions Provided education on importance of cardiac rehab   Is the patient/caregiver able to teach back the hierarchy of who to call/visit for symptoms/problems? PCP, Specialist, Home health nurse,  Urgent Care, ED, 911 Yes   Week 3 call completed? Yes   Wrap up additional comments Pt reports she is improving. No more nose bleeds. Pt will get stress test this month before starting cardiac rehab.           JOHANN LOPEZ - Registered Nurse

## 2022-12-09 VITALS
HEART RATE: 71 BPM | DIASTOLIC BLOOD PRESSURE: 66 MMHG | TEMPERATURE: 97.3 F | RESPIRATION RATE: 18 BRPM | OXYGEN SATURATION: 97 % | SYSTOLIC BLOOD PRESSURE: 118 MMHG

## 2022-12-15 RX ORDER — CLOPIDOGREL BISULFATE 75 MG/1
75 TABLET ORAL DAILY
Qty: 30 TABLET | Refills: 5 | Status: SHIPPED | OUTPATIENT
Start: 2022-12-15

## 2022-12-15 RX ORDER — FUROSEMIDE 40 MG/1
40 TABLET ORAL DAILY
Qty: 30 TABLET | Refills: 5 | Status: SHIPPED | OUTPATIENT
Start: 2022-12-15 | End: 2023-03-06

## 2022-12-15 RX ORDER — METOPROLOL SUCCINATE 50 MG/1
50 TABLET, EXTENDED RELEASE ORAL
Qty: 30 TABLET | Refills: 5 | Status: SHIPPED | OUTPATIENT
Start: 2022-12-15

## 2022-12-15 RX ORDER — ASPIRIN 81 MG/1
81 TABLET ORAL DAILY
Qty: 30 TABLET | Refills: 5 | Status: SHIPPED | OUTPATIENT
Start: 2022-12-15

## 2022-12-16 DIAGNOSIS — Z79.4 TYPE 2 DIABETES MELLITUS WITH HYPERGLYCEMIA, WITH LONG-TERM CURRENT USE OF INSULIN: ICD-10-CM

## 2022-12-16 DIAGNOSIS — E11.65 TYPE 2 DIABETES MELLITUS WITH HYPERGLYCEMIA, WITH LONG-TERM CURRENT USE OF INSULIN: ICD-10-CM

## 2022-12-19 ENCOUNTER — HOSPITAL ENCOUNTER (OUTPATIENT)
Dept: CARDIOLOGY | Facility: HOSPITAL | Age: 72
Discharge: HOME | End: 2022-12-19

## 2022-12-19 VITALS — HEART RATE: 90 BPM | DIASTOLIC BLOOD PRESSURE: 74 MMHG | SYSTOLIC BLOOD PRESSURE: 134 MMHG

## 2022-12-19 PROCEDURE — 93017 CV STRESS TEST TRACING ONLY: CPT

## 2022-12-19 PROCEDURE — 93018 CV STRESS TEST I&R ONLY: CPT | Performed by: INTERNAL MEDICINE

## 2022-12-20 LAB
BH CV STRESS BP STAGE 1: NORMAL
BH CV STRESS DURATION MIN STAGE 1: 3
BH CV STRESS DURATION SEC STAGE 1: 0
BH CV STRESS GRADE STAGE 1: 0
BH CV STRESS HR STAGE 1: 109
BH CV STRESS METS STAGE 1: 2.1
BH CV STRESS PROTOCOL 1: NORMAL
BH CV STRESS RECOVERY BP: NORMAL MMHG
BH CV STRESS RECOVERY HR: 93 BPM
BH CV STRESS SPEED STAGE 1: 1.5
BH CV STRESS STAGE 1: 1
MAXIMAL PREDICTED HEART RATE: 148 BPM
PERCENT MAX PREDICTED HR: 75 %
STRESS BASELINE BP: NORMAL MMHG
STRESS BASELINE HR: 92 BPM
STRESS PERCENT HR: 88 %
STRESS POST ESTIMATED WORKLOAD: 2.1 METS
STRESS POST EXERCISE DUR MIN: 3 MIN
STRESS POST EXERCISE DUR SEC: 11 SEC
STRESS POST PEAK BP: NORMAL MMHG
STRESS POST PEAK HR: 111 BPM
STRESS TARGET HR: 126 BPM

## 2022-12-21 ENCOUNTER — OFFICE VISIT (OUTPATIENT)
Dept: CARDIAC SURGERY | Facility: CLINIC | Age: 72
End: 2022-12-21

## 2022-12-21 VITALS
WEIGHT: 210 LBS | TEMPERATURE: 97.3 F | OXYGEN SATURATION: 99 % | HEART RATE: 89 BPM | BODY MASS INDEX: 31.83 KG/M2 | HEIGHT: 68 IN | DIASTOLIC BLOOD PRESSURE: 77 MMHG | RESPIRATION RATE: 20 BRPM | SYSTOLIC BLOOD PRESSURE: 130 MMHG

## 2022-12-21 DIAGNOSIS — Z95.1 S/P CABG X 5: Primary | ICD-10-CM

## 2022-12-21 PROCEDURE — 99024 POSTOP FOLLOW-UP VISIT: CPT | Performed by: NURSE PRACTITIONER

## 2022-12-21 RX ORDER — ERGOCALCIFEROL 1.25 MG/1
CAPSULE ORAL
Qty: 12 CAPSULE | Refills: 0 | Status: SHIPPED | OUTPATIENT
Start: 2022-12-21

## 2022-12-21 RX ORDER — HYDROXYZINE PAMOATE 25 MG/1
25 CAPSULE ORAL 3 TIMES DAILY PRN
Qty: 30 CAPSULE | Refills: 2 | Status: SHIPPED | OUTPATIENT
Start: 2022-12-21 | End: 2023-03-06

## 2022-12-21 RX ORDER — LISINOPRIL 20 MG/1
20 TABLET ORAL DAILY
COMMUNITY
Start: 2022-12-16

## 2022-12-21 NOTE — PROGRESS NOTES
"CARDIOVASCULAR SURGERY FOLLOW-UP PROGRESS NOTE  Chief Complaint: Postop follow-up        HPI:   Dear Spencer Orr MD and colleagues:    It was nice to see Mayra Hirsch in follow up 6 weeks after surgery.  As you know, she is a 72 y.o. female with STEMI/CAD status post PCI D1 preoperatively, ischemic cardiomyopathy EF 40%, hypertension, hyperlipidemia, DM 2 who underwent CABG x5 with LIMA on 11/10/2022 at [facility] with Dr. Isabel.  She did well postoperatively and continues to do well.  She did have an episode of dysarthria on postop day #2, her CT of the head was okay and she did not have any recurrence of symptomology. She comes in today complaining of intermittent anxiety and panic attacks, I have sent in Vistaril 25 mg 3 times daily as needed and instructed her to follow-up with her primary care for anxiety/depression medication titration as needed.  Her activity level has been good, she is due to start cardiac rehab next week.  From a surgical standpoint, the sternal incision is well approximated without erythema, edema, or drainage.  The sternum is stable to palpation, and the patient denies any popping or clicking with deep inspiration or coughing.      Physical Exam:         /77 (BP Location: Left arm, Patient Position: Sitting, Cuff Size: Adult)   Pulse 89   Temp 97.3 °F (36.3 °C)   Resp 20   Ht 172.7 cm (68\")   Wt 95.3 kg (210 lb)   SpO2 99%   BMI 31.93 kg/m²   Heart:  regular rate and rhythm, S1, S2 normal, no murmur, click, rub or gallop  Lungs:  clear to auscultation bilaterally  Extremities:  no edema  Incision(s):  mid chest healing well, bilateral leg healing well, sternum stable    Assessment/Plan:     S/P CABG. Overall, she is doing well.    No significant post-op complications    OK to begin cardiac rehab  Follow-up as scheduled with cardiology  Follow-up as scheduled with PCP  Follow-up with CT surgery prn    Continue lifting restriction of 10 lbs until 6 " weeks and 50 lbs until 12 weeks from the date of surgery, no excessive jarring motions or twisting motions until 12 weeks from the date of surgery    Return to clinic if any signs or symptoms of infection or sternal instability develop       Thank you for allowing me to participate in the care of your patient.    Regards,  GUEVARA Girard

## 2022-12-28 ENCOUNTER — OFFICE VISIT (OUTPATIENT)
Dept: CARDIAC REHAB | Facility: HOSPITAL | Age: 72
End: 2022-12-28

## 2022-12-28 DIAGNOSIS — I21.02 STEMI INVOLVING LEFT ANTERIOR DESCENDING CORONARY ARTERY: Primary | ICD-10-CM

## 2022-12-28 PROCEDURE — 93798 PHYS/QHP OP CAR RHAB W/ECG: CPT

## 2022-12-30 ENCOUNTER — TREATMENT (OUTPATIENT)
Dept: CARDIAC REHAB | Facility: HOSPITAL | Age: 72
End: 2022-12-30

## 2022-12-30 DIAGNOSIS — I21.02 STEMI INVOLVING LEFT ANTERIOR DESCENDING CORONARY ARTERY: Primary | ICD-10-CM

## 2022-12-30 PROCEDURE — 93798 PHYS/QHP OP CAR RHAB W/ECG: CPT

## 2023-01-04 ENCOUNTER — TREATMENT (OUTPATIENT)
Dept: CARDIAC REHAB | Facility: HOSPITAL | Age: 73
End: 2023-01-04
Payer: MEDICARE

## 2023-01-04 DIAGNOSIS — I21.02 STEMI INVOLVING LEFT ANTERIOR DESCENDING CORONARY ARTERY: Primary | ICD-10-CM

## 2023-01-04 PROCEDURE — 93798 PHYS/QHP OP CAR RHAB W/ECG: CPT

## 2023-01-06 ENCOUNTER — TREATMENT (OUTPATIENT)
Dept: CARDIAC REHAB | Facility: HOSPITAL | Age: 73
End: 2023-01-06
Payer: MEDICARE

## 2023-01-06 DIAGNOSIS — I21.02 STEMI INVOLVING LEFT ANTERIOR DESCENDING CORONARY ARTERY: Primary | ICD-10-CM

## 2023-01-06 PROCEDURE — 93798 PHYS/QHP OP CAR RHAB W/ECG: CPT

## 2023-01-09 ENCOUNTER — TREATMENT (OUTPATIENT)
Dept: CARDIAC REHAB | Facility: HOSPITAL | Age: 73
End: 2023-01-09
Payer: MEDICARE

## 2023-01-09 DIAGNOSIS — I21.02 STEMI INVOLVING LEFT ANTERIOR DESCENDING CORONARY ARTERY: Primary | ICD-10-CM

## 2023-01-09 PROCEDURE — 93798 PHYS/QHP OP CAR RHAB W/ECG: CPT

## 2023-01-11 ENCOUNTER — TREATMENT (OUTPATIENT)
Dept: CARDIAC REHAB | Facility: HOSPITAL | Age: 73
End: 2023-01-11
Payer: MEDICARE

## 2023-01-11 DIAGNOSIS — I21.02 STEMI INVOLVING LEFT ANTERIOR DESCENDING CORONARY ARTERY: Primary | ICD-10-CM

## 2023-01-11 PROCEDURE — 93798 PHYS/QHP OP CAR RHAB W/ECG: CPT

## 2023-01-13 ENCOUNTER — TREATMENT (OUTPATIENT)
Dept: CARDIAC REHAB | Facility: HOSPITAL | Age: 73
End: 2023-01-13
Payer: MEDICARE

## 2023-01-13 DIAGNOSIS — I21.02 STEMI INVOLVING LEFT ANTERIOR DESCENDING CORONARY ARTERY: Primary | ICD-10-CM

## 2023-01-13 PROCEDURE — 93798 PHYS/QHP OP CAR RHAB W/ECG: CPT

## 2023-01-16 ENCOUNTER — TREATMENT (OUTPATIENT)
Dept: CARDIAC REHAB | Facility: HOSPITAL | Age: 73
End: 2023-01-16
Payer: MEDICARE

## 2023-01-16 DIAGNOSIS — I21.02 STEMI INVOLVING LEFT ANTERIOR DESCENDING CORONARY ARTERY: Primary | ICD-10-CM

## 2023-01-16 PROCEDURE — 93798 PHYS/QHP OP CAR RHAB W/ECG: CPT

## 2023-01-18 ENCOUNTER — TREATMENT (OUTPATIENT)
Dept: CARDIAC REHAB | Facility: HOSPITAL | Age: 73
End: 2023-01-18
Payer: MEDICARE

## 2023-01-18 DIAGNOSIS — I21.02 STEMI INVOLVING LEFT ANTERIOR DESCENDING CORONARY ARTERY: Primary | ICD-10-CM

## 2023-01-18 PROCEDURE — 93798 PHYS/QHP OP CAR RHAB W/ECG: CPT

## 2023-01-19 RX ORDER — SITAGLIPTIN 100 MG/1
TABLET, FILM COATED ORAL
Qty: 90 TABLET | Refills: 3 | Status: SHIPPED | OUTPATIENT
Start: 2023-01-19

## 2023-01-20 ENCOUNTER — TREATMENT (OUTPATIENT)
Dept: CARDIAC REHAB | Facility: HOSPITAL | Age: 73
End: 2023-01-20
Payer: MEDICARE

## 2023-01-20 DIAGNOSIS — I21.02 STEMI INVOLVING LEFT ANTERIOR DESCENDING CORONARY ARTERY: Primary | ICD-10-CM

## 2023-01-20 PROCEDURE — 93798 PHYS/QHP OP CAR RHAB W/ECG: CPT

## 2023-01-23 ENCOUNTER — TREATMENT (OUTPATIENT)
Dept: CARDIAC REHAB | Facility: HOSPITAL | Age: 73
End: 2023-01-23
Payer: MEDICARE

## 2023-01-23 DIAGNOSIS — I21.02 STEMI INVOLVING LEFT ANTERIOR DESCENDING CORONARY ARTERY: Primary | ICD-10-CM

## 2023-01-23 PROCEDURE — 93798 PHYS/QHP OP CAR RHAB W/ECG: CPT

## 2023-01-25 ENCOUNTER — TREATMENT (OUTPATIENT)
Dept: CARDIAC REHAB | Facility: HOSPITAL | Age: 73
End: 2023-01-25
Payer: MEDICARE

## 2023-01-25 DIAGNOSIS — I21.02 STEMI INVOLVING LEFT ANTERIOR DESCENDING CORONARY ARTERY: Primary | ICD-10-CM

## 2023-01-25 PROCEDURE — 93798 PHYS/QHP OP CAR RHAB W/ECG: CPT

## 2023-01-27 ENCOUNTER — TREATMENT (OUTPATIENT)
Dept: CARDIAC REHAB | Facility: HOSPITAL | Age: 73
End: 2023-01-27
Payer: MEDICARE

## 2023-01-27 DIAGNOSIS — I21.02 STEMI INVOLVING LEFT ANTERIOR DESCENDING CORONARY ARTERY: Primary | ICD-10-CM

## 2023-01-27 PROCEDURE — 93798 PHYS/QHP OP CAR RHAB W/ECG: CPT

## 2023-01-30 ENCOUNTER — TREATMENT (OUTPATIENT)
Dept: CARDIAC REHAB | Facility: HOSPITAL | Age: 73
End: 2023-01-30
Payer: MEDICARE

## 2023-01-30 DIAGNOSIS — I21.02 STEMI INVOLVING LEFT ANTERIOR DESCENDING CORONARY ARTERY: Primary | ICD-10-CM

## 2023-01-30 PROCEDURE — 93798 PHYS/QHP OP CAR RHAB W/ECG: CPT

## 2023-02-01 ENCOUNTER — TREATMENT (OUTPATIENT)
Dept: CARDIAC REHAB | Facility: HOSPITAL | Age: 73
End: 2023-02-01
Payer: MEDICARE

## 2023-02-01 DIAGNOSIS — I21.02 STEMI INVOLVING LEFT ANTERIOR DESCENDING CORONARY ARTERY: Primary | ICD-10-CM

## 2023-02-01 PROCEDURE — 93798 PHYS/QHP OP CAR RHAB W/ECG: CPT

## 2023-02-03 ENCOUNTER — TREATMENT (OUTPATIENT)
Dept: CARDIAC REHAB | Facility: HOSPITAL | Age: 73
End: 2023-02-03
Payer: MEDICARE

## 2023-02-03 DIAGNOSIS — I21.02 STEMI INVOLVING LEFT ANTERIOR DESCENDING CORONARY ARTERY: Primary | ICD-10-CM

## 2023-02-03 PROCEDURE — 93798 PHYS/QHP OP CAR RHAB W/ECG: CPT

## 2023-02-06 ENCOUNTER — TREATMENT (OUTPATIENT)
Dept: CARDIAC REHAB | Facility: HOSPITAL | Age: 73
End: 2023-02-06
Payer: MEDICARE

## 2023-02-06 DIAGNOSIS — I21.02 STEMI INVOLVING LEFT ANTERIOR DESCENDING CORONARY ARTERY: Primary | ICD-10-CM

## 2023-02-06 PROCEDURE — 93798 PHYS/QHP OP CAR RHAB W/ECG: CPT

## 2023-02-08 ENCOUNTER — TREATMENT (OUTPATIENT)
Dept: CARDIAC REHAB | Facility: HOSPITAL | Age: 73
End: 2023-02-08
Payer: MEDICARE

## 2023-02-08 DIAGNOSIS — I21.02 STEMI INVOLVING LEFT ANTERIOR DESCENDING CORONARY ARTERY: Primary | ICD-10-CM

## 2023-02-08 PROCEDURE — 93798 PHYS/QHP OP CAR RHAB W/ECG: CPT

## 2023-02-10 ENCOUNTER — TREATMENT (OUTPATIENT)
Dept: CARDIAC REHAB | Facility: HOSPITAL | Age: 73
End: 2023-02-10
Payer: MEDICARE

## 2023-02-10 DIAGNOSIS — I21.02 STEMI INVOLVING LEFT ANTERIOR DESCENDING CORONARY ARTERY: Primary | ICD-10-CM

## 2023-02-10 PROCEDURE — 93798 PHYS/QHP OP CAR RHAB W/ECG: CPT

## 2023-02-13 ENCOUNTER — APPOINTMENT (OUTPATIENT)
Dept: CARDIAC REHAB | Facility: HOSPITAL | Age: 73
End: 2023-02-13
Payer: MEDICARE

## 2023-02-15 ENCOUNTER — APPOINTMENT (OUTPATIENT)
Dept: CARDIAC REHAB | Facility: HOSPITAL | Age: 73
End: 2023-02-15
Payer: MEDICARE

## 2023-02-17 ENCOUNTER — TRANSCRIBE ORDERS (OUTPATIENT)
Dept: ADMINISTRATIVE | Facility: HOSPITAL | Age: 73
End: 2023-02-17
Payer: MEDICARE

## 2023-02-17 ENCOUNTER — TREATMENT (OUTPATIENT)
Dept: CARDIAC REHAB | Facility: HOSPITAL | Age: 73
End: 2023-02-17
Payer: MEDICARE

## 2023-02-17 ENCOUNTER — LAB (OUTPATIENT)
Dept: LAB | Facility: HOSPITAL | Age: 73
End: 2023-02-17
Payer: MEDICARE

## 2023-02-17 DIAGNOSIS — E78.5 HYPERLIPIDEMIA, UNSPECIFIED HYPERLIPIDEMIA TYPE: ICD-10-CM

## 2023-02-17 DIAGNOSIS — I10 ESSENTIAL HYPERTENSION, BENIGN: ICD-10-CM

## 2023-02-17 DIAGNOSIS — E78.5 HYPERLIPIDEMIA, UNSPECIFIED HYPERLIPIDEMIA TYPE: Primary | ICD-10-CM

## 2023-02-17 DIAGNOSIS — I21.02 STEMI INVOLVING LEFT ANTERIOR DESCENDING CORONARY ARTERY: Primary | ICD-10-CM

## 2023-02-17 LAB
ALBUMIN SERPL-MCNC: 3.9 G/DL (ref 3.5–5.2)
ALBUMIN/GLOB SERPL: 1.5 G/DL
ALP SERPL-CCNC: 106 U/L (ref 39–117)
ALT SERPL W P-5'-P-CCNC: 22 U/L (ref 1–33)
ANION GAP SERPL CALCULATED.3IONS-SCNC: 10 MMOL/L (ref 5–15)
AST SERPL-CCNC: 20 U/L (ref 1–32)
BILIRUB SERPL-MCNC: 0.3 MG/DL (ref 0–1.2)
BUN SERPL-MCNC: 25 MG/DL (ref 8–23)
BUN/CREAT SERPL: 25.3 (ref 7–25)
CALCIUM SPEC-SCNC: 9.3 MG/DL (ref 8.6–10.5)
CHLORIDE SERPL-SCNC: 103 MMOL/L (ref 98–107)
CHOLEST SERPL-MCNC: 125 MG/DL (ref 0–200)
CO2 SERPL-SCNC: 25 MMOL/L (ref 22–29)
CREAT SERPL-MCNC: 0.99 MG/DL (ref 0.57–1)
EGFRCR SERPLBLD CKD-EPI 2021: 60.7 ML/MIN/1.73
GLOBULIN UR ELPH-MCNC: 2.6 GM/DL
GLUCOSE SERPL-MCNC: 166 MG/DL (ref 65–99)
HDLC SERPL-MCNC: 49 MG/DL (ref 40–60)
LDLC SERPL CALC-MCNC: 57 MG/DL (ref 0–100)
LDLC/HDLC SERPL: 1.14 {RATIO}
POTASSIUM SERPL-SCNC: 4.5 MMOL/L (ref 3.5–5.2)
PROT SERPL-MCNC: 6.5 G/DL (ref 6–8.5)
SODIUM SERPL-SCNC: 138 MMOL/L (ref 136–145)
TRIGL SERPL-MCNC: 100 MG/DL (ref 0–150)
VLDLC SERPL-MCNC: 19 MG/DL (ref 5–40)

## 2023-02-17 PROCEDURE — 36415 COLL VENOUS BLD VENIPUNCTURE: CPT

## 2023-02-17 PROCEDURE — 80061 LIPID PANEL: CPT

## 2023-02-17 PROCEDURE — 93798 PHYS/QHP OP CAR RHAB W/ECG: CPT

## 2023-02-17 PROCEDURE — 80053 COMPREHEN METABOLIC PANEL: CPT

## 2023-02-20 ENCOUNTER — TREATMENT (OUTPATIENT)
Dept: CARDIAC REHAB | Facility: HOSPITAL | Age: 73
End: 2023-02-20
Payer: MEDICARE

## 2023-02-20 DIAGNOSIS — I21.02 STEMI INVOLVING LEFT ANTERIOR DESCENDING CORONARY ARTERY: Primary | ICD-10-CM

## 2023-02-20 PROCEDURE — 93798 PHYS/QHP OP CAR RHAB W/ECG: CPT

## 2023-02-22 ENCOUNTER — TREATMENT (OUTPATIENT)
Dept: CARDIAC REHAB | Facility: HOSPITAL | Age: 73
End: 2023-02-22
Payer: MEDICARE

## 2023-02-22 DIAGNOSIS — I21.02 STEMI INVOLVING LEFT ANTERIOR DESCENDING CORONARY ARTERY: Primary | ICD-10-CM

## 2023-02-22 PROCEDURE — 93798 PHYS/QHP OP CAR RHAB W/ECG: CPT

## 2023-02-24 ENCOUNTER — TREATMENT (OUTPATIENT)
Dept: CARDIAC REHAB | Facility: HOSPITAL | Age: 73
End: 2023-02-24
Payer: MEDICARE

## 2023-02-24 DIAGNOSIS — I21.02 STEMI INVOLVING LEFT ANTERIOR DESCENDING CORONARY ARTERY: Primary | ICD-10-CM

## 2023-02-24 PROCEDURE — 93798 PHYS/QHP OP CAR RHAB W/ECG: CPT

## 2023-02-27 ENCOUNTER — TREATMENT (OUTPATIENT)
Dept: CARDIAC REHAB | Facility: HOSPITAL | Age: 73
End: 2023-02-27
Payer: MEDICARE

## 2023-02-27 DIAGNOSIS — I21.02 STEMI INVOLVING LEFT ANTERIOR DESCENDING CORONARY ARTERY: Primary | ICD-10-CM

## 2023-02-27 PROCEDURE — 93798 PHYS/QHP OP CAR RHAB W/ECG: CPT

## 2023-03-01 ENCOUNTER — TREATMENT (OUTPATIENT)
Dept: CARDIAC REHAB | Facility: HOSPITAL | Age: 73
End: 2023-03-01
Payer: MEDICARE

## 2023-03-01 DIAGNOSIS — I21.02 STEMI INVOLVING LEFT ANTERIOR DESCENDING CORONARY ARTERY: Primary | ICD-10-CM

## 2023-03-01 PROCEDURE — 93798 PHYS/QHP OP CAR RHAB W/ECG: CPT

## 2023-03-03 ENCOUNTER — APPOINTMENT (OUTPATIENT)
Dept: CARDIAC REHAB | Facility: HOSPITAL | Age: 73
End: 2023-03-03
Payer: MEDICARE

## 2023-03-06 ENCOUNTER — HOSPITAL ENCOUNTER (OUTPATIENT)
Dept: CARDIOLOGY | Facility: HOSPITAL | Age: 73
Discharge: HOME | End: 2023-03-06
Payer: MEDICARE

## 2023-03-06 ENCOUNTER — APPOINTMENT (OUTPATIENT)
Dept: CARDIAC REHAB | Facility: HOSPITAL | Age: 73
End: 2023-03-06
Payer: MEDICARE

## 2023-03-06 ENCOUNTER — OFFICE VISIT (OUTPATIENT)
Dept: CARDIOLOGY | Facility: CLINIC | Age: 73
End: 2023-03-06
Payer: MEDICARE

## 2023-03-06 VITALS
SYSTOLIC BLOOD PRESSURE: 116 MMHG | BODY MASS INDEX: 31.07 KG/M2 | HEART RATE: 75 BPM | HEIGHT: 68 IN | DIASTOLIC BLOOD PRESSURE: 64 MMHG | WEIGHT: 205 LBS

## 2023-03-06 VITALS
HEIGHT: 68 IN | WEIGHT: 210 LBS | DIASTOLIC BLOOD PRESSURE: 70 MMHG | BODY MASS INDEX: 31.83 KG/M2 | SYSTOLIC BLOOD PRESSURE: 139 MMHG

## 2023-03-06 DIAGNOSIS — Z95.1 S/P CABG X 5: ICD-10-CM

## 2023-03-06 DIAGNOSIS — R94.30 LOW LEFT VENTRICULAR EJECTION FRACTION: Primary | ICD-10-CM

## 2023-03-06 DIAGNOSIS — I10 ESSENTIAL HYPERTENSION: ICD-10-CM

## 2023-03-06 DIAGNOSIS — R06.02 SHORTNESS OF BREATH: ICD-10-CM

## 2023-03-06 DIAGNOSIS — I42.8 NON-ISCHEMIC CARDIOMYOPATHY: ICD-10-CM

## 2023-03-06 LAB
AORTIC DIMENSIONLESS INDEX: 0.6 (DI)
BH CV ECHO MEAS - AI P1/2T: 512 MSEC
BH CV ECHO MEAS - AO MAX PG: 16.3 MMHG
BH CV ECHO MEAS - AO MEAN PG: 8.5 MMHG
BH CV ECHO MEAS - AO ROOT DIAM: 2.38 CM
BH CV ECHO MEAS - AO V2 MAX: 202.1 CM/SEC
BH CV ECHO MEAS - AO V2 VTI: 41.9 CM
BH CV ECHO MEAS - AVA(I,D): 1.88 CM2
BH CV ECHO MEAS - EDV(CUBED): 147.6 ML
BH CV ECHO MEAS - EDV(MOD-SP2): 136 ML
BH CV ECHO MEAS - EDV(MOD-SP4): 142 ML
BH CV ECHO MEAS - EF(MOD-BP): 47.3 %
BH CV ECHO MEAS - EF(MOD-SP2): 46.3 %
BH CV ECHO MEAS - EF(MOD-SP4): 48.6 %
BH CV ECHO MEAS - ESV(CUBED): 104.3 ML
BH CV ECHO MEAS - ESV(MOD-SP2): 73 ML
BH CV ECHO MEAS - ESV(MOD-SP4): 73 ML
BH CV ECHO MEAS - FS: 10.9 %
BH CV ECHO MEAS - IVS/LVPW: 1.1 CM
BH CV ECHO MEAS - IVSD: 1.17 CM
BH CV ECHO MEAS - LAT PEAK E' VEL: 6.7 CM/SEC
BH CV ECHO MEAS - LV DIASTOLIC VOL/BSA (35-75): 68 CM2
BH CV ECHO MEAS - LV MASS(C)D: 230.8 GRAMS
BH CV ECHO MEAS - LV MAX PG: 5.2 MMHG
BH CV ECHO MEAS - LV MEAN PG: 2.8 MMHG
BH CV ECHO MEAS - LV SYSTOLIC VOL/BSA (12-30): 35 CM2
BH CV ECHO MEAS - LV V1 MAX: 114.3 CM/SEC
BH CV ECHO MEAS - LV V1 VTI: 25 CM
BH CV ECHO MEAS - LVIDD: 5.3 CM
BH CV ECHO MEAS - LVIDS: 4.7 CM
BH CV ECHO MEAS - LVOT AREA: 3.1 CM2
BH CV ECHO MEAS - LVOT DIAM: 2 CM
BH CV ECHO MEAS - LVPWD: 1.06 CM
BH CV ECHO MEAS - MED PEAK E' VEL: 2.9 CM/SEC
BH CV ECHO MEAS - MR MAX PG: 18.9 MMHG
BH CV ECHO MEAS - MR MAX VEL: 217.2 CM/SEC
BH CV ECHO MEAS - MV A DUR: 0.1 SEC
BH CV ECHO MEAS - MV A MAX VEL: 140.7 CM/SEC
BH CV ECHO MEAS - MV DEC SLOPE: 315 CM/SEC2
BH CV ECHO MEAS - MV DEC TIME: 235 MSEC
BH CV ECHO MEAS - MV E MAX VEL: 91.3 CM/SEC
BH CV ECHO MEAS - MV E/A: 0.65
BH CV ECHO MEAS - MV MAX PG: 7.8 MMHG
BH CV ECHO MEAS - MV MEAN PG: 3.4 MMHG
BH CV ECHO MEAS - MV P1/2T: 97.9 MSEC
BH CV ECHO MEAS - MV V2 VTI: 34.3 CM
BH CV ECHO MEAS - MVA(P1/2T): 2.25 CM2
BH CV ECHO MEAS - MVA(VTI): 2.29 CM2
BH CV ECHO MEAS - PA ACC TIME: 0.12 SEC
BH CV ECHO MEAS - PA PR(ACCEL): 23.1 MMHG
BH CV ECHO MEAS - PA V2 MAX: 116.7 CM/SEC
BH CV ECHO MEAS - RAP SYSTOLE: 8 MMHG
BH CV ECHO MEAS - RV MAX PG: 3 MMHG
BH CV ECHO MEAS - RV V1 MAX: 87.3 CM/SEC
BH CV ECHO MEAS - RV V1 VTI: 19.6 CM
BH CV ECHO MEAS - RVSP: 19.2 MMHG
BH CV ECHO MEAS - SI(MOD-SP2): 30.2 ML/M2
BH CV ECHO MEAS - SI(MOD-SP4): 33.1 ML/M2
BH CV ECHO MEAS - SV(LVOT): 78.8 ML
BH CV ECHO MEAS - SV(MOD-SP2): 63 ML
BH CV ECHO MEAS - SV(MOD-SP4): 69 ML
BH CV ECHO MEAS - TAPSE (>1.6): 1.56 CM
BH CV ECHO MEAS - TR MAX PG: 11.2 MMHG
BH CV ECHO MEAS - TR MAX VEL: 167.4 CM/SEC
BH CV ECHO MEASUREMENTS AVERAGE E/E' RATIO: 19.02
BH CV XLRA - RV BASE: 2.9 CM
BH CV XLRA - RV LENGTH: 7.5 CM
BH CV XLRA - RV MID: 3.1 CM
BH CV XLRA - TDI S': 7.2 CM/SEC
LEFT ATRIUM VOLUME INDEX: 27.5 ML/M2
MAXIMAL PREDICTED HEART RATE: 148 BPM
STRESS TARGET HR: 126 BPM

## 2023-03-06 PROCEDURE — 25510000001 PERFLUTREN (DEFINITY) 8.476 MG IN SODIUM CHLORIDE (PF) 0.9 % 10 ML INJECTION

## 2023-03-06 PROCEDURE — 99214 OFFICE O/P EST MOD 30 MIN: CPT | Performed by: INTERNAL MEDICINE

## 2023-03-06 PROCEDURE — 93306 TTE W/DOPPLER COMPLETE: CPT | Performed by: INTERNAL MEDICINE

## 2023-03-06 PROCEDURE — 3078F DIAST BP <80 MM HG: CPT | Performed by: INTERNAL MEDICINE

## 2023-03-06 PROCEDURE — 93306 TTE W/DOPPLER COMPLETE: CPT

## 2023-03-06 PROCEDURE — 3074F SYST BP LT 130 MM HG: CPT | Performed by: INTERNAL MEDICINE

## 2023-03-06 RX ORDER — FUROSEMIDE 40 MG/1
20 TABLET ORAL DAILY
Qty: 30 TABLET | Refills: 5 | Status: SHIPPED | OUTPATIENT
Start: 2023-03-06

## 2023-03-06 RX ADMIN — SODIUM CHLORIDE 2 ML: 9 INJECTION INTRAMUSCULAR; INTRAVENOUS; SUBCUTANEOUS at 13:23

## 2023-03-06 NOTE — PROGRESS NOTES
3 MONTH FOLLOW UP WITH ECHO TODAY   Subjective:        Mayra Hirsch is a 72 y.o. female who here for follow up    CC  Follow-up hypertension CABG  HPI  72-year-old female with hypertension CABG here for the follow-up     Problems Addressed this Visit        Cardiac and Vasculature    Essential hypertension    Relevant Medications    furosemide (LASIX) 40 MG tablet    Other Relevant Orders    Adult Transthoracic Echo Complete W/ Cont if Necessary Per Protocol    Adult Transthoracic Echo Complete W/ Cont if Necessary Per Protocol    S/P CABG x 5    Relevant Orders    Adult Transthoracic Echo Complete W/ Cont if Necessary Per Protocol    Adult Transthoracic Echo Complete W/ Cont if Necessary Per Protocol       Pulmonary and Pneumonias    Shortness of breath    Relevant Orders    Adult Transthoracic Echo Complete W/ Cont if Necessary Per Protocol    Adult Transthoracic Echo Complete W/ Cont if Necessary Per Protocol       Other    Low left ventricular ejection fraction - Primary    Relevant Orders    Adult Transthoracic Echo Complete W/ Cont if Necessary Per Protocol    Adult Transthoracic Echo Complete W/ Cont if Necessary Per Protocol   Diagnoses       Codes Comments    Low left ventricular ejection fraction    -  Primary ICD-10-CM: R94.30  ICD-9-CM: 794.30     Essential hypertension     ICD-10-CM: I10  ICD-9-CM: 401.9     S/P CABG x 5     ICD-10-CM: Z95.1  ICD-9-CM: V45.81     Shortness of breath     ICD-10-CM: R06.02  ICD-9-CM: 786.05         .    The following portions of the patient's history were reviewed and updated as appropriate: allergies, current medications, past family history, past medical history, past social history, past surgical history and problem list.    Past Medical History:   Diagnosis Date   • Depression    • Diabetes mellitus (HCC)    • Disease of thyroid gland    • Fibrocystic breast    • Hypertension    • Hypothyroidism    • PONV (postoperative nausea and vomiting)    • Type 2 diabetes  "mellitus (HCC)      reports that she has never smoked. She has never used smokeless tobacco. She reports that she does not drink alcohol and does not use drugs.   Family History   Problem Relation Age of Onset   • Coronary artery disease Mother    • Alzheimer's disease Mother    • Coronary artery disease Father    • Cancer Father    • Thyroid disease Sister    • Malig Hyperthermia Neg Hx        Review of Systems  Constitutional: No wt loss, fever, fatigue  Gastrointestinal: No nausea, abdominal pain  Behavioral/Psych: No insomnia or anxiety   Cardiovascular no chest pains or tightness in the chest  Objective:       Physical Exam           Physical Exam  /64   Pulse 75   Ht 172.7 cm (68\")   Wt 93 kg (205 lb)   BMI 31.17 kg/m²     General appearance: No acute changes   Eyes: Sclerae conjunctivae normal, pupils reactive   HENT: Atraumatic; oropharynx clear with moist mucous membranes and no mucosal ulcerations;  Neck: Trachea midline; NECK, supple, no thyromegaly or lymphadenopathy   Lungs: Normal size and shape, normal breath sounds, equal distribution of air, no rales and rhonchi   CV: S1-S2 regular, no murmurs, no rub, no gallop   Abdomen: Soft, nontender; no masses , no abnormal abdominal sounds   Extremities: No deformity , normal color , no peripheral edema   Skin: Normal temperature, turgor and texture; no rash, ulcers  Psych: Appropriate affect, alert and oriented to person, place and time           Procedures      Echocardiogram:        Current Outpatient Medications:   •  aspirin 81 MG EC tablet, Take 1 tablet by mouth Daily., Disp: 30 tablet, Rfl: 5  •  atorvastatin (LIPITOR) 40 MG tablet, Take 1 tablet by mouth Every Night., Disp: 90 tablet, Rfl: 0  •  clopidogrel (PLAVIX) 75 MG tablet, Take 1 tablet by mouth Daily., Disp: 30 tablet, Rfl: 5  •  cyclobenzaprine (FLEXERIL) 10 MG tablet, Take 1 tablet by mouth Every 8 (Eight) Hours As Needed for Muscle Spasms., Disp: 30 tablet, Rfl: 0  •  ezetimibe " (ZETIA) 10 MG tablet, TAKE 1 TABLET DAILY, Disp: 90 tablet, Rfl: 0  •  Farxiga 10 MG tablet, TAKE 1 TABLET EVERY MORNING, Disp: 90 tablet, Rfl: 3  •  FLUoxetine (PROzac) 40 MG capsule, , Disp: , Rfl:   •  furosemide (LASIX) 40 MG tablet, Take 0.5 tablets by mouth Daily., Disp: 30 tablet, Rfl: 5  •  Januvia 100 MG tablet, TAKE 1 TABLET DAILY, Disp: 90 tablet, Rfl: 3  •  levothyroxine (SYNTHROID, LEVOTHROID) 125 MCG tablet, TAKE 2 TABLETS DAILY, Disp: 180 tablet, Rfl: 3  •  lisinopril (PRINIVIL,ZESTRIL) 20 MG tablet, Take 1 tablet by mouth Daily., Disp: , Rfl:   •  metoprolol succinate XL (TOPROL-XL) 50 MG 24 hr tablet, Take 1 tablet by mouth Daily., Disp: 30 tablet, Rfl: 5  •  Toujeo SoloStar 300 UNIT/ML solution pen-injector injection, Inject 36 Units under the skin into the appropriate area as directed Daily. (Patient taking differently: Inject 40 Units under the skin into the appropriate area as directed Daily.), Disp: 9 mL, Rfl: 4  •  vitamin D (ERGOCALCIFEROL) 1.25 MG (49382 UT) capsule capsule, TAKE ONE CAPSULE BY MOUTH ONCE WEEKLY, Disp: 12 capsule, Rfl: 0  •  Continuous Blood Gluc  (FreeStyle Gene 2 Troy Systm) device, 1 Device Daily., Disp: 1 Device, Rfl: 0  •  Continuous Blood Gluc Sensor (FreeStyle Gene 2 Sensor) misc, USE TO TEST BLOOD SUGAR CONTINUOUSLY. CHANGE EVERY 14 DAYS, Disp: 1 each, Rfl: 3  •  Insulin Pen Needle (BD Pen Needle Micro U/F) 32G X 6 MM misc, USE ONE PER DAY FOR DAILY INSULIN INJECTION, E11.9, Disp: 100 each, Rfl: 3   Assessment:        Patient Active Problem List   Diagnosis   • Vitamin D deficiency   • Primary hypothyroidism   • Dyslipidemia   • Essential hypertension   • Type 2 diabetes mellitus without complication, with long-term current use of insulin (HCC)   • Noncompliance with diabetes treatment   • ST elevation myocardial infarction (STEMI) (HCC)   • Coronary artery disease involving native heart   • S/P CABG x 5   • Low left ventricular ejection fraction   •  Shortness of breath               Plan:            ICD-10-CM ICD-9-CM   1. Low left ventricular ejection fraction  R94.30 794.30   2. Essential hypertension  I10 401.9   3. S/P CABG x 5  Z95.1 V45.81   4. Shortness of breath  R06.02 786.05     1. Essential hypertension  Considering patient's medical condition as well as the risk factors, patient will require echocardiogram for further evaluation for the LV function, four-chamber evaluation, including the pressures, valvular function and  pericardial disease and pericardial effusion    - Adult Transthoracic Echo Complete W/ Cont if Necessary Per Protocol; Future  - Adult Transthoracic Echo Complete W/ Cont if Necessary Per Protocol    2. S/P CABG x 5  No angina pectoris  - Adult Transthoracic Echo Complete W/ Cont if Necessary Per Protocol; Future  - Adult Transthoracic Echo Complete W/ Cont if Necessary Per Protocol    3. Low left ventricular ejection fraction  Treat medically  - Adult Transthoracic Echo Complete W/ Cont if Necessary Per Protocol; Future  - Adult Transthoracic Echo Complete W/ Cont if Necessary Per Protocol    4. Shortness of breath  Multifactorial  - Adult Transthoracic Echo Complete W/ Cont if Necessary Per Protocol; Future  - Adult Transthoracic Echo Complete W/ Cont if Necessary Per Protocol       ECHO EF 47%    1 YR WITH ECHO  COUNSELING:    Mayra Henry was given to patient for the following topics: diagnostic results, risk factor reductions, impressions, risks and benefits of treatment options and importance of treatment compliance .       SMOKING COUNSELING:        Dictated using Dragon dictation

## 2023-03-08 ENCOUNTER — APPOINTMENT (OUTPATIENT)
Dept: CARDIAC REHAB | Facility: HOSPITAL | Age: 73
End: 2023-03-08
Payer: MEDICARE

## 2023-03-10 ENCOUNTER — APPOINTMENT (OUTPATIENT)
Dept: CARDIAC REHAB | Facility: HOSPITAL | Age: 73
End: 2023-03-10
Payer: MEDICARE

## 2023-03-13 ENCOUNTER — APPOINTMENT (OUTPATIENT)
Dept: CARDIAC REHAB | Facility: HOSPITAL | Age: 73
End: 2023-03-13
Payer: MEDICARE

## 2023-03-14 RX ORDER — PEN NEEDLE, DIABETIC 32 GX 1/4"
NEEDLE, DISPOSABLE MISCELLANEOUS
Qty: 100 EACH | Refills: 3 | Status: SHIPPED | OUTPATIENT
Start: 2023-03-14

## 2023-03-14 NOTE — TELEPHONE ENCOUNTER
Pt called needing needles for her toujeo. Insulin Pen Needle (NovoFine) 32G X 6 MM misc    Please send to [facility] on [address]

## 2023-03-14 NOTE — TELEPHONE ENCOUNTER
PT seeing you next week, can we refill pen needles?      Rx Refill Note  Requested Prescriptions     Pending Prescriptions Disp Refills   • Insulin Pen Needle (NovoFine) 32G X 6 MM misc 100 each 3     Sig: USE FOR DAILY INSULIN INJECTION      Last office visit with prescribing clinician: Visit date not found   Last telemedicine visit with prescribing clinician: 3/23/2023   Next office visit with prescribing clinician: 3/23/2023                         Would you like a call back once the refill request has been completed: [] Yes [] No    If the office needs to give you a call back, can they leave a voicemail: [] Yes [] No    Karla Bender MA  03/14/23, 15:08 EDT

## 2023-03-15 ENCOUNTER — APPOINTMENT (OUTPATIENT)
Dept: CARDIAC REHAB | Facility: HOSPITAL | Age: 73
End: 2023-03-15
Payer: MEDICARE

## 2023-03-17 ENCOUNTER — APPOINTMENT (OUTPATIENT)
Dept: CARDIAC REHAB | Facility: HOSPITAL | Age: 73
End: 2023-03-17
Payer: MEDICARE

## 2023-03-20 ENCOUNTER — APPOINTMENT (OUTPATIENT)
Dept: CARDIAC REHAB | Facility: HOSPITAL | Age: 73
End: 2023-03-20
Payer: MEDICARE

## 2023-03-21 PROBLEM — R06.02 SHORTNESS OF BREATH: Status: ACTIVE | Noted: 2023-03-21

## 2023-03-21 PROBLEM — R94.30 LOW LEFT VENTRICULAR EJECTION FRACTION: Status: ACTIVE | Noted: 2023-03-21

## 2023-03-21 PROBLEM — R93.1 LOW LEFT VENTRICULAR EJECTION FRACTION: Status: ACTIVE | Noted: 2023-03-21

## 2023-03-22 ENCOUNTER — APPOINTMENT (OUTPATIENT)
Dept: CARDIAC REHAB | Facility: HOSPITAL | Age: 73
End: 2023-03-22
Payer: MEDICARE

## 2023-03-23 ENCOUNTER — OFFICE VISIT (OUTPATIENT)
Dept: ENDOCRINOLOGY | Age: 73
End: 2023-03-23
Payer: MEDICARE

## 2023-03-23 VITALS
OXYGEN SATURATION: 100 % | WEIGHT: 204.4 LBS | TEMPERATURE: 97.7 F | BODY MASS INDEX: 30.98 KG/M2 | DIASTOLIC BLOOD PRESSURE: 70 MMHG | HEART RATE: 56 BPM | SYSTOLIC BLOOD PRESSURE: 122 MMHG | HEIGHT: 68 IN

## 2023-03-23 DIAGNOSIS — E11.65 TYPE 2 DIABETES MELLITUS WITH HYPERGLYCEMIA, WITH LONG-TERM CURRENT USE OF INSULIN: Primary | ICD-10-CM

## 2023-03-23 DIAGNOSIS — Z79.4 TYPE 2 DIABETES MELLITUS WITH HYPERGLYCEMIA, WITH LONG-TERM CURRENT USE OF INSULIN: Primary | ICD-10-CM

## 2023-03-23 DIAGNOSIS — B07.0 PLANTAR WART OF RIGHT FOOT: ICD-10-CM

## 2023-03-23 DIAGNOSIS — E03.9 ACQUIRED HYPOTHYROIDISM: ICD-10-CM

## 2023-03-23 PROCEDURE — 1159F MED LIST DOCD IN RCRD: CPT | Performed by: INTERNAL MEDICINE

## 2023-03-23 PROCEDURE — 3074F SYST BP LT 130 MM HG: CPT | Performed by: INTERNAL MEDICINE

## 2023-03-23 PROCEDURE — 3078F DIAST BP <80 MM HG: CPT | Performed by: INTERNAL MEDICINE

## 2023-03-23 PROCEDURE — 1160F RVW MEDS BY RX/DR IN RCRD: CPT | Performed by: INTERNAL MEDICINE

## 2023-03-23 PROCEDURE — 99214 OFFICE O/P EST MOD 30 MIN: CPT | Performed by: INTERNAL MEDICINE

## 2023-03-23 RX ORDER — FLASH GLUCOSE SCANNING READER
1 EACH MISCELLANEOUS ONCE
Qty: 1 EACH | Refills: 0 | Status: SHIPPED | OUTPATIENT
Start: 2023-03-23 | End: 2023-03-23

## 2023-03-23 RX ORDER — DAPAGLIFLOZIN 10 MG/1
1 TABLET, FILM COATED ORAL EVERY MORNING
Qty: 90 TABLET | Refills: 3 | Status: SHIPPED | OUTPATIENT
Start: 2023-03-23

## 2023-03-23 NOTE — PROGRESS NOTES
Chief complaint:  T2DM    HPI:  - 72 year old female here for management of diabetes mellitus type 2  - Has had diabetes for over 10 years  - Complications include CAD status post CABG in 11/2022 and underwent cardiac rehab after that  - No known complications to date  - Is currently taking Farxiga 10 mg daily, Januvia, and Toujeo 40 units daily  - She has not been monitoring her BG because she lost her Gene [place] Device  - Is also on atorva 40 mg daily and levothyroxine 112 mcg daily  - She has an eye exam every 6 months  - She is complaining of pain on the bottom of her right foot      The following portions of the patient's history were reviewed and updated as appropriate: allergies, current medications, past family history, past medical history, past social history, past surgical history and problem list.    Review of Systems   HENT: Negative.    Respiratory: Negative.    Cardiovascular: Negative.    Gastrointestinal: Negative.    Musculoskeletal: Negative.    Hematological: Negative.    Psychiatric/Behavioral: Negative.        Objective     Vitals:    03/23/23 0819   BP: 122/70   Pulse: 56   Temp: 97.7 °F (36.5 °C)   SpO2: 100%        Physical Exam  Constitutional:       Appearance: Normal appearance.   HENT:      Head: Normocephalic and atraumatic.   Eyes:      General: No scleral icterus.     Conjunctiva/sclera: Conjunctivae normal.   Pulmonary:      Effort: Pulmonary effort is normal.      Breath sounds: Normal breath sounds.   Feet:      Comments: 2 plantar warts in the arch of the right foot  Neurological:      Mental Status: She is alert.      Gait: Gait normal.   Psychiatric:         Mood and Affect: Mood normal.         Behavior: Behavior normal.         Thought Content: Thought content normal.         Judgment: Judgment normal.         Labs/Imaging:  A1c was 7.6% in 11/2022    Assessment & Plan   1. T2DM, complicated by CAD  - Will check A1c today  - Currently on Farxiga 10 mg daily, Januvia, and  Toujeo 40 units daily  - Sent in Rx for Gene [place] device so she can monitor her BG    2. Hypothyroidism  - On levothyroxine 112 mcg daily  - Recent TSH was normal    3. Plantar wart  - Patient is having foot pain due to plantar wart so ordered dermatology referral    Addendum:  Called patient to discuss A1c of 8.4% and got VM.  Dietary modification would help but if that does not improve glycemic control prandial insulin would be the best option.

## 2023-03-24 ENCOUNTER — PATIENT ROUNDING (BHMG ONLY) (OUTPATIENT)
Dept: ENDOCRINOLOGY | Age: 73
End: 2023-03-24
Payer: MEDICARE

## 2023-03-24 ENCOUNTER — APPOINTMENT (OUTPATIENT)
Dept: CARDIAC REHAB | Facility: HOSPITAL | Age: 73
End: 2023-03-24
Payer: MEDICARE

## 2023-03-24 LAB — HBA1C MFR BLD: 8.4 % (ref 4.8–5.6)

## 2023-03-27 ENCOUNTER — APPOINTMENT (OUTPATIENT)
Dept: CARDIAC REHAB | Facility: HOSPITAL | Age: 73
End: 2023-03-27
Payer: MEDICARE

## 2023-06-06 RX ORDER — METOPROLOL SUCCINATE 50 MG/1
TABLET, EXTENDED RELEASE ORAL
Qty: 30 TABLET | Refills: 5 | Status: SHIPPED | OUTPATIENT
Start: 2023-06-06

## 2023-06-06 RX ORDER — CLOPIDOGREL BISULFATE 75 MG/1
TABLET ORAL
Qty: 30 TABLET | Refills: 5 | Status: SHIPPED | OUTPATIENT
Start: 2023-06-06

## 2023-06-16 ENCOUNTER — HOSPITAL ENCOUNTER (OUTPATIENT)
Facility: HOSPITAL | Age: 73
Setting detail: OBSERVATION
Discharge: HOME | End: 2023-06-17
Attending: EMERGENCY MEDICINE | Admitting: INTERNAL MEDICINE
Payer: MEDICARE

## 2023-06-16 ENCOUNTER — APPOINTMENT (OUTPATIENT)
Dept: GENERAL RADIOLOGY | Facility: HOSPITAL | Age: 73
End: 2023-06-16
Payer: MEDICARE

## 2023-06-16 ENCOUNTER — APPOINTMENT (OUTPATIENT)
Dept: CT IMAGING | Facility: HOSPITAL | Age: 73
End: 2023-06-16
Payer: MEDICARE

## 2023-06-16 DIAGNOSIS — R53.1 RIGHT SIDED WEAKNESS: Primary | ICD-10-CM

## 2023-06-16 DIAGNOSIS — E86.0 DEHYDRATION: ICD-10-CM

## 2023-06-16 LAB
ALBUMIN SERPL-MCNC: 4.2 G/DL (ref 3.5–5.2)
ALBUMIN/GLOB SERPL: 1.3 G/DL
ALP SERPL-CCNC: 109 U/L (ref 39–117)
ALT SERPL W P-5'-P-CCNC: 24 U/L (ref 1–33)
ANION GAP SERPL CALCULATED.3IONS-SCNC: 11.1 MMOL/L (ref 5–15)
AST SERPL-CCNC: 20 U/L (ref 1–32)
BASOPHILS # BLD AUTO: 0.09 10*3/MM3 (ref 0–0.2)
BASOPHILS NFR BLD AUTO: 1.1 % (ref 0–1.5)
BILIRUB SERPL-MCNC: 0.4 MG/DL (ref 0–1.2)
BUN SERPL-MCNC: 30 MG/DL (ref 8–23)
BUN/CREAT SERPL: 28.8 (ref 7–25)
CALCIUM SPEC-SCNC: 9.7 MG/DL (ref 8.6–10.5)
CHLORIDE SERPL-SCNC: 105 MMOL/L (ref 98–107)
CO2 SERPL-SCNC: 19.9 MMOL/L (ref 22–29)
CREAT SERPL-MCNC: 1.04 MG/DL (ref 0.57–1)
DEPRECATED RDW RBC AUTO: 46.6 FL (ref 37–54)
EGFRCR SERPLBLD CKD-EPI 2021: 57.2 ML/MIN/1.73
EOSINOPHIL # BLD AUTO: 0.16 10*3/MM3 (ref 0–0.4)
EOSINOPHIL NFR BLD AUTO: 1.9 % (ref 0.3–6.2)
ERYTHROCYTE [DISTWIDTH] IN BLOOD BY AUTOMATED COUNT: 14.4 % (ref 12.3–15.4)
GLOBULIN UR ELPH-MCNC: 3.2 GM/DL
GLUCOSE BLDC GLUCOMTR-MCNC: 168 MG/DL (ref 70–130)
GLUCOSE BLDC GLUCOMTR-MCNC: 91 MG/DL (ref 70–130)
GLUCOSE SERPL-MCNC: 124 MG/DL (ref 65–99)
HCT VFR BLD AUTO: 45.2 % (ref 34–46.6)
HGB BLD-MCNC: 14.5 G/DL (ref 12–15.9)
IMM GRANULOCYTES # BLD AUTO: 0.03 10*3/MM3 (ref 0–0.05)
IMM GRANULOCYTES NFR BLD AUTO: 0.4 % (ref 0–0.5)
LYMPHOCYTES # BLD AUTO: 2.45 10*3/MM3 (ref 0.7–3.1)
LYMPHOCYTES NFR BLD AUTO: 29.7 % (ref 19.6–45.3)
MCH RBC QN AUTO: 28.5 PG (ref 26.6–33)
MCHC RBC AUTO-ENTMCNC: 32.1 G/DL (ref 31.5–35.7)
MCV RBC AUTO: 88.8 FL (ref 79–97)
MONOCYTES # BLD AUTO: 0.65 10*3/MM3 (ref 0.1–0.9)
MONOCYTES NFR BLD AUTO: 7.9 % (ref 5–12)
NEUTROPHILS NFR BLD AUTO: 4.87 10*3/MM3 (ref 1.7–7)
NEUTROPHILS NFR BLD AUTO: 59 % (ref 42.7–76)
NRBC BLD AUTO-RTO: 0 /100 WBC (ref 0–0.2)
NT-PROBNP SERPL-MCNC: 1460 PG/ML (ref 0–900)
PLATELET # BLD AUTO: 253 10*3/MM3 (ref 140–450)
PMV BLD AUTO: 10.5 FL (ref 6–12)
POTASSIUM SERPL-SCNC: 4.6 MMOL/L (ref 3.5–5.2)
PROT SERPL-MCNC: 7.4 G/DL (ref 6–8.5)
QT INTERVAL: 425 MS
RBC # BLD AUTO: 5.09 10*6/MM3 (ref 3.77–5.28)
SODIUM SERPL-SCNC: 136 MMOL/L (ref 136–145)
TROPONIN T SERPL HS-MCNC: 22 NG/L
WBC NRBC COR # BLD: 8.25 10*3/MM3 (ref 3.4–10.8)

## 2023-06-16 PROCEDURE — 71045 X-RAY EXAM CHEST 1 VIEW: CPT

## 2023-06-16 PROCEDURE — 80053 COMPREHEN METABOLIC PANEL: CPT | Performed by: EMERGENCY MEDICINE

## 2023-06-16 PROCEDURE — 84484 ASSAY OF TROPONIN QUANT: CPT | Performed by: EMERGENCY MEDICINE

## 2023-06-16 PROCEDURE — G0378 HOSPITAL OBSERVATION PER HR: HCPCS

## 2023-06-16 PROCEDURE — 70450 CT HEAD/BRAIN W/O DYE: CPT

## 2023-06-16 PROCEDURE — 85025 COMPLETE CBC W/AUTO DIFF WBC: CPT | Performed by: EMERGENCY MEDICINE

## 2023-06-16 PROCEDURE — 93005 ELECTROCARDIOGRAM TRACING: CPT | Performed by: EMERGENCY MEDICINE

## 2023-06-16 PROCEDURE — 82948 REAGENT STRIP/BLOOD GLUCOSE: CPT

## 2023-06-16 PROCEDURE — 83880 ASSAY OF NATRIURETIC PEPTIDE: CPT | Performed by: EMERGENCY MEDICINE

## 2023-06-16 RX ORDER — LISINOPRIL 20 MG/1
20 TABLET ORAL DAILY
Status: DISCONTINUED | OUTPATIENT
Start: 2023-06-17 | End: 2023-06-17 | Stop reason: HOSPADM

## 2023-06-16 RX ORDER — SODIUM CHLORIDE 0.9 % (FLUSH) 0.9 %
10 SYRINGE (ML) INJECTION AS NEEDED
Status: DISCONTINUED | OUTPATIENT
Start: 2023-06-16 | End: 2023-06-17 | Stop reason: HOSPADM

## 2023-06-16 RX ORDER — ATORVASTATIN CALCIUM 20 MG/1
40 TABLET, FILM COATED ORAL NIGHTLY
Status: DISCONTINUED | OUTPATIENT
Start: 2023-06-17 | End: 2023-06-17 | Stop reason: HOSPADM

## 2023-06-16 RX ORDER — ONDANSETRON 2 MG/ML
4 INJECTION INTRAMUSCULAR; INTRAVENOUS EVERY 6 HOURS PRN
Status: DISCONTINUED | OUTPATIENT
Start: 2023-06-16 | End: 2023-06-17 | Stop reason: HOSPADM

## 2023-06-16 RX ORDER — METOPROLOL SUCCINATE 50 MG/1
50 TABLET, EXTENDED RELEASE ORAL DAILY
Status: DISCONTINUED | OUTPATIENT
Start: 2023-06-17 | End: 2023-06-17 | Stop reason: HOSPADM

## 2023-06-16 RX ORDER — SODIUM CHLORIDE 0.9 % (FLUSH) 0.9 %
10 SYRINGE (ML) INJECTION EVERY 12 HOURS SCHEDULED
Status: DISCONTINUED | OUTPATIENT
Start: 2023-06-16 | End: 2023-06-17 | Stop reason: HOSPADM

## 2023-06-16 RX ORDER — IBUPROFEN 600 MG/1
1 TABLET ORAL
Status: DISCONTINUED | OUTPATIENT
Start: 2023-06-16 | End: 2023-06-17 | Stop reason: HOSPADM

## 2023-06-16 RX ORDER — NICOTINE POLACRILEX 4 MG
15 LOZENGE BUCCAL
Status: DISCONTINUED | OUTPATIENT
Start: 2023-06-16 | End: 2023-06-17 | Stop reason: HOSPADM

## 2023-06-16 RX ORDER — SODIUM CHLORIDE 9 MG/ML
40 INJECTION, SOLUTION INTRAVENOUS AS NEEDED
Status: DISCONTINUED | OUTPATIENT
Start: 2023-06-16 | End: 2023-06-17 | Stop reason: HOSPADM

## 2023-06-16 RX ORDER — FLUOXETINE HYDROCHLORIDE 20 MG/1
40 CAPSULE ORAL DAILY
Status: DISCONTINUED | OUTPATIENT
Start: 2023-06-17 | End: 2023-06-17 | Stop reason: HOSPADM

## 2023-06-16 RX ORDER — LEVOTHYROXINE SODIUM 0.12 MG/1
125 TABLET ORAL
Status: DISCONTINUED | OUTPATIENT
Start: 2023-06-17 | End: 2023-06-17 | Stop reason: HOSPADM

## 2023-06-16 RX ORDER — INSULIN LISPRO 100 [IU]/ML
2-9 INJECTION, SOLUTION INTRAVENOUS; SUBCUTANEOUS
Status: DISCONTINUED | OUTPATIENT
Start: 2023-06-16 | End: 2023-06-17 | Stop reason: HOSPADM

## 2023-06-16 RX ORDER — DEXTROSE MONOHYDRATE 25 G/50ML
25 INJECTION, SOLUTION INTRAVENOUS
Status: DISCONTINUED | OUTPATIENT
Start: 2023-06-16 | End: 2023-06-17 | Stop reason: HOSPADM

## 2023-06-16 RX ORDER — CLOPIDOGREL BISULFATE 75 MG/1
75 TABLET ORAL DAILY
Status: DISCONTINUED | OUTPATIENT
Start: 2023-06-17 | End: 2023-06-17 | Stop reason: HOSPADM

## 2023-06-16 RX ORDER — SODIUM CHLORIDE 9 MG/ML
75 INJECTION, SOLUTION INTRAVENOUS CONTINUOUS
Status: DISCONTINUED | OUTPATIENT
Start: 2023-06-16 | End: 2023-06-17 | Stop reason: HOSPADM

## 2023-06-16 RX ORDER — NITROGLYCERIN 0.4 MG/1
0.4 TABLET SUBLINGUAL
Status: DISCONTINUED | OUTPATIENT
Start: 2023-06-16 | End: 2023-06-17 | Stop reason: HOSPADM

## 2023-06-16 NOTE — ED NOTES
Nursing report ED to floor  Mayra Hirsch  72 y.o.  female    HPI :   Chief Complaint   Patient presents with    Weakness - Generalized     [place] in arms/legs and tingling in left leg x3-4 days. Cardiologist office said to come to ED        Admitting doctor:   Carlee Ibarra MD    Admitting diagnosis:   The primary encounter diagnosis was Right sided weakness. A diagnosis of Dehydration was also pertinent to this visit.    Code status:   Current Code Status       Date Active Code Status Order ID Comments User Context       Prior            Allergies:   Bydureon [exenatide] and Metformin and related    Isolation:   No active isolations    Intake and Output  No intake or output data in the 24 hours ending 06/16/23 1943    Weight:   There were no vitals filed for this visit.    Most recent vitals:   Vitals:    06/16/23 1600 06/16/23 1620 06/16/23 1631 06/16/23 1934   BP:  (!) 134/105 110/67 117/57   BP Location:    Left arm   Patient Position:    Lying   Pulse: 77 68 64 61   Resp:  16 16 12   Temp: 98.1 °F (36.7 °C)      SpO2: 99% 99% 98% 97%       Active LDAs/IV Access:   Lines, Drains & Airways       Active LDAs       None                    Labs (abnormal labs have a star):   Labs Reviewed   COMPREHENSIVE METABOLIC PANEL - Abnormal; Notable for the following components:       Result Value    Glucose 124 (*)     BUN 30 (*)     Creatinine 1.04 (*)     CO2 19.9 (*)     BUN/Creatinine Ratio 28.8 (*)     eGFR 57.2 (*)     All other components within normal limits    Narrative:     GFR Normal >60  Chronic Kidney Disease <60  Kidney Failure <15    The GFR formula is only valid for adults with stable renal function between ages 18 and 70.   SINGLE HSTROPONIN T - Abnormal; Notable for the following components:    HS Troponin T 22 (*)     All other components within normal limits    Narrative:     High Sensitive Troponin T Reference Range:  <10.0 ng/L- Negative Female for AMI  <15.0 ng/L- Negative Male for AMI  >=10 -  Abnormal Female indicating possible myocardial injury.  >=15 - Abnormal Male indicating possible myocardial injury.   Clinicians would have to utilize clinical acumen, EKG, Troponin, and serial changes to determine if it is an Acute Myocardial Infarction or myocardial injury due to an underlying chronic condition.        BNP (IN-HOUSE) - Abnormal; Notable for the following components:    proBNP 1,460.0 (*)     All other components within normal limits    Narrative:     Among patients with dyspnea, NT-proBNP is highly sensitive for the detection of acute congestive heart failure. In addition NT-proBNP of <300 pg/ml effectively rules out acute congestive heart failure with 99% negative predictive value.    Results may be falsely decreased if patient taking Biotin.     CBC WITH AUTO DIFFERENTIAL - Normal   CBC AND DIFFERENTIAL    Narrative:     The following orders were created for panel order CBC & Differential.  Procedure                               Abnormality         Status                     ---------                               -----------         ------                     CBC Auto Differential[[id number]]        Normal              Final result                 Please view results for these tests on the individual orders.       EKG:   ECG 12 Lead Stroke Evaluation   Preliminary Result   HEART RATE= 64  bpm   RR Interval= 938  ms   KS Interval= 175  ms   P Horizontal Axis= -50  deg   P Front Axis= -11  deg   QRSD Interval= 115  ms   QT Interval= 425  ms   QRS Axis= 17  deg   T Wave Axis= 118  deg   - ABNORMAL ECG -   Sinus rhythm   Probable left atrial enlargement   Nonspecific intraventricular conduction delay   Probable anteroseptal infarct, old   Abnormal T, consider ischemia, lateral leads   Electronically Signed By:    Date and Time of Study: 2023-06-16 16:41:20          Meds given in ED:   Medications - No data to display    Imaging results:  CT Head Without Contrast    Result Date: 6/16/2023   No acute  intracranial hemorrhage or hydrocephalus. If there is further clinical concern, MRI could be considered for further evaluation.  This report was finalized on 6/16/2023 6:43 PM by Dr. Tr Hinton M.D.      XR Chest 1 View    Result Date: 6/16/2023  No focal pulmonary consolidation. Borderline heart size. Follow-up as clinical indications persist.  This report was finalized on 6/16/2023 4:56 PM by Dr. Tr Hinton M.D.       Ambulatory status:   - Assist x 1    Social issues:   Social History     Socioeconomic History    Marital status:    Tobacco Use    Smoking status: Never    Smokeless tobacco: Never   Vaping Use    Vaping Use: Never used   Substance and Sexual Activity    Alcohol use: No    Drug use: Never    Sexual activity: Not Currently       NIH Stroke Scale:         Nancy Moss RN  06/16/23 19:43 EDT

## 2023-06-16 NOTE — ED PROVIDER NOTES
EMERGENCY DEPARTMENT ENCOUNTER    Room Number:  25/25  PCP: Spencer Hua MD  Historian: Patient      HPI:  Chief Complaint: Multiple complaints  A complete HPI/ROS/PMH/PSH/SH/FH are unobtainable due to: None  Context: Mayra Hirsch is a 72 y.o. female who presents to the ED c/o multiple complaints.  Patient states she had recent heart attack.  Patient states she has had some low back pain recently.  Has noted some increasing weakness right arm and right leg.  States that is been going on for couple days.  States she has trouble brushing her teeth with the right hand.  In the right leg seem to feel like it is going to give out.  Has had no speech issues or vision issues.  Last normal was several days ago.  Patient also has some tingling to the left leg.  Symptoms started after her surgery but have persisted since then.            PAST MEDICAL HISTORY  Active Ambulatory Problems     Diagnosis Date Noted    Vitamin D deficiency 04/28/2017    Primary hypothyroidism 04/28/2017    Dyslipidemia 04/28/2017    Essential hypertension 04/28/2017    Type 2 diabetes mellitus without complication, with long-term current use of insulin 04/28/2017    Noncompliance with diabetes treatment 02/07/2019    ST elevation myocardial infarction (STEMI) 11/07/2022    Coronary artery disease involving native heart 11/07/2022    S/P CABG x 5 12/21/2022    Low left ventricular ejection fraction 03/21/2023    Shortness of breath 03/21/2023     Resolved Ambulatory Problems     Diagnosis Date Noted    Abnormal findings on diagnostic imaging of other specified body structures 11/07/2022     Past Medical History:   Diagnosis Date    Depression     Diabetes mellitus     Disease of thyroid gland     Fibrocystic breast     Hypertension     Hyperthyroidism     Hypothyroidism     PONV (postoperative nausea and vomiting)     Type 2 diabetes mellitus          PAST SURGICAL HISTORY  Past Surgical History:   Procedure Laterality Date    BREAST  EXCISIONAL BIOPSY Right     at age 22; benign.    CARDIAC CATHETERIZATION Left 2022    Procedure: Cardiac Catheterization/Vascular Study;  Surgeon: Joanna Marie MD;  Location: [facility] CATH INVASIVE LOCATION;  Service: Cardiology;  Laterality: Left;    CARDIAC CATHETERIZATION N/A 2022    Procedure: Percutaneous Coronary Intervention;  Surgeon: Joanna Marie MD;  Location[facility] CATH INVASIVE LOC[facility];  Service: Cardiology;  Laterality: N/A;    CARDIAC CATHETERIZATION N/A 2022    Procedure: Left ventriculography;  Surgeon: Joanna Marie MD;  Location: [facility] CATH INVASIVE LOCATION;  Service: Cardiology;  Laterality: N/A;    CARDIAC CATHETERIZATION N/A 2022    Procedure: Stent MARTINEZ coronary;  Surgeon: Joanna Marie MD;  Location: [facility] CATH INVASIVE LOCATION;  Service: Cardiology;  Laterality: N/A;    CARDIAC CATHETERIZATION N/A 2022    Procedure: Left Heart Cath;  Surgeon: Joanna Marie MD;  Location: [facility] CATH INVASIVE LOCATION;  Service: Cardiology;  Laterality: N/A;     SECTION      CORONARY ARTERY BYPASS GRAFT N/A 11/10/2022    Procedure: NIKKY, CORONARY ARTERY BYPASS GRAFTING TIMES 6 WITH LEFT JORDYN AND ENDOSCOPICALLY HARVESTED LEFT AND RIGHT GREATER SAPHENOUS VEIN, PRP TRANSESOPHAGEAL ECHOCARDIOGRAM WITH ANESTHESIA;  Surgeon: Jr Dong Isabel MD;  Location: [facility];  Service: Cardiothoracic;  Laterality: N/A;    HAND SURGERY      KNEE SURGERY      OOPHORECTOMY      1 ovary removed due to ectopic pregnancy         FAMILY HISTORY  Family History   Problem Relation Age of Onset    Coronary artery disease Mother     Alzheimer's disease Mother     Hypertension Mother     Coronary artery disease Father     Cancer Father     Thyroid disease Sister     Malig Hyperthermia Neg Hx          SOCIAL HISTORY  Social History     Socioeconomic History    Marital status:    Tobacco Use    Smoking status: Never    Smokeless tobacco:  Never   Vaping Use    Vaping Use: Never used   Substance and Sexual Activity    Alcohol use: No    Drug use: Never    Sexual activity: Not Currently         ALLERGIES  Bydureon [exenatide] and Metformin and related        REVIEW OF SYSTEMS  Review of Systems   Right-sided weakness, back pain.  Tingling to left leg      PHYSICAL EXAM  ED Triage Vitals [06/16/23 1600]   Temp Heart Rate Resp BP SpO2   98.1 °F (36.7 °C) 77 -- -- 99 %      Temp src Heart Rate Source Patient Position BP Location FiO2 (%)   -- -- -- -- --       Physical Exam      GENERAL: no acute distress  HENT: nares patent  EYES: no scleral icterus  CV: regular rhythm, normal rate  RESPIRATORY: normal effort  ABDOMEN: soft  MUSCULOSKELETAL: no deformity  NEURO: alert, moves all extremities, follows commands.  Patient does have weakness in her right arm.  Patient does have weakness in the right leg  PSYCH:  calm, cooperative  SKIN: warm, dry    Vital signs and nursing notes reviewed.          LAB RESULTS  Recent Results (from the past 24 hour(s))   Comprehensive Metabolic Panel    Collection Time: 06/16/23  4:24 PM    Specimen: Blood   Result Value Ref Range    Glucose 124 (H) 65 - 99 mg/dL    BUN 30 (H) 8 - 23 mg/dL    Creatinine 1.04 (H) 0.57 - 1.00 mg/dL    Sodium 136 136 - 145 mmol/L    Potassium 4.6 3.5 - 5.2 mmol/L    Chloride 105 98 - 107 mmol/L    CO2 19.9 (L) 22.0 - 29.0 mmol/L    Calcium 9.7 8.6 - 10.5 mg/dL    Total Protein 7.4 6.0 - 8.5 g/dL    Albumin 4.2 3.5 - 5.2 g/dL    ALT (SGPT) 24 1 - 33 U/L    AST (SGOT) 20 1 - 32 U/L    Alkaline Phosphatase 109 39 - 117 U/L    Total Bilirubin 0.4 0.0 - 1.2 mg/dL    Globulin 3.2 gm/dL    A/G Ratio 1.3 g/dL    BUN/Creatinine Ratio 28.8 (H) 7.0 - 25.0    Anion Gap 11.1 5.0 - 15.0 mmol/L    eGFR 57.2 (L) >60.0 mL/min/1.73   Single High Sensitivity Troponin T    Collection Time: 06/16/23  4:24 PM    Specimen: Blood   Result Value Ref Range    HS Troponin T 22 (H) <10 ng/L   CBC Auto Differential     Collection Time: 06/16/23  4:24 PM    Specimen: Blood   Result Value Ref Range    WBC 8.25 3.40 - 10.80 10*3/mm3    RBC 5.09 3.77 - 5.28 10*6/mm3    Hemoglobin 14.5 12.0 - 15.9 g/dL    Hematocrit 45.2 34.0 - 46.6 %    MCV 88.8 79.0 - 97.0 fL    MCH 28.5 26.6 - 33.0 pg    MCHC 32.1 31.5 - 35.7 g/dL    RDW 14.4 12.3 - 15.4 %    RDW-SD 46.6 37.0 - 54.0 fl    MPV 10.5 6.0 - 12.0 fL    Platelets 253 140 - 450 10*3/mm3    Neutrophil % 59.0 42.7 - 76.0 %    Lymphocyte % 29.7 19.6 - 45.3 %    Monocyte % 7.9 5.0 - 12.0 %    Eosinophil % 1.9 0.3 - 6.2 %    Basophil % 1.1 0.0 - 1.5 %    Immature Grans % 0.4 0.0 - 0.5 %    Neutrophils, Absolute 4.87 1.70 - 7.00 10*3/mm3    Lymphocytes, Absolute 2.45 0.70 - 3.10 10*3/mm3    Monocytes, Absolute 0.65 0.10 - 0.90 10*3/mm3    Eosinophils, Absolute 0.16 0.00 - 0.40 10*3/mm3    Basophils, Absolute 0.09 0.00 - 0.20 10*3/mm3    Immature Grans, Absolute 0.03 0.00 - 0.05 10*3/mm3    nRBC 0.0 0.0 - 0.2 /100 WBC   BNP    Collection Time: 06/16/23  4:24 PM    Specimen: Blood   Result Value Ref Range    proBNP 1,460.0 (H) 0.0 - 900.0 pg/mL   ECG 12 Lead Stroke Evaluation    Collection Time: 06/16/23  4:41 PM   Result Value Ref Range    QT Interval 425 ms       Ordered the above labs and reviewed the results.        RADIOLOGY  CT Head Without Contrast    Result Date: 6/16/2023  CT HEAD WO CONTRAST-  INDICATIONS: Weakness  TECHNIQUE: Radiation dose reduction techniques were utilized, including automated exposure control and exposure modulation based on body size. Noncontrast head CT  COMPARISON: 11/12/2022  FINDINGS:    No acute intracranial hemorrhage, midline shift or mass effect. No acute territorial infarct is identified.  Mild periventricular hypodensities suggest chronic small vessel ischemic change in a patient this age.  Arterial calcifications are seen at the base of the brain.  Ventricles, cisterns, cerebral sulci are unremarkable for patient age.  Mild paranasal sinus mucosal  thickening is present. The visualized paranasal sinuses, orbits, mastoid air cells are otherwise unremarkable.           No acute intracranial hemorrhage or hydrocephalus. If there is further clinical concern, MRI could be considered for further evaluation.  This report was finalized on 6/16/2023 6:43 PM by Dr. Tr Hinton M.D.      XR Chest 1 View    Result Date: 6/16/2023  XR CHEST 1 VW-  HISTORY: Female who is 72 years-old,  short of breath  TECHNIQUE: Frontal view of the chest  COMPARISON: 11/14/2022  FINDINGS: The heart size is borderline. Sternotomy wires are seen. Pulmonary vasculature is unremarkable. No focal pulmonary consolidation, pleural effusion, or pneumothorax. No acute osseous process.      No focal pulmonary consolidation. Borderline heart size. Follow-up as clinical indications persist.  This report was finalized on 6/16/2023 4:56 PM by Dr. Tr Hinton M.D.       Ordered the above noted radiological studies.  Chest x-ray independently interpreted by me and shows no evidence of pneumonia          PROCEDURES  Procedures    EKG          EKG time: 1641  Rhythm/Rate: Normal sinus rhythm 64  P waves and UT: Normal P waves  QRS, axis: Normal QRS  ST and T waves: Normal ST-T wave    Interpreted Contemporaneously by me, independently viewed  Unchanged compared to prior 11/14/2022      MEDICATIONS GIVEN IN ER  Medications - No data to display                MEDICAL DECISION MAKING, PROGRESS, and CONSULTS     Discussion below represents my analysis of pertinent findings related to patient's condition, differential diagnosis, treatment plan and final disposition.      Additional sources:  - Discussed/ obtained information from independent historians: None    - External (non-ED) record review: Epic reviewed and patient seen in endocrine office 3/23/2023 for her diabetes    - Chronic or social conditions impacting care: None    - Shared decision making: None      Orders placed during this  visit:  Orders Placed This Encounter   Procedures    CT Head Without Contrast    XR Chest 1 View    Comprehensive Metabolic Panel    Single High Sensitivity Troponin T    CBC Auto Differential    BNP    [facility] (on-call MD unless specified) Details    ECG 12 Lead Stroke Evaluation    Initiate Observation Status    CBC & Differential         Additional orders considered but not ordered:  None        Differential diagnosis includes but is not limited to:    CVA versus TIA versus dehydration      Independent interpretation of labs, radiology studies, and discussions with consultants:  ED Course as of 06/16/23 1957 Fri Jun 16, 2023 1927 19:27 EDT  Patient presents for evaluation of weakness and right arm and leg weakness.  Patient's generalized weakness today from being dehydrated with a BUN of 30.  Will be given small amount of fluid.  Concerning for right arm and leg weakness.  Skin negative for bleeding.  Patient may have small CVA.  Patient discussed with Dr. Ibarra who will admit.   [SL]      ED Course User Index  [SL] Yariel Ruiz MD               DIAGNOSIS  Final diagnoses:   Right sided weakness   Dehydration         DISPOSITION  admit            Latest Documented Vital Signs:  As of 19:57 EDT  BP- 117/57 HR- 61 Temp- 98.1 °F (36.7 °C) O2 sat- 97%              --    Please note that portions of this were completed with a voice recognition program.       Note Disclaimer: At [facility], we believe that sharing information builds trust and better relationships. You are receiving this note because you are receiving care at [facility] or recently visited. It is possible you will see health information before a provider has talked with you about it. This kind of information can be easy to misunderstand. To help you fully understand what it means for your health, we urge you to discuss this note with your provider.            Yariel Ruiz MD  06/16/23 1959

## 2023-06-17 ENCOUNTER — APPOINTMENT (OUTPATIENT)
Dept: CARDIOLOGY | Facility: HOSPITAL | Age: 73
End: 2023-06-17
Payer: MEDICARE

## 2023-06-17 ENCOUNTER — APPOINTMENT (OUTPATIENT)
Dept: MRI IMAGING | Facility: HOSPITAL | Age: 73
End: 2023-06-17
Payer: MEDICARE

## 2023-06-17 VITALS
TEMPERATURE: 98.6 F | DIASTOLIC BLOOD PRESSURE: 59 MMHG | RESPIRATION RATE: 16 BRPM | SYSTOLIC BLOOD PRESSURE: 122 MMHG | WEIGHT: 210.1 LBS | HEART RATE: 69 BPM | OXYGEN SATURATION: 99 % | HEIGHT: 68 IN | BODY MASS INDEX: 31.84 KG/M2

## 2023-06-17 LAB
ALBUMIN SERPL-MCNC: 3.5 G/DL (ref 3.5–5.2)
ALBUMIN/GLOB SERPL: 1.2 G/DL
ALP SERPL-CCNC: 97 U/L (ref 39–117)
ALT SERPL W P-5'-P-CCNC: 23 U/L (ref 1–33)
ANION GAP SERPL CALCULATED.3IONS-SCNC: 9.4 MMOL/L (ref 5–15)
AORTIC DIMENSIONLESS INDEX: 0.5 (DI)
AST SERPL-CCNC: 25 U/L (ref 1–32)
BH CV ECHO MEAS - AO MAX PG: 12.6 MMHG
BH CV ECHO MEAS - AO MEAN PG: 7 MMHG
BH CV ECHO MEAS - AO V2 MAX: 177.2 CM/SEC
BH CV ECHO MEAS - AO V2 VTI: 42.1 CM
BH CV ECHO MEAS - AVA(I,D): 1.29 CM2
BH CV ECHO MEAS - EDV(CUBED): 100.3 ML
BH CV ECHO MEAS - EDV(MOD-SP2): 119 ML
BH CV ECHO MEAS - EDV(MOD-SP4): 131 ML
BH CV ECHO MEAS - EF(MOD-BP): 30.8 %
BH CV ECHO MEAS - EF(MOD-SP2): 37.8 %
BH CV ECHO MEAS - EF(MOD-SP4): 26.7 %
BH CV ECHO MEAS - ESV(CUBED): 54.3 ML
BH CV ECHO MEAS - ESV(MOD-SP2): 74 ML
BH CV ECHO MEAS - ESV(MOD-SP4): 96 ML
BH CV ECHO MEAS - FS: 18.5 %
BH CV ECHO MEAS - IVS/LVPW: 1.04 CM
BH CV ECHO MEAS - IVSD: 0.96 CM
BH CV ECHO MEAS - LAT PEAK E' VEL: 6.9 CM/SEC
BH CV ECHO MEAS - LV DIASTOLIC VOL/BSA (35-75): 62.8 CM2
BH CV ECHO MEAS - LV MASS(C)D: 149.1 GRAMS
BH CV ECHO MEAS - LV MAX PG: 3.3 MMHG
BH CV ECHO MEAS - LV MEAN PG: 1.73 MMHG
BH CV ECHO MEAS - LV SYSTOLIC VOL/BSA (12-30): 46 CM2
BH CV ECHO MEAS - LV V1 MAX: 90.3 CM/SEC
BH CV ECHO MEAS - LV V1 VTI: 20.2 CM
BH CV ECHO MEAS - LVIDD: 4.6 CM
BH CV ECHO MEAS - LVIDS: 3.8 CM
BH CV ECHO MEAS - LVOT AREA: 2.7 CM2
BH CV ECHO MEAS - LVOT DIAM: 1.85 CM
BH CV ECHO MEAS - LVPWD: 0.92 CM
BH CV ECHO MEAS - MED PEAK E' VEL: 4.8 CM/SEC
BH CV ECHO MEAS - MV A DUR: 0.19 SEC
BH CV ECHO MEAS - MV A MAX VEL: 131.8 CM/SEC
BH CV ECHO MEAS - MV DEC SLOPE: 349.7 CM/SEC2
BH CV ECHO MEAS - MV DEC TIME: 0.31 MSEC
BH CV ECHO MEAS - MV E MAX VEL: 79.3 CM/SEC
BH CV ECHO MEAS - MV E/A: 0.6
BH CV ECHO MEAS - MV MAX PG: 8.1 MMHG
BH CV ECHO MEAS - MV MEAN PG: 2.23 MMHG
BH CV ECHO MEAS - MV P1/2T: 76 MSEC
BH CV ECHO MEAS - MV V2 VTI: 36.6 CM
BH CV ECHO MEAS - MVA(P1/2T): 2.9 CM2
BH CV ECHO MEAS - MVA(VTI): 1.48 CM2
BH CV ECHO MEAS - PA ACC TIME: 0.14 SEC
BH CV ECHO MEAS - PA V2 MAX: 89.2 CM/SEC
BH CV ECHO MEAS - PULM A REVS DUR: 0.21 SEC
BH CV ECHO MEAS - PULM A REVS VEL: 24.8 CM/SEC
BH CV ECHO MEAS - PULM DIAS VEL: 38.6 CM/SEC
BH CV ECHO MEAS - PULM S/D: 1.72
BH CV ECHO MEAS - PULM SYS VEL: 66.2 CM/SEC
BH CV ECHO MEAS - QP/QS: 1.04
BH CV ECHO MEAS - RV MAX PG: 2.5 MMHG
BH CV ECHO MEAS - RV V1 MAX: 79.3 CM/SEC
BH CV ECHO MEAS - RV V1 VTI: 17.8 CM
BH CV ECHO MEAS - RVOT DIAM: 2.01 CM
BH CV ECHO MEAS - SI(MOD-SP2): 21.6 ML/M2
BH CV ECHO MEAS - SI(MOD-SP4): 16.8 ML/M2
BH CV ECHO MEAS - SV(LVOT): 54.2 ML
BH CV ECHO MEAS - SV(MOD-SP2): 45 ML
BH CV ECHO MEAS - SV(MOD-SP4): 35 ML
BH CV ECHO MEAS - SV(RVOT): 56.3 ML
BH CV ECHO MEASUREMENTS AVERAGE E/E' RATIO: 13.56
BH CV ECHO SHUNT ASSESSMENT PERFORMED (HIDDEN SCRIPTING): 1
BH CV XLRA - RV BASE: 3.3 CM
BH CV XLRA - RV LENGTH: 7.5 CM
BH CV XLRA - RV MID: 2.35 CM
BH CV XLRA - TDI S': 6.6 CM/SEC
BILIRUB SERPL-MCNC: 0.4 MG/DL (ref 0–1.2)
BUN SERPL-MCNC: 29 MG/DL (ref 8–23)
BUN/CREAT SERPL: 30.9 (ref 7–25)
CALCIUM SPEC-SCNC: 9.5 MG/DL (ref 8.6–10.5)
CHLORIDE SERPL-SCNC: 107 MMOL/L (ref 98–107)
CHOLEST SERPL-MCNC: 128 MG/DL (ref 0–200)
CO2 SERPL-SCNC: 22.6 MMOL/L (ref 22–29)
CREAT SERPL-MCNC: 0.94 MG/DL (ref 0.57–1)
DEPRECATED RDW RBC AUTO: 44 FL (ref 37–54)
EGFRCR SERPLBLD CKD-EPI 2021: 64.6 ML/MIN/1.73
ERYTHROCYTE [DISTWIDTH] IN BLOOD BY AUTOMATED COUNT: 14 % (ref 12.3–15.4)
GLOBULIN UR ELPH-MCNC: 2.9 GM/DL
GLUCOSE BLDC GLUCOMTR-MCNC: 123 MG/DL (ref 70–130)
GLUCOSE BLDC GLUCOMTR-MCNC: 85 MG/DL (ref 70–130)
GLUCOSE SERPL-MCNC: 101 MG/DL (ref 65–99)
HBA1C MFR BLD: 6.6 % (ref 4.8–5.6)
HCT VFR BLD AUTO: 40 % (ref 34–46.6)
HDLC SERPL-MCNC: 61 MG/DL (ref 40–60)
HGB BLD-MCNC: 13.2 G/DL (ref 12–15.9)
LDLC SERPL CALC-MCNC: 52 MG/DL (ref 0–100)
LDLC/HDLC SERPL: 0.85 {RATIO}
LEFT ATRIUM VOLUME INDEX: 31.8 ML/M2
LEFT ATRIUM VOLUME: 67 ML
MCH RBC QN AUTO: 28.7 PG (ref 26.6–33)
MCHC RBC AUTO-ENTMCNC: 33 G/DL (ref 31.5–35.7)
MCV RBC AUTO: 87 FL (ref 79–97)
PLATELET # BLD AUTO: 220 10*3/MM3 (ref 140–450)
PMV BLD AUTO: 10.7 FL (ref 6–12)
POTASSIUM SERPL-SCNC: 4.7 MMOL/L (ref 3.5–5.2)
PROT SERPL-MCNC: 6.4 G/DL (ref 6–8.5)
RBC # BLD AUTO: 4.6 10*6/MM3 (ref 3.77–5.28)
SINUS: 2.6 CM
SODIUM SERPL-SCNC: 139 MMOL/L (ref 136–145)
TRIGL SERPL-MCNC: 77 MG/DL (ref 0–150)
VLDLC SERPL-MCNC: 15 MG/DL (ref 5–40)
WBC NRBC COR # BLD: 8.67 10*3/MM3 (ref 3.4–10.8)

## 2023-06-17 PROCEDURE — 82948 REAGENT STRIP/BLOOD GLUCOSE: CPT

## 2023-06-17 PROCEDURE — 97530 THERAPEUTIC ACTIVITIES: CPT

## 2023-06-17 PROCEDURE — G0378 HOSPITAL OBSERVATION PER HR: HCPCS

## 2023-06-17 PROCEDURE — 25510000001 PERFLUTREN (DEFINITY) 8.476 MG IN SODIUM CHLORIDE (PF) 0.9 % 10 ML INJECTION: Performed by: INTERNAL MEDICINE

## 2023-06-17 PROCEDURE — 93306 TTE W/DOPPLER COMPLETE: CPT

## 2023-06-17 PROCEDURE — 80061 LIPID PANEL: CPT | Performed by: INTERNAL MEDICINE

## 2023-06-17 PROCEDURE — 97535 SELF CARE MNGMENT TRAINING: CPT

## 2023-06-17 PROCEDURE — 85027 COMPLETE CBC AUTOMATED: CPT | Performed by: INTERNAL MEDICINE

## 2023-06-17 PROCEDURE — 97162 PT EVAL MOD COMPLEX 30 MIN: CPT

## 2023-06-17 PROCEDURE — 70551 MRI BRAIN STEM W/O DYE: CPT

## 2023-06-17 PROCEDURE — 83036 HEMOGLOBIN GLYCOSYLATED A1C: CPT | Performed by: INTERNAL MEDICINE

## 2023-06-17 PROCEDURE — 97166 OT EVAL MOD COMPLEX 45 MIN: CPT

## 2023-06-17 PROCEDURE — 80053 COMPREHEN METABOLIC PANEL: CPT | Performed by: INTERNAL MEDICINE

## 2023-06-17 RX ADMIN — PERFLUTREN 2 ML: 6.52 INJECTION, SUSPENSION INTRAVENOUS at 10:09

## 2023-06-17 RX ADMIN — FLUOXETINE HYDROCHLORIDE 40 MG: 20 CAPSULE ORAL at 08:16

## 2023-06-17 RX ADMIN — INSULIN GLARGINE-YFGN 40 UNITS: 100 INJECTION, SOLUTION SUBCUTANEOUS at 08:13

## 2023-06-17 RX ADMIN — SODIUM CHLORIDE 75 ML/HR: 9 INJECTION, SOLUTION INTRAVENOUS at 03:05

## 2023-06-17 RX ADMIN — LINAGLIPTIN 5 MG: 5 TABLET, FILM COATED ORAL at 08:16

## 2023-06-17 RX ADMIN — ATORVASTATIN CALCIUM 40 MG: 20 TABLET, FILM COATED ORAL at 03:03

## 2023-06-17 RX ADMIN — LEVOTHYROXINE SODIUM 125 MCG: 0.12 TABLET ORAL at 07:05

## 2023-06-17 RX ADMIN — METOPROLOL SUCCINATE 50 MG: 50 TABLET, FILM COATED, EXTENDED RELEASE ORAL at 08:17

## 2023-06-17 RX ADMIN — CLOPIDOGREL BISULFATE 75 MG: 75 TABLET, FILM COATED ORAL at 08:16

## 2023-06-17 RX ADMIN — Medication 10 ML: at 08:18

## 2023-06-17 RX ADMIN — SODIUM CHLORIDE 75 ML/HR: 9 INJECTION, SOLUTION INTRAVENOUS at 03:03

## 2023-06-17 RX ADMIN — Medication 10 ML: at 03:06

## 2023-06-17 RX ADMIN — LISINOPRIL 20 MG: 20 TABLET ORAL at 08:16

## 2023-06-17 NOTE — PROGRESS NOTES
[facility] Acute Inpt Rehab Note    Referral received via stroke order set. Please note this is a screening only, rehab admissions will not actively be evaluating this patient. If felt patient is appropriate for our services once therapies begin, please call our office at [phone], to initiate a full referral.    Thank you,  Mayra Chase, RN  Rehab Admission Nurse

## 2023-06-17 NOTE — PLAN OF CARE
Goal Outcome Evaluation:  Plan of Care Reviewed With: patient              Patient is a 72 y.o. female admitted to [facility] for R sided weakness and dehydration on 6/16/2023. MRI (-) for acute infarct. PMHx includes DM, HTN, CABG, CAD. Patient is ind at baseline and lives alone. Today, patient performed bed mobility with Margarita, required S for transfers, and ambulated 160ft without AD requiring CGA. Balance, strength, activity tolerance deficits noted. Decreased hip flexion strength on R compared to L, decreased R  strength. Patient may benefit from skilled PT services to address functional deficits and improve level of independence prior to discharge. Anticipate home with assist and OP PT upon DC.

## 2023-06-17 NOTE — PLAN OF CARE
Problem: Adult Inpatient Plan of Care  Goal: Plan of Care Review  Outcome: Ongoing, Progressing   Goal Outcome Evaluation:               Bedside swallow evaluation not performed this date. RN stated pt is tolerating a regular diet with thin liquids.

## 2023-06-17 NOTE — H&P
Internal medicine history and physical  INTERNAL MEDICINE   [facility]       Patient Identification:  Name: Mayra Hirsch  Age: 72 y.o.  Sex: female  :  1950  MRN: [id number]                   Primary Care Physician: Spencer Hua MD                               Date of admission:2023    Chief Complaint: Worsening back pain and bilateral upper and lower extremity weakness as well as left lower extremity numbness and right foot cramping over the course of last 1 week but symptom has been progressing since her heart surgery since November.    History of Present Illness:   Patient is a 72-year-old female with history of diabetes, hypothyroidism, hypertension as well as depression and coronary artery disease for which she has had coronary artery bypass grafting in 2022.  According to the patient since then she has been having various issues involving weakness numbness of the arms and legs predominantly involving the right arm as well as back pain.  About a week ago she started doing worsening pain and discomfort in her back with weakness of the upper and lower extremities and last few days she is unable to use her right hand while because of weakness and cannot even brush her teeth.  She also seems that her right leg is weak.  Patient denies any fever and chills.  Evaluation in the emergency room included CT scan of the head without contrast which did not show any acute intracranial process.  Patient is being admitted for further care.      Past Medical History:  Past Medical History:   Diagnosis Date    Depression     Diabetes mellitus     Disease of thyroid gland     Fibrocystic breast     Hypertension     Hyperthyroidism     Hypothyroidism     PONV (postoperative nausea and vomiting)     Type 2 diabetes mellitus      Past Surgical History:  Past Surgical History:   Procedure Laterality Date    BREAST EXCISIONAL BIOPSY Right     at age 22; benign.    CARDIAC  CATHETERIZATION Left 2022    Procedure: Cardiac Catheterization/Vascular Study;  Surgeon: Joanna Marie MD;  Location: [facility] CATH INVASIVE LOCATION;  Service: Cardiology;  Laterality: Left;    CARDIAC CATHETERIZATION N/A 2022    Procedure: Percutaneous Coronary Intervention;  Surgeon: Joanna Marie MD;  Location: [facility] CATH INVASIVE LOCATION;  Service: Cardiology;  Laterality: N/A;    CARDIAC CATHETERIZATION N/A 2022    Procedure: Left ventriculography;  Surgeon: Joanna Marie MD;  Location[facility] LOC[facility];  Service: Cardiology;  Laterality: N/A;    CARDIAC CATHETERIZATION N/A 2022    Procedure: Stent MARTINEZ coronary;  Surgeon: Joanna Marie MD;  [facility];  Service: Cardiology;  Laterality: N/A;    CARDIAC CATHETERIZATION N/A 2022    Procedure: Left Heart Cath;  Surgeon: Joanna Marie MD;  Location: [facility] CATH INVASIVE LOCATION;  Service: Cardiology;  Laterality: N/A;     SECTION      CORONARY ARTERY BYPASS GRAFT N/A 11/10/2022    Procedure: NIKKY, CORONARY ARTERY BYPASS GRAFTING TIMES 6 WITH LEFT JORDYN AND ENDOSCOPICALLY HARVESTED LEFT AND RIGHT GREATER SAPHENOUS VEIN, PRP TRANSESOPHAGEAL ECHOCARDIOGRAM WITH ANESTHESIA;  Surgeon: Jr Dong Isabel MD;  Location: [facility];  Service: Cardiothoracic;  Laterality: N/A;    HAND SURGERY      KNEE SURGERY      OOPHORECTOMY      1 ovary removed due to ectopic pregnancy      Home Meds:  (Not in a hospital admission)    Current Meds:   No current facility-administered medications for this encounter.    Current Outpatient Medications:     aspirin 81 MG EC tablet, Take 1 tablet by mouth Daily., Disp: 30 tablet, Rfl: 5    atorvastatin (LIPITOR) 40 MG tablet, Take 1 tablet by mouth Every Night., Disp: 90 tablet, Rfl: 0    clopidogrel (PLAVIX) 75 MG tablet, TAKE 1 TABLET BY MOUTH EVERY DAY, Disp: 30 tablet, Rfl: 5    Continuous Blood Gluc Sensor  (FreeStyle Gene 2 Sensor) misc, USE TO TEST BLOOD SUGAR CONTINUOUSLY. CHANGE EVERY 14 DAYS (Patient not taking: Reported on 3/23/2023), Disp: 1 each, Rfl: 3    cyclobenzaprine (FLEXERIL) 10 MG tablet, Take 1 tablet by mouth Every 8 (Eight) Hours As Needed for Muscle Spasms., Disp: 30 tablet, Rfl: 0    ezetimibe (ZETIA) 10 MG tablet, TAKE 1 TABLET DAILY, Disp: 90 tablet, Rfl: 0    Farxiga 10 MG tablet, Take 10 mg by mouth Every Morning., Disp: 90 tablet, Rfl: 3    FLUoxetine (PROzac) 40 MG capsule, , Disp: , Rfl:     furosemide (LASIX) 40 MG tablet, Take 0.5 tablets by mouth Daily., Disp: 30 tablet, Rfl: 5    Insulin Pen Needle (BD Pen Needle Micro U/F) 32G X 6 MM misc, USE ONE PER DAY FOR DAILY INSULIN INJECTION, E11.9, Disp: 100 each, Rfl: 3    Januvia 100 MG tablet, TAKE 1 TABLET DAILY, Disp: 90 tablet, Rfl: 3    levothyroxine (SYNTHROID, LEVOTHROID) 125 MCG tablet, TAKE 2 TABLETS DAILY, Disp: 180 tablet, Rfl: 3    lisinopril (PRINIVIL,ZESTRIL) 20 MG tablet, Take 1 tablet by mouth Daily., Disp: , Rfl:     metoprolol succinate XL (TOPROL-XL) 50 MG 24 hr tablet, TAKE 1 TABLET BY MOUTH EVERY DAY, Disp: 30 tablet, Rfl: 5    Toujeo SoloStar 300 UNIT/ML solution pen-injector injection, Inject 36 Units under the skin into the appropriate area as directed Daily. (Patient taking differently: Inject 40 Units under the skin into the appropriate area as directed Daily.), Disp: 9 mL, Rfl: 4    vitamin D (ERGOCALCIFEROL) 1.25 MG (03287 UT) capsule capsule, TAKE ONE CAPSULE BY MOUTH ONCE WEEKLY, Disp: 12 capsule, Rfl: 0  Allergies:  Allergies   Allergen Reactions    Bydureon [Exenatide] Diarrhea    Metformin And Related Nausea And Vomiting     Social History:   Social History     Tobacco Use    Smoking status: Never    Smokeless tobacco: Never   Substance Use Topics    Alcohol use: No      Family History:  Family History   Problem Relation Age of Onset    Coronary artery disease Mother     Alzheimer's disease Mother      Hypertension Mother     Coronary artery disease Father     Cancer Father     Thyroid disease Sister     Malig Hyperthermia Neg Hx           Review of Systems  See history of present illness and past medical history.  Patient denies headache, dizziness, syncope, falls, trauma, change in vision, change in hearing, change in taste, changes in weight, changes in appetite, focal weakness, numbness, or paresthesia.  Patient denies chest pain, palpitations, dyspnea, orthopnea, PND, cough, sinus pressure, rhinorrhea, epistaxis, hemoptysis, nausea, vomiting,hematemesis, diarrhea, constipation or hematchezia.  Denies cold or heat intolerance, polydipsia, polyuria, polyphagia. Denies hematuria, pyuria, dysuria, hesitancy, frequency or urgency. Denies consumption of raw and under cooked meats foods or change in water source.  Denies fever, chills, sweats, night sweats.  Denies missing any routine medications. Remainder of ROS is negative.      Vitals:   /57 (BP Location: Left arm, Patient Position: Lying)   Pulse 61   Temp 98.1 °F (36.7 °C)   Resp 12   SpO2 97%   I/O: No intake or output data in the 24 hours ending 06/16/23 2007  Exam:  Patient is examined using the personal protective equipment as per guidelines from infection control for this particular patient as enacted.  Hand washing was performed before and after patient interaction.  General Appearance:    Alert, cooperative, no distress, appears stated age   Head:    Normocephalic, without obvious abnormality, atraumatic   Eyes:    PERRL, conjunctiva/corneas clear, EOM's intact, both eyes   Ears:    Normal external ear canals, both ears   Nose:   Nares normal, septum midline, mucosa normal, no drainage    or sinus tenderness   Throat:   Lips, tongue, gums normal; oral mucosa pink and moist   Neck:   Supple, symmetrical, trachea midline, no adenopathy;     thyroid:  no enlargement/tenderness/nodules; no carotid    bruit or JVD   Back:     Symmetric, no  curvature, ROM normal, no CVA tenderness   Lungs:     Clear to auscultation bilaterally, respirations unlabored   Chest Wall:    No tenderness or deformity    Heart:    Regular rate and rhythm, S1 and S2 normal, no murmur, rub   or gallop   Abdomen:   Obese soft nontender   Extremities:   Extremities normal, atraumatic, no cyanosis or edema   Pulses:   Pulses palpable in all extremities; symmetric all extremities   Skin: No rash noted   Neurologic: Alert cooperative oriented x3 does not appear to be in any acute distress.  Questionable decreased  strength in the right hand.       Data Review:      I reviewed the patient's new clinical results.  Results from last 7 days   Lab Units 06/16/23  1624   WBC 10*3/mm3 8.25   HEMOGLOBIN g/dL 14.5   PLATELETS 10*3/mm3 253     Results from last 7 days   Lab Units 06/16/23  1624   SODIUM mmol/L 136   POTASSIUM mmol/L 4.6   CHLORIDE mmol/L 105   CO2 mmol/L 19.9*   BUN mg/dL 30*   CREATININE mg/dL 1.04*   CALCIUM mg/dL 9.7   GLUCOSE mg/dL 124*     CT Head Without Contrast    Result Date: 6/16/2023   No acute intracranial hemorrhage or hydrocephalus. If there is further clinical concern, MRI could be considered for further evaluation.  This report was finalized on 6/16/2023 6:43 PM by Dr. Tr Hinton M.D.      MRI Brain Without Contrast    Result Date: 6/17/2023  Electronically signed by Hang Romo MD on 06-17-23 at 0334    XR Chest 1 View    Result Date: 6/16/2023  No focal pulmonary consolidation. Borderline heart size. Follow-up as clinical indications persist.  This report was finalized on 6/16/2023 4:56 PM by Dr. Tr Hinton M.D.     ECG 12 Lead Stroke Evaluation   Final Result   HEART RATE= 64  bpm   RR Interval= 938  ms   MS Interval= 175  ms   P Horizontal Axis= -50  deg   P Front Axis= -11  deg   QRSD Interval= 115  ms   QT Interval= 425  ms   QRS Axis= 17  deg   T Wave Axis= 118  deg   - ABNORMAL ECG -   Sinus rhythm   Probable left atrial  enlargement   Nonspecific intraventricular conduction delay   Abnormal T, consider ischemia, lateral leads   No change from previous tracing   Electronically Signed By: Maxwell Avila ([facility]) 16-Jun-2023 20:44:04   Date and Time of Study: 2023-06-16 16:41:20        Microbiology Results (last 10 days)       ** No results found for the last 240 hours. **                Assessment:  Active Hospital Problems    Diagnosis  POA    **Right sided weakness [R53.1]  Yes    Low left ventricular ejection fraction [R94.30]  Yes    S/P CABG x 5 [Z95.1]  Not Applicable    Coronary artery disease involving native heart [I25.10]  Yes     Added automatically from request for surgery [id number]        Essential hypertension [I10]  Yes    Primary hypothyroidism [E03.9]  Yes    Type 2 diabetes mellitus without complication, with long-term current use of insulin [E11.9, Z79.4]  Not Applicable       Medical decision making/care plan: See admitting orders  Right-sided weakness in the background of chronic upper and lower extremity weakness since her surgery in the setting of risk factors such as diabetes coronary artery disease-plan is to provide her with neurochecks, OT PT evaluation, neurology consultation as well as get MRI of her brain without contrast.  Continue with Plavix and statin.  Back pain with upper and lower extremity involvement and weakness since surgery-primary spinal process with radiculopathy need to be considered and may require extensive imaging study of her back under the guidance of spine Ortho surgery service if her upper or lower extremity weakness with back pain persists or gets worse.  OT PT evaluation.  Hypertension-continue antihypertensive regimen avoid hypotensive episodes.  Diabetes mellitus-continue her home regimen and provide her with Accu-Cheks and sliding scale coverage and watch for hypoglycemia.  Coronary artery disease status post coronary artery bypass grafting-continue her statins ACE inhibitor and  beta-blocker along with Plavix.  Hypothyroidism-continue thyroid replacement therapy.  Carlee Ibarra MD   6/16/2023  20:07 EDT    Parts of this note may be an electronic transcription/translation of spoken language to printed text using the Dragon dictation system.

## 2023-06-17 NOTE — THERAPY DISCHARGE NOTE
Acute Care - Occupational Therapy Discharge  [facility]    Patient Name: Mayra Hirsch  : 1950    MRN: [id number]                              Today's Date: 2023       Admit Date: 2023    Visit Dx:     ICD-10-CM ICD-9-CM   1. Right sided weakness  R53.1 728.87   2. Dehydration  E86.0 276.51     Patient Active Problem List   Diagnosis   • Vitamin D deficiency   • Primary hypothyroidism   • Dyslipidemia   • Essential hypertension   • Type 2 diabetes mellitus without complication, with long-term current use of insulin   • Noncompliance with diabetes treatment   • ST elevation myocardial infarction (STEMI)   • Coronary artery disease involving native heart   • S/P CABG x 5   • Low left ventricular ejection fraction   • Shortness of breath   • Right sided weakness     Past Medical History:   Diagnosis Date   • Depression    • Diabetes mellitus    • Disease of thyroid gland    • Fibrocystic breast    • Hypertension    • Hyperthyroidism    • Hypothyroidism    • PONV (postoperative nausea and vomiting)    • Type 2 diabetes mellitus      Past Surgical History:   Procedure Laterality Date   • BREAST EXCISIONAL BIOPSY Right     at age 22; benign.   • CARDIAC CATHETERIZATION Left 2022    Procedure: Cardiac Catheterization/Vascular Study;  Surgeon: Joanna Marie MD;  [facility];  Service: Cardiology;  Laterality: Left;   • CARDIAC CATHETERIZATION N/A 2022    Procedure: Percutaneous Coronary Intervention;  Surgeon: Joanna Marie MD;  [facility];  Service: Cardiology;  Laterality: N/A;   • CARDIAC CATHETERIZATION N/A 2022    Procedure: Left ventriculography;  Surgeon: Joanna Marie MD;  Location:  [facility] CATH INVASIVE LOCATION;  Service: Cardiology;  Laterality: N/A;   • CARDIAC CATHETERIZATION N/A 2022    Procedure: Stent MARTINEZ coronary;  Surgeon: Joanna Marie MD;  [facility];  Service: Cardiology;  Laterality: N/A;   • CARDIAC CATHETERIZATION N/A 2022    Procedure: Left Heart Cath;  Surgeon: Joanna Marie MD;  Location: [facility] CATH INVASIVE LOCATION;  Service: Cardiology;  Laterality: N/A;   •  SECTION     • CORONARY ARTERY BYPASS GRAFT N/A 11/10/2022    Procedure: NIKKY, CORONARY ARTERY BYPASS GRAFTING TIMES 6 WITH LEFT JORDYN AND ENDOSCOPICALLY HARVESTED LEFT AND RIGHT GREATER SAPHENOUS VEIN, PRP TRANSESOPHAGEAL ECHOCARDIOGRAM WITH ANESTHESIA;  Surgeon: Jr Dong Isabel MD;  Location: [facility] CVOR;  Service: Cardiothoracic;  Laterality: N/A;   • HAND SURGERY     • KNEE SURGERY     • OOPHORECTOMY      1 ovary removed due to ectopic pregnancy      General Information     Row Name 23 151          OT Time and Intention    Document Type discharge evaluation/summary  -RB     Mode of Treatment individual therapy;occupational therapy  -RB     Row Name 23 151          General Information    Patient Profile Reviewed yes  -RB     Prior Level of Function independent:;ADL's;transfer  -RB     Existing Precautions/Restrictions fall  -RB     Barriers to Rehab none identified  -RB     Row Name 23 151          Living Environment    People in Home alone  -RB     Row Name 23 151          Cognition    Orientation Status (Cognition) oriented x 3  -RB     Row Name 23 151          Safety Issues, Functional Mobility    Safety Issues Affecting Function (Mobility) safety precautions follow-through/compliance;safety precaution awareness  -RB     Impairments Affecting Function (Mobility) coordination;strength;endurance/activity tolerance  -RB           User Key  (r) = Recorded By, (t) = Taken By, (c) = Cosigned By    Initials Name Provider Type    RB Kimberli Guillory OT Occupational Therapist               Mobility/ADL's     Row Name 23 151          Bed Mobility    Bed Mobility supine-sit  -RB     Supine-Sit [place] (Bed  Mobility) modified independence  -RB     Row Name 06/17/23 1512          Transfers    Transfers sit-stand transfer;bed-chair transfer;stand-sit transfer  -RB     Row Name 06/17/23 1512          Bed-Chair Transfer    Bed-Chair [place] (Transfers) supervision  -RB     Row Name 06/17/23 1512          Sit-Stand Transfer    Sit-Stand [place] (Transfers) supervision  -RB     Row Name 06/17/23 1512          Stand-Sit Transfer    Stand-Sit [place] (Transfers) supervision  -RB     Row Name 06/17/23 1512          Functional Mobility    Functional Mobility- Ind. Level supervision required  -RB     Row Name 06/17/23 1512          Activities of Daily Living    BADL Assessment/Intervention lower body dressing;grooming  -RB     Row Name 06/17/23 1512          Lower Body Dressing Assessment/Training    [place] Level (Lower Body Dressing) lower body dressing skills;supervision  -RB     Position (Lower Body Dressing) edge of bed sitting  -RB     Row Name 06/17/23 1512          Grooming Assessment/Training    [place] Level (Grooming) grooming skills;supervision  -RB     Position (Grooming) sink side;unsupported standing  -RB           User Key  (r) = Recorded By, (t) = Taken By, (c) = Cosigned By    Initials Name Provider Type    RB Kimberli Guillory OT Occupational Therapist               Obj/Interventions     Row Name 06/17/23 1513          Sensory Assessment (Somatosensory)    Sensory Assessment LLE tingling- baseline  -[facility] Name 06/17/23 1513          Vision Assessment/Intervention    Visual Impairment/Limitations WFL  -RB     [facility] Name 06/17/23 1513          Range of Motion Comprehensive    General Range of Motion bilateral upper extremity ROM WNL  -[facility] Name 06/17/23 1513          Strength Comprehensive (MMT)    Comment, General Manual Muscle Testing (MMT) Assessment RUE mildly weaker than LUE  -[facility] Name 06/17/23 1513          Motor Skills    Motor Skills coordination  -      Coordination fine motor deficit;right;upper extremity;minimal impairment  -RB     Row Name 06/17/23 1513          Balance    Comment, Balance No LOB - supervision for all sitting and standing activity. RLE limping with odd gait after fatigued  -RB           User Key  (r) = Recorded By, (t) = Taken By, (c) = Cosigned By    Initials Name Provider Type    Kimberli Farah OT Occupational Therapist               Goals/Plan     Row Name 06/17/23 1514          Transfer Goal 1 (OT)    Activity/Assistive Device (Transfer Goal 1, OT) transfers, all  -RB     [place] Level/Cues Needed (Transfer Goal 1, OT) supervision required  -RB     Time Frame (Transfer Goal 1, OT) short term goal (STG);2 weeks  -RB     Progress/Outcome (Transfer Goal 1, OT) goal met  -RB     Row Name 06/17/23 1514          Dressing Goal 1 (OT)    Activity/Device (Dressing Goal 1, OT) dressing skills, all  -RB     [place]/Cues Needed (Dressing Goal 1, OT) supervision required  -RB     Time Frame (Dressing Goal 1, OT) short term goal (STG);2 weeks  -RB     Progress/Outcome (Dressing Goal 1, OT) goal met  -RB           User Key  (r) = Recorded By, (t) = Taken By, (c) = Cosigned By    Initials Name Provider Type    Kimberli Farah OT Occupational Therapist               Clinical Impression     Row Name 06/17/23 1514          Pain Assessment    Pretreatment Pain Rating 0/10 - no pain  -RB     Posttreatment Pain Rating 0/10 - no pain  -RB     Row Name 06/17/23 1514          Plan of Care Review    Plan of Care Reviewed With patient  -RB     Progress improving  -RB     Row Name 06/17/23 1514          Therapy Assessment/Plan (OT)    Criteria for Skilled Therapeutic Interventions Met (OT) no;other (see comments)  F/U with OP OT/PT  -RB     Therapy Frequency (OT) evaluation only  -RB     Row Name 06/17/23 1514          Therapy Plan Review/Discharge Plan (OT)    Anticipated Discharge Disposition (OT) home with outpatient therapy services  -RB      Row Name 06/17/23 1514          Vital Signs    Pre Patient Position Supine  -RB     Intra Patient Position Standing  -RB     Post Patient Position Sitting  -RB     Row Name 06/17/23 1514          Positioning and Restraints    Pre-Treatment Position in bed  -RB     Post Treatment Position chair  -RB     In Chair notified nsg;reclined;call light within reach;encouraged to call for assist;exit alarm on  -RB           User Key  (r) = Recorded By, (t) = Taken By, (c) = Cosigned By    Initials Name Provider Type    RB Kimberli Guillory OT Occupational Therapist               Outcome Measures     Row Name 06/17/23 1514          How much help from another is currently needed...    Putting on and taking off regular lower body clothing? 4  -RB     Bathing (including washing, rinsing, and drying) 4  -RB     Toileting (which includes using toilet bed pan or urinal) 4  -RB     Putting on and taking off regular upper body clothing 4  -RB     Taking care of personal grooming (such as brushing teeth) 4  -RB     Eating meals 4  -RB     AM-PAC 6 Clicks Score (OT) 24  -RB     Row Name 06/17/23 1157          How much help from another person do you currently need...    Turning from your back to your side while in flat bed without using bedrails? 4  -CB     Moving from lying on back to sitting on the side of a flat bed without bedrails? 4  -CB     Moving to and from a bed to a chair (including a wheelchair)? 3  -CB     Standing up from a chair using your arms (e.g., wheelchair, bedside chair)? 3  -CB     Climbing 3-5 steps with a railing? 3  -CB     To walk in hospital room? 3  -CB     AM-PAC 6 Clicks Score (PT) 20  -CB     Highest level of mobility 6 --> Walked 10 steps or more  -CB     Row Name 06/17/23 1514 06/17/23 1157       Modified [place] Scale    Modified [place] Scale 2 - Slight disability.  Unable to carry out all previous activities but able to look after own affairs without assistance.  -RB 3 - Moderate disability.   Requiring some help, but able to walk without assistance.  -CB    Row Name 06/17/23 1514 06/17/23 1157       Functional Assessment    Outcome Measure Options AM-PAC 6 Clicks Daily Activity (OT);Modified [place]  -RB AM-PAC 6 Clicks Basic Mobility (PT);Modified [place]  -CB          User Key  (r) = Recorded By, (t) = Taken By, (c) = Cosigned By    Initials Name Provider Type    Kimberli Farah, OT Occupational Therapist    Jaz Allen, PT Physical Therapist              Occupational Therapy Education     Title: PT OT SLP Therapies (In Progress)     Topic: Occupational Therapy (Not Started)     Point: ADL training (Not Started)     Description:   Instruct learner(s) on proper safety adaptation and remediation techniques during self care or transfers.   Instruct in proper use of assistive devices.              Learner Progress:  Not documented in this visit.          Point: Home exercise program (Not Started)     Description:   Instruct learner(s) on appropriate technique for monitoring, assisting and/or progressing therapeutic exercises/activities.              Learner Progress:  Not documented in this visit.          Point: Precautions (Not Started)     Description:   Instruct learner(s) on prescribed precautions during self-care and functional transfers.              Learner Progress:  Not documented in this visit.          Point: Body mechanics (Not Started)     Description:   Instruct learner(s) on proper positioning and spine alignment during self-care, functional mobility activities and/or exercises.              Learner Progress:  Not documented in this visit.                          OT Recommendation and Plan  Therapy Frequency (OT): evaluation only  Plan of Care Review  Plan of Care Reviewed With: patient  Progress: improving  Plan of Care Reviewed With: patient     Time Calculation:    Time Calculation- OT     Row Name 06/17/23 1511             Time Calculation- OT    OT Start Time 1037  -BELEM       OT Stop Time 1109  -RB      OT Time Calculation (min) 32 min  -RB      Total Timed Code Minutes- OT 23 minute(s)  -RB      OT Received On 06/17/23  -RB         Timed Charges    94583 - OT Self Care/Mgmt Minutes 23  -RB         Untimed Charges    OT Eval/Re-eval Minutes 9  -RB         Total Minutes    Timed Charges Total Minutes 23  -RB      Untimed Charges Total Minutes 9  -RB       Total Minutes 32  -RB            User Key  (r) = Recorded By, (t) = Taken By, (c) = Cosigned By    Initials Name Provider Type    RB Kimberli Guillory OT Occupational Therapist              Therapy Charges for Today     Code Description Service Date Service Provider Modifiers Qty    09921537456 HC OT SELF CARE/MGMT/TRAIN EA 15 MIN 6/17/2023 Kimberli Guillory OT GO 2    [id number] HC OT EVAL MOD COMPLEXITY 2 6/17/2023 Kimberli Guillory OT GO 1             OT Discharge Summary  Anticipated Discharge Disposition (OT): home with outpatient therapy services    Kimberli Guillory OT  6/17/2023

## 2023-06-17 NOTE — PROGRESS NOTES
Patient Name: Mayra Hirsch  : 1950  MRN: [id number]    Date of Admission: 2023  Date of Discharge:  2023  Primary Care Physician: Spencer Hua MD      Chief Complaint:   Weakness - Generalized ([place] in arms/legs and tingling in left leg x3-4 days. Cardiologist office said to come to ED )      Discharge Diagnoses     Active Hospital Problems    Diagnosis  POA    **Right sided weakness [R53.1]  Yes    Low left ventricular ejection fraction [R94.30]  Yes    S/P CABG x 5 [Z95.1]  Not Applicable    Coronary artery disease involving native heart [I25.10]  Yes    Essential hypertension [I10]  Yes    Primary hypothyroidism [E03.9]  Yes    Type 2 diabetes mellitus without complication, with long-term current use of insulin [E11.9, Z79.4]  Not Applicable      Resolved Hospital Problems   No resolved problems to display.        Hospital Course     Ms. Hirsch is a 72 y.o. female with a history of diabetes type 2, hypothyroidism, hypertension, coronary artery disease status post CABG in 2022 presenting with right upper extremity weakness and some left lower extremity tingling/numbness.  She states that this left lower extremity numbness has been related to when they did vein grafts for her CABG last November.  Neurology consulted, presentation was consistent with a C7 cervical radiculopathy.  Symptoms have markedly improved since admission.  Outpatient physical therapy ordered.  Neurology plans on coordinating EMG.  Consider spine imaging in the future if does not improve with physical therapy.    At the time of discharge patient was told to take all medications as prescribed, keep all follow-up appointments, and call their doctor or return to the hospital with any worsening or concerning symptoms.    Day of Discharge     Subjective:  No acute events overnight.  Patient feels much better today.  Weakness is much improved.  Requesting to discharge home today    Review of Systems    Constitutional:  Negative for chills and fever.   Respiratory:  Negative for cough and shortness of breath.    Cardiovascular:  Negative for chest pain and leg swelling.   Gastrointestinal:  Negative for abdominal pain, diarrhea and nausea.     Physical Exam:  Temp:  [97.7 °F (36.5 °C)-98.6 °F (37 °C)] 98.6 °F (37 °C)  Heart Rate:  [59-77] 69  Resp:  [12-16] 16  BP: (110-151)/() 122/59  Body mass index is 31.95 kg/m².  Physical Exam  Constitutional:       General: She is not in acute distress.     Appearance: She is not diaphoretic.   Eyes:      Extraocular Movements: Extraocular movements intact.      Conjunctiva/sclera: Conjunctivae normal.   Cardiovascular:      Rate and Rhythm: Normal rate and regular rhythm.      Heart sounds: No murmur heard.    No gallop.   Pulmonary:      Effort: Pulmonary effort is normal. No respiratory distress.   Abdominal:      General: Abdomen is flat. There is no distension.      Palpations: Abdomen is soft.      Tenderness: There is no abdominal tenderness. There is no guarding or rebound.   Musculoskeletal:         General: No swelling or deformity. Normal range of motion.   Skin:     General: Skin is warm and dry.   Neurological:      General: No focal deficit present.      Mental Status: She is alert and oriented to person, place, and time.      Comments: YURIY 4+/5       Consultants     Consult Orders (all) (From admission, onward)       Start     Ordered    06/17/23 0819  Inpatient Neurology Consult Stroke  Once        Specialty:  Neurology  Provider:  Luis Miguel Carvalho MD    06/17/23 0819 06/16/23 2204  Notify Stroke Coordinator  Once        Provider:  (Not yet assigned)    06/16/23 2203 06/16/23 2204  Inpatient Rehab Admission Consult  Once        Provider:  (Not yet assigned)    06/16/23 2203 06/16/23 2204  Consult to Case Management   Once        Provider:  (Not yet assigned)    06/16/23 2203 06/16/23 2204  Consult to Diabetes  Educator  Once,   Status:  Canceled        Provider:  (Not yet assigned)    06/16/23 2203 06/16/23 2204  Inpatient Neurology Consult Stroke  Once,   Status:  Canceled        Specialty:  Neurology  Provider:  (Not yet assigned)    06/16/23 2203 06/16/23 1855  [facility] (on-call MD unless specified) Details  Once        Specialty:  Hospitalist  Provider:  Carlee Ibarra MD    06/16/23 1854    Pending  Inpatient Consult to Advance Care Planning  Once        Provider:  (Not yet assigned)    Pending                  Procedures     Imaging Results (All)       Procedure Component Value Units Date/Time    MRI Brain Without Contrast [[id number]] Collected: 06/17/23 0335     Updated: 06/17/23 0335    Narrative:        Patient: AUDRA MARSHALL  Time Out: 03:34  Exam(s): MRI HEAD Without Contrast     EXAM:    MR Head Without Intravenous Contrast    CLINICAL HISTORY:     Reason for exam: cva vs tia.    TECHNIQUE:    Magnetic resonance images of the head brain without intravenous   contrast in multiple planes.    COMPARISON:    6 16 2023    FINDINGS:    Brain:  No mass.  No hemorrhage.  No restricted diffusion.  Mild   chronic microvascular ischemic changes.    Ventricles:  No hydrocephalus.    Bones joints:  Unremarkable.    Sinuses:  No acute sinusitis.    Mastoid air cells:  No effusion.    Orbits:  Unremarkable.    IMPRESSION:         No acute infarct, hemorrhage, or hydrocephalus.      Impression:          Electronically signed by Hang Romo MD on 06-17-23 at 0334    CT Head Without Contrast [[id number]] Collected: 06/16/23 1842     Updated: 06/16/23 1846    Narrative:      CT HEAD WO CONTRAST-     INDICATIONS: Weakness     TECHNIQUE: Radiation dose reduction techniques were utilized, including  automated exposure control and exposure modulation based on body size.  Noncontrast head CT     COMPARISON: 11/12/2022     FINDINGS:           No acute intracranial hemorrhage, midline shift or mass effect. No acute  territorial  infarct is identified.     Mild periventricular hypodensities suggest chronic small vessel ischemic  change in a patient this age.     Arterial calcifications are seen at the base of the brain.     Ventricles, cisterns, cerebral sulci are unremarkable for patient age.     Mild paranasal sinus mucosal thickening is present. The visualized  paranasal sinuses, orbits, mastoid air cells are otherwise unremarkable.                   Impression:         No acute intracranial hemorrhage or hydrocephalus. If there is further  clinical concern, MRI could be considered for further evaluation.     This report was finalized on 6/16/2023 6:43 PM by Dr. Tr Hinton M.D.       XR Chest 1 View [[id number]] Collected: 06/16/23 1656     Updated: 06/16/23 1659    Narrative:      XR CHEST 1 VW-     HISTORY: Female who is 72 years-old,  short of breath     TECHNIQUE: Frontal view of the chest     COMPARISON: 11/14/2022     FINDINGS: The heart size is borderline. Sternotomy wires are seen.  Pulmonary vasculature is unremarkable. No focal pulmonary consolidation,  pleural effusion, or pneumothorax. No acute osseous process.       Impression:      No focal pulmonary consolidation. Borderline heart size.  Follow-up as clinical indications persist.     This report was finalized on 6/16/2023 4:56 PM by Dr. Tr Hinton M.D.               Pertinent Labs     Results from last 7 days   Lab Units 06/17/23  0432 06/16/23  1624   WBC 10*3/mm3 8.67 8.25   HEMOGLOBIN g/dL 13.2 14.5   PLATELETS 10*3/mm3 220 253     Results from last 7 days   Lab Units 06/17/23  0431 06/16/23  1624   SODIUM mmol/L 139 136   POTASSIUM mmol/L 4.7 4.6   CHLORIDE mmol/L 107 105   CO2 mmol/L 22.6 19.9*   BUN mg/dL 29* 30*   CREATININE mg/dL 0.94 1.04*   GLUCOSE mg/dL 101* 124*   Estimated Creatinine Clearance: 65.3 mL/min (by C-G formula based on SCr of 0.94 mg/dL).  Results from last 7 days   Lab Units 06/17/23  0431 06/16/23  1624   ALBUMIN g/dL 3.5 4.2    BILIRUBIN mg/dL 0.4 0.4   ALK PHOS U/L 97 109   AST (SGOT) U/L 25 20   ALT (SGPT) U/L 23 24     Results from last 7 days   Lab Units 06/17/23  0431 06/16/23  1624   CALCIUM mg/dL 9.5 9.7   ALBUMIN g/dL 3.5 4.2       Results from last 7 days   Lab Units 06/16/23  1624   HSTROP T ng/L 22*   PROBNP pg/mL 1,460.0*       Results from last 7 days   Lab Units 06/17/23  0431   CHOLESTEROL mg/dL 128   TRIGLYCERIDES mg/dL 77   HDL CHOL mg/dL 61*   LDL CHOL mg/dL 52           Test Results Pending at Discharge       Discharge Details        Discharge Medications        Changes to Medications        Instructions Start Date   Toujeo SoloStar 300 UNIT/ML solution pen-injector injection  Generic drug: Insulin Glargine (1 Unit Dial)  What changed: how much to take   36 Units, Subcutaneous, Daily             Continue These Medications        Instructions Start Date   aspirin 81 MG EC tablet   81 mg, Oral, Daily      atorvastatin 40 MG tablet  Commonly known as: LIPITOR   40 mg, Oral, Nightly      BD Pen Needle Micro U/F 32G X 6 MM misc  Generic drug: Insulin Pen Needle   USE ONE PER DAY FOR DAILY INSULIN INJECTION, E11.9      clopidogrel 75 MG tablet  Commonly known as: PLAVIX   TAKE 1 TABLET BY MOUTH EVERY DAY      cyclobenzaprine 10 MG tablet  Commonly known as: FLEXERIL   10 mg, Oral, Every 8 Hours PRN      ezetimibe 10 MG tablet  Commonly known as: ZETIA   TAKE 1 TABLET DAILY      Farxiga 10 MG tablet  Generic drug: dapagliflozin Propanediol   10 mg, Oral, Every Morning      FLUoxetine 40 MG capsule  Commonly known as: PROzac   No dose, route, or frequency recorded.      FreeStyle Gene 2 Sensor misc   USE TO TEST BLOOD SUGAR CONTINUOUSLY. CHANGE EVERY 14 DAYS      furosemide 40 MG tablet  Commonly known as: LASIX   20 mg, Oral, Daily      Januvia 100 MG tablet  Generic drug: SITagliptin   TAKE 1 TABLET DAILY      levothyroxine 125 MCG tablet  Commonly known as: SYNTHROID, LEVOTHROID   TAKE 2 TABLETS DAILY      lisinopril 20 MG  tablet  Commonly known as: PRINIVIL,ZESTRIL   20 mg, Oral, Daily      metoprolol succinate XL 50 MG 24 hr tablet  Commonly known as: TOPROL-XL   TAKE 1 TABLET BY MOUTH EVERY DAY      vitamin D 1.25 MG (28085 UT) capsule capsule  Commonly known as: ERGOCALCIFEROL   TAKE ONE CAPSULE BY MOUTH ONCE WEEKLY               Allergies   Allergen Reactions    Bydureon [Exenatide] Diarrhea    Metformin And Related Nausea And Vomiting         Discharge Disposition:  Home or Self Care    Discharge Diet:  Diet Order   Procedures    Diet: Regular/House Diet, Cardiac Diets, Diabetic Diets; Healthy Heart (2-3 Na+); Consistent Carbohydrate; Texture: Regular Texture (IDDSI 7); Fluid Consistency: Thin (IDDSI 0)       Discharge Activity:   As tolerated    CODE STATUS:    Code Status and Medical Interventions:   Ordered at: 06/16/23 2011     Code Status (Patient has no pulse and is not breathing):    CPR (Attempt to Resuscitate)     Medical Interventions (Patient has pulse or is breathing):    Full Support     Release to patient:    Routine Release       Future Appointments   Date Time Provider Department Center   7/24/2023  8:30 AM Rm Mosley DO [facility]   3/11/2024 12:30 PM [facility]   3/11/2024  1:15 PM Joanna Marie MD [facility]     Additional Instructions for the Follow-ups that You Need to Schedule       Ambulatory Referral to Physical Therapy Evaluate and treat   As directed      Specialty needed: Evaluate and treat    Follow-up needed: Yes                Follow-up Information       Spencer Hua MD .    Specialty: Internal Medicine  Contact information:  [address] [facility] [address]  [facility] [id number] [phone]               Luis Miguel Carvalho MD Follow up.    Specialty: Neurology  Why: As needed  Contact information:  [address]  [facility] [address]  [facility] [id number] [phone]                             [unfilled]Time Spent on Discharge:  Greater than 30  minutes      Tucker Ann MD  [facility]  06/17/23  15:28 EDT

## 2023-06-17 NOTE — ED NOTES
Patient stated that she had open heart surgery in November.  Patient stated that for the past week she has had back pain, BLE weakness, BUE weakness, LLE numbness and right foot cramping.  Patient stated that she has had GARCIA for two days.  Patient states she called her regular doctor when this started and the doctor told her to take Ibuprofen.  Patient states that the Ibuprofen did not help so she called her cardiologist who sent her here.

## 2023-06-17 NOTE — CONSULTS
"Neurology Consult Note    Consult Date: 6/17/2023    Referring MD: Carlee Ibarra MD    Reason for Consult I have been asked to see the patient in neurological consultation to render advice and opinion regarding right arm weakness, paraparesis    Mayra Hirsch is a 72 y.o. female with hypertension, insulin-dependent diabetes, obesity, chronic back pain and known diabetic peripheral neuropathy.    She presents to the hospital with several days of right arm weakness and difficulty walking.  She reports that she has noticed some difficulty using the right arm.  She denies clear associated pain with that.  Since that started she has noticed some increasing difficulty with her ambulation.  She is able to get up normally but feels that her legs are stiff and weaker than normal.  She describes some associated tingling in the feet but feels that this has been worse.  She has fairly longstanding chronic low back pain which is not much different.    Past Medical History:   Diagnosis Date   • Depression    • Diabetes mellitus    • Disease of thyroid gland    • Fibrocystic breast    • Hypertension    • Hyperthyroidism    • Hypothyroidism    • PONV (postoperative nausea and vomiting)    • Type 2 diabetes mellitus        Exam  /59   Pulse 69   Temp 98.6 °F (37 °C) (Oral)   Resp 16   Ht 172.7 cm (67.99\")   Wt 95.3 kg (210 lb 1.6 oz)   SpO2 99%   BMI 31.95 kg/m²   Gen: NAD, vitals reviewed  MS: oriented x3, recent/remote memory intact, normal attention/concentration, language intact, no neglect.  CN: visual acuity grossly normal, PERRL, EOMI, no facial droop, no dysarthria  Motor: 5/5 bilateral deltoids, 4/5 right tricep, 5/5 left tricep, 5/5 bilateral biceps and intrinsic hand muscles, maybe 4+/5 right wrist extension  Coordination: No dysmetria or overshoot  Sensory: Intact to cold temperature bilateral upper extremities, diminished to vibration and cold temperature bilateral lower extremities, left greater than " right  Reflexes: Absent right triceps, 2+ right biceps, 2+ left triceps and biceps, absent bilateral lower extremities, plantars trace upgoing  Gait: Normal station, positive Romberg, slightly wide-based gait    DATA:    Lab Results   Component Value Date    GLUCOSE 101 (H) 06/17/2023    CALCIUM 9.5 06/17/2023     06/17/2023    K 4.7 06/17/2023    CO2 22.6 06/17/2023     06/17/2023    BUN 29 (H) 06/17/2023    CREATININE 0.94 06/17/2023    EGFRIFAFRI 70 12/18/2020    EGFRIFNONA 74 02/23/2022    BCR 30.9 (H) 06/17/2023    ANIONGAP 9.4 06/17/2023     Lab Results   Component Value Date    WBC 8.67 06/17/2023    HGB 13.2 06/17/2023    HCT 40.0 06/17/2023    MCV 87.0 06/17/2023     06/17/2023       Lab review: CBC, BMP unremarkable    Imaging review: I personally reviewed her brain MRI which shows no evidence of acute stroke, mild underlying white matter disease.  Radiology report reviewed.    Diagnoses:  Insulin-dependent diabetes with diabetic peripheral neuropathy  Cervical radiculopathy at C7 on the right  Suspected cervical and lumbar spinal stenosis  Obesity  Chronic low back pain    Comment: No evidence of acute stroke clinically or radiographically.  Her symptoms I suspect are due to a combination of underlying diabetic peripheral neuropathy with associated sensory ataxia and mild orthostatic hypotension with new or worsening cervical radiculopathy on the right and likely underlying cervical and lumbar spinal stenosis related to body habitus.  Patient is likely a fairly poor surgical candidate for any kind of a neck or back operation and her symptoms are improving since admission.  I think MRI of the spine is unlikely to change her management at this point.  I recommended a trial of physical therapy for cervical radiculopathy as well as an outpatient EMG to further evaluate her right arm weakness.    PLAN:  Would defer any spine imaging for now given fairly mild symptoms  Outpatient EMG right  arm  Physical therapy for cervical radiculopathy  Return to the hospital or call our office if symptoms worsen    Management discussed with Dr. Ann.  Yash for discharge today from a neurology standpoint

## 2023-06-17 NOTE — PLAN OF CARE
The pt was admitted to [facility] 2/2 to R side weakness - dx includes insulin-dependent diabetes with diabetic peripheral neuropathy, cervical radiculopathy at C7 on the right,  suspected cervical and lumbar spinal stenosis, obesity and chronic low back pain. The pt lives alone and is independent. Today, R side weakness mild during OOB ADL's. Supervision provided for functional mobility/transfers. She plans to d/c home with OP OT/PT.

## 2023-06-18 NOTE — CASE MANAGEMENT/SOCIAL WORK
Case Management Discharge Note      Final Note: DC home on 6/17/23         Selected Continued Care - Discharged on 6/17/2023 Admission date: 6/16/2023 - Discharge disposition: Home or Self Care      Destination    No services have been selected for the patient.                Durable Medical Equipment    No services have been selected for the patient.                Dialysis/Infusion    No services have been selected for the patient.                Home Medical Care    No services have been selected for the patient.                Therapy    No services have been selected for the patient.                Community Resources    No services have been selected for the patient.                Community & DME    No services have been selected for the patient.                         Final Discharge Disposition Code: 01 - home or self-care

## 2023-06-19 DIAGNOSIS — R27.0 ATAXIA: Primary | ICD-10-CM

## 2023-07-25 ENCOUNTER — OFFICE VISIT (OUTPATIENT)
Dept: ENDOCRINOLOGY | Age: 73
End: 2023-07-25
Payer: MEDICARE

## 2023-07-25 VITALS
SYSTOLIC BLOOD PRESSURE: 144 MMHG | OXYGEN SATURATION: 99 % | BODY MASS INDEX: 32.25 KG/M2 | WEIGHT: 212.8 LBS | HEIGHT: 68 IN | DIASTOLIC BLOOD PRESSURE: 80 MMHG | HEART RATE: 61 BPM

## 2023-07-25 DIAGNOSIS — Z79.4 TYPE 2 DIABETES MELLITUS WITH HYPERGLYCEMIA, WITH LONG-TERM CURRENT USE OF INSULIN: Primary | ICD-10-CM

## 2023-07-25 DIAGNOSIS — E03.9 ACQUIRED HYPOTHYROIDISM: ICD-10-CM

## 2023-07-25 DIAGNOSIS — E11.65 TYPE 2 DIABETES MELLITUS WITH HYPERGLYCEMIA, WITH LONG-TERM CURRENT USE OF INSULIN: Primary | ICD-10-CM

## 2023-07-25 PROCEDURE — 3077F SYST BP >= 140 MM HG: CPT | Performed by: INTERNAL MEDICINE

## 2023-07-25 PROCEDURE — 99214 OFFICE O/P EST MOD 30 MIN: CPT | Performed by: INTERNAL MEDICINE

## 2023-07-25 PROCEDURE — 3079F DIAST BP 80-89 MM HG: CPT | Performed by: INTERNAL MEDICINE

## 2023-07-25 PROCEDURE — 1160F RVW MEDS BY RX/DR IN RCRD: CPT | Performed by: INTERNAL MEDICINE

## 2023-07-25 PROCEDURE — 1159F MED LIST DOCD IN RCRD: CPT | Performed by: INTERNAL MEDICINE

## 2023-07-25 RX ORDER — SITAGLIPTIN 100 MG/1
100 TABLET, FILM COATED ORAL DAILY
Qty: 90 TABLET | Refills: 3 | Status: SHIPPED | OUTPATIENT
Start: 2023-07-25

## 2023-07-25 RX ORDER — PEN NEEDLE, DIABETIC 32GX 5/32"
NEEDLE, DISPOSABLE MISCELLANEOUS
COMMUNITY
Start: 2023-06-29

## 2023-07-25 RX ORDER — LEVOTHYROXINE SODIUM 0.12 MG/1
TABLET ORAL
Qty: 180 TABLET | Refills: 3 | Status: SHIPPED | OUTPATIENT
Start: 2023-07-25

## 2023-07-25 RX ORDER — INSULIN GLARGINE 300 U/ML
36 INJECTION, SOLUTION SUBCUTANEOUS DAILY
Qty: 9 ML | Refills: 4 | Status: SHIPPED | OUTPATIENT
Start: 2023-07-25

## 2023-07-25 NOTE — PROGRESS NOTES
Chief complaint:  T2DM    HPI:   - 72 year old female here for management of diabetes mellitus type 2  - Complains of dizziness that is worse when she is walking.  Denies orthostasis or hypotension.  - Was hospitalized about 1 month ago for weakness on her right arm and right leg and she was evaluated for CVA but no evidence was found for CVA on imaging.  She is doing PT.  - Has had diabetes for over 10 years  - Complications include CAD status post CABG in 11/2022 and underwent cardiac rehab after that  - No known complications to date  - Is currently taking Farxiga 10 mg daily, Januvia 100 mg daily, and Toujeo 36 units daily  - She has not been monitoring her BG because she lost her Gene [place] Device  - Is also on atorvastatin 40 mg daily and levothyroxine 125 mcg daily  - She has an eye exam every 6 months      The following portions of the patient's history were reviewed and updated as appropriate: allergies, current medications, past family history, past medical history, past social history, past surgical history, and problem list.      Objective     Vitals:    07/25/23 0852   BP: 144/80   Pulse: 61   SpO2: 99%        Physical Exam  Constitutional:       Appearance: Normal appearance.   HENT:      Head: Normocephalic and atraumatic.   Eyes:      General: No scleral icterus.  Pulmonary:      Effort: Pulmonary effort is normal. No respiratory distress.   Neurological:      Mental Status: She is alert.      Gait: Gait normal.   Psychiatric:         Mood and Affect: Mood normal.         Behavior: Behavior normal.         Thought Content: Thought content normal.         Judgment: Judgment normal.       Labs/Imaging:  A1c was 6.6% in 6/2023    Assessment & Plan   1. T2DM, complicated by CAD  - Recent A1c was 6.6%  - Currently on Farxiga 10 mg daily, Januvia 100 mg daily, and Toujeo 36 units daily  - Sent in Rx for Gene [place] device so she can monitor her BG     2. Hypothyroidism  - On levothyroxine 125 mcg  daily  - Will check TSH at next visit    - Return to clinic in 3 months

## 2023-07-26 ENCOUNTER — PRIOR AUTHORIZATION (OUTPATIENT)
Dept: ENDOCRINOLOGY | Age: 73
End: 2023-07-26
Payer: MEDICARE

## 2023-07-26 NOTE — TELEPHONE ENCOUNTER
7/26/2023    PA has been initiated in Covermymeds as of today for the FreeStyle Gene 2 Sensor all information has been submitted and forward over to the patient insurance company for further review. It is currently still pending a response back from the patient insurance company with an determination.    Key: [id number] - PA Case ID: [id number]

## 2023-08-21 ENCOUNTER — LAB (OUTPATIENT)
Dept: LAB | Facility: HOSPITAL | Age: 73
End: 2023-08-21
Payer: MEDICARE

## 2023-08-21 ENCOUNTER — TRANSCRIBE ORDERS (OUTPATIENT)
Dept: ADMINISTRATIVE | Facility: HOSPITAL | Age: 73
End: 2023-08-21
Payer: MEDICARE

## 2023-08-21 DIAGNOSIS — Z00.00 ROUTINE GENERAL MEDICAL EXAMINATION AT A HEALTH CARE FACILITY: ICD-10-CM

## 2023-08-21 DIAGNOSIS — E78.5 HYPERLIPIDEMIA, UNSPECIFIED HYPERLIPIDEMIA TYPE: ICD-10-CM

## 2023-08-21 DIAGNOSIS — I10 ESSENTIAL HYPERTENSION, MALIGNANT: ICD-10-CM

## 2023-08-21 DIAGNOSIS — Z00.00 ROUTINE GENERAL MEDICAL EXAMINATION AT A HEALTH CARE FACILITY: Primary | ICD-10-CM

## 2023-08-21 LAB
ALBUMIN SERPL-MCNC: 3.8 G/DL (ref 3.5–5.2)
ALBUMIN/GLOB SERPL: 1.4 G/DL
ALP SERPL-CCNC: 109 U/L (ref 39–117)
ALT SERPL W P-5'-P-CCNC: 16 U/L (ref 1–33)
ANION GAP SERPL CALCULATED.3IONS-SCNC: 8 MMOL/L (ref 5–15)
AST SERPL-CCNC: 16 U/L (ref 1–32)
BACTERIA UR QL AUTO: ABNORMAL /HPF
BASOPHILS # BLD AUTO: 0.13 10*3/MM3 (ref 0–0.2)
BASOPHILS NFR BLD AUTO: 1.8 % (ref 0–1.5)
BILIRUB SERPL-MCNC: 0.3 MG/DL (ref 0–1.2)
BILIRUB UR QL STRIP: NEGATIVE
BUN SERPL-MCNC: 16 MG/DL (ref 8–23)
BUN/CREAT SERPL: 18.4 (ref 7–25)
CALCIUM SPEC-SCNC: 9.3 MG/DL (ref 8.6–10.5)
CHLORIDE SERPL-SCNC: 110 MMOL/L (ref 98–107)
CHOLEST SERPL-MCNC: 115 MG/DL (ref 0–200)
CLARITY UR: ABNORMAL
CO2 SERPL-SCNC: 25 MMOL/L (ref 22–29)
COLOR UR: YELLOW
CREAT SERPL-MCNC: 0.87 MG/DL (ref 0.57–1)
DEPRECATED RDW RBC AUTO: 44.9 FL (ref 37–54)
EGFRCR SERPLBLD CKD-EPI 2021: 70.9 ML/MIN/1.73
EOSINOPHIL # BLD AUTO: 0.53 10*3/MM3 (ref 0–0.4)
EOSINOPHIL NFR BLD AUTO: 7.5 % (ref 0.3–6.2)
ERYTHROCYTE [DISTWIDTH] IN BLOOD BY AUTOMATED COUNT: 14.2 % (ref 12.3–15.4)
GLOBULIN UR ELPH-MCNC: 2.7 GM/DL
GLUCOSE SERPL-MCNC: 94 MG/DL (ref 65–99)
GLUCOSE UR STRIP-MCNC: ABNORMAL MG/DL
HCT VFR BLD AUTO: 37.9 % (ref 34–46.6)
HDLC SERPL-MCNC: 54 MG/DL (ref 40–60)
HGB BLD-MCNC: 12.4 G/DL (ref 12–15.9)
HGB UR QL STRIP.AUTO: NEGATIVE
HYALINE CASTS UR QL AUTO: ABNORMAL /LPF
IMM GRANULOCYTES # BLD AUTO: 0.01 10*3/MM3 (ref 0–0.05)
IMM GRANULOCYTES NFR BLD AUTO: 0.1 % (ref 0–0.5)
KETONES UR QL STRIP: NEGATIVE
LDLC SERPL CALC-MCNC: 48 MG/DL (ref 0–100)
LDLC/HDLC SERPL: 0.92 {RATIO}
LEUKOCYTE ESTERASE UR QL STRIP.AUTO: ABNORMAL
LYMPHOCYTES # BLD AUTO: 2.41 10*3/MM3 (ref 0.7–3.1)
LYMPHOCYTES NFR BLD AUTO: 34.2 % (ref 19.6–45.3)
MCH RBC QN AUTO: 29 PG (ref 26.6–33)
MCHC RBC AUTO-ENTMCNC: 32.7 G/DL (ref 31.5–35.7)
MCV RBC AUTO: 88.6 FL (ref 79–97)
MONOCYTES # BLD AUTO: 0.62 10*3/MM3 (ref 0.1–0.9)
MONOCYTES NFR BLD AUTO: 8.8 % (ref 5–12)
NEUTROPHILS NFR BLD AUTO: 3.34 10*3/MM3 (ref 1.7–7)
NEUTROPHILS NFR BLD AUTO: 47.6 % (ref 42.7–76)
NITRITE UR QL STRIP: NEGATIVE
NRBC BLD AUTO-RTO: 0 /100 WBC (ref 0–0.2)
PH UR STRIP.AUTO: 6 [PH] (ref 5–8)
PLATELET # BLD AUTO: 241 10*3/MM3 (ref 140–450)
PMV BLD AUTO: 10.2 FL (ref 6–12)
POTASSIUM SERPL-SCNC: 4.1 MMOL/L (ref 3.5–5.2)
PROT SERPL-MCNC: 6.5 G/DL (ref 6–8.5)
PROT UR QL STRIP: ABNORMAL
RBC # BLD AUTO: 4.28 10*6/MM3 (ref 3.77–5.28)
RBC # UR STRIP: ABNORMAL /HPF
REF LAB TEST METHOD: ABNORMAL
SODIUM SERPL-SCNC: 143 MMOL/L (ref 136–145)
SP GR UR STRIP: 1.02 (ref 1–1.03)
SQUAMOUS #/AREA URNS HPF: ABNORMAL /HPF
TRIGL SERPL-MCNC: 57 MG/DL (ref 0–150)
UROBILINOGEN UR QL STRIP: ABNORMAL
VLDLC SERPL-MCNC: 13 MG/DL (ref 5–40)
WBC # UR STRIP: ABNORMAL /HPF
WBC NRBC COR # BLD: 7.04 10*3/MM3 (ref 3.4–10.8)

## 2023-08-21 PROCEDURE — 80061 LIPID PANEL: CPT

## 2023-08-21 PROCEDURE — 85025 COMPLETE CBC W/AUTO DIFF WBC: CPT

## 2023-08-21 PROCEDURE — 36415 COLL VENOUS BLD VENIPUNCTURE: CPT

## 2023-08-21 PROCEDURE — 80053 COMPREHEN METABOLIC PANEL: CPT

## 2023-08-21 PROCEDURE — 81001 URINALYSIS AUTO W/SCOPE: CPT

## 2023-08-30 DIAGNOSIS — E11.65 TYPE 2 DIABETES MELLITUS WITH HYPERGLYCEMIA, WITH LONG-TERM CURRENT USE OF INSULIN: ICD-10-CM

## 2023-08-30 DIAGNOSIS — Z79.4 TYPE 2 DIABETES MELLITUS WITH HYPERGLYCEMIA, WITH LONG-TERM CURRENT USE OF INSULIN: ICD-10-CM

## 2023-08-30 RX ORDER — INSULIN GLARGINE 300 U/ML
36 INJECTION, SOLUTION SUBCUTANEOUS DAILY
Qty: 9 ML | Refills: 3 | Status: SHIPPED | OUTPATIENT
Start: 2023-08-30

## 2023-09-22 ENCOUNTER — HOSPITAL ENCOUNTER (OUTPATIENT)
Dept: INFUSION THERAPY | Facility: HOSPITAL | Age: 73
Discharge: HOME | End: 2023-09-22
Payer: MEDICARE

## 2023-09-22 DIAGNOSIS — R27.0 ATAXIA: ICD-10-CM

## 2023-09-22 PROCEDURE — 95886 MUSC TEST DONE W/N TEST COMP: CPT | Performed by: PSYCHIATRY & NEUROLOGY

## 2023-09-22 PROCEDURE — 95911 NRV CNDJ TEST 9-10 STUDIES: CPT | Performed by: PSYCHIATRY & NEUROLOGY

## 2023-09-22 PROCEDURE — 95911 NRV CNDJ TEST 9-10 STUDIES: CPT

## 2023-09-22 PROCEDURE — 95886 MUSC TEST DONE W/N TEST COMP: CPT

## 2023-09-22 NOTE — PROCEDURES
EMG and Nerve Conduction Studies    I.      Instrument used: Neuromax 1002  II.     Please see data sheets for tabular summary of NCS and details on methods, temperatures and lab standards.   III.    EMG muscles tested for upper extremity studies include the deltoid, biceps, triceps, pronator teres, extensor digitorum communis, first dorsal interosseous and abductor pollicis brevis.    IV.   EMG muscles tested for lower extremity studies include the vastus lateralis, tibialis anterior, peroneus longus, medial gastrocnemius and extensor digitorum brevis.    V.    Additional muscles tested as needed.  Paraspinal muscles tested as needed.   VI.   Please see data sheets for tabular summary of EMG findings.   VII. The complete report includes the data sheets.      Indication: Weakness of the right arm and right leg with gait ataxia.  History: 72-year-old white female with diabetes who had weakness in the right arm and leg and was hospitalized with work-up negative for stroke.  She has had improvement in strength with physical therapy but there is still some weakness in the arm and leg.  She describes some tingling in the feet.  She has had diabetes for 10 to 15 years.      Ht: 172.7 cm  Wt: 96.5 kg  HbA1C:   Lab Results   Component Value Date    HGBA1C 6.60 (H) 06/17/2023     TSH:   Lab Results   Component Value Date    TSH 2.910 11/08/2022       Technical summary:  Nerve conduction studies were obtained in the right arm and right leg.  Skin temperatures were a bit cold and the foot and hand were warmed prior to study.  Temperature correction was not needed.  Needle examination was obtained on selected muscles of the right arm and leg.    Results:  1.  Prolonged right median antidromic sensory distal latency at 3.9 ms with low amplitude of 10 µV.  2.  Prolonged right ulnar antidromic sensory distal latency at 3.7 ms with low amplitude of 8.9 µV.  3.  Normal right radial sensory distal latency with low amplitude of 12  µV.  4.  Slow right median motor conduction velocity at 46.9 m/s with a prolonged distal latency of 4.7 ms.  Normal amplitudes.  5.  Slow right ulnar motor conduction velocity in the short segment across the elbow at 47.6 m/s with a normal velocity below the elbow.  Normal distal latency and amplitudes.  6.  Normal right sural sensory distal latency and amplitude.  7.  Normal right superficial peroneal sensory distal latency with low amplitude of 4.0 µV.  8.  Slow right peroneal motor velocity below the knee at 37.9 m/s with a normal velocity in the short segment across the fibular head.  Normal distal latency with low amplitude of 1.8 mV from ankle stimulation.  9.  Normal right tibial motor conduction velocity with a normal distal latency and amplitudes.  10.  Needle examination of selected muscles in the right arm and leg showed normal insertional activities throughout.  There was a mild increased number of large motor units in the abductor pollicis brevis and first dorsal interosseous with increased firing rate and reduced interference pattern.  There was also an increased number of white polyphasics with increased firing rate and reduced interference pattern in the biceps.  Remaining muscles tested in the right arm showed normal motor units and recruitment.    In the right leg there was a mild increased number of large motor units in the extensor digitorum brevis with increased firing rate and reduced interference pattern.  There was a question of large motor units in the gastrocnemius but recruitment was normal.  The remaining muscles tested in the right leg showed normal motor units and recruitment.  Lumbar paraspinals at L5 showed no abnormality    Impression:  Abnormal study showing evidence of peripheral neuropathy.  In addition there were some subacute changes in the right biceps which could go along with a C5 or C6 radiculopathy.  Clinical correlation is suggested.  Study results were discussed with the  patient    Everardo Fragoso M.D.              Dictated utilizing Dragon dictation.

## 2023-10-03 ENCOUNTER — PRIOR AUTHORIZATION (OUTPATIENT)
Dept: ENDOCRINOLOGY | Age: 73
End: 2023-10-03
Payer: MEDICARE

## 2023-10-03 NOTE — TELEPHONE ENCOUNTER
AUDRA MARSHALL Key: [id number] - PA Case ID: [id number] help? Call us at [phone]  Status  Sent to [facility] [employer] [employer]  Drug  Januvia 100MG tablets  Form  [employer] Electronic Prior Authorization Form (2017 NCPDP)

## 2023-10-06 ENCOUNTER — PREP FOR SURGERY (OUTPATIENT)
Dept: OTHER | Facility: HOSPITAL | Age: 73
End: 2023-10-06
Payer: MEDICARE

## 2023-10-06 DIAGNOSIS — Z12.11 SCREENING FOR COLON CANCER: Primary | ICD-10-CM

## 2023-11-28 ENCOUNTER — OFFICE VISIT (OUTPATIENT)
Dept: ENDOCRINOLOGY | Age: 73
End: 2023-11-28
Payer: MEDICARE

## 2023-11-28 VITALS
BODY MASS INDEX: 32.58 KG/M2 | HEIGHT: 68 IN | DIASTOLIC BLOOD PRESSURE: 68 MMHG | HEART RATE: 70 BPM | OXYGEN SATURATION: 99 % | SYSTOLIC BLOOD PRESSURE: 110 MMHG | WEIGHT: 215 LBS

## 2023-11-28 DIAGNOSIS — Z79.4 TYPE 2 DIABETES MELLITUS WITH HYPERGLYCEMIA, WITH LONG-TERM CURRENT USE OF INSULIN: Primary | ICD-10-CM

## 2023-11-28 DIAGNOSIS — E11.65 TYPE 2 DIABETES MELLITUS WITH HYPERGLYCEMIA, WITH LONG-TERM CURRENT USE OF INSULIN: Primary | ICD-10-CM

## 2023-11-28 RX ORDER — PEN NEEDLE, DIABETIC 32GX 5/32"
NEEDLE, DISPOSABLE MISCELLANEOUS
Qty: 90 EACH | Refills: 3 | Status: SHIPPED | OUTPATIENT
Start: 2023-11-28

## 2023-11-28 NOTE — PROGRESS NOTES
Chief complaint/Reason for consult:  T2DM    HPI:   - 73 year old female here for management of diabetes mellitus type 2  - Has had diabetes for over 10 years  - Complications include CAD status post CABG  - No known complications to date  - Is currently taking Tresiba 36 units daily, Farxiga 10 mg daily, Januvia 100 mg daily  - Denies hypoglycemia  - Is also on atorvastatin 40 mg daily and levothyroxine 125 mcg daily      The following portions of the patient's history were reviewed and updated as appropriate: allergies, current medications, past family history, past medical history, past social history, past surgical history, and problem list.      Objective     Vitals:    11/28/23 1528   BP: 110/68   Pulse: 70   SpO2: 99%        Physical Exam  Vitals reviewed.   Constitutional:       Appearance: Normal appearance.   HENT:      Head: Normocephalic and atraumatic.   Eyes:      General: No scleral icterus.  Pulmonary:      Effort: Pulmonary effort is normal. No respiratory distress.   Neurological:      Mental Status: She is alert.      Gait: Gait normal.   Psychiatric:         Mood and Affect: Mood normal.         Behavior: Behavior normal.         Thought Content: Thought content normal.         Judgment: Judgment normal.       Labs/Imaging:  A1c was 6.6 and TSH was 0.398 in 11/2023    Assessment & Plan   1., T2DM, controlled  - Cont. Tresiba 36 units daily, Farxiga 10 mg daily, Januvia 100 mg daily    2. Hypothyroidism  - Cont. Levothyroxine 125 mcg daily    3. Hyperlipidemia  - On atrovastatin 40 mg daily    - Return to clinic in 6 months

## 2023-12-06 RX ORDER — CLOPIDOGREL BISULFATE 75 MG/1
TABLET ORAL
Qty: 90 TABLET | Refills: 1 | Status: SHIPPED | OUTPATIENT
Start: 2023-12-06

## 2024-01-10 RX ORDER — METOPROLOL SUCCINATE 50 MG/1
TABLET, EXTENDED RELEASE ORAL
Qty: 90 TABLET | Refills: 0 | Status: SHIPPED | OUTPATIENT
Start: 2024-01-10

## 2024-01-26 DIAGNOSIS — Z79.4 TYPE 2 DIABETES MELLITUS WITH HYPERGLYCEMIA, WITH LONG-TERM CURRENT USE OF INSULIN: ICD-10-CM

## 2024-01-26 DIAGNOSIS — E11.65 TYPE 2 DIABETES MELLITUS WITH HYPERGLYCEMIA, WITH LONG-TERM CURRENT USE OF INSULIN: ICD-10-CM

## 2024-01-26 RX ORDER — SITAGLIPTIN 100 MG/1
100 TABLET, FILM COATED ORAL DAILY
Qty: 90 TABLET | Refills: 3 | Status: SHIPPED | OUTPATIENT
Start: 2024-01-26

## 2024-01-26 NOTE — TELEPHONE ENCOUNTER
Incoming Refill Request      Medication requested (name and dose): [facility]    Pharmacy where request should be sent: [facility]    Additional details provided by patient:     Best call back number: [phone]     Does the patient have less than a 3 day supply:  [] Yes  [] No    Alex Guzman Rep  01/26/24, 09:25 EST          
Statement Selected

## 2024-02-21 ENCOUNTER — LAB (OUTPATIENT)
Dept: LAB | Facility: HOSPITAL | Age: 74
End: 2024-02-21
Payer: MEDICARE

## 2024-02-21 ENCOUNTER — TRANSCRIBE ORDERS (OUTPATIENT)
Dept: ADMINISTRATIVE | Facility: HOSPITAL | Age: 74
End: 2024-02-21
Payer: MEDICARE

## 2024-02-21 DIAGNOSIS — I10 HYPERTENSION, UNSPECIFIED TYPE: ICD-10-CM

## 2024-02-21 DIAGNOSIS — E78.5 HYPERLIPIDEMIA, UNSPECIFIED HYPERLIPIDEMIA TYPE: ICD-10-CM

## 2024-02-21 DIAGNOSIS — E78.5 HYPERLIPIDEMIA, UNSPECIFIED HYPERLIPIDEMIA TYPE: Primary | ICD-10-CM

## 2024-02-21 LAB
ALBUMIN SERPL-MCNC: 3.9 G/DL (ref 3.5–5.2)
ALBUMIN/GLOB SERPL: 1.5 G/DL
ALP SERPL-CCNC: 84 U/L (ref 39–117)
ALT SERPL W P-5'-P-CCNC: 21 U/L (ref 1–33)
ANION GAP SERPL CALCULATED.3IONS-SCNC: 12 MMOL/L (ref 5–15)
AST SERPL-CCNC: 20 U/L (ref 1–32)
BILIRUB SERPL-MCNC: 0.3 MG/DL (ref 0–1.2)
BUN SERPL-MCNC: 18 MG/DL (ref 8–23)
BUN/CREAT SERPL: 20.2 (ref 7–25)
CALCIUM SPEC-SCNC: 9.1 MG/DL (ref 8.6–10.5)
CHLORIDE SERPL-SCNC: 108 MMOL/L (ref 98–107)
CHOLEST SERPL-MCNC: 128 MG/DL (ref 0–200)
CO2 SERPL-SCNC: 22 MMOL/L (ref 22–29)
CREAT SERPL-MCNC: 0.89 MG/DL (ref 0.57–1)
EGFRCR SERPLBLD CKD-EPI 2021: 68.6 ML/MIN/1.73
GLOBULIN UR ELPH-MCNC: 2.6 GM/DL
GLUCOSE SERPL-MCNC: 100 MG/DL (ref 65–99)
HDLC SERPL-MCNC: 59 MG/DL (ref 40–60)
LDLC SERPL CALC-MCNC: 55 MG/DL (ref 0–100)
LDLC/HDLC SERPL: 0.95 {RATIO}
POTASSIUM SERPL-SCNC: 4 MMOL/L (ref 3.5–5.2)
PROT SERPL-MCNC: 6.5 G/DL (ref 6–8.5)
SODIUM SERPL-SCNC: 142 MMOL/L (ref 136–145)
TRIGL SERPL-MCNC: 66 MG/DL (ref 0–150)
VLDLC SERPL-MCNC: 14 MG/DL (ref 5–40)

## 2024-02-21 PROCEDURE — 36415 COLL VENOUS BLD VENIPUNCTURE: CPT

## 2024-02-21 PROCEDURE — 80061 LIPID PANEL: CPT

## 2024-02-21 PROCEDURE — 80053 COMPREHEN METABOLIC PANEL: CPT

## 2024-03-01 ENCOUNTER — APPOINTMENT (OUTPATIENT)
Dept: GENERAL RADIOLOGY | Facility: HOSPITAL | Age: 74
DRG: 638 | End: 2024-03-01
Payer: MEDICARE

## 2024-03-01 ENCOUNTER — APPOINTMENT (OUTPATIENT)
Dept: CT IMAGING | Facility: HOSPITAL | Age: 74
DRG: 638 | End: 2024-03-01
Payer: MEDICARE

## 2024-03-01 ENCOUNTER — HOSPITAL ENCOUNTER (INPATIENT)
Facility: HOSPITAL | Age: 74
LOS: 1 days | Discharge: HOME | DRG: 638 | End: 2024-03-04
Attending: EMERGENCY MEDICINE | Admitting: INTERNAL MEDICINE
Payer: MEDICARE

## 2024-03-01 DIAGNOSIS — S22.31XA CLOSED TRAUMATIC NONDISPLACED FRACTURE OF ONE RIB OF RIGHT SIDE, INITIAL ENCOUNTER: ICD-10-CM

## 2024-03-01 DIAGNOSIS — E11.10 DIABETIC KETOACIDOSIS WITHOUT COMA ASSOCIATED WITH TYPE 2 DIABETES MELLITUS: Primary | ICD-10-CM

## 2024-03-01 DIAGNOSIS — J10.1 INFLUENZA A: ICD-10-CM

## 2024-03-01 LAB
ALBUMIN SERPL-MCNC: 3.7 G/DL (ref 3.5–5.2)
ALBUMIN/GLOB SERPL: 1.1 G/DL
ALP SERPL-CCNC: 85 U/L (ref 39–117)
ALT SERPL W P-5'-P-CCNC: 26 U/L (ref 1–33)
ANION GAP SERPL CALCULATED.3IONS-SCNC: 15.2 MMOL/L (ref 5–15)
ANION GAP SERPL CALCULATED.3IONS-SCNC: 17.1 MMOL/L (ref 5–15)
ANION GAP SERPL CALCULATED.3IONS-SCNC: 9 MMOL/L (ref 5–15)
AST SERPL-CCNC: 39 U/L (ref 1–32)
ATMOSPHERIC PRESS: 755.2 MMHG
B PARAPERT DNA SPEC QL NAA+PROBE: NOT DETECTED
B PERT DNA SPEC QL NAA+PROBE: NOT DETECTED
B-OH-BUTYR SERPL-SCNC: 0.62 MMOL/L (ref 0.02–0.27)
BACTERIA UR QL AUTO: NORMAL /HPF
BASE EXCESS BLDV CALC-SCNC: -5.4 MMOL/L (ref -2–2)
BASOPHILS # BLD AUTO: 0.02 10*3/MM3 (ref 0–0.2)
BASOPHILS NFR BLD AUTO: 0.3 % (ref 0–1.5)
BILIRUB SERPL-MCNC: 0.5 MG/DL (ref 0–1.2)
BILIRUB UR QL STRIP: NEGATIVE
BUN SERPL-MCNC: 17 MG/DL (ref 8–23)
BUN SERPL-MCNC: 20 MG/DL (ref 8–23)
BUN SERPL-MCNC: 21 MG/DL (ref 8–23)
BUN/CREAT SERPL: 16.5 (ref 7–25)
BUN/CREAT SERPL: 16.7 (ref 7–25)
BUN/CREAT SERPL: 20 (ref 7–25)
C PNEUM DNA NPH QL NAA+NON-PROBE: NOT DETECTED
CALCIUM SPEC-SCNC: 7.5 MG/DL (ref 8.6–10.5)
CALCIUM SPEC-SCNC: 8.4 MG/DL (ref 8.6–10.5)
CALCIUM SPEC-SCNC: 8.9 MG/DL (ref 8.6–10.5)
CHLORIDE SERPL-SCNC: 100 MMOL/L (ref 98–107)
CHLORIDE SERPL-SCNC: 105 MMOL/L (ref 98–107)
CHLORIDE SERPL-SCNC: 96 MMOL/L (ref 98–107)
CLARITY UR: CLEAR
CO2 BLDA-SCNC: 22.6 MMOL/L (ref 23–27)
CO2 SERPL-SCNC: 17.8 MMOL/L (ref 22–29)
CO2 SERPL-SCNC: 18.9 MMOL/L (ref 22–29)
CO2 SERPL-SCNC: 21 MMOL/L (ref 22–29)
COLOR UR: YELLOW
CREAT SERPL-MCNC: 1.02 MG/DL (ref 0.57–1)
CREAT SERPL-MCNC: 1.05 MG/DL (ref 0.57–1)
CREAT SERPL-MCNC: 1.21 MG/DL (ref 0.57–1)
DEPRECATED RDW RBC AUTO: 43.4 FL (ref 37–54)
DEVICE COMMENT: ABNORMAL
EGFRCR SERPLBLD CKD-EPI 2021: 47.4 ML/MIN/1.73
EGFRCR SERPLBLD CKD-EPI 2021: 56.2 ML/MIN/1.73
EGFRCR SERPLBLD CKD-EPI 2021: 58.2 ML/MIN/1.73
EOSINOPHIL # BLD AUTO: 0 10*3/MM3 (ref 0–0.4)
EOSINOPHIL NFR BLD AUTO: 0 % (ref 0.3–6.2)
ERYTHROCYTE [DISTWIDTH] IN BLOOD BY AUTOMATED COUNT: 13.5 % (ref 12.3–15.4)
FLUAV H3 RNA NPH QL NAA+PROBE: DETECTED
FLUBV RNA ISLT QL NAA+PROBE: NOT DETECTED
GEN 5 2HR TROPONIN T REFLEX: 22 NG/L
GLOBULIN UR ELPH-MCNC: 3.5 GM/DL
GLUCOSE BLDC GLUCOMTR-MCNC: 108 MG/DL (ref 70–130)
GLUCOSE BLDC GLUCOMTR-MCNC: 125 MG/DL (ref 70–130)
GLUCOSE BLDC GLUCOMTR-MCNC: 131 MG/DL (ref 70–130)
GLUCOSE BLDC GLUCOMTR-MCNC: 136 MG/DL (ref 70–130)
GLUCOSE BLDC GLUCOMTR-MCNC: 141 MG/DL (ref 70–130)
GLUCOSE BLDC GLUCOMTR-MCNC: 153 MG/DL (ref 70–130)
GLUCOSE BLDC GLUCOMTR-MCNC: 155 MG/DL (ref 70–130)
GLUCOSE BLDC GLUCOMTR-MCNC: 177 MG/DL (ref 70–130)
GLUCOSE BLDC GLUCOMTR-MCNC: 192 MG/DL (ref 70–130)
GLUCOSE SERPL-MCNC: 145 MG/DL (ref 65–99)
GLUCOSE SERPL-MCNC: 160 MG/DL (ref 65–99)
GLUCOSE SERPL-MCNC: 185 MG/DL (ref 65–99)
GLUCOSE UR STRIP-MCNC: ABNORMAL MG/DL
HADV DNA SPEC NAA+PROBE: NOT DETECTED
HBA1C MFR BLD: 7 % (ref 4.8–5.6)
HCO3 BLDV-SCNC: 21.3 MMOL/L (ref 22–28)
HCOV 229E RNA SPEC QL NAA+PROBE: NOT DETECTED
HCOV HKU1 RNA SPEC QL NAA+PROBE: NOT DETECTED
HCOV NL63 RNA SPEC QL NAA+PROBE: NOT DETECTED
HCOV OC43 RNA SPEC QL NAA+PROBE: NOT DETECTED
HCT VFR BLD AUTO: 42.7 % (ref 34–46.6)
HGB BLD-MCNC: 14.2 G/DL (ref 12–15.9)
HGB UR QL STRIP.AUTO: NEGATIVE
HMPV RNA NPH QL NAA+NON-PROBE: NOT DETECTED
HPIV1 RNA ISLT QL NAA+PROBE: NOT DETECTED
HPIV2 RNA SPEC QL NAA+PROBE: NOT DETECTED
HPIV3 RNA NPH QL NAA+PROBE: NOT DETECTED
HPIV4 P GENE NPH QL NAA+PROBE: NOT DETECTED
HYALINE CASTS UR QL AUTO: NORMAL /LPF
IMM GRANULOCYTES # BLD AUTO: 0.03 10*3/MM3 (ref 0–0.05)
IMM GRANULOCYTES NFR BLD AUTO: 0.4 % (ref 0–0.5)
KETONES UR QL STRIP: ABNORMAL
LEUKOCYTE ESTERASE UR QL STRIP.AUTO: NEGATIVE
LYMPHOCYTES # BLD AUTO: 0.72 10*3/MM3 (ref 0.7–3.1)
LYMPHOCYTES NFR BLD AUTO: 10.7 % (ref 19.6–45.3)
M PNEUMO IGG SER IA-ACNC: NOT DETECTED
MAGNESIUM SERPL-MCNC: 1.5 MG/DL (ref 1.6–2.4)
MAGNESIUM SERPL-MCNC: 1.8 MG/DL (ref 1.6–2.4)
MAGNESIUM SERPL-MCNC: 1.8 MG/DL (ref 1.6–2.4)
MCH RBC QN AUTO: 29.5 PG (ref 26.6–33)
MCHC RBC AUTO-ENTMCNC: 33.3 G/DL (ref 31.5–35.7)
MCV RBC AUTO: 88.8 FL (ref 79–97)
MODALITY: ABNORMAL
MONOCYTES # BLD AUTO: 0.69 10*3/MM3 (ref 0.1–0.9)
MONOCYTES NFR BLD AUTO: 10.3 % (ref 5–12)
NEUTROPHILS NFR BLD AUTO: 5.27 10*3/MM3 (ref 1.7–7)
NEUTROPHILS NFR BLD AUTO: 78.3 % (ref 42.7–76)
NITRITE UR QL STRIP: NEGATIVE
NRBC BLD AUTO-RTO: 0 /100 WBC (ref 0–0.2)
OSMOLALITY SERPL: 276 MOSM/KG (ref 280–301)
PCO2 BLDV: 44.3 MM HG (ref 41–51)
PH BLDV: 7.29 PH UNITS (ref 7.31–7.41)
PH UR STRIP.AUTO: 5.5 [PH] (ref 5–8)
PHOSPHATE SERPL-MCNC: 2 MG/DL (ref 2.5–4.5)
PHOSPHATE SERPL-MCNC: 2.1 MG/DL (ref 2.5–4.5)
PHOSPHATE SERPL-MCNC: 2.1 MG/DL (ref 2.5–4.5)
PLATELET # BLD AUTO: 187 10*3/MM3 (ref 140–450)
PMV BLD AUTO: 10.9 FL (ref 6–12)
PO2 BLDV: 28.2 MM HG (ref 35–45)
POTASSIUM SERPL-SCNC: 3.8 MMOL/L (ref 3.5–5.2)
POTASSIUM SERPL-SCNC: 3.9 MMOL/L (ref 3.5–5.2)
POTASSIUM SERPL-SCNC: 4 MMOL/L (ref 3.5–5.2)
PROCALCITONIN SERPL-MCNC: 0.09 NG/ML (ref 0–0.25)
PROT SERPL-MCNC: 7.2 G/DL (ref 6–8.5)
PROT UR QL STRIP: ABNORMAL
RBC # BLD AUTO: 4.81 10*6/MM3 (ref 3.77–5.28)
RBC # UR STRIP: NORMAL /HPF
REF LAB TEST METHOD: NORMAL
RHINOVIRUS RNA SPEC NAA+PROBE: NOT DETECTED
RSV RNA NPH QL NAA+NON-PROBE: NOT DETECTED
SAO2 % BLDCOV: 45.9 % (ref 45–75)
SARS-COV-2 RNA NPH QL NAA+NON-PROBE: NOT DETECTED
SODIUM SERPL-SCNC: 132 MMOL/L (ref 136–145)
SODIUM SERPL-SCNC: 133 MMOL/L (ref 136–145)
SODIUM SERPL-SCNC: 135 MMOL/L (ref 136–145)
SP GR UR STRIP: 1.02 (ref 1–1.03)
SQUAMOUS #/AREA URNS HPF: NORMAL /HPF
TROPONIN T DELTA: -4 NG/L
TROPONIN T SERPL HS-MCNC: 26 NG/L
UROBILINOGEN UR QL STRIP: ABNORMAL
WBC # UR STRIP: NORMAL /HPF
WBC NRBC COR # BLD AUTO: 6.73 10*3/MM3 (ref 3.4–10.8)

## 2024-03-01 PROCEDURE — 84484 ASSAY OF TROPONIN QUANT: CPT | Performed by: PHYSICIAN ASSISTANT

## 2024-03-01 PROCEDURE — 25810000003 SODIUM CHLORIDE 0.9 % SOLUTION: Performed by: PHYSICIAN ASSISTANT

## 2024-03-01 PROCEDURE — 82803 BLOOD GASES ANY COMBINATION: CPT

## 2024-03-01 PROCEDURE — 99291 CRITICAL CARE FIRST HOUR: CPT

## 2024-03-01 PROCEDURE — G0378 HOSPITAL OBSERVATION PER HR: HCPCS

## 2024-03-01 PROCEDURE — 83930 ASSAY OF BLOOD OSMOLALITY: CPT | Performed by: PHYSICIAN ASSISTANT

## 2024-03-01 PROCEDURE — 93010 ELECTROCARDIOGRAM REPORT: CPT | Performed by: INTERNAL MEDICINE

## 2024-03-01 PROCEDURE — 0202U NFCT DS 22 TRGT SARS-COV-2: CPT | Performed by: INTERNAL MEDICINE

## 2024-03-01 PROCEDURE — 93005 ELECTROCARDIOGRAM TRACING: CPT | Performed by: PHYSICIAN ASSISTANT

## 2024-03-01 PROCEDURE — 83735 ASSAY OF MAGNESIUM: CPT | Performed by: PHYSICIAN ASSISTANT

## 2024-03-01 PROCEDURE — 85025 COMPLETE CBC W/AUTO DIFF WBC: CPT | Performed by: PHYSICIAN ASSISTANT

## 2024-03-01 PROCEDURE — 71101 X-RAY EXAM UNILAT RIBS/CHEST: CPT

## 2024-03-01 PROCEDURE — 63710000001 INSULIN GLARGINE PER 5 UNITS

## 2024-03-01 PROCEDURE — 80053 COMPREHEN METABOLIC PANEL: CPT | Performed by: PHYSICIAN ASSISTANT

## 2024-03-01 PROCEDURE — 81001 URINALYSIS AUTO W/SCOPE: CPT | Performed by: PHYSICIAN ASSISTANT

## 2024-03-01 PROCEDURE — 82010 KETONE BODYS QUAN: CPT | Performed by: PHYSICIAN ASSISTANT

## 2024-03-01 PROCEDURE — 72125 CT NECK SPINE W/O DYE: CPT

## 2024-03-01 PROCEDURE — 70450 CT HEAD/BRAIN W/O DYE: CPT

## 2024-03-01 PROCEDURE — 84145 PROCALCITONIN (PCT): CPT | Performed by: INTERNAL MEDICINE

## 2024-03-01 PROCEDURE — 25010000002 MORPHINE PER 10 MG: Performed by: EMERGENCY MEDICINE

## 2024-03-01 PROCEDURE — 25010000002 ONDANSETRON PER 1 MG: Performed by: EMERGENCY MEDICINE

## 2024-03-01 PROCEDURE — 82948 REAGENT STRIP/BLOOD GLUCOSE: CPT

## 2024-03-01 PROCEDURE — 36415 COLL VENOUS BLD VENIPUNCTURE: CPT

## 2024-03-01 PROCEDURE — 84100 ASSAY OF PHOSPHORUS: CPT | Performed by: PHYSICIAN ASSISTANT

## 2024-03-01 PROCEDURE — 83036 HEMOGLOBIN GLYCOSYLATED A1C: CPT | Performed by: PHYSICIAN ASSISTANT

## 2024-03-01 RX ORDER — DEXTROSE MONOHYDRATE, SODIUM CHLORIDE, AND POTASSIUM CHLORIDE 50; 1.49; 9 G/1000ML; G/1000ML; G/1000ML
150 INJECTION, SOLUTION INTRAVENOUS CONTINUOUS PRN
Status: DISCONTINUED | OUTPATIENT
Start: 2024-03-01 | End: 2024-03-01

## 2024-03-01 RX ORDER — SODIUM CHLORIDE 0.9 % (FLUSH) 0.9 %
10 SYRINGE (ML) INJECTION AS NEEDED
Status: DISCONTINUED | OUTPATIENT
Start: 2024-03-01 | End: 2024-03-01

## 2024-03-01 RX ORDER — NICOTINE POLACRILEX 4 MG
15 LOZENGE BUCCAL
Status: DISCONTINUED | OUTPATIENT
Start: 2024-03-01 | End: 2024-03-04 | Stop reason: HOSPADM

## 2024-03-01 RX ORDER — POLYETHYLENE GLYCOL 3350 17 G/17G
17 POWDER, FOR SOLUTION ORAL DAILY PRN
Status: DISCONTINUED | OUTPATIENT
Start: 2024-03-01 | End: 2024-03-04 | Stop reason: HOSPADM

## 2024-03-01 RX ORDER — OXYMETAZOLINE HYDROCHLORIDE 0.05 G/100ML
2 SPRAY NASAL 2 TIMES DAILY
Status: COMPLETED | OUTPATIENT
Start: 2024-03-01 | End: 2024-03-02

## 2024-03-01 RX ORDER — SODIUM CHLORIDE 0.9 % (FLUSH) 0.9 %
10 SYRINGE (ML) INJECTION EVERY 12 HOURS SCHEDULED
Status: DISCONTINUED | OUTPATIENT
Start: 2024-03-01 | End: 2024-03-01

## 2024-03-01 RX ORDER — SODIUM CHLORIDE 9 MG/ML
250 INJECTION, SOLUTION INTRAVENOUS CONTINUOUS PRN
Status: DISCONTINUED | OUTPATIENT
Start: 2024-03-01 | End: 2024-03-01

## 2024-03-01 RX ORDER — AMOXICILLIN 250 MG
2 CAPSULE ORAL 2 TIMES DAILY
Status: DISCONTINUED | OUTPATIENT
Start: 2024-03-01 | End: 2024-03-04 | Stop reason: HOSPADM

## 2024-03-01 RX ORDER — INSULIN LISPRO 100 [IU]/ML
2-9 INJECTION, SOLUTION INTRAVENOUS; SUBCUTANEOUS
Status: DISCONTINUED | OUTPATIENT
Start: 2024-03-02 | End: 2024-03-04 | Stop reason: HOSPADM

## 2024-03-01 RX ORDER — IBUPROFEN 600 MG/1
1 TABLET ORAL
Status: DISCONTINUED | OUTPATIENT
Start: 2024-03-01 | End: 2024-03-04 | Stop reason: HOSPADM

## 2024-03-01 RX ORDER — SODIUM CHLORIDE 9 MG/ML
40 INJECTION, SOLUTION INTRAVENOUS AS NEEDED
Status: DISCONTINUED | OUTPATIENT
Start: 2024-03-01 | End: 2024-03-01

## 2024-03-01 RX ORDER — SODIUM CHLORIDE AND POTASSIUM CHLORIDE 150; 450 MG/100ML; MG/100ML
250 INJECTION, SOLUTION INTRAVENOUS CONTINUOUS PRN
Status: DISCONTINUED | OUTPATIENT
Start: 2024-03-01 | End: 2024-03-01

## 2024-03-01 RX ORDER — ONDANSETRON 2 MG/ML
4 INJECTION INTRAMUSCULAR; INTRAVENOUS EVERY 6 HOURS PRN
Status: DISCONTINUED | OUTPATIENT
Start: 2024-03-01 | End: 2024-03-04 | Stop reason: HOSPADM

## 2024-03-01 RX ORDER — NITROGLYCERIN 0.4 MG/1
0.4 TABLET SUBLINGUAL
Status: DISCONTINUED | OUTPATIENT
Start: 2024-03-01 | End: 2024-03-04 | Stop reason: HOSPADM

## 2024-03-01 RX ORDER — IBUPROFEN 600 MG/1
1 TABLET ORAL
Status: DISCONTINUED | OUTPATIENT
Start: 2024-03-01 | End: 2024-03-01

## 2024-03-01 RX ORDER — DEXTROSE MONOHYDRATE, SODIUM CHLORIDE, AND POTASSIUM CHLORIDE 50; 1.49; 4.5 G/1000ML; G/1000ML; G/1000ML
150 INJECTION, SOLUTION INTRAVENOUS CONTINUOUS PRN
Status: DISCONTINUED | OUTPATIENT
Start: 2024-03-01 | End: 2024-03-01

## 2024-03-01 RX ORDER — ONDANSETRON 4 MG/1
4 TABLET, ORALLY DISINTEGRATING ORAL EVERY 6 HOURS PRN
Status: DISCONTINUED | OUTPATIENT
Start: 2024-03-01 | End: 2024-03-04 | Stop reason: HOSPADM

## 2024-03-01 RX ORDER — DEXTROSE MONOHYDRATE 25 G/50ML
25 INJECTION, SOLUTION INTRAVENOUS
Status: DISCONTINUED | OUTPATIENT
Start: 2024-03-01 | End: 2024-03-04 | Stop reason: HOSPADM

## 2024-03-01 RX ORDER — ACETAMINOPHEN 325 MG/1
650 TABLET ORAL EVERY 4 HOURS PRN
Status: DISCONTINUED | OUTPATIENT
Start: 2024-03-01 | End: 2024-03-04 | Stop reason: HOSPADM

## 2024-03-01 RX ORDER — SODIUM CHLORIDE 450 MG/100ML
250 INJECTION, SOLUTION INTRAVENOUS CONTINUOUS PRN
Status: DISCONTINUED | OUTPATIENT
Start: 2024-03-01 | End: 2024-03-01

## 2024-03-01 RX ORDER — DEXTROSE AND SODIUM CHLORIDE 5; .9 G/100ML; G/100ML
150 INJECTION, SOLUTION INTRAVENOUS CONTINUOUS PRN
Status: DISCONTINUED | OUTPATIENT
Start: 2024-03-01 | End: 2024-03-01

## 2024-03-01 RX ORDER — MORPHINE SULFATE 2 MG/ML
4 INJECTION, SOLUTION INTRAMUSCULAR; INTRAVENOUS ONCE
Status: COMPLETED | OUTPATIENT
Start: 2024-03-01 | End: 2024-03-01

## 2024-03-01 RX ORDER — NICOTINE POLACRILEX 4 MG
15 LOZENGE BUCCAL
Status: DISCONTINUED | OUTPATIENT
Start: 2024-03-01 | End: 2024-03-01

## 2024-03-01 RX ORDER — DEXTROSE MONOHYDRATE, SODIUM CHLORIDE, AND POTASSIUM CHLORIDE 50; 2.98; 9 G/1000ML; G/1000ML; G/1000ML
150 INJECTION, SOLUTION INTRAVENOUS CONTINUOUS PRN
Status: DISCONTINUED | OUTPATIENT
Start: 2024-03-01 | End: 2024-03-01

## 2024-03-01 RX ORDER — ACETAMINOPHEN 650 MG/1
650 SUPPOSITORY RECTAL EVERY 4 HOURS PRN
Status: DISCONTINUED | OUTPATIENT
Start: 2024-03-01 | End: 2024-03-04 | Stop reason: HOSPADM

## 2024-03-01 RX ORDER — SODIUM CHLORIDE 0.9 % (FLUSH) 0.9 %
10 SYRINGE (ML) INJECTION EVERY 12 HOURS SCHEDULED
Status: DISCONTINUED | OUTPATIENT
Start: 2024-03-01 | End: 2024-03-04 | Stop reason: HOSPADM

## 2024-03-01 RX ORDER — SODIUM CHLORIDE AND POTASSIUM CHLORIDE 150; 900 MG/100ML; MG/100ML
250 INJECTION, SOLUTION INTRAVENOUS CONTINUOUS PRN
Status: DISCONTINUED | OUTPATIENT
Start: 2024-03-01 | End: 2024-03-01

## 2024-03-01 RX ORDER — ONDANSETRON 2 MG/ML
4 INJECTION INTRAMUSCULAR; INTRAVENOUS ONCE
Status: COMPLETED | OUTPATIENT
Start: 2024-03-01 | End: 2024-03-01

## 2024-03-01 RX ORDER — BISACODYL 5 MG/1
5 TABLET, DELAYED RELEASE ORAL DAILY PRN
Status: DISCONTINUED | OUTPATIENT
Start: 2024-03-01 | End: 2024-03-04 | Stop reason: HOSPADM

## 2024-03-01 RX ORDER — SODIUM CHLORIDE 9 MG/ML
40 INJECTION, SOLUTION INTRAVENOUS AS NEEDED
Status: DISCONTINUED | OUTPATIENT
Start: 2024-03-01 | End: 2024-03-04 | Stop reason: HOSPADM

## 2024-03-01 RX ORDER — DEXTROSE AND SODIUM CHLORIDE 5; .45 G/100ML; G/100ML
150 INJECTION, SOLUTION INTRAVENOUS CONTINUOUS PRN
Status: DISCONTINUED | OUTPATIENT
Start: 2024-03-01 | End: 2024-03-01

## 2024-03-01 RX ORDER — BISACODYL 10 MG
10 SUPPOSITORY, RECTAL RECTAL DAILY PRN
Status: DISCONTINUED | OUTPATIENT
Start: 2024-03-01 | End: 2024-03-04 | Stop reason: HOSPADM

## 2024-03-01 RX ORDER — DEXTROSE MONOHYDRATE 25 G/50ML
10-50 INJECTION, SOLUTION INTRAVENOUS
Status: DISCONTINUED | OUTPATIENT
Start: 2024-03-01 | End: 2024-03-01

## 2024-03-01 RX ORDER — DEXTROSE MONOHYDRATE, SODIUM CHLORIDE, AND POTASSIUM CHLORIDE 50; 2.98; 4.5 G/1000ML; G/1000ML; G/1000ML
150 INJECTION, SOLUTION INTRAVENOUS CONTINUOUS PRN
Status: DISCONTINUED | OUTPATIENT
Start: 2024-03-01 | End: 2024-03-01

## 2024-03-01 RX ORDER — SODIUM CHLORIDE AND POTASSIUM CHLORIDE 300; 900 MG/100ML; MG/100ML
250 INJECTION, SOLUTION INTRAVENOUS CONTINUOUS PRN
Status: DISCONTINUED | OUTPATIENT
Start: 2024-03-01 | End: 2024-03-01

## 2024-03-01 RX ORDER — SODIUM CHLORIDE 0.9 % (FLUSH) 0.9 %
10 SYRINGE (ML) INJECTION AS NEEDED
Status: DISCONTINUED | OUTPATIENT
Start: 2024-03-01 | End: 2024-03-04 | Stop reason: HOSPADM

## 2024-03-01 RX ORDER — SODIUM CHLORIDE 9 MG/ML
75 INJECTION, SOLUTION INTRAVENOUS CONTINUOUS
Status: DISCONTINUED | OUTPATIENT
Start: 2024-03-01 | End: 2024-03-04 | Stop reason: HOSPADM

## 2024-03-01 RX ADMIN — SODIUM CHLORIDE 1000 ML/HR: 9 INJECTION, SOLUTION INTRAVENOUS at 15:47

## 2024-03-01 RX ADMIN — INSULIN HUMAN 0.9 UNITS/HR: 1 INJECTION, SOLUTION INTRAVENOUS at 15:51

## 2024-03-01 RX ADMIN — OXYMETAZOLINE HYDROCHLORIDE 2 SPRAY: 0.05 SPRAY NASAL at 20:06

## 2024-03-01 RX ADMIN — Medication 10 ML: at 20:08

## 2024-03-01 RX ADMIN — INSULIN GLARGINE 20 UNITS: 100 INJECTION, SOLUTION SUBCUTANEOUS at 22:58

## 2024-03-01 RX ADMIN — SODIUM CHLORIDE 500 ML: 9 INJECTION, SOLUTION INTRAVENOUS at 13:51

## 2024-03-01 RX ADMIN — MORPHINE SULFATE 4 MG: 2 INJECTION, SOLUTION INTRAMUSCULAR; INTRAVENOUS at 16:40

## 2024-03-01 RX ADMIN — SODIUM CHLORIDE 500 ML: 9 INJECTION, SOLUTION INTRAVENOUS at 16:24

## 2024-03-01 RX ADMIN — POTASSIUM CHLORIDE, DEXTROSE MONOHYDRATE AND SODIUM CHLORIDE 150 ML/HR: 150; 5; 900 INJECTION, SOLUTION INTRAVENOUS at 18:53

## 2024-03-01 RX ADMIN — ONDANSETRON 4 MG: 2 INJECTION INTRAMUSCULAR; INTRAVENOUS at 16:39

## 2024-03-01 NOTE — ED NOTES
"Nursing report ED to floor  Mayra Hirsch  73 y.o.  female    HPI :  HPI (Adult)  Stated Reason for Visit: fall  History Obtained From: patient, EMS    Chief Complaint  Chief Complaint   Patient presents with    Back Pain    Fall       Admitting doctor:   Ashley Majano MD    Admitting diagnosis:   The primary encounter diagnosis was Diabetic ketoacidosis without coma associated with type 2 diabetes mellitus. Diagnoses of Influenza A and Closed traumatic nondisplaced fracture of one rib of right side, initial encounter were also pertinent to this visit.    Code status:   Current Code Status       Date Active Code Status Order ID Comments User Context       Prior            Allergies:   Bydureon [exenatide] and Metformin and related    Isolation:   Droplet    Intake and Output  No intake or output data in the 24 hours ending 03/01/24 1554    Weight:       03/01/24  1253   Weight: 97.5 kg (215 lb)       Most recent vitals:   Vitals:    03/01/24 1214 03/01/24 1253   BP: 162/76 125/73   BP Location:  Right arm   Patient Position:  Sitting   Pulse: 98 95   Resp: 18 18   Temp: 99.2 °F (37.3 °C) 99.1 °F (37.3 °C)   TempSrc: Tympanic Tympanic   SpO2: 98% 95%   Weight:  97.5 kg (215 lb)   Height:  172.7 cm (68\")       Active LDAs/IV Access:   Lines, Drains & Airways       Active LDAs       Name Placement date Placement time Site Days    Peripheral IV 03/01/24 1307 Right Antecubital 03/01/24  1307  Antecubital  less than 1    Peripheral IV 03/01/24 1548 Anterior;Left Forearm 03/01/24  1548  Forearm  less than 1                    Labs (abnormal labs have a star):   Labs Reviewed   COMPREHENSIVE METABOLIC PANEL - Abnormal; Notable for the following components:       Result Value    Glucose 185 (*)     Creatinine 1.21 (*)     Sodium 132 (*)     Chloride 96 (*)     CO2 18.9 (*)     AST (SGOT) 39 (*)     Anion Gap 17.1 (*)     eGFR 47.4 (*)     All other components within normal limits    Narrative:     GFR Normal >60  Chronic " Kidney Disease <60  Kidney Failure <15    The GFR formula is only valid for adults with stable renal function between ages 18 and 70.   CBC WITH AUTO DIFFERENTIAL - Abnormal; Notable for the following components:    Neutrophil % 78.3 (*)     Lymphocyte % 10.7 (*)     Eosinophil % 0.0 (*)     All other components within normal limits   BLOOD GAS, VENOUS - Abnormal; Notable for the following components:    pH, Venous 7.289 (*)     pO2, Venous 28.2 (*)     HCO3, Venous 21.3 (*)     Base Excess, Venous -5.4 (*)     CO2 Content 22.6 (*)     All other components within normal limits   BETA HYDROXYBUTYRATE QUANTITATIVE - Abnormal; Notable for the following components:    Beta-Hydroxybutyrate Quant 0.622 (*)     All other components within normal limits    Narrative:     In the assessment of possible diabetic ketoacidosis, the test should be interpreted along with other clinical and laboratory findings.  A level greater than 1 mmol/L should require further evaluation and levels of more than 3 mmol/L require immediate medical review.   PHOSPHORUS - Abnormal; Notable for the following components:    Phosphorus 2.0 (*)     All other components within normal limits   MAGNESIUM - Abnormal; Notable for the following components:    Magnesium 1.5 (*)     All other components within normal limits   HEMOGLOBIN A1C - Abnormal; Notable for the following components:    Hemoglobin A1C 7.00 (*)     All other components within normal limits    Narrative:     Hemoglobin A1C Ranges:    Increased Risk for Diabetes  5.7% to 6.4%  Diabetes                     >= 6.5%  Diabetic Goal                < 7.0%   TROPONIN - Abnormal; Notable for the following components:    HS Troponin T 26 (*)     All other components within normal limits    Narrative:     High Sensitive Troponin T Reference Range:  <14.0 ng/L- Negative Female for AMI  <22.0 ng/L- Negative Male for AMI  >=14 - Abnormal Female indicating possible myocardial injury.  >=22 - Abnormal  Male indicating possible myocardial injury.   Clinicians would have to utilize clinical acumen, EKG, Troponin, and serial changes to determine if it is an Acute Myocardial Infarction or myocardial injury due to an underlying chronic condition.        POCT GLUCOSE FINGERSTICK - Abnormal; Notable for the following components:    Glucose 192 (*)     All other components within normal limits   POCT GLUCOSE FINGERSTICK - Abnormal; Notable for the following components:    Glucose 153 (*)     All other components within normal limits   BLOOD GAS, VENOUS   URINALYSIS W/ MICROSCOPIC IF INDICATED (NO CULTURE)   OSMOLALITY, SERUM   BASIC METABOLIC PANEL   BASIC METABOLIC PANEL   MAGNESIUM   MAGNESIUM   PHOSPHORUS   PHOSPHORUS   HIGH SENSITIVITIY TROPONIN T 2HR   CBC AND DIFFERENTIAL    Narrative:     The following orders were created for panel order CBC & Differential.  Procedure                               Abnormality         Status                     ---------                               -----------         ------                     CBC Auto Differential[[id number]]        Abnormal            Final result                 Please view results for these tests on the individual orders.   KETONE BODIES SERUM    Narrative:     The following orders were created for panel order Ketone Bodies, Serum (Not performed at [place]).  Procedure                               Abnormality         Status                     ---------                               -----------         ------                     Beta Hydroxybutyrate Arya...[[id number]]  Abnormal            Final result                 Please view results for these tests on the individual orders.       EKG:   ECG 12 Lead Electrolyte Imbalance   Preliminary Result   HEART RATE= 88  bpm   RR Interval= 682  ms   AR Interval= 148  ms   P Horizontal Axis= -68  deg   P Front Axis= -25  deg   QRSD Interval= 96  ms   QT Interval= 369  ms   QTcB= 447  ms   QRS Axis= 13  deg   T Wave  Axis= 119  deg   - ABNORMAL ECG -   Sinus rhythm   Probable left atrial enlargement   Abnormal T, consider ischemia, lateral leads   Electronically Signed By:    Date and Time of Study: 2024-03-01 15:14:09          Meds given in ED:   Medications   sodium chloride 0.9 % flush 10 mL (has no administration in time range)   sodium chloride 0.9 % flush 10 mL (has no administration in time range)   sodium chloride 0.9 % infusion 40 mL (has no administration in time range)   dextrose (GLUTOSE) oral gel 15 g (has no administration in time range)   dextrose (D50W) (25 g/50 mL) IV injection 10-50 mL (has no administration in time range)   glucagon (GLUCAGEN) injection 1 mg (has no administration in time range)   sodium chloride 0.9 % bolus (1,000 mL/hr Intravenous New Bag 3/1/24 1547)   sodium chloride 0.9 % infusion (has no administration in time range)   sodium chloride 0.9 % with KCl 20 mEq/L infusion (has no administration in time range)   sodium chloride 0.9 % with KCl 40 mEq/L infusion (has no administration in time range)   dextrose 5 % and sodium chloride 0.9 % infusion (has no administration in time range)   dextrose 5 % and sodium chloride 0.9 % with KCl 20 mEq/L infusion (has no administration in time range)   dextrose 5 % and sodium chloride 0.9 % with KCl 40 mEq/L infusion (has no administration in time range)   sodium chloride 0.45 % infusion (has no administration in time range)   sodium chloride 0.45 % with KCl 20 mEq/L infusion (has no administration in time range)   sodium chloride 0.45 % 1,000 mL with potassium chloride 40 mEq infusion (has no administration in time range)   dextrose 5 % and sodium chloride 0.45 % infusion (has no administration in time range)   dextrose 5 % and sodium chloride 0.45 % with KCl 20 mEq/L infusion (has no administration in time range)   dextrose 5 % and sodium chloride 0.45 % with KCl 40 mEq/L infusion (has no administration in time range)   insulin regular 1 unit/mL in 0.9%  sodium chloride (Glucommander) (0.9 Units/hr Intravenous New Bag 3/1/24 1551)   Potassium Replacement - Follow Nurse / BPA Driven Protocol (has no administration in time range)   Magnesium Standard Dose Replacement - Follow Nurse / BPA Driven Protocol (has no administration in time range)   Phosphorus Replacement - Follow Nurse / BPA Driven Protocol (has no administration in time range)   Calcium Replacement - Follow Nurse / BPA Driven Protocol (has no administration in time range)   sodium chloride 0.9 % bolus 500 mL (has no administration in time range)   sodium chloride 0.9 % bolus 500 mL (0 mL Intravenous Stopped 3/1/24 1539)       Imaging results:  CT Head Without Contrast    Result Date: 3/1/2024  1. There is no evidence of acute infarction or of intracranial hemorrhage. Further evaluation could be performed with a MRI examination of the brain as indicated. 2. Paranasal sinus disease is noted which is new versus 06/16/2023.   CT EXAMINATION CERVICAL SPINE WITHOUT CONTRAST:  The alignment of the cervical spine is within normal limits. There is partial fusion of the facets at C2-C3 bilaterally. There is no evidence of a cervical fracture.  C2-C3: There is no evidence of disc bulge or herniation.  C3-C4: There is severe spinal stenosis secondary to what is likely a moderate to large calcified disc herniation. This appears similar, given differences in technique, as compared to the CT examination of the neck performed in 2018.  C4-C5: There is a central disc osteophyte complex resulting in mild canal stenosis. This appears similar to the prior CT examination of the neck.  C5-C6: A mild central disc-osteophyte complex is present with no evidence of herniation.  C6-C7: There is no evidence of disc bulge or herniation.  C7-T1: There is no evidence of a disc bulge or herniation.  An azygos fissure is incidentally noted.  IMPRESSION: There is no evidence of a cervical fracture. Multilevel degenerative disease involving  the cervical spine is noted as described in detail above including severe spinal stenosis at C3-C4 secondary to a central calcified disc herniation. This is grossly similar to the CT examination of the neck performed on 05/04/2018. See above.    Radiation dose reduction techniques were utilized, including automated exposure control and exposure modulation based on body size.       CT Cervical Spine Without Contrast    Result Date: 3/1/2024  1. There is no evidence of acute infarction or of intracranial hemorrhage. Further evaluation could be performed with a MRI examination of the brain as indicated. 2. Paranasal sinus disease is noted which is new versus 06/16/2023.   CT EXAMINATION CERVICAL SPINE WITHOUT CONTRAST:  The alignment of the cervical spine is within normal limits. There is partial fusion of the facets at C2-C3 bilaterally. There is no evidence of a cervical fracture.  C2-C3: There is no evidence of disc bulge or herniation.  C3-C4: There is severe spinal stenosis secondary to what is likely a moderate to large calcified disc herniation. This appears similar, given differences in technique, as compared to the CT examination of the neck performed in 2018.  C4-C5: There is a central disc osteophyte complex resulting in mild canal stenosis. This appears similar to the prior CT examination of the neck.  C5-C6: A mild central disc-osteophyte complex is present with no evidence of herniation.  C6-C7: There is no evidence of disc bulge or herniation.  C7-T1: There is no evidence of a disc bulge or herniation.  An azygos fissure is incidentally noted.  IMPRESSION: There is no evidence of a cervical fracture. Multilevel degenerative disease involving the cervical spine is noted as described in detail above including severe spinal stenosis at C3-C4 secondary to a central calcified disc herniation. This is grossly similar to the CT examination of the neck performed on 05/04/2018. See above.    Radiation dose  reduction techniques were utilized, including automated exposure control and exposure modulation based on body size.        Ambulatory status:   - assistx1    Social issues:   Social History     Socioeconomic History    Marital status:    Tobacco Use    Smoking status: Never     Passive exposure: Never    Smokeless tobacco: Never   Vaping Use    Vaping Use: Never used   Substance and Sexual Activity    Alcohol use: Never    Drug use: Never    Sexual activity: Defer       Peripheral Neurovascular  Peripheral Neurovascular (Adult)  Peripheral Neurovascular WDL: WDL    Neuro Cognitive  Neuro Cognitive (Adult)  Cognitive/Neuro/Behavioral WDL: WDL, all  Orientation: oriented x 4    Learning  Learning Assessment (Adult)  Learning Readiness and Ability: no barriers identified  Education Provided  Person Taught: patient    Respiratory  Respiratory (Adult)  Airway WDL: WDL  Respiratory WDL  Respiratory WDL: .WDL except (Tested positive for flu. Has cough. Pt states she is able to take a deep breath in regards to her rib pain.)    Abdominal Pain       Pain Assessments  Pain (Adult)  (0-10) Pain Rating: Rest: 2  (0-10) Pain Rating: Activity: 5  Pain Location: back, other (see comments), head (ribs)  Pain Side/Orientation: bilateral  Pain Description: constant    NIH Stroke Scale       Esther Kinsey RN  03/01/24 15:54 EST

## 2024-03-01 NOTE — ED PROVIDER NOTES
EMERGENCY DEPARTMENT ENCOUNTER      PCP: Spencer Hua MD  Patient Care Team:  Spencer Hua MD as PCP - General   Independent Historians: Patient    HPI:  Chief Complaint: Fall  A complete HPI/ROS/PMH/PSH/SH/FH are unobtainable due to: None    Chronic or social conditions impacting patient care (social determinants of health): None    Context: Mayra Hirsch is a 73 y.o. female who presents to the ED c/o chest wall pain, head injury after a fall.  Patient reports being seen at urgent care this morning due to not feeling well the past couple days and was diagnosed with influenza.  Patient states she felt lightheaded in the parking lot when she was leaving and this caused her to fall hitting the back of her head.  She did not lose consciousness.  She does take Plavix and aspirin.  She reports pain to her right ribs and back of her head.  No weakness or numbness to her extremities.  Patient states she has not been able to take her medications for the past several days due to feeling so poorly.    Review of prior external notes and/or external test results outside of this encounter: CMP on 2/21/24 shows normal renal function of 0.89, normal LFTs, electrolytes. CT head on 6/16/23 showed no acute intracranial hemorrhage or hydrocephalus.      PAST MEDICAL HISTORY  Active Ambulatory Problems     Diagnosis Date Noted    Vitamin D deficiency 04/28/2017    Primary hypothyroidism 04/28/2017    Dyslipidemia 04/28/2017    Essential hypertension 04/28/2017    Type 2 diabetes mellitus without complication, with long-term current use of insulin 04/28/2017    Noncompliance with diabetes treatment 02/07/2019    ST elevation myocardial infarction (STEMI) 11/07/2022    Coronary artery disease involving native heart 11/07/2022    S/P CABG x 5 12/21/2022    Low left ventricular ejection fraction 03/21/2023    Shortness of breath 03/21/2023    Right sided weakness 06/16/2023     Resolved Ambulatory Problems     Diagnosis Date  Noted    Abnormal findings on diagnostic imaging of other specified body structures 2022     Past Medical History:   Diagnosis Date    Depression     Diabetes mellitus     Disease of thyroid gland     Fibrocystic breast     Hypertension     Hyperthyroidism     Hypothyroidism     PONV (postoperative nausea and vomiting)     Type 2 diabetes mellitus        The patient has started, but not completed, their COVID-19 vaccination series.    PAST SURGICAL HISTORY  Past Surgical History:   Procedure Laterality Date    BREAST EXCISIONAL BIOPSY Right     at age 22; benign.    CARDIAC CATHETERIZATION Left 2022    Procedure: Cardiac Catheterization/Vascular Study;  Surgeon: Joanna Marie MD;  Location: [facility] CATH INVASIVE LOCATION;  Service: Cardiology;  Laterality: Left;    CARDIAC CATHETERIZATION N/A 2022    Procedure: Percutaneous Coronary Intervention;  Surgeon: Joanna Marie MD;  Location: [facility] CATH INVASIVE LOCATION;  Service: Cardiology;  Laterality: N/A;    CARDIAC CATHETERIZATION N/A 2022    Procedure: Left ventriculography;  Surgeon: Joanna Marie MD;  Location: [facility] CATH INVASIVE LOCATION;  Service: Cardiology;  Laterality: N/A;    CARDIAC CATHETERIZATION N/A 2022    Procedure: Stent MARTINEZ coronary;  Surgeon: Joanna Marie MD;  Location: [facility] CATH INVASIVE LOCATION;  Service: Cardiology;  Laterality: N/A;    CARDIAC CATHETERIZATION N/A 2022    Procedure: Left Heart Cath;  Surgeon: Joanna Marie MD;  Location: [facility] CATH INVASIVE LOCATION;  Service: Cardiology;  Laterality: N/A;     SECTION      CORONARY ARTERY BYPASS GRAFT N/A 11/10/2022    Procedure: NIKKY, CORONARY ARTERY BYPASS GRAFTING TIMES 6 WITH LEFT JORDYN AND ENDOSCOPICALLY HARVESTED LEFT AND RIGHT GREATER SAPHENOUS VEIN, PRP TRANSESOPHAGEAL ECHOCARDIOGRAM WITH ANESTHESIA;  Surgeon: Jr Dong Isabel MD;  Location: [facility];  Service: Cardiothoracic;  Laterality:  N/A;    HAND SURGERY      KNEE SURGERY      OOPHORECTOMY      1 ovary removed due to ectopic pregnancy         FAMILY HISTORY  Family History   Problem Relation Age of Onset    Coronary artery disease Mother     Alzheimer's disease Mother     Hypertension Mother     Coronary artery disease Father     Cancer Father     Thyroid disease Sister     Malig Hyperthermia Neg Hx          SOCIAL HISTORY  Social History     Socioeconomic History    Marital status:    Tobacco Use    Smoking status: Never     Passive exposure: Never    Smokeless tobacco: Never   Vaping Use    Vaping Use: Never used   Substance and Sexual Activity    Alcohol use: Never    Drug use: Never    Sexual activity: Defer         ALLERGIES  Bydureon [exenatide] and Metformin and related        REVIEW OF SYSTEMS  Review of Systems   Constitutional:  Positive for fatigue.   HENT:  Positive for congestion.    Respiratory:  Positive for cough.    Cardiovascular:  Negative for chest pain.   Gastrointestinal:  Negative for abdominal pain.   Musculoskeletal:  Negative for neck pain.        Right chest wall pain   Neurological:  Positive for light-headedness. Negative for syncope and numbness.        All systems reviewed and negative except for those discussed in HPI.       PHYSICAL EXAM    I have reviewed the triage vital signs and nursing notes.    ED Triage Vitals   Temp Heart Rate Resp BP SpO2   03/01/24 1214 03/01/24 1214 03/01/24 1214 03/01/24 1214 03/01/24 1214   99.2 °F (37.3 °C) 98 18 162/76 98 %      Temp src Heart Rate Source Patient Position BP Location FiO2 (%)   03/01/24 1214 03/01/24 1253 03/01/24 1253 03/01/24 1253 --   Tympanic Monitor Sitting Right arm        Physical Exam  GENERAL: alert, no acute distress  SKIN: Warm, dry  HENT: Normocephalic, atraumatic  EYES: no scleral icterus  CV: regular rhythm, regular rate  RESPIRATORY: normal effort, lungs clear, right anterior chest wall pain to palpation  ABDOMEN: soft, nontender,  nondistended  MUSCULOSKELETAL: no deformity, no midline C/T/L spine tenderness  NEURO: alert, moves all extremities, follows commands          LAB RESULTS  Recent Results (from the past 24 hour(s))   Covid-19 + Flu A&B AG, Veritor ([id number])    Collection Time: 03/01/24 11:00 AM    Specimen: Swab   Result Value Ref Range    SARS Antigen Not Detected Not Detected, Presumptive Negative    Influenza A Antigen LAINA Detected (A) Not Detected    Influenza B Antigen LAINA Not Detected Not Detected    Internal Control Passed Passed    Lot Number [id number]     Expiration Date 1,[id number]    POC Glucose Once    Collection Time: 03/01/24 11:14 AM    Specimen: Blood   Result Value Ref Range    Glucose 271 (A) 70 - 130 mg/dL   POC Glucose Once    Collection Time: 03/01/24 12:49 PM    Specimen: Blood   Result Value Ref Range    Glucose 192 (H) 70 - 130 mg/dL   Comprehensive Metabolic Panel    Collection Time: 03/01/24  1:13 PM    Specimen: Blood   Result Value Ref Range    Glucose 185 (H) 65 - 99 mg/dL    BUN 20 8 - 23 mg/dL    Creatinine 1.21 (H) 0.57 - 1.00 mg/dL    Sodium 132 (L) 136 - 145 mmol/L    Potassium 4.0 3.5 - 5.2 mmol/L    Chloride 96 (L) 98 - 107 mmol/L    CO2 18.9 (L) 22.0 - 29.0 mmol/L    Calcium 8.9 8.6 - 10.5 mg/dL    Total Protein 7.2 6.0 - 8.5 g/dL    Albumin 3.7 3.5 - 5.2 g/dL    ALT (SGPT) 26 1 - 33 U/L    AST (SGOT) 39 (H) 1 - 32 U/L    Alkaline Phosphatase 85 39 - 117 U/L    Total Bilirubin 0.5 0.0 - 1.2 mg/dL    Globulin 3.5 gm/dL    A/G Ratio 1.1 g/dL    BUN/Creatinine Ratio 16.5 7.0 - 25.0    Anion Gap 17.1 (H) 5.0 - 15.0 mmol/L    eGFR 47.4 (L) >60.0 mL/min/1.73   CBC Auto Differential    Collection Time: 03/01/24  1:13 PM    Specimen: Blood   Result Value Ref Range    WBC 6.73 3.40 - 10.80 10*3/mm3    RBC 4.81 3.77 - 5.28 10*6/mm3    Hemoglobin 14.2 12.0 - 15.9 g/dL    Hematocrit 42.7 34.0 - 46.6 %    MCV 88.8 79.0 - 97.0 fL    MCH 29.5 26.6 - 33.0 pg    MCHC 33.3 31.5 - 35.7 g/dL    RDW 13.5 12.3 - 15.4 %     RDW-SD 43.4 37.0 - 54.0 fl    MPV 10.9 6.0 - 12.0 fL    Platelets 187 140 - 450 10*3/mm3    Neutrophil % 78.3 (H) 42.7 - 76.0 %    Lymphocyte % 10.7 (L) 19.6 - 45.3 %    Monocyte % 10.3 5.0 - 12.0 %    Eosinophil % 0.0 (L) 0.3 - 6.2 %    Basophil % 0.3 0.0 - 1.5 %    Immature Grans % 0.4 0.0 - 0.5 %    Neutrophils, Absolute 5.27 1.70 - 7.00 10*3/mm3    Lymphocytes, Absolute 0.72 0.70 - 3.10 10*3/mm3    Monocytes, Absolute 0.69 0.10 - 0.90 10*3/mm3    Eosinophils, Absolute 0.00 0.00 - 0.40 10*3/mm3    Basophils, Absolute 0.02 0.00 - 0.20 10*3/mm3    Immature Grans, Absolute 0.03 0.00 - 0.05 10*3/mm3    nRBC 0.0 0.0 - 0.2 /100 WBC   Beta Hydroxybutyrate Quantitative    Collection Time: 03/01/24  1:13 PM    Specimen: Blood   Result Value Ref Range    Beta-Hydroxybutyrate Quant 0.622 (H) 0.020 - 0.270 mmol/L   Phosphorus    Collection Time: 03/01/24  1:13 PM    Specimen: Blood   Result Value Ref Range    Phosphorus 2.0 (L) 2.5 - 4.5 mg/dL   Magnesium    Collection Time: 03/01/24  1:13 PM    Specimen: Blood   Result Value Ref Range    Magnesium 1.5 (L) 1.6 - 2.4 mg/dL   Hemoglobin A1c    Collection Time: 03/01/24  1:13 PM    Specimen: Blood   Result Value Ref Range    Hemoglobin A1C 7.00 (H) 4.80 - 5.60 %   High Sensitivity Troponin T    Collection Time: 03/01/24  1:13 PM    Specimen: Blood   Result Value Ref Range    HS Troponin T 26 (H) <14 ng/L   Blood Gas, Venous -    Collection Time: 03/01/24  2:28 PM    Specimen: Venous Blood   Result Value Ref Range    pH, Venous 7.289 (C) 7.310 - 7.410 pH Units    pCO2, Venous 44.3 41.0 - 51.0 mm Hg    pO2, Venous 28.2 (L) 35.0 - 45.0 mm Hg    HCO3, Venous 21.3 (L) 22.0 - 28.0 mmol/L    Base Excess, Venous -5.4 (L) -2.0 - 2.0 mmol/L    O2 Saturation, Venous 45.9 45.0 - 75.0 %    CO2 Content 22.6 (L) 23 - 27 mmol/L    Barometric Pressure for Blood Gas 755.2000 mmHg    Modality Room Air     Device Comment sat. 04 drawn by [id number] at 14:02    ECG 12 Lead Electrolyte Imbalance     Collection Time: 03/01/24  3:14 PM   Result Value Ref Range    QT Interval 369 ms    QTC Interval 447 ms   POC Glucose Once    Collection Time: 03/01/24  3:45 PM    Specimen: Blood   Result Value Ref Range    Glucose 153 (H) 70 - 130 mg/dL   Osmolality, Serum    Collection Time: 03/01/24  3:48 PM    Specimen: Blood   Result Value Ref Range    Osmolality 276 (L) 280 - 301 mOsm/kg   Basic Metabolic Panel    Collection Time: 03/01/24  3:48 PM    Specimen: Blood   Result Value Ref Range    Glucose 160 (H) 65 - 99 mg/dL    BUN 21 8 - 23 mg/dL    Creatinine 1.05 (H) 0.57 - 1.00 mg/dL    Sodium 133 (L) 136 - 145 mmol/L    Potassium 3.9 3.5 - 5.2 mmol/L    Chloride 100 98 - 107 mmol/L    CO2 17.8 (L) 22.0 - 29.0 mmol/L    Calcium 8.4 (L) 8.6 - 10.5 mg/dL    BUN/Creatinine Ratio 20.0 7.0 - 25.0    Anion Gap 15.2 (H) 5.0 - 15.0 mmol/L    eGFR 56.2 (L) >60.0 mL/min/1.73   Magnesium    Collection Time: 03/01/24  3:48 PM    Specimen: Blood   Result Value Ref Range    Magnesium 1.8 1.6 - 2.4 mg/dL   Phosphorus    Collection Time: 03/01/24  3:48 PM    Specimen: Blood   Result Value Ref Range    Phosphorus 2.1 (L) 2.5 - 4.5 mg/dL   High Sensitivity Troponin T 2Hr    Collection Time: 03/01/24  3:48 PM    Specimen: Blood   Result Value Ref Range    HS Troponin T 22 (H) <14 ng/L    Troponin T Delta -4 (L) >=-4 - <+4 ng/L   Procalcitonin    Collection Time: 03/01/24  3:48 PM    Specimen: Blood   Result Value Ref Range    Procalcitonin 0.09 0.00 - 0.25 ng/mL   POC Glucose Once    Collection Time: 03/01/24  4:43 PM    Specimen: Blood   Result Value Ref Range    Glucose 136 (H) 70 - 130 mg/dL   Respiratory Panel PCR w/COVID-19(SARS-CoV-2) JAGRUTI/SALUD/MIGUEL/PAD/COR/ABDOULAYE In-House, NP Swab in UTM/VTM, 2 HR TAT - Swab, Nasopharynx    Collection Time: 03/01/24  5:31 PM    Specimen: Nasopharynx; Swab   Result Value Ref Range    ADENOVIRUS, PCR Not Detected Not Detected    Coronavirus 229E Not Detected Not Detected    Coronavirus HKU1 Not Detected Not  Detected    Coronavirus NL63 Not Detected Not Detected    Coronavirus OC43 Not Detected Not Detected    COVID19 Not Detected Not Detected - Ref. Range    Human Metapneumovirus Not Detected Not Detected    Human Rhinovirus/Enterovirus Not Detected Not Detected    Influenza A H3 Detected (A) Not Detected    Influenza B PCR Not Detected Not Detected    Parainfluenza Virus 1 Not Detected Not Detected    Parainfluenza Virus 2 Not Detected Not Detected    Parainfluenza Virus 3 Not Detected Not Detected    Parainfluenza Virus 4 Not Detected Not Detected    RSV, PCR Not Detected Not Detected    Bordetella pertussis pcr Not Detected Not Detected    Bordetella parapertussis PCR Not Detected Not Detected    Chlamydophila pneumoniae PCR Not Detected Not Detected    Mycoplasma pneumo by PCR Not Detected Not Detected   POC Glucose Once    Collection Time: 03/01/24  5:56 PM    Specimen: Blood   Result Value Ref Range    Glucose 125 70 - 130 mg/dL   POC Glucose Once    Collection Time: 03/01/24  6:47 PM    Specimen: Blood   Result Value Ref Range    Glucose 108 70 - 130 mg/dL   POC Glucose Once    Collection Time: 03/01/24  7:50 PM    Specimen: Blood   Result Value Ref Range    Glucose 131 (H) 70 - 130 mg/dL       Ordered the above labs and independently reviewed and interpreted the results.        RADIOLOGY  CT Head Without Contrast, CT Cervical Spine Without Contrast    Result Date: 3/1/2024  CT HEAD AND CERVICAL SPINE WITHOUT CONTRAST  HISTORY: Fall, hit back of head, blood thinners, headache, neck pain.  COMPARISON: CT head 06/16/2023, MRI brain 06/17/2023. No prior CT examination of the cervical spine is available for comparison. Comparison is made to the CT examination of the neck from 05/04/2018.  CT HEAD WITHOUT CONTRAST:  The brain and ventricles are symmetrical. There is no evidence of intracranial hemorrhage, hydrocephalus or of abnormal extra-axial fluid. No focal area of decreased attenuation to suggest acute  infarction is identified. An air-fluid level is present in the right maxillary sinus. There is a trace amount of fluid present in the sphenoid sinus and left maxillary sinus. There is moderate opacification of the ethmoid air cells bilaterally. Bone windows show no evidence of a calvarial fracture.      1. There is no evidence of acute infarction or of intracranial hemorrhage. Further evaluation could be performed with a MRI examination of the brain as indicated. 2. Paranasal sinus disease is noted which is new versus 06/16/2023.   CT EXAMINATION CERVICAL SPINE WITHOUT CONTRAST:  The alignment of the cervical spine is within normal limits. There is partial fusion of the facets at C2-C3 bilaterally. There is no evidence of a cervical fracture.  C2-C3: There is no evidence of disc bulge or herniation.  C3-C4: There is severe spinal stenosis secondary to what is likely a moderate to large calcified disc herniation. This appears similar, given differences in technique, as compared to the CT examination of the neck performed in 2018.  C4-C5: There is a central disc osteophyte complex resulting in mild canal stenosis. This appears similar to the prior CT examination of the neck.  C5-C6: A mild central disc-osteophyte complex is present with no evidence of herniation.  C6-C7: There is no evidence of disc bulge or herniation.  C7-T1: There is no evidence of a disc bulge or herniation.  An azygos fissure is incidentally noted.  IMPRESSION: There is no evidence of a cervical fracture. Multilevel degenerative disease involving the cervical spine is noted as described in detail above including severe spinal stenosis at C3-C4 secondary to a central calcified disc herniation. This is grossly similar to the CT examination of the neck performed on 05/04/2018. See above.    Radiation dose reduction techniques were utilized, including automated exposure control and exposure modulation based on body size.   This report was finalized  on 3/1/2024 6:50 PM by Dr. Navin Hilton M.D on Workstation: [id number]      XR Ribs Right With PA Chest    Result Date: 3/1/2024  XR RIBS RIGHT W PA CHEST-  Clinical: Fell with right anterior chest pain  COMPARISON 6/16/2023  FINDINGS: Median sternotomy wires and vascular markers in position as before. Cardiac size stable. No pneumothorax, pleural effusion or lung contusion.  Minimally angulated fracture through the anterior right seventh rib. The remaining right ribs have a satisfactory appearance.  CONCLUSION: Subtle anterior right seventh rib fracture, no active disease of the chest  This report was finalized on 3/1/2024 1:43 PM by Dr. Kushal Will M.D on Workstation: [id number]       I ordered the above noted radiological studies. Independently reviewed and interpreted by me.  See dictation for official radiology interpretation.      PROCEDURES    Critical Care    Performed by: Nancy Ribera PA  Authorized by: Ashley Majano MD    Critical care provider statement:     Critical care time (minutes):  35    Critical care time was exclusive of:  Separately billable procedures and treating other patients    Critical care was necessary to treat or prevent imminent or life-threatening deterioration of the following conditions:  Metabolic crisis    Critical care was time spent personally by me on the following activities:  Blood draw for specimens, development of treatment plan with patient or surrogate, discussions with consultants, evaluation of patient's response to treatment, examination of patient, obtaining history from patient or surrogate, ordering and performing treatments and interventions, ordering and review of laboratory studies, ordering and review of radiographic studies, pulse oximetry, re-evaluation of patient's condition and review of old charts    Care discussed with: admitting provider          MEDICATIONS GIVEN IN ER    Medications   sodium chloride 0.9 % flush 10 mL (10 mL Intravenous Given  3/1/24 2008)   sodium chloride 0.9 % flush 10 mL (has no administration in time range)   sodium chloride 0.9 % infusion 40 mL (has no administration in time range)   dextrose (GLUTOSE) oral gel 15 g (has no administration in time range)   dextrose (D50W) (25 g/50 mL) IV injection 10-50 mL (has no administration in time range)   glucagon (GLUCAGEN) injection 1 mg (has no administration in time range)   sodium chloride 0.9 % bolus (0 mL/hr Intravenous Stopped 3/1/24 1736)   sodium chloride 0.9 % infusion (has no administration in time range)   sodium chloride 0.9 % with KCl 20 mEq/L infusion (has no administration in time range)   sodium chloride 0.9 % with KCl 40 mEq/L infusion (has no administration in time range)   dextrose 5 % and sodium chloride 0.9 % infusion (has no administration in time range)   dextrose 5 % and sodium chloride 0.9 % with KCl 20 mEq/L infusion (150 mL/hr Intravenous New Bag 3/1/24 1853)   dextrose 5 % and sodium chloride 0.9 % with KCl 40 mEq/L infusion (has no administration in time range)   sodium chloride 0.45 % infusion (has no administration in time range)   sodium chloride 0.45 % with KCl 20 mEq/L infusion (has no administration in time range)   sodium chloride 0.45 % 1,000 mL with potassium chloride 40 mEq infusion (has no administration in time range)   dextrose 5 % and sodium chloride 0.45 % infusion (has no administration in time range)   dextrose 5 % and sodium chloride 0.45 % with KCl 20 mEq/L infusion (has no administration in time range)   dextrose 5 % and sodium chloride 0.45 % with KCl 40 mEq/L infusion (has no administration in time range)   insulin regular 1 unit/mL in 0.9% sodium chloride (Glucommander) (0.6 Units/hr Intravenous Rate Change (DUAL SIGN) 3/1/24 1951)   Potassium Replacement - Follow Nurse / BPA Driven Protocol (has no administration in time range)   Magnesium Standard Dose Replacement - Follow Nurse / BPA Driven Protocol (has no administration in time range)    Phosphorus Replacement - Follow Nurse / BPA Driven Protocol (has no administration in time range)   Calcium Replacement - Follow Nurse / BPA Driven Protocol (has no administration in time range)   nitroglycerin (NITROSTAT) SL tablet 0.4 mg (has no administration in time range)   sodium chloride 0.9 % flush 10 mL (10 mL Intravenous Given 3/1/24 2008)   sodium chloride 0.9 % flush 10 mL (has no administration in time range)   sodium chloride 0.9 % infusion 40 mL (has no administration in time range)   sennosides-docusate (PERICOLACE) 8.6-50 MG per tablet 2 tablet (2 tablets Oral Not Given 3/1/24 2007)     And   polyethylene glycol (MIRALAX) packet 17 g (has no administration in time range)     And   bisacodyl (DULCOLAX) EC tablet 5 mg (has no administration in time range)     And   bisacodyl (DULCOLAX) suppository 10 mg (has no administration in time range)   ondansetron ODT (ZOFRAN-ODT) disintegrating tablet 4 mg (has no administration in time range)     Or   ondansetron (ZOFRAN) injection 4 mg (has no administration in time range)   acetaminophen (TYLENOL) tablet 650 mg (has no administration in time range)     Or   acetaminophen (TYLENOL) suppository 650 mg (has no administration in time range)   oxymetazoline (AFRIN) nasal spray 2 spray (2 sprays Each Nare Given 3/1/24 2006)   sodium chloride 0.9 % bolus 500 mL (0 mL Intravenous Stopped 3/1/24 1539)   sodium chloride 0.9 % bolus 500 mL (0 mL Intravenous Stopped 3/1/24 1953)   morphine injection 4 mg (4 mg Intravenous Given 3/1/24 1640)   ondansetron (ZOFRAN) injection 4 mg (4 mg Intravenous Given 3/1/24 1639)         PROGRESS, DATA ANALYSIS, CONSULTS, AND MEDICAL DECISION MAKING    All labs have been independently reviewed and interpreted by me.  All radiology studies have been independently reviewed and interpreted by me and discussed with radiologist dictating the report.   EKG's independently reviewed and interpreted by me.  Discussion below represents my  analysis of pertinent findings related to patient's condition, differential diagnosis, treatment plan and final disposition.    Differential diagnosis: Orthostasis, dehydration, metabolic derangement, DKA, rib fracture, rib contusion, concussion, intracranial hemorrhage    ED Course as of 03/01/24 2018   Fri Mar 01, 2024   1341 WBC: 6.73 [DC]   1341 Hemoglobin: 14.2 [DC]   1407 Glucose(!): 185 [DC]   1407 Creatinine(!): 1.21  0.89 nine days ago [DC]   1408 Sodium(!): 132 [DC]   1408 CO2(!): 18.9 [DC]   1408 Anion Gap(!): 17.1 [DC]   1408 XR Ribs Right With PA Chest  Radiology study independently interpreted by me and my findings are no pneumothorax.   [DC]   1433 pH, Venous(!!): 7.289 [DC]   1515 Beta-Hydroxybutyrate Quant(!): 0.622 [DC]   1520 Labs show patient to be in diabetic ketoacidosis.  DKA with Glucomander protocols initiated.  Will plan to admit to ICU. [DC]   1528 Discussed case with Dr. Newman, pulmonary CC, who will admit to ICU. [DC]   1552 EKG ER MD interpretation   Time: 15: 14  Rhythm and rate: Normal sinus rhythm at a rate of 88  Axis: Normal  P waves: Normal  QRS complexes: Normal  ST segments: no elevation nor depressions  T waves: Inverted T waves in 1, aVL  Similar T wave changes in prior EKG done June 16, 2023 [AR]      ED Course User Index  [AR] Erika Blair MD  [DC] Nancy Ribera PA             AS OF 20:18 EST VITALS:    BP - 134/86  HR - 79  TEMP - 98.9 °F (37.2 °C) (Oral)  O2 SATS - 98%        DIAGNOSIS  Final diagnoses:   Diabetic ketoacidosis without coma associated with type 2 diabetes mellitus   Influenza A   Closed traumatic nondisplaced fracture of one rib of right side, initial encounter         DISPOSITION  ED Disposition       ED Disposition   Decision to Admit    Condition   --    Comment   Level of Care: Critical Care [6]   Diagnosis: Diabetic ketoacidosis [084299]   Admitting Physician: IDRIS NEWMAN [[id number]]   Attending Physician: IDRIS NEWMAN [[id number]]                     Note Disclaimer: At [facility], we believe that sharing information builds trust and better relationships. You are receiving this note because you recently visited [facility]. It is possible you will see health information before a provider has talked with you about it. This kind of information can be easy to misunderstand. To help you fully understand what it means for your health, we urge you to discuss this note with your provider.         Nancy Ribera PA  03/01/24 2018

## 2024-03-01 NOTE — PLAN OF CARE
Problem: Skin Injury Risk Increased  Goal: Skin Health and Integrity  Outcome: Ongoing, Progressing  Intervention: Optimize Skin Protection  Recent Flowsheet Documentation  Taken 3/1/2024 1800 by Sonya Cannon RN  Head of Bed (HOB) Positioning: HOB at 30-45 degrees  Taken 3/1/2024 1715 by Sonya Cannon RN  Head of Bed (HOB) Positioning: HOB at 30-45 degrees     Problem: Adult Inpatient Plan of Care  Goal: Plan of Care Review  Outcome: Ongoing, Progressing  Goal: Patient-Specific Goal (Individualized)  Outcome: Ongoing, Progressing  Goal: Absence of Hospital-Acquired Illness or Injury  Outcome: Ongoing, Progressing  Intervention: Identify and Manage Fall Risk  Recent Flowsheet Documentation  Taken 3/1/2024 1800 by Sonya Cannon RN  Safety Promotion/Fall Prevention:   safety round/check completed   clutter free environment maintained  Taken 3/1/2024 1715 by Sonya Cannon RN  Safety Promotion/Fall Prevention:   safety round/check completed   clutter free environment maintained  Intervention: Prevent Skin Injury  Recent Flowsheet Documentation  Taken 3/1/2024 1800 by Sonya Cannon RN  Body Position: position changed independently  Taken 3/1/2024 1715 by Sonya Cannon RN  Body Position: position changed independently  Intervention: Prevent and Manage VTE (Venous Thromboembolism) Risk  Recent Flowsheet Documentation  Taken 3/1/2024 1715 by Sonya Cannon RN  Activity Management: bedrest  Goal: Optimal Comfort and Wellbeing  Outcome: Ongoing, Progressing  Intervention: Provide Person-Centered Care  Recent Flowsheet Documentation  Taken 3/1/2024 1715 by Sonya Cannon RN  Trust Relationship/Rapport:   care explained   thoughts/feelings acknowledged  Goal: Readiness for Transition of Care  Outcome: Ongoing, Progressing  Intervention: Mutually Develop Transition Plan  Recent Flowsheet Documentation  Taken 3/1/2024 1833 by Sonya Cannon RN  Equipment Currently Used at Home: none   Goal Outcome  Evaluation:     Pt transferred from ED around 1700 pm via stretcher on room air. A & O X4. On insulin drip. Afebrile. External catheter in place. Vital stable. Ongoing plan of care.

## 2024-03-01 NOTE — ED TRIAGE NOTES
Patient to ed from urgent care. Patient was seen at urgent care for N/V/D and was dx with the Flu patient fell in the parking lot as she was leaving and now has lower back pain. Patient did hit head.

## 2024-03-01 NOTE — H&P
Group: [facility]         CRITICAL CARE ADMISSION NOTE    Patient Identification:  Mayra Hirsch  73 y.o.  female  1950  [id number]                CC: cough, excessive thirst    History of Present Illness:  73-year-old female who presents with cough.  Started Monday.  Still present.  Productive of yellow-green sputum.  Associated with headache, nasal congestion, sinus pressure, significant fatigue, excessive thirst and fever.  She did not quantify her fever.  Symptoms are moderate, persistent and not improving.  She has also had diarrhea but denies abdominal pain or vomiting.  She is a diabetic and is on several oral agents as well as long-acting injectable insulin.  She has lab work showing elevated betahydroxybutiric acid, elevated creatinine, low sodium, hyperglycemia and low serum CO2.  Venous blood gas shows metabolic acidosis with normal pCO2.  I reviewed her medical records  She was here in November 2022 for ST elevation myocardial infarction.    Review of Systems   Constitutional:  Positive for fatigue and fever. Negative for diaphoresis.   HENT:  Negative for ear discharge and sore throat.    Eyes:  Negative for pain and visual disturbance.   Respiratory:  Positive for cough and shortness of breath.    Cardiovascular:  Negative for chest pain and leg swelling.   Gastrointestinal:  Positive for diarrhea. Negative for abdominal pain and vomiting.   Endocrine: Positive for polydipsia and polyuria. Negative for cold intolerance.   Genitourinary:  Negative for dysuria and hematuria.   Musculoskeletal:  Negative for joint swelling and myalgias.   Skin:  Negative for rash and wound.   Neurological:  Positive for headaches. Negative for speech difficulty and numbness.   Hematological:  Negative for adenopathy. Does not bruise/bleed easily.   Psychiatric/Behavioral:  Negative for agitation and confusion.        Past Medical History:  Past Medical History:   Diagnosis Date    Depression      Diabetes mellitus     Disease of thyroid gland     Dyslipidemia 2017    Fibrocystic breast     Hypertension     Hyperthyroidism     Hypothyroidism     PONV (postoperative nausea and vomiting)     Type 2 diabetes mellitus        Past Surgical History:  Past Surgical History:   Procedure Laterality Date    BREAST EXCISIONAL BIOPSY Right     at age 22; benign.    CARDIAC CATHETERIZATION Left 2022    Procedure: Cardiac Catheterization/Vascular Study;  Surgeon: Joanna Marie MD;  [facility];  Service: Cardiology;  Laterality: Left;    CARDIAC CATHETERIZATION N/A 2022    Procedure: Percutaneous Coronary Intervention;  Surgeon: Joanna Marie MD;  [facility];  Service: Cardiology;  Laterality: N/A;    CARDIAC CATHETERIZATION N/A 2022    Procedure: Left ventriculography;  Surgeon: Joanna Marie MD;  Location[facility] C[facility];  Service: Cardiology;  Laterality: N/A;    CARDIAC CATHETERIZATION N/A 2022    Procedure: Stent MARTINEZ coronary;  Surgeon: Joanna Marie MD;  [facility];  Service: Cardiology;  Laterality: N/A;    CARDIAC CATHETERIZATION N/A 2022    Procedure: Left Heart Cath;  Surgeon: Joanna Marie MD;  [facility];  Service: Cardiology;  Laterality: N/A;     SECTION      CORONARY ARTERY BYPASS GRAFT N/A 11/10/2022    Procedure: NIKKY, CORONARY ARTERY BYPASS GRAFTING TIMES 6 WITH LEFT JORDYN AND ENDOSCOPICALLY HARVESTED LEFT AND RIGHT GREATER SAPHENOUS VEIN, PRP TRANSESOPHAGEAL ECHOCARDIOGRAM WITH ANESTHESIA;  Surgeon: Jr Dong Isabel MD;  Location: [facility];  Service: Cardiothoracic;  Laterality: N/A;    HAND SURGERY      KNEE SURGERY      OOPHORECTOMY      1 ovary removed due to ectopic pregnancy        Home Meds:  Reviewed and reconciled    Allergies:  Allergies   Allergen Reactions    Bydureon  "[Exenatide] Diarrhea    Metformin And Related Nausea And Vomiting       Social History:   Social History     Socioeconomic History    Marital status:    Tobacco Use    Smoking status: Never     Passive exposure: Never    Smokeless tobacco: Never   Vaping Use    Vaping Use: Never used   Substance and Sexual Activity    Alcohol use: Never    Drug use: Never    Sexual activity: Defer       Family History:  Family History   Problem Relation Age of Onset    Coronary artery disease Mother     Alzheimer's disease Mother     Hypertension Mother     Coronary artery disease Father     Cancer Father     Thyroid disease Sister     Malig Hyperthermia Neg Hx        Physical Exam:  /73 (BP Location: Right arm, Patient Position: Sitting)   Pulse 95   Temp 99.1 °F (37.3 °C) (Tympanic)   Resp 18   Ht 172.7 cm (68\")   Wt 97.5 kg (215 lb)   SpO2 95%   BMI 32.69 kg/m²  Body mass index is 32.69 kg/m². 95% 97.5 kg (215 lb)  Physical Exam  Constitutional:       Appearance: She is ill-appearing.   HENT:      Right Ear: External ear normal.      Left Ear: External ear normal.      Nose: Nose normal.      Mouth/Throat:      Mouth: Mucous membranes are dry.   Eyes:      Conjunctiva/sclera: Conjunctivae normal.      Pupils: Pupils are equal, round, and reactive to light.   Neck:      Thyroid: No thyromegaly.      Vascular: No JVD.      Trachea: No tracheal deviation.   Cardiovascular:      Rate and Rhythm: Normal rate and regular rhythm.      Heart sounds: Normal heart sounds. No murmur heard.  Pulmonary:      Effort: Pulmonary effort is normal.      Breath sounds: Rhonchi present.   Abdominal:      Palpations: Abdomen is soft.      Tenderness: There is no abdominal tenderness. There is no rebound.      Comments: Cannot palpate liver or spleen enlargement   Musculoskeletal:         General: No deformity. Normal range of motion.      Cervical back: Neck supple. No rigidity.   Skin:     General: Skin is warm.      Findings: No " rash.      Comments: No palpable nodules   Neurological:      General: No focal deficit present.      Mental Status: She is alert and oriented to person, place, and time.      Cranial Nerves: No cranial nerve deficit.      Motor: No weakness.   Psychiatric:         Mood and Affect: Mood normal.         Thought Content: Thought content normal.         LABS:  COVID19   Date Value Ref Range Status   11/09/2022 Not Detected Not Detected - Ref. Range Final       Lab Results   Component Value Date    CALCIUM 8.9 03/01/2024    PHOS 2.0 (L) 03/01/2024     Results from last 7 days   Lab Units 03/01/24  1313   MAGNESIUM mg/dL 1.5*   SODIUM mmol/L 132*   POTASSIUM mmol/L 4.0   CHLORIDE mmol/L 96*   CO2 mmol/L 18.9*   BUN mg/dL 20   CREATININE mg/dL 1.21*   GLUCOSE mg/dL 185*   CALCIUM mg/dL 8.9   WBC 10*3/mm3 6.73   HEMOGLOBIN g/dL 14.2   PLATELETS 10*3/mm3 187   ALT (SGPT) U/L 26   AST (SGOT) U/L 39*     Lab Results   Component Value Date    TROPONINT 26 (H) 03/01/2024     Results from last 7 days   Lab Units 03/01/24  1313   HSTROP T ng/L 26*             Results from last 7 days   Lab Units 03/01/24  1428   MODALITY  Room Air                 Lab Results   Component Value Date    TSH 0.398 11/14/2023     Estimated Creatinine Clearance: 50.5 mL/min (A) (by C-G formula based on SCr of 1.21 mg/dL (H)).         Imaging: I personally visualized the images of CT scan of the head and cervical spine.  There is no intracranial pathology per radiologist report.  There is severe cervical stenosis C3-C4 per radiologist report in the cervical spine.      Assessment:  Diabetic ketoacidosis  Hypomagnesemia  Acute bronchitis due to influenza infection  Sinusitis  Hyponatremia  Elevated creatinine  Uncontrolled diabetes mellitus  Coronary artery disease  Myocardial infarction 2022  Hyperlipidemia  C3-C4 cervical spinal stenosis      Recommendations:  Admit to ICU.  Start IV insulin drip per DKA protocol.  Give large volume IV fluids per  diabetic ketoacidosis protocol.  Check phosphorus and magnesium as well as basic metabolic panel every 4 hours and replace electrolytes aggressively.  Obtain chest x-ray.  Patient was suspected to have the flu, went to urgent care earlier, received prescriptions but never started the medications.  She fell on the floor and ambulance brought her to this hospital.  CT head and neck showed no intracranial pathology and no cervical fracture but the patient has significant cervical spine stenosis at C3 C4.  We will get neurosurgery to see if they want to follow-up this patient in the office.  We expect her renal function to improve in the next 24 hours after IV fluids.  Repeat venous blood gas and serum ketones tomorrow.  For her influenza infection, supportive care.  Check procalcitonin and chest x-ray.    Lovenox for DVT prophylaxis.    Total critical care time 36 minutes    Patient was placed in face mask upon entering room and kept mask on throughout our encounter. I wore full protective equipment throughout this patient encounter including a face mask, gown and gloves. Hand hygiene was performed before donning protective equipment and after removal when leaving the room.    Isiah Donaldson MD  3/1/2024  16:11 EST      Much of this encounter note is an electronic transcription/translation of spoken language to printed text using Dragon Software.

## 2024-03-01 NOTE — ED NOTES
Pt reports being sick with the flu, feeling super dehydrated with diarrhea, nausea, and not being able to take her heart medications or insulin. Pt said she felt super weak and fell and hit her head, back, and now has bilateral rib pain.

## 2024-03-02 ENCOUNTER — APPOINTMENT (OUTPATIENT)
Dept: GENERAL RADIOLOGY | Facility: HOSPITAL | Age: 74
DRG: 638 | End: 2024-03-02
Payer: MEDICARE

## 2024-03-02 PROBLEM — M47.812 CERVICAL SPONDYLOSIS WITHOUT MYELOPATHY: Status: ACTIVE | Noted: 2024-03-02

## 2024-03-02 LAB
ANION GAP SERPL CALCULATED.3IONS-SCNC: 8 MMOL/L (ref 5–15)
BUN SERPL-MCNC: 13 MG/DL (ref 8–23)
BUN/CREAT SERPL: 18.6 (ref 7–25)
CALCIUM SPEC-SCNC: 6.6 MG/DL (ref 8.6–10.5)
CALCIUM SPEC-SCNC: 9.5 MG/DL (ref 8.6–10.5)
CHLORIDE SERPL-SCNC: 109 MMOL/L (ref 98–107)
CO2 SERPL-SCNC: 19 MMOL/L (ref 22–29)
CREAT SERPL-MCNC: 0.7 MG/DL (ref 0.57–1)
EGFRCR SERPLBLD CKD-EPI 2021: 91.5 ML/MIN/1.73
GLUCOSE BLDC GLUCOMTR-MCNC: 120 MG/DL (ref 70–130)
GLUCOSE BLDC GLUCOMTR-MCNC: 145 MG/DL (ref 70–130)
GLUCOSE BLDC GLUCOMTR-MCNC: 188 MG/DL (ref 70–130)
GLUCOSE BLDC GLUCOMTR-MCNC: 199 MG/DL (ref 70–130)
GLUCOSE BLDC GLUCOMTR-MCNC: 202 MG/DL (ref 70–130)
GLUCOSE SERPL-MCNC: 156 MG/DL (ref 65–99)
POTASSIUM SERPL-SCNC: 3.3 MMOL/L (ref 3.5–5.2)
POTASSIUM SERPL-SCNC: 4.5 MMOL/L (ref 3.5–5.2)
QT INTERVAL: 369 MS
QTC INTERVAL: 447 MS
SODIUM SERPL-SCNC: 136 MMOL/L (ref 136–145)

## 2024-03-02 PROCEDURE — 84132 ASSAY OF SERUM POTASSIUM: CPT

## 2024-03-02 PROCEDURE — 71045 X-RAY EXAM CHEST 1 VIEW: CPT

## 2024-03-02 PROCEDURE — 80048 BASIC METABOLIC PNL TOTAL CA: CPT

## 2024-03-02 PROCEDURE — G0378 HOSPITAL OBSERVATION PER HR: HCPCS

## 2024-03-02 PROCEDURE — 82310 ASSAY OF CALCIUM: CPT

## 2024-03-02 PROCEDURE — 25010000002 POTASSIUM CHLORIDE 10 MEQ/100ML SOLUTION

## 2024-03-02 PROCEDURE — 94640 AIRWAY INHALATION TREATMENT: CPT

## 2024-03-02 PROCEDURE — 25010000002 CALCIUM GLUCONATE 2-0.675 GM/100ML-% SOLUTION

## 2024-03-02 PROCEDURE — 71046 X-RAY EXAM CHEST 2 VIEWS: CPT

## 2024-03-02 PROCEDURE — 63710000001 INSULIN LISPRO (HUMAN) PER 5 UNITS

## 2024-03-02 PROCEDURE — 25010000002 CALCIUM GLUCONATE-NACL 1-0.675 GM/50ML-% SOLUTION

## 2024-03-02 PROCEDURE — 82948 REAGENT STRIP/BLOOD GLUCOSE: CPT

## 2024-03-02 PROCEDURE — 25810000003 SODIUM CHLORIDE 0.9 % SOLUTION

## 2024-03-02 PROCEDURE — 63710000001 INSULIN GLARGINE PER 5 UNITS

## 2024-03-02 RX ORDER — CALCIUM GLUCONATE 20 MG/ML
2000 INJECTION, SOLUTION INTRAVENOUS
Status: COMPLETED | OUTPATIENT
Start: 2024-03-02 | End: 2024-03-02

## 2024-03-02 RX ORDER — POTASSIUM CHLORIDE 7.45 MG/ML
10 INJECTION INTRAVENOUS
Status: COMPLETED | OUTPATIENT
Start: 2024-03-02 | End: 2024-03-02

## 2024-03-02 RX ORDER — IPRATROPIUM BROMIDE AND ALBUTEROL SULFATE 2.5; .5 MG/3ML; MG/3ML
3 SOLUTION RESPIRATORY (INHALATION) EVERY 4 HOURS PRN
Status: DISCONTINUED | OUTPATIENT
Start: 2024-03-02 | End: 2024-03-04 | Stop reason: HOSPADM

## 2024-03-02 RX ORDER — CALCIUM GLUCONATE 20 MG/ML
1000 INJECTION, SOLUTION INTRAVENOUS
Status: COMPLETED | OUTPATIENT
Start: 2024-03-02 | End: 2024-03-02

## 2024-03-02 RX ADMIN — CALCIUM GLUCONATE 1000 MG: 20 INJECTION, SOLUTION INTRAVENOUS at 05:27

## 2024-03-02 RX ADMIN — IPRATROPIUM BROMIDE AND ALBUTEROL SULFATE 3 ML: .5; 3 SOLUTION RESPIRATORY (INHALATION) at 01:15

## 2024-03-02 RX ADMIN — POTASSIUM CHLORIDE 10 MEQ: 7.46 INJECTION, SOLUTION INTRAVENOUS at 05:27

## 2024-03-02 RX ADMIN — SODIUM CHLORIDE 75 ML/HR: 9 INJECTION, SOLUTION INTRAVENOUS at 21:26

## 2024-03-02 RX ADMIN — POTASSIUM CHLORIDE 10 MEQ: 7.46 INJECTION, SOLUTION INTRAVENOUS at 09:43

## 2024-03-02 RX ADMIN — POTASSIUM CHLORIDE 10 MEQ: 7.46 INJECTION, SOLUTION INTRAVENOUS at 06:32

## 2024-03-02 RX ADMIN — INSULIN LISPRO 2 UNITS: 100 INJECTION, SOLUTION INTRAVENOUS; SUBCUTANEOUS at 11:27

## 2024-03-02 RX ADMIN — INSULIN LISPRO 4 UNITS: 100 INJECTION, SOLUTION INTRAVENOUS; SUBCUTANEOUS at 18:07

## 2024-03-02 RX ADMIN — SODIUM CHLORIDE 75 ML/HR: 9 INJECTION, SOLUTION INTRAVENOUS at 00:24

## 2024-03-02 RX ADMIN — ACETAMINOPHEN 325MG 650 MG: 325 TABLET ORAL at 06:52

## 2024-03-02 RX ADMIN — OXYMETAZOLINE HYDROCHLORIDE 2 SPRAY: 0.05 SPRAY NASAL at 09:44

## 2024-03-02 RX ADMIN — POTASSIUM CHLORIDE 10 MEQ: 7.46 INJECTION, SOLUTION INTRAVENOUS at 07:52

## 2024-03-02 RX ADMIN — CALCIUM GLUCONATE 2000 MG: 20 INJECTION, SOLUTION INTRAVENOUS at 09:44

## 2024-03-02 RX ADMIN — Medication 10 ML: at 21:26

## 2024-03-02 RX ADMIN — INSULIN GLARGINE 20 UNITS: 100 INJECTION, SOLUTION SUBCUTANEOUS at 21:09

## 2024-03-02 RX ADMIN — CALCIUM GLUCONATE 2000 MG: 20 INJECTION, SOLUTION INTRAVENOUS at 09:37

## 2024-03-02 RX ADMIN — Medication 10 ML: at 09:44

## 2024-03-02 RX ADMIN — CALCIUM GLUCONATE 2000 MG: 20 INJECTION, SOLUTION INTRAVENOUS at 06:31

## 2024-03-02 RX ADMIN — Medication 10 ML: at 03:01

## 2024-03-02 RX ADMIN — ACETAMINOPHEN 325MG 650 MG: 325 TABLET ORAL at 21:14

## 2024-03-02 NOTE — PLAN OF CARE
Goal Outcome Evaluation:  Plan of Care Reviewed With: patient        Progress: improving  Outcome Evaluation: patient off DKA order set. Alert x4. Urine sent overnight. Per radiology, will come get patient at 0630 for routine xray. patient getting replacement calcium and potassium.  Patient has PRN neb treatments ordered. Room air. Alert x4. SCDs on. Diabetic diet. NS running at 75 mL/hr. Patient up to commode with one assist. bed alarm on. Patient slept minimally overnight. Turns self. Completed home med-reconcilliation. Patient states she didn't take her medications for the last few days due to not feeling well.

## 2024-03-02 NOTE — PLAN OF CARE
Goal Outcome Evaluation:   AAO ROSALES  audible resp wheezing. Productive frequent cough. IS given and instructed use. Able to pull 750 to 1000. Afebrile.   Denies c/o  Assisted oob to BR. Able to ambulate with assist X1.     Had a good nap this afternoon.   Ate all meals about 50% [place] enc.     Cont to have a fair day and assisted oob to BR when necessary.   C/o CP on right side for fx rib

## 2024-03-02 NOTE — CONSULTS
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 which helps her neck pain but denies any aspect of arm pain, arm weakness, or arm numbness.  She denies any Lhermitte sign.  She does not appear to have any symptomatology referable to cervical stenosis and although she does have significant cervical spondylosis imaging reveals that it is stable in comparison to a CT cervical study done in 2018.  Therefore given that she has no neurologic deficits I educated her on what to look for should she develop issues relative to cervical spinal stenosis or nerve root impingement and she is encouraged that she can contact us for follow-up if necessary.  She does not require routine neurosurgical follow-up.    I discussed the patient's findings and my recommendations with patient    Suman Bravo MD  03/02/24  10:34 EST

## 2024-03-02 NOTE — CONSULTS
Nutrition Services    Patient Name:  Mayra Hirsch  YOB: 1950  MRN: [id number]  Admit Date:  3/1/2024    Assessment Date:  03/02/24    Summary: Consulted per RN Admit Screen:  Consulted per RN Admit Screen for decreased po intake prior to admit. Pt here with cough and excessive thirst and found to have DKA, Influenza A, hypomagnesemia, hyponatremia, uncontrolled DM, and acute bronchitis.   Pt with decreased appetite and po intake of meals. Pt on insulin gtt for DKA. Last BM 3/1. Meds/labs reviewed. BMI 33 (obese). Encouraged increased po intake of meals.  Recommend:  Boost GC daily at dinner, will order.    Will follow per protocol.    CLINICAL NUTRITION ASSESSMENT      Reason for Assessment Nurse Admission Screen     Diagnosis/Problem   DKA, Influenza A, hypomagnesemia, hyponatremia, uncontrolled DM, and acute bronchitis   Medical/Surgical History Past Medical History:   Diagnosis Date    Depression     Diabetes mellitus     Disease of thyroid gland     Dyslipidemia 04/28/2017    Fibrocystic breast     Hypertension     Hyperthyroidism     Hypothyroidism     PONV (postoperative nausea and vomiting)     Type 2 diabetes mellitus        Past Surgical History:   Procedure Laterality Date    BREAST EXCISIONAL BIOPSY Right     at age 22; benign.    CARDIAC CATHETERIZATION Left 11/07/2022    Procedure: Cardiac Catheterization/Vascular Study;  Surgeon: Joanna Marie MD;  Location: [facility] CATH INVASIVE LOCATION;  Service: Cardiology;  Laterality: Left;    CARDIAC CATHETERIZATION N/A 11/07/2022    Procedure: Percutaneous Coronary Intervention;  Surgeon: Joanna Marie MD;  Location: [facility] CATH INVASIVE LOCATION;  Service: Cardiology;  Laterality: N/A;    CARDIAC CATHETERIZATION N/A 11/07/2022    Procedure: Left ventriculography;  Surgeon: Joanna Marie MD;  Location: [facility] CATH INVASIVE LOCATION;  Service: Cardiology;  Laterality: N/A;    CARDIAC CATHETERIZATION N/A 11/07/2022     "Procedure: Stent MARTINEZ coronary;  Surgeon: Joanna Marie MD;  Location:  [facility] CATH INVASIVE LOCATION;  Service: Cardiology;  Laterality: N/A;    CARDIAC CATHETERIZATION N/A 2022    Procedure: Left Heart Cath;  Surgeon: Joanna Marie MD;  Location:  [facility] CATH INVASIVE LOCATION;  Service: Cardiology;  Laterality: N/A;     SECTION      CORONARY ARTERY BYPASS GRAFT N/A 11/10/2022    Procedure: NIKKY, CORONARY ARTERY BYPASS GRAFTING TIMES 6 WITH LEFT JORDNY AND ENDOSCOPICALLY HARVESTED LEFT AND RIGHT GREATER SAPHENOUS VEIN, PRP TRANSESOPHAGEAL ECHOCARDIOGRAM WITH ANESTHESIA;  Surgeon: Jr Dong Isabel MD;  Location: [facility] CVOR;  Service: Cardiothoracic;  Laterality: N/A;    HAND SURGERY      KNEE SURGERY      OOPHORECTOMY      1 ovary removed due to ectopic pregnancy        Anthropometrics        Current Height  Current Weight  BMI kg/m2 Height: 172.7 cm (68\")  Weight: 101 kg (222 lb 10.6 oz) (24 0500)  Body mass index is 33.86 kg/m².   Adjusted BMI (if applicable)    BMI Category Obese, Class I (30 - 34.9)   Ideal Body Weight (IBW) 140 lb   Usual Body Weight (UBW) 210 lb   Weight Trend Gain   Weight History Wt Readings from Last 30 Encounters:   24 0500 101 kg (222 lb 10.6 oz)   24 0055 102 kg (225 lb 5 oz)   24 1253 97.5 kg (215 lb)   23 1528 97.5 kg (215 lb)   23 0852 96.5 kg (212 lb 12.8 oz)   23 0704 95.3 kg (210 lb 1.6 oz)   23 2321 95.3 kg (210 lb)   23 0819 92.7 kg (204 lb 6.4 oz)   23 1322 95.3 kg (210 lb)   23 1320 93 kg (205 lb)   22 0928 95.3 kg (210 lb)   22 1011 95.3 kg (210 lb)   22 1358 91.6 kg (202 lb)   22 0943 97.5 kg (215 lb)   22 0600 99.1 kg (218 lb 8 oz)   22 0500 99.7 kg (219 lb 14.4 oz)   22 0500 100 kg (221 lb 3.2 oz)   22 0500 101 kg (222 lb 12.8 oz)   11/15/22 0500 103 kg (226 lb)   22 0600 104 kg (229 lb 8 oz)   22 0630 104 kg (229 lb " 4.8 oz)   11/12/22 0600 99.9 kg (220 lb 3.2 oz)   11/11/22 0506 92.9 kg (204 lb 12.9 oz)   11/09/22 0400 103 kg (227 lb 11.8 oz)   11/07/22 1301 102 kg (225 lb)   11/07/22 0305 102 kg (225 lb 1.4 oz)   11/07/22 0146 102 kg (225 lb 14.4 oz)   07/21/22 1024 101 kg (221 lb 12.8 oz)   03/21/22 1433 101 kg (222 lb)   12/18/20 1423 99 kg (218 lb 3.2 oz)   03/27/20 1838 90.7 kg (200 lb)   08/13/19 1051 92.1 kg (203 lb)   02/07/19 0914 96.2 kg (212 lb)   08/01/18 0943 97.1 kg (214 lb)   03/12/18 0922 96.6 kg (213 lb)   09/19/17 1451 93.2 kg (205 lb 6.4 oz)   04/28/17 0934 89.2 kg (196 lb 9.6 oz)   10/02/16 1618 86.2 kg (190 lb)      --  Labs       Pertinent Labs    Results from last 7 days   Lab Units 03/02/24  0306 03/01/24 2003 03/01/24  1548 03/01/24  1313   SODIUM mmol/L 136 135* 133* 132*   POTASSIUM mmol/L 3.3* 3.8 3.9 4.0   CHLORIDE mmol/L 109* 105 100 96*   CO2 mmol/L 19.0* 21.0* 17.8* 18.9*   BUN mg/dL 13 17 21 20   CREATININE mg/dL 0.70 1.02* 1.05* 1.21*   CALCIUM mg/dL 6.6* 7.5* 8.4* 8.9   BILIRUBIN mg/dL  --   --   --  0.5   ALK PHOS U/L  --   --   --  85   ALT (SGPT) U/L  --   --   --  26   AST (SGOT) U/L  --   --   --  39*   GLUCOSE mg/dL 156* 145* 160* 185*     Results from last 7 days   Lab Units 03/01/24 2003 03/01/24  1548 03/01/24  1313   MAGNESIUM mg/dL 1.8 1.8 1.5*   PHOSPHORUS mg/dL 2.1* 2.1* 2.0*   HEMOGLOBIN g/dL  --   --  14.2   HEMATOCRIT %  --   --  42.7   WBC 10*3/mm3  --   --  6.73   ALBUMIN g/dL  --   --  3.7     Results from last 7 days   Lab Units 03/01/24  1313   PLATELETS 10*3/mm3 187     COVID19   Date Value Ref Range Status   03/01/2024 Not Detected Not Detected - Ref. Range Final     Lab Results   Component Value Date    HGBA1C 7.00 (H) 03/01/2024          Medications           Scheduled Medications insulin glargine, 20 Units, Subcutaneous, Nightly  insulin lispro, 2-9 Units, Subcutaneous, 4x Daily AC & at Bedtime  senna-docusate sodium, 2 tablet, Oral, BID  sodium chloride, 10 mL,  Intravenous, Q12H       Infusions sodium chloride, 75 mL/hr, Last Rate: 75 mL/hr (03/02/24 0941)       PRN Medications   acetaminophen **OR** acetaminophen    senna-docusate sodium **AND** polyethylene glycol **AND** bisacodyl **AND** bisacodyl    Calcium Replacement - Follow Nurse / BPA Driven Protocol    dextrose    dextrose    glucagon (human recombinant)    ipratropium-albuterol    Magnesium Standard Dose Replacement - Follow Nurse / BPA Driven Protocol    nitroglycerin    ondansetron ODT **OR** ondansetron    Phosphorus Replacement - Follow Nurse / BPA Driven Protocol    Potassium Replacement - Follow Nurse / BPA Driven Protocol    sodium chloride    sodium chloride     Physical Findings          General Findings obese, oriented, responds/arouses to voice, room air   Oral/Mouth Cavity WDL   Edema  no edema   Gastrointestinal diarrhea, normoactive, last bowel movement: 3/1   Skin  skin intact, pale   Tubes/Drains/Lines none   NFPE Not indicated at this time   --  Current Nutrition Orders & Evaluation of Intake       Oral Nutrition     Food Allergies NKFA   Current PO Diet Diet: Diabetic Diets; Consistent Carbohydrate; Texture: Regular Texture (IDDSI 7); Fluid Consistency: Thin (IDDSI 0)   Supplement n/a   PO Evaluation     % PO Intake < 50%    Factors Affecting Intake: decreased appetite   --  PES STATEMENT / NUTRITION DIAGNOSIS      Nutrition Dx Problem  Problem: Inadequate Oral Intake  Etiology: Factors Affecting Nutrition - MARTITA    Signs/Symptoms: Report of Minimal PO Intake     NUTRITION INTERVENTION / PLAN OF CARE      Intervention Goal(s) Maintain nutrition status, Reduce/improve symptoms, Meet estimated needs, Disease management/therapy, Increase intake, Accepts oral nutrition supplement, and Appropriate weight loss         RD Intervention/Action Encourage intake, Continue to monitor, Care plan reviewed, and Recommend/order: ONS   --      Prescription/Orders:       PO Diet       Supplements Boost GC daily  at dinner      Enteral Nutrition       Parenteral Nutrition    New Prescription Ordered? Yes   --      Monitor/Evaluation Per protocol   Discharge Plan/Needs Pending clinical course   --    RD to follow per protocol.      Electronically signed by:  Tiffanie Espinoza RD  03/02/24 13:25 EST

## 2024-03-03 LAB
ANION GAP SERPL CALCULATED.3IONS-SCNC: 9 MMOL/L (ref 5–15)
BUN SERPL-MCNC: 10 MG/DL (ref 8–23)
BUN/CREAT SERPL: 14.3 (ref 7–25)
CALCIUM SPEC-SCNC: 8.7 MG/DL (ref 8.6–10.5)
CHLORIDE SERPL-SCNC: 110 MMOL/L (ref 98–107)
CO2 SERPL-SCNC: 20 MMOL/L (ref 22–29)
CREAT SERPL-MCNC: 0.7 MG/DL (ref 0.57–1)
EGFRCR SERPLBLD CKD-EPI 2021: 91.5 ML/MIN/1.73
GLUCOSE BLDC GLUCOMTR-MCNC: 115 MG/DL (ref 70–130)
GLUCOSE BLDC GLUCOMTR-MCNC: 140 MG/DL (ref 70–130)
GLUCOSE BLDC GLUCOMTR-MCNC: 142 MG/DL (ref 70–130)
GLUCOSE BLDC GLUCOMTR-MCNC: 156 MG/DL (ref 70–130)
GLUCOSE SERPL-MCNC: 113 MG/DL (ref 65–99)
POTASSIUM SERPL-SCNC: 4.1 MMOL/L (ref 3.5–5.2)
SODIUM SERPL-SCNC: 139 MMOL/L (ref 136–145)

## 2024-03-03 PROCEDURE — 25010000002 ONDANSETRON PER 1 MG: Performed by: INTERNAL MEDICINE

## 2024-03-03 PROCEDURE — 94799 UNLISTED PULMONARY SVC/PX: CPT

## 2024-03-03 PROCEDURE — 94664 DEMO&/EVAL PT USE INHALER: CPT

## 2024-03-03 PROCEDURE — 63710000001 INSULIN LISPRO (HUMAN) PER 5 UNITS

## 2024-03-03 PROCEDURE — 80048 BASIC METABOLIC PNL TOTAL CA: CPT

## 2024-03-03 PROCEDURE — 25810000003 SODIUM CHLORIDE 0.9 % SOLUTION

## 2024-03-03 PROCEDURE — 82948 REAGENT STRIP/BLOOD GLUCOSE: CPT

## 2024-03-03 PROCEDURE — 63710000001 INSULIN GLARGINE PER 5 UNITS

## 2024-03-03 RX ORDER — IPRATROPIUM BROMIDE AND ALBUTEROL SULFATE 2.5; .5 MG/3ML; MG/3ML
3 SOLUTION RESPIRATORY (INHALATION)
Status: DISCONTINUED | OUTPATIENT
Start: 2024-03-03 | End: 2024-03-04 | Stop reason: HOSPADM

## 2024-03-03 RX ADMIN — Medication 10 ML: at 21:30

## 2024-03-03 RX ADMIN — INSULIN LISPRO 2 UNITS: 100 INJECTION, SOLUTION INTRAVENOUS; SUBCUTANEOUS at 21:48

## 2024-03-03 RX ADMIN — IPRATROPIUM BROMIDE AND ALBUTEROL SULFATE 3 ML: .5; 3 SOLUTION RESPIRATORY (INHALATION) at 17:32

## 2024-03-03 RX ADMIN — Medication 10 ML: at 08:58

## 2024-03-03 RX ADMIN — INSULIN GLARGINE 20 UNITS: 100 INJECTION, SOLUTION SUBCUTANEOUS at 21:48

## 2024-03-03 RX ADMIN — ACETAMINOPHEN 325MG 650 MG: 325 TABLET ORAL at 09:18

## 2024-03-03 RX ADMIN — SODIUM CHLORIDE 75 ML/HR: 9 INJECTION, SOLUTION INTRAVENOUS at 10:11

## 2024-03-03 RX ADMIN — ONDANSETRON 4 MG: 2 INJECTION INTRAMUSCULAR; INTRAVENOUS at 21:48

## 2024-03-03 RX ADMIN — IPRATROPIUM BROMIDE AND ALBUTEROL SULFATE 3 ML: .5; 3 SOLUTION RESPIRATORY (INHALATION) at 19:14

## 2024-03-03 RX ADMIN — ACETAMINOPHEN 325MG 650 MG: 325 TABLET ORAL at 21:48

## 2024-03-03 NOTE — PLAN OF CARE
Shift summary: No significant events overnight. Tylenol given x1.    Goal Outcome Evaluation:     Problem: Skin Injury Risk Increased  Goal: Skin Health and Integrity  Outcome: Ongoing, Progressing  Intervention: Optimize Skin Protection  Recent Flowsheet Documentation  Taken 3/3/2024 0600 by Mena Martell RN  Head of Bed (HOB) Positioning: HOB at 20-30 degrees  Taken 3/3/2024 0400 by Mena Martell RN  Head of Bed (HOB) Positioning: HOB at 20-30 degrees  Taken 3/3/2024 0200 by Mena Martell RN  Head of Bed (HOB) Positioning: HOB at 20-30 degrees  Taken 3/3/2024 0000 by Mena Martell RN  Head of Bed (HOB) Positioning: HOB at 20-30 degrees  Taken 3/2/2024 2200 by Mena Martell RN  Head of Bed (HOB) Positioning: HOB at 20-30 degrees  Taken 3/2/2024 2000 by Mena Martell RN  Head of Bed (HOB) Positioning: HOB at 30 degrees     Problem: Adult Inpatient Plan of Care  Goal: Plan of Care Review  Outcome: Ongoing, Progressing  Goal: Patient-Specific Goal (Individualized)  Outcome: Ongoing, Progressing  Goal: Absence of Hospital-Acquired Illness or Injury  Outcome: Ongoing, Progressing  Intervention: Identify and Manage Fall Risk  Recent Flowsheet Documentation  Taken 3/3/2024 0600 by Mena Martell RN  Safety Promotion/Fall Prevention:   activity supervised   assistive device/personal items within reach   clutter free environment maintained   fall prevention program maintained   lighting adjusted   nonskid shoes/slippers when out of bed   safety round/check completed  Taken 3/3/2024 0500 by Mena Martell RN  Safety Promotion/Fall Prevention:   activity supervised   assistive device/personal items within reach   clutter free environment maintained   fall prevention program maintained   lighting adjusted   nonskid shoes/slippers when out of bed   safety round/check completed  Taken 3/3/2024 0400 by Mena Martell RN  Safety Promotion/Fall Prevention:   activity supervised   assistive device/personal items within reach    clutter free environment maintained   fall prevention program maintained   lighting adjusted   nonskid shoes/slippers when out of bed   safety round/check completed  Taken 3/3/2024 0300 by Mena Martell, JAMESON  Safety Promotion/Fall Prevention:   activity supervised   assistive device/personal items within reach   clutter free environment maintained   fall prevention program maintained   lighting adjusted   nonskid shoes/slippers when out of bed   safety round/check completed  Taken 3/3/2024 0200 by Mena Martell, JAMESON  Safety Promotion/Fall Prevention:   activity supervised   assistive device/personal items within reach   clutter free environment maintained   fall prevention program maintained   lighting adjusted   nonskid shoes/slippers when out of bed   safety round/check completed  Taken 3/3/2024 0100 by Mena Martell, JAMESON  Safety Promotion/Fall Prevention:   activity supervised   assistive device/personal items within reach   clutter free environment maintained   fall prevention program maintained   lighting adjusted   nonskid shoes/slippers when out of bed   safety round/check completed  Taken 3/3/2024 0000 by Mena Martell, RN  Safety Promotion/Fall Prevention:   activity supervised   assistive device/personal items within reach   clutter free environment maintained   fall prevention program maintained   lighting adjusted   nonskid shoes/slippers when out of bed   safety round/check completed  Taken 3/2/2024 2200 by Mena Martell, JAMESON  Safety Promotion/Fall Prevention:   activity supervised   assistive device/personal items within reach   clutter free environment maintained   fall prevention program maintained   lighting adjusted   nonskid shoes/slippers when out of bed   safety round/check completed  Taken 3/2/2024 2100 by Mena Martell, JAMESON  Safety Promotion/Fall Prevention:   activity supervised   assistive device/personal items within reach   clutter free environment maintained   fall prevention program  maintained   lighting adjusted   nonskid shoes/slippers when out of bed   safety round/check completed  Taken 3/2/2024 2000 by Mena Martell RN  Safety Promotion/Fall Prevention:   activity supervised   assistive device/personal items within reach   clutter free environment maintained   fall prevention program maintained   lighting adjusted   nonskid shoes/slippers when out of bed   safety round/check completed  Intervention: Prevent Skin Injury  Recent Flowsheet Documentation  Taken 3/3/2024 0600 by Mena Martell RN  Body Position: position changed independently  Taken 3/3/2024 0400 by Mena Martell RN  Body Position: position changed independently  Taken 3/3/2024 0200 by Mena Martell RN  Body Position: position changed independently  Taken 3/3/2024 0000 by Mena Martell RN  Body Position: position changed independently  Taken 3/2/2024 2200 by Mena Martell RN  Body Position: position changed independently  Taken 3/2/2024 2000 by Mena Martell RN  Body Position: position changed independently  Intervention: Prevent and Manage VTE (Venous Thromboembolism) Risk  Recent Flowsheet Documentation  Taken 3/3/2024 0400 by Mena Martell RN  Activity Management: activity encouraged  Taken 3/3/2024 0000 by Mena Martell RN  Activity Management: activity encouraged  Taken 3/2/2024 2000 by Mena Martell RN  Activity Management: activity encouraged  VTE Prevention/Management:   bilateral   sequential compression devices on  Range of Motion: active ROM (range of motion) encouraged  Intervention: Prevent Infection  Recent Flowsheet Documentation  Taken 3/3/2024 0600 by Mena Martell RN  Infection Prevention:   environmental surveillance performed   hand hygiene promoted   personal protective equipment utilized   rest/sleep promoted   single patient room provided  Taken 3/3/2024 0500 by Mena Martell RN  Infection Prevention:   environmental surveillance performed   hand hygiene promoted   personal protective equipment  utilized   rest/sleep promoted   single patient room provided  Taken 3/3/2024 0400 by Mena Martell RN  Infection Prevention:   environmental surveillance performed   equipment surfaces disinfected   hand hygiene promoted   personal protective equipment utilized   rest/sleep promoted   single patient room provided  Taken 3/3/2024 0300 by Mena Martell RN  Infection Prevention:   environmental surveillance performed   hand hygiene promoted   personal protective equipment utilized   rest/sleep promoted   single patient room provided  Taken 3/3/2024 0200 by Mena Martell RN  Infection Prevention:   environmental surveillance performed   hand hygiene promoted   personal protective equipment utilized   rest/sleep promoted   single patient room provided  Taken 3/3/2024 0100 by Mean Martell RN  Infection Prevention:   environmental surveillance performed   hand hygiene promoted   personal protective equipment utilized   rest/sleep promoted   single patient room provided  Taken 3/3/2024 0000 by Mena Martell RN  Infection Prevention:   environmental surveillance performed   equipment surfaces disinfected   hand hygiene promoted   personal protective equipment utilized   rest/sleep promoted   single patient room provided  Taken 3/2/2024 2200 by Mena Martell RN  Infection Prevention:   environmental surveillance performed   hand hygiene promoted   personal protective equipment utilized   rest/sleep promoted   single patient room provided  Taken 3/2/2024 2100 by Mena Martell RN  Infection Prevention:   environmental surveillance performed   hand hygiene promoted   personal protective equipment utilized   rest/sleep promoted   single patient room provided  Taken 3/2/2024 2000 by Mena Martell RN  Infection Prevention:   environmental surveillance performed   equipment surfaces disinfected   hand hygiene promoted   personal protective equipment utilized   rest/sleep promoted   single patient room provided  Goal:  Optimal Comfort and Wellbeing  Outcome: Ongoing, Progressing  Intervention: Provide Person-Centered Care  Recent Flowsheet Documentation  Taken 3/2/2024 2000 by Mena Martell RN  Trust Relationship/Rapport:   care explained   reassurance provided  Goal: Readiness for Transition of Care  Outcome: Ongoing, Progressing     Problem: Pain Acute  Goal: Acceptable Pain Control and Functional Ability  Outcome: Ongoing, Progressing  Intervention: Prevent or Manage Pain  Recent Flowsheet Documentation  Taken 3/3/2024 0600 by Mena Martell RN  Medication Review/Management: medications reviewed  Taken 3/3/2024 0500 by Mena Martell RN  Medication Review/Management: medications reviewed  Taken 3/3/2024 0400 by Mena Martell RN  Medication Review/Management: medications reviewed  Taken 3/3/2024 0300 by Mena Martell RN  Medication Review/Management: medications reviewed  Taken 3/3/2024 0200 by Mena Martell RN  Medication Review/Management: medications reviewed  Taken 3/3/2024 0100 by Mena Martell RN  Medication Review/Management: medications reviewed  Taken 3/3/2024 0000 by Mena Martell RN  Medication Review/Management: medications reviewed  Taken 3/2/2024 2200 by Mena Martell RN  Medication Review/Management: medications reviewed  Taken 3/2/2024 2100 by Mena Martell RN  Medication Review/Management: medications reviewed  Taken 3/2/2024 2000 by Mena Martell RN  Medication Review/Management: medications reviewed  Intervention: Optimize Psychosocial Wellbeing  Recent Flowsheet Documentation  Taken 3/2/2024 2000 by Mena Martell RN  Diversional Activities:   smartphone   television     Problem: Fall Injury Risk  Goal: Absence of Fall and Fall-Related Injury  Outcome: Ongoing, Progressing  Intervention: Identify and Manage Contributors  Recent Flowsheet Documentation  Taken 3/3/2024 0600 by Mena Martell RN  Medication Review/Management: medications reviewed  Taken 3/3/2024 0500 by Mena Martell RN  Medication  Review/Management: medications reviewed  Taken 3/3/2024 0400 by Mena Martell RN  Medication Review/Management: medications reviewed  Taken 3/3/2024 0300 by Mena Martell RN  Medication Review/Management: medications reviewed  Taken 3/3/2024 0200 by Mena Martell RN  Medication Review/Management: medications reviewed  Taken 3/3/2024 0100 by Mena Martell RN  Medication Review/Management: medications reviewed  Taken 3/3/2024 0000 by Mena Martell RN  Medication Review/Management: medications reviewed  Taken 3/2/2024 2200 by Mena Martell RN  Medication Review/Management: medications reviewed  Taken 3/2/2024 2100 by Mena Martell RN  Medication Review/Management: medications reviewed  Taken 3/2/2024 2000 by Mena Martell RN  Medication Review/Management: medications reviewed  Intervention: Promote Injury-Free Environment  Recent Flowsheet Documentation  Taken 3/3/2024 0600 by Mena Martell RN  Safety Promotion/Fall Prevention:   activity supervised   assistive device/personal items within reach   clutter free environment maintained   fall prevention program maintained   lighting adjusted   nonskid shoes/slippers when out of bed   safety round/check completed  Taken 3/3/2024 0500 by Mena Martell RN  Safety Promotion/Fall Prevention:   activity supervised   assistive device/personal items within reach   clutter free environment maintained   fall prevention program maintained   lighting adjusted   nonskid shoes/slippers when out of bed   safety round/check completed  Taken 3/3/2024 0400 by Mena Martell RN  Safety Promotion/Fall Prevention:   activity supervised   assistive device/personal items within reach   clutter free environment maintained   fall prevention program maintained   lighting adjusted   nonskid shoes/slippers when out of bed   safety round/check completed  Taken 3/3/2024 0300 by Mena Martell RN  Safety Promotion/Fall Prevention:   activity supervised   assistive device/personal items within  reach   clutter free environment maintained   fall prevention program maintained   lighting adjusted   nonskid shoes/slippers when out of bed   safety round/check completed  Taken 3/3/2024 0200 by Mena aMrtell RN  Safety Promotion/Fall Prevention:   activity supervised   assistive device/personal items within reach   clutter free environment maintained   fall prevention program maintained   lighting adjusted   nonskid shoes/slippers when out of bed   safety round/check completed  Taken 3/3/2024 0100 by Mena Martell RN  Safety Promotion/Fall Prevention:   activity supervised   assistive device/personal items within reach   clutter free environment maintained   fall prevention program maintained   lighting adjusted   nonskid shoes/slippers when out of bed   safety round/check completed  Taken 3/3/2024 0000 by Mena Martell RN  Safety Promotion/Fall Prevention:   activity supervised   assistive device/personal items within reach   clutter free environment maintained   fall prevention program maintained   lighting adjusted   nonskid shoes/slippers when out of bed   safety round/check completed  Taken 3/2/2024 2200 by Mena Martell RN  Safety Promotion/Fall Prevention:   activity supervised   assistive device/personal items within reach   clutter free environment maintained   fall prevention program maintained   lighting adjusted   nonskid shoes/slippers when out of bed   safety round/check completed  Taken 3/2/2024 2100 by Mena Martell RN  Safety Promotion/Fall Prevention:   activity supervised   assistive device/personal items within reach   clutter free environment maintained   fall prevention program maintained   lighting adjusted   nonskid shoes/slippers when out of bed   safety round/check completed  Taken 3/2/2024 2000 by Mena Martell RN  Safety Promotion/Fall Prevention:   activity supervised   assistive device/personal items within reach   clutter free environment maintained   fall prevention program  maintained   lighting adjusted   nonskid shoes/slippers when out of bed   safety round/check completed     Problem: Hypertension Comorbidity  Goal: Blood Pressure in Desired Range  Outcome: Ongoing, Progressing  Intervention: Maintain Blood Pressure Management  Recent Flowsheet Documentation  Taken 3/3/2024 0600 by Mena Martell RN  Medication Review/Management: medications reviewed  Taken 3/3/2024 0500 by Mena Martell RN  Medication Review/Management: medications reviewed  Taken 3/3/2024 0400 by Mena Martell RN  Medication Review/Management: medications reviewed  Taken 3/3/2024 0300 by Mena Martell RN  Medication Review/Management: medications reviewed  Taken 3/3/2024 0200 by Mena Martell RN  Medication Review/Management: medications reviewed  Taken 3/3/2024 0100 by Mena Martell RN  Medication Review/Management: medications reviewed  Taken 3/3/2024 0000 by Mena Martell RN  Medication Review/Management: medications reviewed  Taken 3/2/2024 2200 by Mena Matrell RN  Medication Review/Management: medications reviewed  Taken 3/2/2024 2100 by Mena Martell RN  Medication Review/Management: medications reviewed  Taken 3/2/2024 2000 by Mena Martell, RN  Medication Review/Management: medications reviewed

## 2024-03-03 NOTE — PLAN OF CARE
Problem: Skin Injury Risk Increased  Goal: Skin Health and Integrity  Outcome: Ongoing, Progressing  Intervention: Optimize Skin Protection  Recent Flowsheet Documentation  Taken 3/3/2024 1500 by Kayley Richardson RN  Head of Bed (HOB) Positioning: HOB at 30-45 degrees  Taken 3/3/2024 1400 by Kayley Richardson RN  Head of Bed (HOB) Positioning: HOB at 30-45 degrees  Taken 3/3/2024 1300 by Kayley Richardson RN  Head of Bed (HOB) Positioning: HOB at 30-45 degrees  Taken 3/3/2024 1000 by Kayley Richardson RN  Head of Bed (HOB) Positioning: HOB at 30-45 degrees  Taken 3/3/2024 0900 by Kayley Richardson RN  Head of Bed (HOB) Positioning: HOB at 30-45 degrees     Problem: Adult Inpatient Plan of Care  Goal: Plan of Care Review  Outcome: Ongoing, Progressing  Goal: Patient-Specific Goal (Individualized)  Outcome: Ongoing, Progressing  Goal: Absence of Hospital-Acquired Illness or Injury  Outcome: Ongoing, Progressing  Intervention: Identify and Manage Fall Risk  Recent Flowsheet Documentation  Taken 3/3/2024 1700 by Kayley Richardson RN  Safety Promotion/Fall Prevention:   activity supervised   lighting adjusted  Taken 3/3/2024 1600 by Kayley Richardson RN  Safety Promotion/Fall Prevention:   activity supervised   lighting adjusted  Taken 3/3/2024 1500 by Kayley Richardson RN  Safety Promotion/Fall Prevention:   activity supervised   lighting adjusted  Taken 3/3/2024 1400 by Kayley Richardson RN  Safety Promotion/Fall Prevention:   activity supervised   lighting adjusted   muscle strengthening facilitated  Taken 3/3/2024 1300 by Kayley Richardson RN  Safety Promotion/Fall Prevention:   activity supervised   lighting adjusted  Intervention: Prevent Skin Injury  Recent Flowsheet Documentation  Taken 3/3/2024 1000 by Kayley Richardson RN  Body Position: dangle, side of bed  Taken 3/3/2024 0900 by Kayley Richardson RN  Body Position: position changed independently  Taken 3/3/2024 0700 by Kaylye Richardson RN  Body Position: position changed  independently  Intervention: Prevent and Manage VTE (Venous Thromboembolism) Risk  Recent Flowsheet Documentation  Taken 3/3/2024 1700 by Kayley Richardson RN  Activity Management: ambulated in room  Taken 3/3/2024 1600 by Kayley Richardson RN  Activity Management: ambulated in room  Taken 3/3/2024 1500 by Kayley Richardson RN  Activity Management: ambulated in room  Taken 3/3/2024 1400 by Kayley Richardson RN  Activity Management: ambulated in room  Taken 3/3/2024 1300 by Kayley Richardson RN  Activity Management: ambulated in room  Taken 3/3/2024 1200 by Kayley Richardson RN  VTE Prevention/Management:   bilateral   sequential compression devices on  Taken 3/3/2024 1000 by Kayley Richardson RN  Activity Management: ambulated outside room  Taken 3/3/2024 0900 by Kayley Richardson RN  Activity Management: ambulated in room  Taken 3/3/2024 0800 by Kayley Richardson RN  Activity Management: ambulated in room  Taken 3/3/2024 0700 by Kayley Richardson RN  Activity Management: ambulated in room  Intervention: Prevent Infection  Recent Flowsheet Documentation  Taken 3/3/2024 1200 by Kayley Richardson RN  Infection Prevention:   environmental surveillance performed   hand hygiene promoted   rest/sleep promoted   single patient room provided  Taken 3/3/2024 1000 by Kayley Richardson RN  Infection Prevention:   environmental surveillance performed   hand hygiene promoted   rest/sleep promoted   single patient room provided  Taken 3/3/2024 0900 by Kayley Richardson RN  Infection Prevention:   environmental surveillance performed   hand hygiene promoted   single patient room provided   rest/sleep promoted  Taken 3/3/2024 0800 by Kayley Richardson RN  Infection Prevention: environmental surveillance performed  Goal: Optimal Comfort and Wellbeing  Outcome: Ongoing, Progressing  Intervention: Monitor Pain and Promote Comfort  Recent Flowsheet Documentation  Taken 3/3/2024 1200 by Kayley Richardson RN  Pain Management Interventions: see MAR  Taken 3/3/2024 1000 by Debra  JAMESON Zaldivar  Pain Management Interventions: see MAR  Taken 3/3/2024 0900 by Kayley Richadrson RN  Pain Management Interventions: see MAR  Taken 3/3/2024 0800 by Kayley Richardson RN  Pain Management Interventions: see MAR  Intervention: Provide Person-Centered Care  Recent Flowsheet Documentation  Taken 3/3/2024 1200 by Kayley Richardson RN  Trust Relationship/Rapport: care explained  Taken 3/3/2024 0800 by Kayley Richardson RN  Trust Relationship/Rapport:   care explained   emotional support provided   empathic listening provided  Goal: Readiness for Transition of Care  Outcome: Ongoing, Progressing     Problem: Pain Acute  Goal: Acceptable Pain Control and Functional Ability  Outcome: Ongoing, Progressing  Intervention: Prevent or Manage Pain  Recent Flowsheet Documentation  Taken 3/3/2024 1700 by Kayley Richardson RN  Medication Review/Management: medications reviewed  Taken 3/3/2024 1600 by Kayley Richardson RN  Medication Review/Management: medications reviewed  Taken 3/3/2024 1500 by Kayley Richardson RN  Medication Review/Management: medications reviewed  Taken 3/3/2024 1400 by Kayley Richardson RN  Medication Review/Management: medications reviewed  Taken 3/3/2024 1300 by Kayley Richardson RN  Medication Review/Management: medications reviewed  Taken 3/3/2024 0800 by Kayley Richardson RN  Sensory Stimulation Regulation: quiet environment promoted  Intervention: Develop Pain Management Plan  Recent Flowsheet Documentation  Taken 3/3/2024 1200 by Kayley Richardson RN  Pain Management Interventions: see MAR  Taken 3/3/2024 1000 by Kayley Richardson RN  Pain Management Interventions: see MAR  Taken 3/3/2024 0900 by Kayley Richardson RN  Pain Management Interventions: see MAR  Taken 3/3/2024 0800 by Kayley Richardson RN  Pain Management Interventions: see MAR  Intervention: Optimize Psychosocial Wellbeing  Recent Flowsheet Documentation  Taken 3/3/2024 1200 by Kayley Richardson RN  Supportive Measures: active listening utilized  Diversional Activities:    television   smartphone  Taken 3/3/2024 0800 by Kayley Richardson RN  Supportive Measures:   active listening utilized   decision-making supported  Diversional Activities:   smartphone   television     Problem: Fall Injury Risk  Goal: Absence of Fall and Fall-Related Injury  Outcome: Ongoing, Progressing  Intervention: Identify and Manage Contributors  Recent Flowsheet Documentation  Taken 3/3/2024 1700 by Kayley Richardson RN  Medication Review/Management: medications reviewed  Taken 3/3/2024 1600 by Kayley Richardson RN  Medication Review/Management: medications reviewed  Taken 3/3/2024 1500 by Kayley Richardson RN  Medication Review/Management: medications reviewed  Taken 3/3/2024 1400 by Kayley Richardson RN  Medication Review/Management: medications reviewed  Taken 3/3/2024 1300 by Kayley Richardson RN  Medication Review/Management: medications reviewed  Intervention: Promote Injury-Free Environment  Recent Flowsheet Documentation  Taken 3/3/2024 1700 by Kayley Richardson RN  Safety Promotion/Fall Prevention:   activity supervised   lighting adjusted  Taken 3/3/2024 1600 by Kayley Richardson RN  Safety Promotion/Fall Prevention:   activity supervised   lighting adjusted  Taken 3/3/2024 1500 by Kayley Richardson RN  Safety Promotion/Fall Prevention:   activity supervised   lighting adjusted  Taken 3/3/2024 1400 by Kayley Richardson RN  Safety Promotion/Fall Prevention:   activity supervised   lighting adjusted   muscle strengthening facilitated  Taken 3/3/2024 1300 by Kayley Richardson RN  Safety Promotion/Fall Prevention:   activity supervised   lighting adjusted     Problem: Hypertension Comorbidity  Goal: Blood Pressure in Desired Range  Outcome: Ongoing, Progressing  Intervention: Maintain Blood Pressure Management  Recent Flowsheet Documentation  Taken 3/3/2024 1700 by Kayley Richardson RN  Medication Review/Management: medications reviewed  Taken 3/3/2024 1600 by Kayley Richardson RN  Medication Review/Management: medications reviewed  Taken  3/3/2024 1500 by Kayley Richardson, RN  Medication Review/Management: medications reviewed  Taken 3/3/2024 1400 by Kayley Richardson, RN  Medication Review/Management: medications reviewed  Taken 3/3/2024 1300 by Kayley Richardson, RN  Medication Review/Management: medications reviewed   Goal Outcome Evaluation:  AAO Female with Flu. Cont to be on o2. Vss IS enc. OOB to chair. Made comf. Ate all meals well, No distress, watching TV.  Son at bedside.  IVF cont @ 75 cc / Hr.    Had good day. Self care given. Voided several times in BR  Returned to bed. Made comf.

## 2024-03-03 NOTE — PROGRESS NOTES
[facility] INPATIENT PROGRESS NOTE         [facility] CORONARY CARE    3/3/2024      PATIENT IDENTIFICATION:  Name: Mayra Hirsch ADMIT: 3/1/2024   : 1950  PCP: Spencer Hua MD    MRN: [id number] LOS: 0 days   AGE/SEX: 73 y.o. female  ROOM: [facility]                     LOS 0    Reason for visit: DKA and viral bronchitis      SUBJECTIVE:      Says that she is feeling better.  Still with some shortness of breath with nonproductive cough and wheezing    Objective   OBJECTIVE:    Vital Sign Min/Max for last 24 hours  Temp  Min: 97.5 °F (36.4 °C)  Max: 98.3 °F (36.8 °C)   BP  Min: 130/73  Max: 169/79   Pulse  Min: 62  Max: 78   Resp  Min: 14  Max: 30   SpO2  Min: 95 %  Max: 99 %   No data recorded   Weight  Min: 104 kg (228 lb 13.4 oz)  Max: 104 kg (228 lb 13.4 oz)    Vitals:    24 1100 24 1200 24 1300 24 1324   BP: 160/95 165/80 152/80 159/76   BP Location:    Right arm   Patient Position:    Lying   Pulse: 68 71 68 67   Resp:    14   Temp:  97.5 °F (36.4 °C)  98.2 °F (36.8 °C)   TempSrc:  Oral  Oral   SpO2: 97% 95% 97% 95%   Weight:       Height:                24  0055 24  0500 24  0500   Weight: 102 kg (225 lb 5 oz) 101 kg (222 lb 10.6 oz) 104 kg (228 lb 13.4 oz)       Body mass index is 34.79 kg/m².                          Body mass index is 34.79 kg/m².    Intake/Output Summary (Last 24 hours) at 3/3/2024 1534  Last data filed at 3/3/2024 1324  Gross per 24 hour   Intake 3270 ml   Output 2 ml   Net 3268 ml         Exam:  GEN:  No distress, appears stated age  EYES:   PERRL, anicteric sclerae  ENT:    External ears/nose normal, OP clear  NECK:  No adenopathy, midline trachea  LUNGS: Normal chest on inspection, palpation and moderate bilateral wheezing on auscultation  CV:  Normal S1S2, without murmur  ABD:  Nontender, nondistended, no hepatosplenomegaly, +BS  EXT:  No edema.  No cyanosis or clubbing.  No mottling and normal cap refill.    Assessment      Scheduled meds:  insulin glargine, 20 Units, Subcutaneous, Nightly  insulin lispro, 2-9 Units, Subcutaneous, 4x Daily AC & at Bedtime  senna-docusate sodium, 2 tablet, Oral, BID  sodium chloride, 10 mL, Intravenous, Q12H      IV meds:                      sodium chloride, 75 mL/hr, Last Rate: 75 mL/hr (03/03/24 1011)      Data Review:  Results from last 7 days   Lab Units 03/03/24  0442 03/02/24  1435 03/02/24  0306 03/01/24  2003 03/01/24  1548 03/01/24  1313   SODIUM mmol/L 139  --  136 135* 133* 132*   POTASSIUM mmol/L 4.1 4.5 3.3* 3.8 3.9 4.0   CHLORIDE mmol/L 110*  --  109* 105 100 96*   CO2 mmol/L 20.0*  --  19.0* 21.0* 17.8* 18.9*   BUN mg/dL 10  --  13 17 21 20   CREATININE mg/dL 0.70  --  0.70 1.02* 1.05* 1.21*   GLUCOSE mg/dL 113*  --  156* 145* 160* 185*   CALCIUM mg/dL 8.7 9.5 6.6* 7.5* 8.4* 8.9         Estimated Creatinine Clearance: 90.3 mL/min (by C-G formula based on SCr of 0.7 mg/dL).  Results from last 7 days   Lab Units 03/01/24  1313   WBC 10*3/mm3 6.73   HEMOGLOBIN g/dL 14.2   PLATELETS 10*3/mm3 187         Results from last 7 days   Lab Units 03/01/24  1313   ALT (SGPT) U/L 26   AST (SGOT) U/L 39*         Results from last 7 days   Lab Units 03/01/24  1548   PROCALCITONIN ng/mL 0.09         Hemoglobin A1C   Date/Time Value Ref Range Status   03/01/2024 1313 7.00 (H) 4.80 - 5.60 % Final     Glucose   Date/Time Value Ref Range Status   03/03/2024 1123 140 (H) 70 - 130 mg/dL Final   03/03/2024 0733 115 70 - 130 mg/dL Final   03/02/2024 2024 120 70 - 130 mg/dL Final   03/02/2024 1800 202 (H) 70 - 130 mg/dL Final   03/02/2024 1038 188 (H) 70 - 130 mg/dL Final   03/02/2024 0646 145 (H) 70 - 130 mg/dL Final   03/02/2024 0043 199 (H) 70 - 130 mg/dL Final         Imaging reviewed  Chest x-ray 3/2 reviewed            Microbiology reviewed            Active Hospital Problems    Diagnosis  POA    **Diabetic ketoacidosis [E11.10]  Yes    Cervical spondylosis without myelopathy [M47.812]  Yes       Resolved Hospital Problems   No resolved problems to display.         ASSESSMENT:  Diabetic ketoacidosis  Hypomagnesemia  Acute bronchitis due to influenza infection  Sinusitis  Hyponatremia  Elevated creatinine  Uncontrolled diabetes mellitus  Coronary artery disease  Myocardial infarction 2022  Hyperlipidemia  C3-C4 cervical spinal stenosis      PLAN:  Can transfer out of intensive care.    Treatment for viral process is supportive.  Renal function improving.  Add bronchodilators to help with wheezing.  Was seen by neurosurgery for her cervical neck issues and they said follow-up as needed but they did not require routine neurosurgical follow-up.    Jorge Heard MD  Pulmonary and Critical Care Medicine  [facility], [facility]  3/3/2024    15:34 EST

## 2024-03-04 ENCOUNTER — READMISSION MANAGEMENT (OUTPATIENT)
Dept: CALL CENTER | Facility: HOSPITAL | Age: 74
End: 2024-03-04
Payer: MEDICARE

## 2024-03-04 VITALS
TEMPERATURE: 98.3 F | WEIGHT: 230.6 LBS | HEIGHT: 68 IN | DIASTOLIC BLOOD PRESSURE: 83 MMHG | HEART RATE: 77 BPM | SYSTOLIC BLOOD PRESSURE: 169 MMHG | RESPIRATION RATE: 16 BRPM | OXYGEN SATURATION: 96 % | BODY MASS INDEX: 34.95 KG/M2

## 2024-03-04 LAB
ANION GAP SERPL CALCULATED.3IONS-SCNC: 10 MMOL/L (ref 5–15)
BUN SERPL-MCNC: 7 MG/DL (ref 8–23)
BUN/CREAT SERPL: 11.9 (ref 7–25)
CALCIUM SPEC-SCNC: 8.2 MG/DL (ref 8.6–10.5)
CHLORIDE SERPL-SCNC: 110 MMOL/L (ref 98–107)
CO2 SERPL-SCNC: 22 MMOL/L (ref 22–29)
CREAT SERPL-MCNC: 0.59 MG/DL (ref 0.57–1)
EGFRCR SERPLBLD CKD-EPI 2021: 95.3 ML/MIN/1.73
GLUCOSE BLDC GLUCOMTR-MCNC: 104 MG/DL (ref 70–130)
GLUCOSE BLDC GLUCOMTR-MCNC: 158 MG/DL (ref 70–130)
GLUCOSE SERPL-MCNC: 111 MG/DL (ref 65–99)
POTASSIUM SERPL-SCNC: 3.6 MMOL/L (ref 3.5–5.2)
SODIUM SERPL-SCNC: 142 MMOL/L (ref 136–145)

## 2024-03-04 PROCEDURE — 82948 REAGENT STRIP/BLOOD GLUCOSE: CPT

## 2024-03-04 PROCEDURE — 94761 N-INVAS EAR/PLS OXIMETRY MLT: CPT

## 2024-03-04 PROCEDURE — 63710000001 INSULIN LISPRO (HUMAN) PER 5 UNITS

## 2024-03-04 PROCEDURE — 94799 UNLISTED PULMONARY SVC/PX: CPT

## 2024-03-04 PROCEDURE — 94664 DEMO&/EVAL PT USE INHALER: CPT

## 2024-03-04 PROCEDURE — 80048 BASIC METABOLIC PNL TOTAL CA: CPT

## 2024-03-04 RX ORDER — POTASSIUM CHLORIDE 750 MG/1
40 TABLET, FILM COATED, EXTENDED RELEASE ORAL DAILY
Status: DISCONTINUED | OUTPATIENT
Start: 2024-03-04 | End: 2024-03-04 | Stop reason: HOSPADM

## 2024-03-04 RX ORDER — BENZONATATE 200 MG/1
200 CAPSULE ORAL 2 TIMES DAILY PRN
Qty: 30 CAPSULE | Refills: 0 | Status: SHIPPED | OUTPATIENT
Start: 2024-03-04

## 2024-03-04 RX ORDER — BUDESONIDE AND FORMOTEROL FUMARATE DIHYDRATE 160; 4.5 UG/1; UG/1
2 AEROSOL RESPIRATORY (INHALATION) 2 TIMES DAILY
Qty: 10.2 G | Refills: 0 | Status: SHIPPED | OUTPATIENT
Start: 2024-03-04

## 2024-03-04 RX ADMIN — IPRATROPIUM BROMIDE AND ALBUTEROL SULFATE 3 ML: .5; 3 SOLUTION RESPIRATORY (INHALATION) at 11:00

## 2024-03-04 RX ADMIN — IPRATROPIUM BROMIDE AND ALBUTEROL SULFATE 3 ML: .5; 3 SOLUTION RESPIRATORY (INHALATION) at 08:00

## 2024-03-04 RX ADMIN — POTASSIUM CHLORIDE 40 MEQ: 750 TABLET, EXTENDED RELEASE ORAL at 10:48

## 2024-03-04 RX ADMIN — Medication 10 ML: at 09:49

## 2024-03-04 RX ADMIN — INSULIN LISPRO 2 UNITS: 100 INJECTION, SOLUTION INTRAVENOUS; SUBCUTANEOUS at 12:13

## 2024-03-04 NOTE — DISCHARGE SUMMARY
PHYSICIAN DISCHARGE SUMMARY                                                                        [facility]    Date of admit: 3/1/2024  Date of Discharge:  3/4/2024    PCP: Spencer Hua MD    Discharge Diagnosis:     Diabetic ketoacidosis    Cervical spondylosis without myelopathy  Diabetic ketoacidosis  Hypomagnesemia  Acute bronchitis due to influenza infection  Sinusitis  Hyponatremia  Elevated creatinine  Uncontrolled diabetes mellitus  Coronary artery disease  Myocardial infarction 2022  Hyperlipidemia  C3-C4 cervical spinal stenosis    Secondary Diagnoses:  Past Medical History:   Diagnosis Date    Depression     Diabetes mellitus     Disease of thyroid gland     Dyslipidemia 04/28/2017    Fibrocystic breast     Hypertension     Hyperthyroidism     Hypothyroidism     PONV (postoperative nausea and vomiting)     Type 2 diabetes mellitus        Procedures Performed           Consults:       Hospital Course  Patient is a 73 y.o. female presented with   CC: cough, excessive thirst     History of Present Illness:  73-year-old female who presents with cough.  Started Monday.  Still present.  Productive of yellow-green sputum.  Associated with headache, nasal congestion, sinus pressure, significant fatigue, excessive thirst and fever.  She did not quantify her fever.  Symptoms are moderate, persistent and not improving.  She has also had diarrhea but denies abdominal pain or vomiting.  She is a diabetic and is on several oral agents as well as long-acting injectable insulin.  She has lab work showing elevated betahydroxybutiric acid, elevated creatinine, low sodium, hyperglycemia and low serum CO2.  Venous blood gas shows metabolic acidosis with normal pCO2.  I reviewed her medical records  She was here in November 2022 for ST elevation myocardial infarction.    Patient was admitted by my partner with DKA related to acute  bronchitis from influenza infection.  DKA resolved with insulin and IV fluids per protocol.  Treatment for viral process is supportive.  Bronchodilators added for bronchospasm associated with acute bronchitis and clinically improved.  Neurosurgical input for cervical spine stenosis obtained and they did not feel that surgical intervention was required or further neurosurgical follow-up.  She is eager to go home today.    Condition on Discharge:  Stable.      Vital Signs  Temp:  [97.5 °F (36.4 °C)-98.7 °F (37.1 °C)] 98.3 °F (36.8 °C)  Heart Rate:  [67-86] 74  Resp:  [13-18] 16  BP: (146-170)/() 169/83    Physical Exam:  General Appearance: no acute distress, appears comfortable  Heart: regular rate and rhythm  Lungs: clear to auscultation bilaterally, respirations unlabored  Abdomen: soft, nontender, nondistended, no guarding/rebound, nl BS  Extremeties: no edema, no cyanosis  Neurology: speech normal, mental status normal, moving all four extremities  Mental Status: alert, oriented        --  Consults:   IP CONSULT TO PULMONOLOGY  IP CONSULT TO NEUROSURGERY  IP CONSULT TO NUTRITION SERVICES    Significant Discharge Diagnostics   Procedures Performed:         Pertinent Lab Results:  Results from last 7 days   Lab Units 03/04/24  0704 03/03/24  0442 03/02/24  1435 03/02/24  0306 03/01/24 2003 03/01/24  1548 03/01/24  1313   SODIUM mmol/L 142 139  --  136 135* 133* 132*   POTASSIUM mmol/L 3.6 4.1 4.5 3.3* 3.8 3.9 4.0   CHLORIDE mmol/L 110* 110*  --  109* 105 100 96*   CO2 mmol/L 22.0 20.0*  --  19.0* 21.0* 17.8* 18.9*   BUN mg/dL 7* 10  --  13 17 21 20   CREATININE mg/dL 0.59 0.70  --  0.70 1.02* 1.05* 1.21*   GLUCOSE mg/dL 111* 113*  --  156* 145* 160* 185*   CALCIUM mg/dL 8.2* 8.7 9.5 6.6* 7.5* 8.4* 8.9   AST (SGOT) U/L  --   --   --   --   --   --  39*   ALT (SGPT) U/L  --   --   --   --   --   --  26     Results from last 7 days   Lab Units 03/01/24  1548 03/01/24  1313   HSTROP T ng/L 22* 26*     Results  "from last 7 days   Lab Units 03/01/24  1313   WBC 10*3/mm3 6.73   HEMOGLOBIN g/dL 14.2   HEMATOCRIT % 42.7   PLATELETS 10*3/mm3 187   MCV fL 88.8   MCH pg 29.5   MCHC g/dL 33.3   RDW % 13.5   RDW-SD fl 43.4   MPV fL 10.9   NEUTROPHIL % % 78.3*   LYMPHOCYTE % % 10.7*   MONOCYTES % % 10.3   EOSINOPHIL % % 0.0*   BASOPHIL % % 0.3   IMM GRAN % % 0.4   NEUTROS ABS 10*3/mm3 5.27   LYMPHS ABS 10*3/mm3 0.72   MONOS ABS 10*3/mm3 0.69   EOS ABS 10*3/mm3 0.00   BASOS ABS 10*3/mm3 0.02   IMMATURE GRANS (ABS) 10*3/mm3 0.03   NRBC /100 WBC 0.0         Results from last 7 days   Lab Units 03/01/24 2003 03/01/24  1548 03/01/24  1313   MAGNESIUM mg/dL 1.8 1.8 1.5*           Invalid input(s): \"LDLCALC\"              Results from last 7 days   Lab Units 03/01/24  1548   PROCALCITONIN ng/mL 0.09                 Results from last 7 days   Lab Units 03/01/24  2211   NITRITE UA  Negative   WBC UA /HPF 0-2   BACTERIA UA /HPF None Seen   SQUAM EPITHEL UA /HPF 0-2     Results from last 7 days   Lab Units 03/01/24  1731   ADENOVIRUS DETECTION BY PCR  Not Detected   CORONAVIRUS 229E  Not Detected   CORONAVIRUS HKU1  Not Detected   CORONAVIRUS NL63  Not Detected   CORONAVIRUS OC43  Not Detected   HUMAN METAPNEUMOVIRUS  Not Detected   HUMAN RHINOVIRUS/ENTEROVIRUS  Not Detected   INFLUENZA B PCR  Not Detected   PARAINFLUENZA 1  Not Detected   PARAINFLUENZA VIRUS 2  Not Detected   PARAINFLUENZA VIRUS 3  Not Detected   PARAINFLUENZA VIRUS 4  Not Detected   BORDETELLA PERTUSSIS PCR  Not Detected   CHLAMYDOPHILA PNEUMONIAE PCR  Not Detected   MYCOPLAMA PNEUMO PCR  Not Detected   INFLUENZA A H3  Detected*   RSV, PCR  Not Detected           Imaging Results:  Imaging Results (All)       Procedure Component Value Units Date/Time    XR Chest PA & Lateral [[id number]] Collected: 03/02/24 1005     Updated: 03/02/24 1010    Narrative:      XR CHEST PA AND LATERAL-        INDICATION: Cough, fever     COMPARISON: Chest radiograph March 2, 2024 at 1:02 a.m.   "   TECHNIQUE: 2 view chest .     FINDINGS:       No focal opacity. Azygos lobe. No effusions. Stable mediastinum. CABG.  Median sternotomy. Old left proximal humeral fracture         Impression:      No acute cardiopulmonary process     This report was finalized on 3/2/2024 10:07 AM by Dr. Bertram Hankins M.D on Workstation: [id number]       XR Chest 1 View [[id number]] Collected: 03/02/24 0228     Updated: 03/02/24 0232    Narrative:      SINGLE VIEW OF THE CHEST     HISTORY: Wheezing     COMPARISON: March 1, 2024     FINDINGS:  There is cardiomegaly. There is no vascular congestion. No pneumothorax,  pleural effusion, or infiltrate is seen. Patient is status post median  sternotomy with coronary artery bypass grafting. There is an old  proximal left humerus fracture.       Impression:      No acute findings.     This report was finalized on 3/2/2024 2:29 AM by Dr. Jessica Cruz M.D on Workstation: [id number]       CT Head Without Contrast [[id number]] Collected: 03/01/24 1435     Updated: 03/01/24 1853    Narrative:      CT HEAD AND CERVICAL SPINE WITHOUT CONTRAST     HISTORY: Fall, hit back of head, blood thinners, headache, neck pain.     COMPARISON: CT head 06/16/2023, MRI brain 06/17/2023. No prior CT  examination of the cervical spine is available for comparison.  Comparison is made to the CT examination of the neck from 05/04/2018.     CT HEAD WITHOUT CONTRAST:     The brain and ventricles are symmetrical. There is no evidence of  intracranial hemorrhage, hydrocephalus or of abnormal extra-axial fluid.  No focal area of decreased attenuation to suggest acute infarction is  identified. An air-fluid level is present in the right maxillary sinus.  There is a trace amount of fluid present in the sphenoid sinus and left  maxillary sinus. There is moderate opacification of the ethmoid air  cells bilaterally. Bone windows show no evidence of a calvarial  fracture.       Impression:      1. There is no  evidence of acute infarction or of intracranial  hemorrhage. Further evaluation could be performed with a MRI examination  of the brain as indicated. 2. Paranasal sinus disease is noted which is  new versus 06/16/2023.        CT EXAMINATION CERVICAL SPINE WITHOUT CONTRAST:      The alignment of the cervical spine is within normal limits. There is  partial fusion of the facets at C2-C3 bilaterally. There is no evidence  of a cervical fracture.     C2-C3: There is no evidence of disc bulge or herniation.     C3-C4: There is severe spinal stenosis secondary to what is likely a  moderate to large calcified disc herniation. This appears similar, given  differences in technique, as compared to the CT examination of the neck  performed in 2018.     C4-C5: There is a central disc osteophyte complex resulting in mild  canal stenosis. This appears similar to the prior CT examination of the  neck.     C5-C6: A mild central disc-osteophyte complex is present with no  evidence of herniation.     C6-C7: There is no evidence of disc bulge or herniation.     C7-T1: There is no evidence of a disc bulge or herniation.     An azygos fissure is incidentally noted.     IMPRESSION: There is no evidence of a cervical fracture. Multilevel  degenerative disease involving the cervical spine is noted as described  in detail above including severe spinal stenosis at C3-C4 secondary to a  central calcified disc herniation. This is grossly similar to the CT  examination of the neck performed on 05/04/2018. See above.           Radiation dose reduction techniques were utilized, including automated  exposure control and exposure modulation based on body size.        This report was finalized on 3/1/2024 6:50 PM by Dr. Navin Hilton M.D  on Workstation: [id number]       CT Cervical Spine Without Contrast [[id number]] Collected: 03/01/24 1435     Updated: 03/01/24 1853    Narrative:      CT HEAD AND CERVICAL SPINE WITHOUT CONTRAST     HISTORY: Fall,  hit back of head, blood thinners, headache, neck pain.     COMPARISON: CT head 06/16/2023, MRI brain 06/17/2023. No prior CT  examination of the cervical spine is available for comparison.  Comparison is made to the CT examination of the neck from 05/04/2018.     CT HEAD WITHOUT CONTRAST:     The brain and ventricles are symmetrical. There is no evidence of  intracranial hemorrhage, hydrocephalus or of abnormal extra-axial fluid.  No focal area of decreased attenuation to suggest acute infarction is  identified. An air-fluid level is present in the right maxillary sinus.  There is a trace amount of fluid present in the sphenoid sinus and left  maxillary sinus. There is moderate opacification of the ethmoid air  cells bilaterally. Bone windows show no evidence of a calvarial  fracture.       Impression:      1. There is no evidence of acute infarction or of intracranial  hemorrhage. Further evaluation could be performed with a MRI examination  of the brain as indicated. 2. Paranasal sinus disease is noted which is  new versus 06/16/2023.        CT EXAMINATION CERVICAL SPINE WITHOUT CONTRAST:      The alignment of the cervical spine is within normal limits. There is  partial fusion of the facets at C2-C3 bilaterally. There is no evidence  of a cervical fracture.     C2-C3: There is no evidence of disc bulge or herniation.     C3-C4: There is severe spinal stenosis secondary to what is likely a  moderate to large calcified disc herniation. This appears similar, given  differences in technique, as compared to the CT examination of the neck  performed in 2018.     C4-C5: There is a central disc osteophyte complex resulting in mild  canal stenosis. This appears similar to the prior CT examination of the  neck.     C5-C6: A mild central disc-osteophyte complex is present with no  evidence of herniation.     C6-C7: There is no evidence of disc bulge or herniation.     C7-T1: There is no evidence of a disc bulge or  herniation.     An azygos fissure is incidentally noted.     IMPRESSION: There is no evidence of a cervical fracture. Multilevel  degenerative disease involving the cervical spine is noted as described  in detail above including severe spinal stenosis at C3-C4 secondary to a  central calcified disc herniation. This is grossly similar to the CT  examination of the neck performed on 05/04/2018. See above.           Radiation dose reduction techniques were utilized, including automated  exposure control and exposure modulation based on body size.        This report was finalized on 3/1/2024 6:50 PM by Dr. Navin Hilton M.D  on Workstation: [id number]       XR Ribs Right With PA Chest [[id number]] Collected: 03/01/24 1339     Updated: 03/01/24 1346    Narrative:      XR RIBS RIGHT W PA CHEST-     Clinical: Fell with right anterior chest pain     COMPARISON 6/16/2023     FINDINGS: Median sternotomy wires and vascular markers in position as  before. Cardiac size stable. No pneumothorax, pleural effusion or lung  contusion.     Minimally angulated fracture through the anterior right seventh rib. The  remaining right ribs have a satisfactory appearance.     CONCLUSION: Subtle anterior right seventh rib fracture, no active  disease of the chest     This report was finalized on 3/1/2024 1:43 PM by Dr. Kushal Will M.D  on Workstation: [id number]             --    Discharge Disposition  Home or Self Care    Discharge Medications     Discharge Medications        New Medications        Instructions Start Date   budesonide-formoterol 160-4.5 MCG/ACT inhaler  Commonly known as: SYMBICORT   2 puffs, Inhalation, 2 Times Daily             Continue These Medications        Instructions Start Date   aspirin 81 MG EC tablet   81 mg, Oral, Daily      atorvastatin 40 MG tablet  Commonly known as: LIPITOR   40 mg, Oral, Nightly      BD Pen Needle Micro U/F 32G X 6 MM misc  Generic drug: Insulin Pen Needle   USE ONE PER DAY FOR DAILY INSULIN  INJECTION, E11.9      BD Pen Needle Ban 2nd Gen 32G X 4 MM misc  Generic drug: Insulin Pen Needle   Use 1 per day      benzonatate 200 MG capsule  Commonly known as: TESSALON   200 mg, Oral, 3 Times Daily PRN      clopidogrel 75 MG tablet  Commonly known as: PLAVIX   TAKE 1 TABLET BY MOUTH EVERY DAY      cyclobenzaprine 10 MG tablet  Commonly known as: FLEXERIL   10 mg, Oral, Every 8 Hours PRN      ezetimibe 10 MG tablet  Commonly known as: ZETIA   TAKE 1 TABLET DAILY      Farxiga 10 MG tablet  Generic drug: dapagliflozin Propanediol   10 mg, Oral, Every Morning      FLUoxetine 40 MG capsule  Commonly known as: PROzac   40 mg, Oral, Daily      FreeStyle Gene 2 Sensor misc   1 each, Other, Every 14 Days      furosemide 40 MG tablet  Commonly known as: LASIX   20 mg, Oral, Daily      Januvia 100 MG tablet  Generic drug: SITagliptin   100 mg, Oral, Daily      levothyroxine 125 MCG tablet  Commonly known as: SYNTHROID, LEVOTHROID   TAKE 2 TABLETS DAILY      lisinopril 20 MG tablet  Commonly known as: PRINIVIL,ZESTRIL   20 mg, Oral, Daily      metoprolol succinate XL 50 MG 24 hr tablet  Commonly known as: TOPROL-XL   TAKE 1 TABLET BY MOUTH EVERY DAY      ondansetron ODT 4 MG disintegrating tablet  Commonly known as: ZOFRAN-ODT   4 mg, Translingual, 4 Times Daily PRN      oseltamivir 75 MG capsule  Commonly known as: Tamiflu   75 mg, Oral, 2 Times Daily      promethazine-dextromethorphan 6.25-15 MG/5ML syrup  Commonly known as: PROMETHAZINE-DM   5 mL, Oral, 4 Times Daily PRN      Toujeo SoloStar 300 UNIT/ML solution pen-injector injection  Generic drug: Insulin Glargine (1 Unit Dial)   36 Units, Subcutaneous, Daily      vitamin D 1.25 MG (03880 UT) capsule capsule  Commonly known as: ERGOCALCIFEROL   TAKE ONE CAPSULE BY MOUTH ONCE WEEKLY               Discharge Diet: diabetic diet    Activity at Discharge:      Follow-up Information       Spencer Hua MD .    Specialty: Internal Medicine  Contact information:  [id number]  [facility] [address]  [facility] [address]  [phone]                           See primary care provider, Spencer Hua MD, in 1-2 weeks        Test Results Pending at Discharge       Jorge Heard MD  [facility], [facility]  Pulmonary and Critical Care Medicine  03/04/24  09:03 EST    Time: greater than 30 minutes.        Total time spent discharging patient including evaluation, post hospitalization follow up, medication and post hospitalization instructions and education total time exceeds 30 minutes.

## 2024-03-04 NOTE — SIGNIFICANT NOTE
Meds to bed received  IVX2 dc'd  Review discharge note  Beds to Meds Delivered.  Assisted to get dressed  Son to   Left AA0, Moves all extremetiesl

## 2024-03-04 NOTE — PLAN OF CARE
Problem: Skin Injury Risk Increased  Goal: Skin Health and Integrity  Outcome: Adequate for Care Transition  Intervention: Optimize Skin Protection  Recent Flowsheet Documentation  Taken 3/4/2024 0801 by Kayley Richardson RN  Pressure Reduction Techniques: weight shift assistance provided  Head of Bed (HOB) Positioning: HOB at 45 degrees  Pressure Reduction Devices: specialty bed utilized  Skin Protection: adhesive use limited     Problem: Adult Inpatient Plan of Care  Goal: Absence of Hospital-Acquired Illness or Injury  Outcome: Adequate for Care Transition  Intervention: Identify and Manage Fall Risk  Recent Flowsheet Documentation  Taken 3/4/2024 0801 by Kayley Richardson RN  Safety Promotion/Fall Prevention: activity supervised  Intervention: Prevent Skin Injury  Recent Flowsheet Documentation  Taken 3/4/2024 0801 by Kayley Richardson RN  Body Position: position changed independently  Skin Protection: adhesive use limited  Intervention: Prevent and Manage VTE (Venous Thromboembolism) Risk  Recent Flowsheet Documentation  Taken 3/4/2024 0801 by Kayley Richardson RN  Activity Management: up in chair  VTE Prevention/Management:   bilateral   sequential compression devices off  Range of Motion: active ROM (range of motion) encouraged  Goal: Optimal Comfort and Wellbeing  Outcome: Adequate for Care Transition  Intervention: Provide Person-Centered Care  Recent Flowsheet Documentation  Taken 3/4/2024 0801 by Kayley Richardson RN  Trust Relationship/Rapport: emotional support provided  Goal: Readiness for Transition of Care  Outcome: Adequate for Care Transition     Problem: Pain Acute  Goal: Acceptable Pain Control and Functional Ability  Outcome: Adequate for Care Transition  Intervention: Prevent or Manage Pain  Recent Flowsheet Documentation  Taken 3/4/2024 0801 by Kayley Richardson RN  Sensory Stimulation Regulation: care clustered  Medication Review/Management: medications reviewed  Intervention: Optimize Psychosocial  Wellbeing  Recent Flowsheet Documentation  Taken 3/4/2024 0801 by Kayley Richardson RN  Supportive Measures: active listening utilized  Diversional Activities:   smartphone   television     Problem: Fall Injury Risk  Goal: Absence of Fall and Fall-Related Injury  Outcome: Adequate for Care Transition  Intervention: Identify and Manage Contributors  Recent Flowsheet Documentation  Taken 3/4/2024 0801 by Kayley Richardson RN  Medication Review/Management: medications reviewed  Intervention: Promote Injury-Free Environment  Recent Flowsheet Documentation  Taken 3/4/2024 0801 by Kayley Richardson RN  Safety Promotion/Fall Prevention: activity supervised     Problem: Hypertension Comorbidity  Goal: Blood Pressure in Desired Range  Outcome: Adequate for Care Transition  Intervention: Maintain Blood Pressure Management  Recent Flowsheet Documentation  Taken 3/4/2024 0801 by Kayley Richardson RN  Medication Review/Management: medications reviewed   Goal Outcome Evaluation:    AAO MAEDoes not need anything when asked.  Breakfast set up feeding self. VVS Denies c/o IVF infusing. Stopped for DC to home. No coverage given   Assisted OOB made comf / Pt is to be dc'd.   Review given. Paper work received and signature form received. Pt IV dc'd. Meds to Beds delivered.   Escorted to be discharge to home

## 2024-03-04 NOTE — PLAN OF CARE
Problem: Skin Injury Risk Increased  Goal: Skin Health and Integrity  Outcome: Ongoing, Progressing     Problem: Adult Inpatient Plan of Care  Goal: Plan of Care Review  Outcome: Ongoing, Progressing  Flowsheets (Taken 3/4/2024 0501)  Progress: no change  Plan of Care Reviewed With: patient  Outcome Evaluation: VSS, pt had complaints of body aches/ head ache and nausea this shift, prn meds given as per md order and pt reports meds are somewhat effective, pt has asugar free popcicle and diet gingerale and then stated that she felt sleepy and worn out and requested that staf try to allow uninterupted sleep, pt was given a purwick per her request and is able to turn self without staff asst, staff able to visualize pt through window, pt is resting in bed with eyes closed at this time and appears to be sleeping, NAD noted at this time, otherwise unremarkable shift  Goal: Patient-Specific Goal (Individualized)  Outcome: Ongoing, Progressing  Goal: Absence of Hospital-Acquired Illness or Injury  Outcome: Ongoing, Progressing  Goal: Optimal Comfort and Wellbeing  Outcome: Ongoing, Progressing  Goal: Readiness for Transition of Care  Outcome: Ongoing, Progressing     Problem: Pain Acute  Goal: Acceptable Pain Control and Functional Ability  Outcome: Ongoing, Progressing     Problem: Fall Injury Risk  Goal: Absence of Fall and Fall-Related Injury  Outcome: Ongoing, Progressing     Problem: Hypertension Comorbidity  Goal: Blood Pressure in Desired Range  Outcome: Ongoing, Progressing   Goal Outcome Evaluation:  Plan of Care Reviewed With: patient        Progress: no change  Outcome Evaluation: VSS, pt had complaints of body aches/ head ache and nausea this shift, prn meds given as per md order and pt reports meds are somewhat effective, pt has asugar free popcicle and diet gingerale and then stated that she felt sleepy and worn out and requested that staf try to allow uninterupted sleep, pt was given a purwick per her  request and is able to turn self without staff asst, staff able to visualize pt through window, pt is resting in bed with eyes closed at this time and appears to be sleeping, NAD noted at this time, otherwise unremarkable shift

## 2024-03-05 NOTE — OUTREACH NOTE
Prep Survey      Flowsheet Row Responses   [facility] facility patient discharged from? [place]   Is LACE score < 7 ? No   Eligibility Readm Mgmt   Discharge diagnosis DKA   Does the patient have one of the following disease processes/diagnoses(primary or secondary)? Other   Does the patient have Home health ordered? No   Is there a DME ordered? No   Prep survey completed? Yes            Lexi SHINE - Registered Nurse

## 2024-03-08 NOTE — CASE MANAGEMENT/SOCIAL WORK
Case Management Discharge Note      Final Note: Home no needs         Selected Continued Care - Discharged on 3/4/2024 Admission date: 3/1/2024 - Discharge disposition: Home or Self Care      Destination    No services have been selected for the patient.                Durable Medical Equipment    No services have been selected for the patient.                Dialysis/Infusion    No services have been selected for the patient.                Home Medical Care    No services have been selected for the patient.                Therapy    No services have been selected for the patient.                Community Resources    No services have been selected for the patient.                Community & DME    No services have been selected for the patient.                         Final Discharge Disposition Code: 01 - home or self-care

## 2024-03-11 ENCOUNTER — HOSPITAL ENCOUNTER (OUTPATIENT)
Dept: CARDIOLOGY | Facility: HOSPITAL | Age: 74
Discharge: HOME | End: 2024-03-11
Admitting: INTERNAL MEDICINE
Payer: MEDICARE

## 2024-03-11 ENCOUNTER — OFFICE VISIT (OUTPATIENT)
Dept: CARDIOLOGY | Facility: CLINIC | Age: 74
End: 2024-03-11
Payer: MEDICARE

## 2024-03-11 VITALS
DIASTOLIC BLOOD PRESSURE: 63 MMHG | HEART RATE: 64 BPM | BODY MASS INDEX: 35.08 KG/M2 | HEIGHT: 68 IN | WEIGHT: 231.48 LBS | SYSTOLIC BLOOD PRESSURE: 124 MMHG

## 2024-03-11 VITALS
DIASTOLIC BLOOD PRESSURE: 68 MMHG | BODY MASS INDEX: 32.28 KG/M2 | SYSTOLIC BLOOD PRESSURE: 121 MMHG | HEART RATE: 66 BPM | WEIGHT: 213 LBS | HEIGHT: 68 IN

## 2024-03-11 DIAGNOSIS — E78.5 HYPERLIPIDEMIA, UNSPECIFIED HYPERLIPIDEMIA TYPE: ICD-10-CM

## 2024-03-11 DIAGNOSIS — R06.02 SHORTNESS OF BREATH: ICD-10-CM

## 2024-03-11 DIAGNOSIS — Z95.1 S/P CABG X 5: ICD-10-CM

## 2024-03-11 DIAGNOSIS — I10 ESSENTIAL HYPERTENSION: Primary | ICD-10-CM

## 2024-03-11 PROCEDURE — 93000 ELECTROCARDIOGRAM COMPLETE: CPT | Performed by: INTERNAL MEDICINE

## 2024-03-11 PROCEDURE — 3078F DIAST BP <80 MM HG: CPT | Performed by: INTERNAL MEDICINE

## 2024-03-11 PROCEDURE — 93306 TTE W/DOPPLER COMPLETE: CPT

## 2024-03-11 PROCEDURE — 99214 OFFICE O/P EST MOD 30 MIN: CPT | Performed by: INTERNAL MEDICINE

## 2024-03-11 PROCEDURE — 3074F SYST BP LT 130 MM HG: CPT | Performed by: INTERNAL MEDICINE

## 2024-03-11 PROCEDURE — 93306 TTE W/DOPPLER COMPLETE: CPT | Performed by: INTERNAL MEDICINE

## 2024-03-11 PROCEDURE — 25510000001 PERFLUTREN (DEFINITY) 8.476 MG IN SODIUM CHLORIDE (PF) 0.9 % 10 ML INJECTION: Performed by: INTERNAL MEDICINE

## 2024-03-11 RX ADMIN — SODIUM CHLORIDE 3 ML: 9 INJECTION INTRAMUSCULAR; INTRAVENOUS; SUBCUTANEOUS at 13:27

## 2024-03-11 NOTE — PROGRESS NOTES
1 YEAR F/U    Subjective:        Mayra Hirsch is a 73 y.o. female who here for follow up    CC  Follow-up hypertension hyperlipidemia CABG  HPI  73-year-old female with hypertension hyperlipidemia CABG here for the follow-up with no complaints of chest pains tightness heaviness or the pressure sensation     Problems Addressed this Visit          Cardiac and Vasculature    Hyperlipidemia    Essential hypertension - Primary    S/P CABG x 5       Pulmonary and Pneumonias    Shortness of breath     Diagnoses         Codes Comments    Essential hypertension    -  Primary ICD-10-CM: I10  ICD-9-CM: 401.9     S/P CABG x 5     ICD-10-CM: Z95.1  ICD-9-CM: V45.81     Shortness of breath     ICD-10-CM: R06.02  ICD-9-CM: 786.05     Hyperlipidemia, unspecified hyperlipidemia type     ICD-10-CM: E78.5  ICD-9-CM: 272.4           .    The following portions of the patient's history were reviewed and updated as appropriate: allergies, current medications, past family history, past medical history, past social history, past surgical history and problem list.    Past Medical History:   Diagnosis Date    Depression     Diabetes mellitus     Disease of thyroid gland     Dyslipidemia 04/28/2017    Fibrocystic breast     Hypertension     Hyperthyroidism     Hypothyroidism     PONV (postoperative nausea and vomiting)     Type 2 diabetes mellitus      reports that she has never smoked. She has never been exposed to tobacco smoke. She has never used smokeless tobacco. She reports that she does not drink alcohol and does not use drugs.   Family History   Problem Relation Age of Onset    Coronary artery disease Mother     Alzheimer's disease Mother     Hypertension Mother     Coronary artery disease Father     Cancer Father     Thyroid disease Sister     Malig Hyperthermia Neg Hx        Review of Systems  Constitutional: No wt loss, fever, fatigue  Gastrointestinal: No nausea, abdominal pain  Behavioral/Psych: No insomnia or anxiety    "Cardiovascular no chest pains or tightness in the chest  Objective:       Physical Exam           Physical Exam  /68   Pulse 66   Ht 172 cm (67.72\")   Wt 96.6 kg (213 lb)   BMI 32.65 kg/m²     General appearance: No acute changes   Eyes: Sclerae conjunctivae normal, pupils reactive   HENT: Atraumatic; oropharynx clear with moist mucous membranes and no mucosal ulcerations;  Neck: Trachea midline; NECK, supple, no thyromegaly or lymphadenopathy   Lungs: Normal size and shape, normal breath sounds, equal distribution of air, no rales and rhonchi   CV: S1-S2 regular, no murmurs, no rub, no gallop   Abdomen: Soft, nontender; no masses , no abnormal abdominal sounds   Extremities: No deformity , normal color , no peripheral edema   Skin: Normal temperature, turgor and texture; no rash, ulcers  Psych: Appropriate affect, alert and oriented to person, place and time             ECG 12 Lead    Date/Time: 3/11/2024 1:50 PM  Performed by: Joanna Marie MD    Authorized by: Joanna Marie MD  Comparison: compared with previous ECG   Similar to previous ECG  Rhythm: sinus rhythm  ST Flattening: all    Clinical impression: non-specific ECG            Echocardiogram:    Results for orders placed during the hospital encounter of 06/16/23    Adult transthoracic echo complete    Interpretation Summary    Left ventricular systolic function is mildly decreased. Left ventricular ejection fraction appears to be 41 - 45%. Normal left ventricular wall thickness noted. The left ventricular cavity is borderline dilated. Left ventricular diastolic function is consistent with (grade I) impaired relaxation.    See wall scoring diagram.          Current Outpatient Medications:     aspirin 81 MG EC tablet, Take 1 tablet by mouth Daily., Disp: 30 tablet, Rfl: 5    atorvastatin (LIPITOR) 40 MG tablet, Take 1 tablet by mouth Every Night., Disp: 90 tablet, Rfl: 0    BD Pen Needle Ban 2nd Gen 32G X 4 MM misc, Use 1 per " day, Disp: 90 each, Rfl: 3    benzonatate (TESSALON) 200 MG capsule, Take 1 capsule by mouth 2 (Two) Times a Day As Needed for Cough., Disp: 30 capsule, Rfl: 0    budesonide-formoterol (SYMBICORT) 160-4.5 MCG/ACT inhaler, Inhale 2 puffs 2 (Two) Times a Day., Disp: 10.2 g, Rfl: 0    clopidogrel (PLAVIX) 75 MG tablet, TAKE 1 TABLET BY MOUTH EVERY DAY, Disp: 90 tablet, Rfl: 1    Continuous Blood Gluc Sensor (FreeStyle Gene 2 Sensor) misc, 1 each by Other route Every 14 (Fourteen) Days., Disp: 6 each, Rfl: 3    cyclobenzaprine (FLEXERIL) 10 MG tablet, Take 1 tablet by mouth Every 8 (Eight) Hours As Needed for Muscle Spasms., Disp: 30 tablet, Rfl: 0    ezetimibe (ZETIA) 10 MG tablet, TAKE 1 TABLET DAILY, Disp: 90 tablet, Rfl: 0    Farxiga 10 MG tablet, Take 10 mg by mouth Every Morning., Disp: 90 tablet, Rfl: 3    FLUoxetine (PROzac) 40 MG capsule, Take 1 capsule by mouth Daily., Disp: , Rfl:     furosemide (LASIX) 40 MG tablet, Take 0.5 tablets by mouth Daily., Disp: 30 tablet, Rfl: 5    Insulin Pen Needle (BD Pen Needle Micro U/F) 32G X 6 MM misc, USE ONE PER DAY FOR DAILY INSULIN INJECTION, E11.9, Disp: 100 each, Rfl: 3    Januvia 100 MG tablet, Take 1 tablet by mouth Daily., Disp: 90 tablet, Rfl: 3    levothyroxine (SYNTHROID, LEVOTHROID) 125 MCG tablet, TAKE 2 TABLETS DAILY, Disp: 180 tablet, Rfl: 3    lisinopril (PRINIVIL,ZESTRIL) 20 MG tablet, Take 1 tablet by mouth Daily., Disp: , Rfl:     metoprolol succinate XL (TOPROL-XL) 50 MG 24 hr tablet, TAKE 1 TABLET BY MOUTH EVERY DAY, Disp: 90 tablet, Rfl: 0    ondansetron ODT (ZOFRAN-ODT) 4 MG disintegrating tablet, Place 1 tablet on the tongue 4 (Four) Times a Day As Needed for Nausea or Vomiting., Disp: 30 tablet, Rfl: 0    oseltamivir (Tamiflu) 75 MG capsule, Take 1 capsule by mouth 2 (Two) Times a Day., Disp: 10 capsule, Rfl: 0    promethazine-dextromethorphan (PROMETHAZINE-DM) 6.25-15 MG/5ML syrup, Take 5 mL by mouth 4 (Four) Times a Day As Needed for Cough.,  Disp: 118 mL, Rfl: 0    Toujeo SoloStar 300 UNIT/ML solution pen-injector injection, INJECT 36 UNITS UNDER THE SKIN INTO THE APPROPRIATE AREA AS DIRECTED DAILY, Disp: 9 mL, Rfl: 3    vitamin D (ERGOCALCIFEROL) 1.25 MG (69120 UT) capsule capsule, TAKE ONE CAPSULE BY MOUTH ONCE WEEKLY, Disp: 12 capsule, Rfl: 0  No current facility-administered medications for this visit.   Assessment:                Plan:          ICD-10-CM ICD-9-CM   1. Essential hypertension  I10 401.9   2. S/P CABG x 5  Z95.1 V45.81   3. Shortness of breath  R06.02 786.05   4. Hyperlipidemia, unspecified hyperlipidemia type  E78.5 272.4     1. Essential hypertension  Patient understands importance of blood pressure check at home which patient does regularly and the blood pressures are well under control to the level of less than 140/90      2. S/P CABG x 5  No angina pectoris    3. Shortness of breath  Multifactorial    4. Hyperlipidemia, unspecified hyperlipidemia type  Continue current treatment      1 yr  COUNSELING:    Mayra Henry was given to patient for the following topics: diagnostic results, risk factor reductions, impressions, risks and benefits of treatment options and importance of treatment compliance .       SMOKING COUNSELING:        Dictated using Dragon dictation

## 2024-03-12 LAB
AORTIC DIMENSIONLESS INDEX: 0.4 (DI)
ASCENDING AORTA: 3 CM
BH CV ECHO MEAS - ACS: 1.4 CM
BH CV ECHO MEAS - AO MAX PG: 18.1 MMHG
BH CV ECHO MEAS - AO MEAN PG: 10 MMHG
BH CV ECHO MEAS - AO ROOT DIAM: 2.6 CM
BH CV ECHO MEAS - AO V2 MAX: 213 CM/SEC
BH CV ECHO MEAS - AO V2 VTI: 49.8 CM
BH CV ECHO MEAS - AVA(I,D): 0.99 CM2
BH CV ECHO MEAS - EDV(CUBED): 64 ML
BH CV ECHO MEAS - EDV(MOD-SP2): 113 ML
BH CV ECHO MEAS - EDV(MOD-SP4): 106 ML
BH CV ECHO MEAS - EF(MOD-BP): 46.1 %
BH CV ECHO MEAS - EF(MOD-SP2): 48.7 %
BH CV ECHO MEAS - EF(MOD-SP4): 48.1 %
BH CV ECHO MEAS - ESV(CUBED): 29.8 ML
BH CV ECHO MEAS - ESV(MOD-SP2): 58 ML
BH CV ECHO MEAS - ESV(MOD-SP4): 55 ML
BH CV ECHO MEAS - FS: 22.5 %
BH CV ECHO MEAS - IVS/LVPW: 1.08 CM
BH CV ECHO MEAS - IVSD: 1.4 CM
BH CV ECHO MEAS - LAT PEAK E' VEL: 9.3 CM/SEC
BH CV ECHO MEAS - LV MASS(C)D: 197.6 GRAMS
BH CV ECHO MEAS - LV MAX PG: 4.1 MMHG
BH CV ECHO MEAS - LV MEAN PG: 2 MMHG
BH CV ECHO MEAS - LV V1 MAX: 101 CM/SEC
BH CV ECHO MEAS - LV V1 VTI: 21.8 CM
BH CV ECHO MEAS - LVIDD: 4 CM
BH CV ECHO MEAS - LVIDS: 3.1 CM
BH CV ECHO MEAS - LVOT AREA: 2.27 CM2
BH CV ECHO MEAS - LVOT DIAM: 1.7 CM
BH CV ECHO MEAS - LVPWD: 1.3 CM
BH CV ECHO MEAS - MED PEAK E' VEL: 4.5 CM/SEC
BH CV ECHO MEAS - MR MAX PG: 35.3 MMHG
BH CV ECHO MEAS - MR MAX VEL: 297 CM/SEC
BH CV ECHO MEAS - MV A DUR: 0.17 SEC
BH CV ECHO MEAS - MV A MAX VEL: 115 CM/SEC
BH CV ECHO MEAS - MV DEC SLOPE: 258 CM/SEC2
BH CV ECHO MEAS - MV DEC TIME: 0.22 SEC
BH CV ECHO MEAS - MV E MAX VEL: 69.4 CM/SEC
BH CV ECHO MEAS - MV E/A: 0.6
BH CV ECHO MEAS - MV MAX PG: 8.2 MMHG
BH CV ECHO MEAS - MV MEAN PG: 3 MMHG
BH CV ECHO MEAS - MV P1/2T: 129.4 MSEC
BH CV ECHO MEAS - MV V2 VTI: 45.4 CM
BH CV ECHO MEAS - MVA(P1/2T): 1.7 CM2
BH CV ECHO MEAS - MVA(VTI): 1.09 CM2
BH CV ECHO MEAS - PA ACC TIME: 0.13 SEC
BH CV ECHO MEAS - PA V2 MAX: 115 CM/SEC
BH CV ECHO MEAS - PULM A REVS DUR: 0.17 SEC
BH CV ECHO MEAS - PULM A REVS VEL: 28.5 CM/SEC
BH CV ECHO MEAS - PULM DIAS VEL: 37.2 CM/SEC
BH CV ECHO MEAS - PULM S/D: 0.95
BH CV ECHO MEAS - PULM SYS VEL: 35.3 CM/SEC
BH CV ECHO MEAS - QP/QS: 1.42
BH CV ECHO MEAS - RAP SYSTOLE: 3 MMHG
BH CV ECHO MEAS - RV MAX PG: 3.4 MMHG
BH CV ECHO MEAS - RV V1 MAX: 91.6 CM/SEC
BH CV ECHO MEAS - RV V1 VTI: 20.3 CM
BH CV ECHO MEAS - RVOT DIAM: 2.1 CM
BH CV ECHO MEAS - RVSP: 9 MMHG
BH CV ECHO MEAS - SV(LVOT): 49.5 ML
BH CV ECHO MEAS - SV(MOD-SP2): 55 ML
BH CV ECHO MEAS - SV(MOD-SP4): 51 ML
BH CV ECHO MEAS - SV(RVOT): 70.3 ML
BH CV ECHO MEAS - TAPSE (>1.6): 1.35 CM
BH CV ECHO MEAS - TR MAX PG: 5.5 MMHG
BH CV ECHO MEAS - TR MAX VEL: 117 CM/SEC
BH CV ECHO MEASUREMENTS AVERAGE E/E' RATIO: 10.06
BH CV XLRA - RV BASE: 2.41 CM
BH CV XLRA - RV LENGTH: 6.8 CM
BH CV XLRA - RV MID: 2.7 CM
BH CV XLRA - TDI S': 8.6 CM/SEC
LEFT ATRIUM VOLUME INDEX: 28.8 ML/M2
SINUS: 2.12 CM
STJ: 1.91 CM

## 2024-03-14 ENCOUNTER — READMISSION MANAGEMENT (OUTPATIENT)
Dept: CALL CENTER | Facility: HOSPITAL | Age: 74
End: 2024-03-14
Payer: MEDICARE

## 2024-03-14 NOTE — OUTREACH NOTE
Medical Week 2 Survey      Flowsheet Row Responses   [facility] patient discharged from? [place]   Does the patient have one of the following disease processes/diagnoses(primary or secondary)? Other   Week 2 attempt successful? No   Unsuccessful attempts Attempt 1            Rhona Swenson Registered Nurse

## 2024-03-19 ENCOUNTER — READMISSION MANAGEMENT (OUTPATIENT)
Dept: CALL CENTER | Facility: HOSPITAL | Age: 74
End: 2024-03-19
Payer: MEDICARE

## 2024-03-19 ENCOUNTER — TELEPHONE (OUTPATIENT)
Dept: ENDOCRINOLOGY | Age: 74
End: 2024-03-19

## 2024-03-19 DIAGNOSIS — E11.65 TYPE 2 DIABETES MELLITUS WITH HYPERGLYCEMIA, WITH LONG-TERM CURRENT USE OF INSULIN: ICD-10-CM

## 2024-03-19 DIAGNOSIS — Z79.4 TYPE 2 DIABETES MELLITUS WITH HYPERGLYCEMIA, WITH LONG-TERM CURRENT USE OF INSULIN: ICD-10-CM

## 2024-03-19 RX ORDER — DAPAGLIFLOZIN 10 MG/1
1 TABLET, FILM COATED ORAL EVERY MORNING
Qty: 90 TABLET | Refills: 1 | Status: SHIPPED | OUTPATIENT
Start: 2024-03-19

## 2024-03-19 NOTE — TELEPHONE ENCOUNTER
Incoming Refill Request      Medication requested (name and dose): FARXIGA TAB 10 MG    Pharmacy where request should be sent: [facility]    Additional details provided by patient: REFILL REQUEST    Best call back number: [phone]    Does the patient have less than a 3 day supply:  [] Yes  [] No    Alex Garza Rep  03/19/24, 08:37 EDT

## 2024-03-19 NOTE — OUTREACH NOTE
Medical Week 2 Survey      Flowsheet Row Responses   [facility] patient discharged from? [place]   Does the patient have one of the following disease processes/diagnoses(primary or secondary)? Other   Week 2 attempt successful? No   Unsuccessful attempts Attempt 2            Mayra DUONG - Licensed Nurse

## 2024-03-28 ENCOUNTER — READMISSION MANAGEMENT (OUTPATIENT)
Dept: CALL CENTER | Facility: HOSPITAL | Age: 74
End: 2024-03-28
Payer: MEDICARE

## 2024-03-28 NOTE — OUTREACH NOTE
Medical Week 3 Survey      Flowsheet Row Responses   [facility] patient discharged from? [place]   Does the patient have one of the following disease processes/diagnoses(primary or secondary)? Other   Week 3 attempt successful? No   Unsuccessful attempts Attempt 1            Wendy Swenson Registered Nurse

## 2024-04-05 RX ORDER — METOPROLOL SUCCINATE 50 MG/1
TABLET, EXTENDED RELEASE ORAL
Qty: 90 TABLET | Refills: 4 | Status: SHIPPED | OUTPATIENT
Start: 2024-04-05

## 2024-04-08 DIAGNOSIS — Z79.4 TYPE 2 DIABETES MELLITUS WITH HYPERGLYCEMIA, WITH LONG-TERM CURRENT USE OF INSULIN: ICD-10-CM

## 2024-04-08 DIAGNOSIS — E11.65 TYPE 2 DIABETES MELLITUS WITH HYPERGLYCEMIA, WITH LONG-TERM CURRENT USE OF INSULIN: ICD-10-CM

## 2024-04-08 RX ORDER — PEN NEEDLE, DIABETIC 32GX 5/32"
NEEDLE, DISPOSABLE MISCELLANEOUS
Qty: 90 EACH | Refills: 3 | Status: SHIPPED | OUTPATIENT
Start: 2024-04-08

## 2024-04-08 NOTE — TELEPHONE ENCOUNTER
Incoming Refill Request      Medication requested (name and dose): PEN NEEDLES    Pharmacy where request should be sent: [facility]    Additional details provided by patient:     Best call back number: [phone]     Does the patient have less than a 3 day supply:  [] Yes  [] No    Alex Guzman Rep  04/08/24, 08:20 EDT

## 2024-05-14 ENCOUNTER — LAB (OUTPATIENT)
Dept: ENDOCRINOLOGY | Age: 74
End: 2024-05-14
Payer: MEDICARE

## 2024-05-14 DIAGNOSIS — E11.65 TYPE 2 DIABETES MELLITUS WITH HYPERGLYCEMIA, WITH LONG-TERM CURRENT USE OF INSULIN: ICD-10-CM

## 2024-05-14 DIAGNOSIS — Z79.4 TYPE 2 DIABETES MELLITUS WITH HYPERGLYCEMIA, WITH LONG-TERM CURRENT USE OF INSULIN: ICD-10-CM

## 2024-05-14 RX ORDER — PEN NEEDLE, DIABETIC 32GX 5/32"
NEEDLE, DISPOSABLE MISCELLANEOUS
Qty: 90 EACH | Refills: 3 | Status: SHIPPED | OUTPATIENT
Start: 2024-05-14

## 2024-05-14 NOTE — TELEPHONE ENCOUNTER
Caller: Mayra Hirsch    Relationship: Self    Best call back number: [phone]    Requested Prescriptions:     BD Pen Needle Ban 2nd Gen 32G X 4 MM misc     Requested Prescriptions      No prescriptions requested or ordered in this encounter        Pharmacy where request should be sent: [facility]- [id number] [facility]     Last office visit with prescribing clinician: 11/28/2023   Last telemedicine visit with prescribing clinician: Visit date not found   Next office visit with prescribing clinician: 5/28/2024     Additional details provided by patient: PT NEEDS A REFILL ON HER PEN NEEDLES.     Does the patient have less than a 3 day supply:  [x] Yes  [] No    Would you like a call back once the refill request has been completed: [x] Yes [] No    If the office needs to give you a call back, can they leave a voicemail: [x] Yes [] No    Alex Malagon Rep   05/14/24 15:39 EDT

## 2024-05-15 LAB
CHOLEST SERPL-MCNC: 108 MG/DL (ref 0–200)
HBA1C MFR BLD: 6.7 % (ref 4.8–5.6)
HDLC SERPL-MCNC: 52 MG/DL (ref 40–60)
IMP & REVIEW OF LAB RESULTS: NORMAL
LDLC SERPL CALC-MCNC: 42 MG/DL (ref 0–100)
TRIGL SERPL-MCNC: 62 MG/DL (ref 0–150)
TSH SERPL DL<=0.005 MIU/L-ACNC: 0.03 UIU/ML (ref 0.27–4.2)
VLDLC SERPL CALC-MCNC: 14 MG/DL (ref 5–40)

## 2024-05-28 ENCOUNTER — OFFICE VISIT (OUTPATIENT)
Dept: ENDOCRINOLOGY | Age: 74
End: 2024-05-28
Payer: MEDICARE

## 2024-05-28 VITALS
OXYGEN SATURATION: 97 % | WEIGHT: 212.8 LBS | DIASTOLIC BLOOD PRESSURE: 72 MMHG | BODY MASS INDEX: 32.25 KG/M2 | HEART RATE: 73 BPM | HEIGHT: 68 IN | SYSTOLIC BLOOD PRESSURE: 126 MMHG

## 2024-05-28 DIAGNOSIS — E78.5 HYPERLIPIDEMIA, UNSPECIFIED HYPERLIPIDEMIA TYPE: ICD-10-CM

## 2024-05-28 DIAGNOSIS — E03.9 HYPOTHYROIDISM, UNSPECIFIED TYPE: ICD-10-CM

## 2024-05-28 DIAGNOSIS — Z79.4 TYPE 2 DIABETES MELLITUS WITH HYPERGLYCEMIA, WITH LONG-TERM CURRENT USE OF INSULIN: Primary | ICD-10-CM

## 2024-05-28 DIAGNOSIS — E11.65 TYPE 2 DIABETES MELLITUS WITH HYPERGLYCEMIA, WITH LONG-TERM CURRENT USE OF INSULIN: Primary | ICD-10-CM

## 2024-05-28 PROCEDURE — 99214 OFFICE O/P EST MOD 30 MIN: CPT | Performed by: INTERNAL MEDICINE

## 2024-05-28 PROCEDURE — 1159F MED LIST DOCD IN RCRD: CPT | Performed by: INTERNAL MEDICINE

## 2024-05-28 PROCEDURE — 3078F DIAST BP <80 MM HG: CPT | Performed by: INTERNAL MEDICINE

## 2024-05-28 PROCEDURE — 3044F HG A1C LEVEL LT 7.0%: CPT | Performed by: INTERNAL MEDICINE

## 2024-05-28 PROCEDURE — 1160F RVW MEDS BY RX/DR IN RCRD: CPT | Performed by: INTERNAL MEDICINE

## 2024-05-28 PROCEDURE — G2211 COMPLEX E/M VISIT ADD ON: HCPCS | Performed by: INTERNAL MEDICINE

## 2024-05-28 PROCEDURE — 3074F SYST BP LT 130 MM HG: CPT | Performed by: INTERNAL MEDICINE

## 2024-05-28 RX ORDER — INSULIN GLARGINE 300 U/ML
36 INJECTION, SOLUTION SUBCUTANEOUS DAILY
Qty: 9 ML | Refills: 3 | Status: SHIPPED | OUTPATIENT
Start: 2024-05-28

## 2024-05-28 NOTE — PROGRESS NOTES
Chief complaint/Reason for consult: T2DM    HPI:   - 73 year old female here for management of diabetes mellitus type 2  - Last seen in 11/2023  - She had a hospitalization for influenza which led to DKA in March.  She was not taking her Farxiga around that time so I do not think that contributed to her DKA.  - Has had diabetes for over 10 years  - Complications include CAD status post CABG  - No known complications to date  - Is currently taking Tresiba 36 units daily, Farxiga 10 mg daily, Januvia 100 mg daily  - Denies hypoglycemia  - Is also on atorvastatin 40 mg daily and levothyroxine 125 mcg two tablets daily    The following portions of the patient's history were reviewed and updated as appropriate: allergies, current medications, past family history, past medical history, past social history, past surgical history, and problem list.      Objective     Vitals:    05/28/24 1317   BP: 126/72   Pulse: 73   SpO2: 97%        Physical Exam  Vitals reviewed.   Constitutional:       Appearance: Normal appearance.   HENT:      Head: Normocephalic and atraumatic.   Eyes:      General: No scleral icterus.  Pulmonary:      Effort: Pulmonary effort is normal. No respiratory distress.   Neurological:      Mental Status: She is alert.      Gait: Gait normal.   Psychiatric:         Mood and Affect: Mood normal.         Behavior: Behavior normal.         Thought Content: Thought content normal.         Judgment: Judgment normal.         Assessment & Plan   1., T2DM, controlled  - Cont. Tresiba 36 units daily, Farxiga 10 mg daily, Januvia 100 mg daily     2. Hypothyroidism  - Cont. Levothyroxine 125 mcg two daily  - Her TSH was suppressed however she has no symptoms of hyperthyroidism and her dose of medication has not changed so I suspect the labs could be a false result so will recheck her thyroid function tests before next visit  - I did tell her to contact me if she develops palpitations, tremor, unexplained weight  loss     3. Hyperlipidemia  - On atrovastatin 40 mg daily     - Return to clinic in 6 months

## 2024-07-23 ENCOUNTER — LAB (OUTPATIENT)
Dept: ENDOCRINOLOGY | Age: 74
End: 2024-07-23
Payer: MEDICARE

## 2024-07-23 DIAGNOSIS — E11.65 TYPE 2 DIABETES MELLITUS WITH HYPERGLYCEMIA, WITH LONG-TERM CURRENT USE OF INSULIN: ICD-10-CM

## 2024-07-23 DIAGNOSIS — Z79.4 TYPE 2 DIABETES MELLITUS WITH HYPERGLYCEMIA, WITH LONG-TERM CURRENT USE OF INSULIN: ICD-10-CM

## 2024-07-23 DIAGNOSIS — E03.9 HYPOTHYROIDISM, UNSPECIFIED TYPE: ICD-10-CM

## 2024-07-24 ENCOUNTER — TELEPHONE (OUTPATIENT)
Dept: ENDOCRINOLOGY | Age: 74
End: 2024-07-24
Payer: MEDICARE

## 2024-07-24 DIAGNOSIS — Z79.4 TYPE 2 DIABETES MELLITUS WITH HYPERGLYCEMIA, WITH LONG-TERM CURRENT USE OF INSULIN: ICD-10-CM

## 2024-07-24 DIAGNOSIS — E11.65 TYPE 2 DIABETES MELLITUS WITH HYPERGLYCEMIA, WITH LONG-TERM CURRENT USE OF INSULIN: ICD-10-CM

## 2024-07-24 DIAGNOSIS — E03.9 HYPOTHYROIDISM, UNSPECIFIED TYPE: Primary | ICD-10-CM

## 2024-07-24 LAB
ALBUMIN SERPL-MCNC: 4 G/DL (ref 3.5–5.2)
ALBUMIN/GLOB SERPL: 1.5 G/DL
ALP SERPL-CCNC: 111 U/L (ref 39–117)
ALT SERPL-CCNC: 22 U/L (ref 1–33)
AST SERPL-CCNC: 20 U/L (ref 1–32)
BILIRUB SERPL-MCNC: 0.4 MG/DL (ref 0–1.2)
BUN SERPL-MCNC: 23 MG/DL (ref 8–23)
BUN/CREAT SERPL: 25.3 (ref 7–25)
CALCIUM SERPL-MCNC: 9.3 MG/DL (ref 8.6–10.5)
CHLORIDE SERPL-SCNC: 107 MMOL/L (ref 98–107)
CO2 SERPL-SCNC: 21.1 MMOL/L (ref 22–29)
CREAT SERPL-MCNC: 0.91 MG/DL (ref 0.57–1)
EGFRCR SERPLBLD CKD-EPI 2021: 66.8 ML/MIN/1.73
GLOBULIN SER CALC-MCNC: 2.6 GM/DL
GLUCOSE SERPL-MCNC: 119 MG/DL (ref 65–99)
HBA1C MFR BLD: 7.1 % (ref 4.8–5.6)
POTASSIUM SERPL-SCNC: 5.2 MMOL/L (ref 3.5–5.2)
PROT SERPL-MCNC: 6.6 G/DL (ref 6–8.5)
SODIUM SERPL-SCNC: 139 MMOL/L (ref 136–145)
T4 FREE SERPL-MCNC: 1.74 NG/DL (ref 0.92–1.68)
TSH SERPL DL<=0.005 MIU/L-ACNC: 0.13 UIU/ML (ref 0.27–4.2)

## 2024-07-24 RX ORDER — LEVOTHYROXINE SODIUM 0.2 MG/1
200 TABLET ORAL DAILY
Qty: 30 TABLET | Refills: 5 | Status: SHIPPED | OUTPATIENT
Start: 2024-07-24

## 2024-07-24 NOTE — TELEPHONE ENCOUNTER
----- Message from Rm Mosley sent at 7/24/2024 12:02 PM EDT -----  Can she please have repeat labs in 2 months.  Thank you

## 2024-08-07 RX ORDER — CLOPIDOGREL BISULFATE 75 MG/1
TABLET ORAL
Qty: 90 TABLET | Refills: 2 | Status: SHIPPED | OUTPATIENT
Start: 2024-08-07

## 2024-09-06 ENCOUNTER — HOSPITAL ENCOUNTER (OUTPATIENT)
Dept: PET IMAGING | Facility: HOSPITAL | Age: 74
Discharge: HOME | End: 2024-09-06
Payer: MEDICARE

## 2024-09-06 ENCOUNTER — APPOINTMENT (OUTPATIENT)
Dept: WOMENS IMAGING | Facility: HOSPITAL | Age: 74
End: 2024-09-06
Payer: MEDICARE

## 2024-09-06 ENCOUNTER — TRANSCRIBE ORDERS (OUTPATIENT)
Dept: PET IMAGING | Facility: HOSPITAL | Age: 74
End: 2024-09-06
Payer: MEDICARE

## 2024-09-06 DIAGNOSIS — M81.0 HIGH RISK FOR FRACTURE DUE TO OSTEOPOROSIS BY DEXA SCAN: Primary | ICD-10-CM

## 2024-09-06 PROCEDURE — 77063 BREAST TOMOSYNTHESIS BI: CPT | Performed by: RADIOLOGY

## 2024-09-06 PROCEDURE — 77080 DXA BONE DENSITY AXIAL: CPT

## 2024-09-06 PROCEDURE — 77067 SCR MAMMO BI INCL CAD: CPT | Performed by: RADIOLOGY

## 2024-09-16 DIAGNOSIS — Z79.4 TYPE 2 DIABETES MELLITUS WITH HYPERGLYCEMIA, WITH LONG-TERM CURRENT USE OF INSULIN: ICD-10-CM

## 2024-09-16 DIAGNOSIS — E11.65 TYPE 2 DIABETES MELLITUS WITH HYPERGLYCEMIA, WITH LONG-TERM CURRENT USE OF INSULIN: ICD-10-CM

## 2024-09-16 RX ORDER — DAPAGLIFLOZIN 10 MG/1
1 TABLET, FILM COATED ORAL EVERY MORNING
Qty: 90 TABLET | Refills: 1 | Status: SHIPPED | OUTPATIENT
Start: 2024-09-16

## 2024-11-20 ENCOUNTER — TELEPHONE (OUTPATIENT)
Dept: ENDOCRINOLOGY | Age: 74
End: 2024-11-20
Payer: MEDICARE

## 2024-11-20 DIAGNOSIS — E78.5 HYPERLIPIDEMIA, UNSPECIFIED HYPERLIPIDEMIA TYPE: ICD-10-CM

## 2024-11-20 DIAGNOSIS — E03.9 HYPOTHYROIDISM, UNSPECIFIED TYPE: ICD-10-CM

## 2024-11-20 DIAGNOSIS — E11.65 TYPE 2 DIABETES MELLITUS WITH HYPERGLYCEMIA, WITH LONG-TERM CURRENT USE OF INSULIN: Primary | ICD-10-CM

## 2024-11-20 DIAGNOSIS — Z79.4 TYPE 2 DIABETES MELLITUS WITH HYPERGLYCEMIA, WITH LONG-TERM CURRENT USE OF INSULIN: Primary | ICD-10-CM

## 2024-12-03 ENCOUNTER — OFFICE VISIT (OUTPATIENT)
Dept: ENDOCRINOLOGY | Age: 74
End: 2024-12-03
Payer: MEDICARE

## 2024-12-03 VITALS
DIASTOLIC BLOOD PRESSURE: 84 MMHG | BODY MASS INDEX: 33.25 KG/M2 | HEIGHT: 68 IN | HEART RATE: 77 BPM | WEIGHT: 219.4 LBS | OXYGEN SATURATION: 98 % | SYSTOLIC BLOOD PRESSURE: 126 MMHG

## 2024-12-03 DIAGNOSIS — E78.5 HYPERLIPIDEMIA, UNSPECIFIED HYPERLIPIDEMIA TYPE: ICD-10-CM

## 2024-12-03 DIAGNOSIS — E11.65 TYPE 2 DIABETES MELLITUS WITH HYPERGLYCEMIA, WITH LONG-TERM CURRENT USE OF INSULIN: Primary | ICD-10-CM

## 2024-12-03 DIAGNOSIS — Z79.4 TYPE 2 DIABETES MELLITUS WITH HYPERGLYCEMIA, WITH LONG-TERM CURRENT USE OF INSULIN: Primary | ICD-10-CM

## 2024-12-03 DIAGNOSIS — E03.9 HYPOTHYROIDISM, UNSPECIFIED TYPE: ICD-10-CM

## 2024-12-03 PROCEDURE — 99214 OFFICE O/P EST MOD 30 MIN: CPT | Performed by: INTERNAL MEDICINE

## 2024-12-03 PROCEDURE — 3079F DIAST BP 80-89 MM HG: CPT | Performed by: INTERNAL MEDICINE

## 2024-12-03 PROCEDURE — 1160F RVW MEDS BY RX/DR IN RCRD: CPT | Performed by: INTERNAL MEDICINE

## 2024-12-03 PROCEDURE — 3051F HG A1C>EQUAL 7.0%<8.0%: CPT | Performed by: INTERNAL MEDICINE

## 2024-12-03 PROCEDURE — 1159F MED LIST DOCD IN RCRD: CPT | Performed by: INTERNAL MEDICINE

## 2024-12-03 PROCEDURE — 95251 CONT GLUC MNTR ANALYSIS I&R: CPT | Performed by: INTERNAL MEDICINE

## 2024-12-03 PROCEDURE — 3074F SYST BP LT 130 MM HG: CPT | Performed by: INTERNAL MEDICINE

## 2024-12-03 RX ORDER — PEN NEEDLE, DIABETIC 32GX 5/32"
NEEDLE, DISPOSABLE MISCELLANEOUS
Qty: 90 EACH | Refills: 3 | Status: SHIPPED | OUTPATIENT
Start: 2024-12-03

## 2024-12-03 RX ORDER — LEVOTHYROXINE SODIUM 200 UG/1
200 TABLET ORAL DAILY
Qty: 30 TABLET | Refills: 5 | Status: SHIPPED | OUTPATIENT
Start: 2024-12-03

## 2024-12-03 RX ORDER — SITAGLIPTIN 100 MG/1
100 TABLET, FILM COATED ORAL DAILY
Qty: 90 TABLET | Refills: 3 | Status: SHIPPED | OUTPATIENT
Start: 2024-12-03

## 2024-12-03 RX ORDER — INSULIN GLARGINE 300 U/ML
36 INJECTION, SOLUTION SUBCUTANEOUS DAILY
Qty: 9 ML | Refills: 3 | Status: SHIPPED | OUTPATIENT
Start: 2024-12-03

## 2024-12-03 RX ORDER — DAPAGLIFLOZIN 10 MG/1
1 TABLET, FILM COATED ORAL EVERY MORNING
Qty: 90 TABLET | Refills: 1 | Status: SHIPPED | OUTPATIENT
Start: 2024-12-03

## 2024-12-03 NOTE — PROGRESS NOTES
Referring provider: No ref. provider found     Chief complaint/Reason for consult: T2DM    HPI:   - 73 year old female here for management of diabetes mellitus type 2  - Last seen in 11/2023  - She had a hospitalization for influenza which led to DKA in March.  She was not taking her Farxiga around that time so I do not think that contributed to her DKA.  - Has had diabetes for over 10 years  - Complications include CAD status post CABG  - No known complications to date  - Is currently taking Tresiba 36 units daily, Farxiga 10 mg daily, Januvia 100 mg daily  - Denies hypoglycemia  - Is also on atorvastatin 40 mg daily and levothyroxine 200 mcg daily    The following portions of the patient's history were reviewed and updated as appropriate: allergies, current medications, past family history, past medical history, past social history, past surgical history, and problem list.      Objective     Vitals:    12/03/24 1321   BP: 126/84   Pulse: 77   SpO2: 98%        Physical Exam  Vitals reviewed.   Constitutional:       Appearance: Normal appearance.   HENT:      Head: Normocephalic and atraumatic.   Eyes:      General: No scleral icterus.  Pulmonary:      Effort: Pulmonary effort is normal. No respiratory distress.   Neurological:      Mental Status: She is alert.      Gait: Gait normal.   Psychiatric:         Mood and Affect: Mood normal.         Behavior: Behavior normal.         Thought Content: Thought content normal.         Judgment: Judgment normal.       CGM interpretation  Dates reviewed:  11/20-12/3/24  Data:  Avg of 159, 72% in target range, 22% high, 6% very high, 0% low  Interpretation:  Reasonable glycemic control    Assessment & Plan   1., T2DM, controlled  - Cont. Tresiba 36 units daily, Farxiga 10 mg daily, Januvia 100 mg daily     2. Hypothyroidism  - Cont. Levothyroxine 200 mcg daily     3. Hyperlipidemia  - On atrovastatin 40 mg daily     - Return to clinic in 6 months

## 2025-01-26 DIAGNOSIS — E11.65 TYPE 2 DIABETES MELLITUS WITH HYPERGLYCEMIA, WITH LONG-TERM CURRENT USE OF INSULIN: ICD-10-CM

## 2025-01-26 DIAGNOSIS — Z79.4 TYPE 2 DIABETES MELLITUS WITH HYPERGLYCEMIA, WITH LONG-TERM CURRENT USE OF INSULIN: ICD-10-CM

## 2025-01-27 RX ORDER — LEVOTHYROXINE SODIUM 200 UG/1
200 TABLET ORAL DAILY
Qty: 90 TABLET | Refills: 1 | Status: SHIPPED | OUTPATIENT
Start: 2025-01-27

## 2025-01-27 NOTE — TELEPHONE ENCOUNTER
Rx Refill Note  Requested Prescriptions     Pending Prescriptions Disp Refills    levothyroxine (SYNTHROID, LEVOTHROID) 200 MCG tablet [Pharmacy Med Name: LEVOTHYROXINE 200 MCG TABLET] 90 tablet 1     Sig: TAKE 1 TABLET BY MOUTH EVERY DAY      Last office visit with prescribing clinician: 12/3/2024   Last telemedicine visit with prescribing clinician: Visit date not found   Next office visit with prescribing clinician: 6/3/2025                         Would you like a call back once the refill request has been completed: [] Yes [] No    If the office needs to give you a call back, can they leave a voicemail: [] Yes [] No    Molly Judd MA  01/27/25, 07:19 EST

## 2025-03-18 ENCOUNTER — OFFICE VISIT (OUTPATIENT)
Dept: CARDIOLOGY | Facility: CLINIC | Age: 75
End: 2025-03-18
Payer: MEDICARE

## 2025-03-18 VITALS
HEART RATE: 76 BPM | HEIGHT: 68 IN | BODY MASS INDEX: 33.8 KG/M2 | WEIGHT: 223 LBS | SYSTOLIC BLOOD PRESSURE: 124 MMHG | DIASTOLIC BLOOD PRESSURE: 80 MMHG

## 2025-03-18 DIAGNOSIS — I10 ESSENTIAL HYPERTENSION: ICD-10-CM

## 2025-03-18 DIAGNOSIS — E78.5 HYPERLIPIDEMIA, UNSPECIFIED HYPERLIPIDEMIA TYPE: ICD-10-CM

## 2025-03-18 DIAGNOSIS — Z95.1 S/P CABG X 5: ICD-10-CM

## 2025-03-18 DIAGNOSIS — R07.2 PRECORDIAL PAIN: Primary | ICD-10-CM

## 2025-03-18 DIAGNOSIS — R06.02 SHORTNESS OF BREATH: ICD-10-CM

## 2025-03-18 PROCEDURE — 93000 ELECTROCARDIOGRAM COMPLETE: CPT | Performed by: NURSE PRACTITIONER

## 2025-03-18 PROCEDURE — 3079F DIAST BP 80-89 MM HG: CPT | Performed by: NURSE PRACTITIONER

## 2025-03-18 PROCEDURE — 3074F SYST BP LT 130 MM HG: CPT | Performed by: NURSE PRACTITIONER

## 2025-03-18 RX ORDER — PAROXETINE 40 MG/1
TABLET, FILM COATED ORAL
COMMUNITY
Start: 2025-01-14

## 2025-03-18 NOTE — ADDENDUM NOTE
Addended by: EMBER DUONG on: 3/18/2025 11:00 AM     Modules accepted: Orders    
Patient expressed no known problems or needs

## 2025-03-18 NOTE — PROGRESS NOTES
Subjective:        Mayra Hirsch is a 74 y.o. female who here for follow up    No chief complaint on file.      HPI    Mayra Hirsch is a 74-year-old female who new to this provider.  She has a history of depression, diabetes mellitus, disease of thyroid gland, dyslipidemia, hypertension, hypothyroidism diabetes mellitus.  She is here today for a 1 year follow-up for hypertension and cardiac clearance.    3/11/24 echo: EF 46 to 30%, LV diastolic function is consistent with grade 1 with impaired relaxation.    12/19/22 stress test: Equivocal ECG evidence of myocardial ischemia.  Negative clinical evidence of mass scar or ischemia.  Findings consistent with an equivocal ECG stress test.    11/7/22 cardiac cath/angioplasty and stent to the diagonal branch 99% reduced to 0% with 2.5/12 Xience stent.  Left circumflex artery was a nondominant with % occluded distal circumflex is diffuse 70 to 80% stenosis.  LAD artery is diffusely small vessel proximally 90% stenosis, diagonal branch was 99% reduced to 0% with 2.5/12 Xience stent.  Left main ostial 50% stenosis bifurcating into the LAD and circumflex in the normal fashion.  EF 40%.    The following portions of the patient's history were reviewed and updated as appropriate: allergies, current medications, past family history, past medical history, past social history, past surgical history and problem list.    Past Medical History:   Diagnosis Date    Depression     Diabetes mellitus     Disease of thyroid gland     Dyslipidemia 04/28/2017    Fibrocystic breast     Hypertension     Hyperthyroidism     Hypothyroidism     PONV (postoperative nausea and vomiting)     Type 2 diabetes mellitus          reports that she has never smoked. She has never been exposed to tobacco smoke. She has never used smokeless tobacco. She reports that she does not drink alcohol and does not use drugs.     Family History   Problem Relation Age of Onset    Coronary artery disease Mother      Alzheimer's disease Mother     Hypertension Mother     Coronary artery disease Father     Cancer Father         Bladder cancer    Thyroid disease Sister     Malig Hyperthermia Neg Hx             Objective:           Vitals and nursing note reviewed.   Constitutional:       Appearance: Well-developed.   HENT:      Head: Normocephalic.      Right Ear: External ear normal.      Left Ear: External ear normal.   Neck:      Vascular: No JVD.   Pulmonary:      Effort: Pulmonary effort is normal. No respiratory distress.      Breath sounds: Normal breath sounds. No stridor. No rales.   Cardiovascular:      Normal rate. Regular rhythm.      No gallop.    Pulses:     Intact distal pulses.   Edema:     Peripheral edema absent.   Abdominal:      General: Bowel sounds are normal. There is no distension.      Palpations: Abdomen is soft.      Tenderness: There is no abdominal tenderness. There is no guarding.   Musculoskeletal: Normal range of motion.         General: No tenderness.      Cervical back: Normal range of motion. Skin:     General: Skin is warm.   Neurological:      Mental Status: Alert and oriented to person, place, and time.      Deep Tendon Reflexes: Reflexes are normal and symmetric.   Psychiatric:         Judgment: Judgment normal.           ECG 12 Lead    Date/Time: 3/18/2025 10:45 AM  Performed by: Sharron Bolivar APRN    Authorized by: Sharron Bolivar APRN  Comparison: compared with previous ECG from 3/11/2024  Comparison to previous ECG: Nonspecific twaves  Rate: normal    Clinical impression: non-specific ECG             3/11/24    Interpretation Summary         Left ventricular ejection fraction appears to be 46 - 50%.    Left ventricular diastolic function is consistent with (grade I) impaired relaxation.    Moderate to severe aortic valve stenosis is present.    Estimated right ventricular systolic pressure from tricuspid regurgitation is normal (<35 mmHg).       12/19/22  Interpretation  Summary         The clinician observed shortness of breath during the stress test.    The patient reported shortness of breath during the stress test.    Arrhythmias were not significant during stress.    Equivocal ECG evidence of myocardial ischemia.    Negative clinical evidence of myocardial ischemia. Findings consistent with an equivocal ECG stress test     Asymptomatic for chest pain for heart rate achieved. Functional study. Specificity of study reduced secondary to baseline Non-specific ST-T wave abnormalities and submaximal stress test.  Ectopy: none.  Blood pressure response:  appropriate. Exercise tolerance poor.   Supervised by:  Patricia WATERMAN.    Recommendation: cardiac rehab      11/7/22    HEMODYNAMIC / ANGIOGRAPHIC DATA:   Left ventricular end diastolic pressure was 10 mmHg.  Left ventriculography revealed an EF around 40%.   The left main is Left main ostial 50% stenosis bifurcating into left anterior descending and circumflex in the normal fashion  The left anterior descending artery is Left anterior descending diffusely small vessel disease proximal 90% stenosis, diagonal branch was 99% reduced to 0% with 2.5/12 Xience stent  The left circumflex is Circumflex artery was a nondominant with % occluded distal circumflex diffuse 70 to 80% stenosis  The right coronary artery is Right coronary artery dominant with ostial 80% stenosed  Successful angioplasty and stent to the diagonal branch 99% reduced to 0% with 2.5/12 Xience stent    SKYLAR FLOW PRE...2.... POST...3...    TYPE OF LESION......c.......    RECOMMENDATIONS: Post-procedure care will focus on prevention of any ischemic events and congestive complications. Aggressive risk factor modification will be carried out.  Importance of taking dual antiplatelets for one year has been explained, risk of stent thrombosis leading to the acute MI, which carries high morbidity and mortality has been explained    Discontinuation or interruptions of  these medications should be under the strict guidance of appropriate health professional     Current Outpatient Medications:     aspirin 81 MG EC tablet, Take 1 tablet by mouth Daily., Disp: 30 tablet, Rfl: 5    atorvastatin (LIPITOR) 40 MG tablet, Take 1 tablet by mouth Every Night., Disp: 90 tablet, Rfl: 0    BD Pen Needle Ban 2nd Gen 32G X 4 MM misc, Use 1 per day, Disp: 90 each, Rfl: 3    benzonatate (TESSALON) 200 MG capsule, Take 1 capsule by mouth 2 (Two) Times a Day As Needed for Cough. (Patient not taking: Reported on 12/3/2024), Disp: 30 capsule, Rfl: 0    budesonide-formoterol (SYMBICORT) 160-4.5 MCG/ACT inhaler, Inhale 2 puffs 2 (Two) Times a Day. (Patient not taking: Reported on 12/3/2024), Disp: 10.2 g, Rfl: 0    clopidogrel (PLAVIX) 75 MG tablet, TAKE 1 TABLET BY MOUTH EVERY DAY, Disp: 90 tablet, Rfl: 2    Continuous Glucose Sensor (FreeStyle Gene 2 Sensor) misc, 1 each by Other route Every 14 (Fourteen) Days., Disp: 6 each, Rfl: 3    cyclobenzaprine (FLEXERIL) 10 MG tablet, Take 1 tablet by mouth Every 8 (Eight) Hours As Needed for Muscle Spasms. (Patient not taking: Reported on 12/3/2024), Disp: 30 tablet, Rfl: 0    dapagliflozin Propanediol (Farxiga) 10 MG tablet, Take 10 mg by mouth Every Morning., Disp: 90 tablet, Rfl: 1    ezetimibe (ZETIA) 10 MG tablet, TAKE 1 TABLET DAILY, Disp: 90 tablet, Rfl: 0    FLUoxetine (PROzac) 40 MG capsule, Take 1 capsule by mouth Daily. (Patient not taking: Reported on 12/3/2024), Disp: , Rfl:     furosemide (LASIX) 40 MG tablet, Take 0.5 tablets by mouth Daily., Disp: 30 tablet, Rfl: 5    Insulin Pen Needle (BD Pen Needle Micro U/F) 32G X 6 MM misc, USE ONE PER DAY FOR DAILY INSULIN INJECTION, E11.9, Disp: 100 each, Rfl: 3    Januvia 100 MG tablet, Take 1 tablet by mouth Daily., Disp: 90 tablet, Rfl: 3    levothyroxine (SYNTHROID, LEVOTHROID) 200 MCG tablet, TAKE 1 TABLET BY MOUTH EVERY DAY, Disp: 90 tablet, Rfl: 1    lisinopril (PRINIVIL,ZESTRIL) 20 MG tablet,  Take 1 tablet by mouth Daily., Disp: , Rfl:     metoprolol succinate XL (TOPROL-XL) 50 MG 24 hr tablet, TAKE 1 TABLET BY MOUTH EVERY DAY, Disp: 90 tablet, Rfl: 4    ondansetron ODT (ZOFRAN-ODT) 4 MG disintegrating tablet, Place 1 tablet on the tongue 4 (Four) Times a Day As Needed for Nausea or Vomiting. (Patient not taking: Reported on 12/3/2024), Disp: 30 tablet, Rfl: 0    oseltamivir (Tamiflu) 75 MG capsule, Take 1 capsule by mouth 2 (Two) Times a Day. (Patient not taking: Reported on 12/3/2024), Disp: 10 capsule, Rfl: 0    promethazine-dextromethorphan (PROMETHAZINE-DM) 6.25-15 MG/5ML syrup, Take 5 mL by mouth 4 (Four) Times a Day As Needed for Cough. (Patient not taking: Reported on 12/3/2024), Disp: 118 mL, Rfl: 0    Toujeo SoloStar 300 UNIT/ML solution pen-injector injection, Inject 36 Units under the skin into the appropriate area as directed Daily., Disp: 9 mL, Rfl: 3    vitamin D (ERGOCALCIFEROL) 1.25 MG (22212 UT) capsule capsule, TAKE ONE CAPSULE BY MOUTH ONCE WEEKLY (Patient not taking: Reported on 12/3/2024), Disp: 12 capsule, Rfl: 0     Assessment:        Patient Active Problem List   Diagnosis    Vitamin D deficiency    Primary hypothyroidism    Hyperlipidemia    Essential hypertension    Type 2 diabetes mellitus without complication, with long-term current use of insulin    Noncompliance with diabetes treatment    ST elevation myocardial infarction (STEMI)    Coronary artery disease involving native heart    S/P CABG x 5    Low left ventricular ejection fraction    Shortness of breath    Right sided weakness    Diabetic ketoacidosis    Cervical spondylosis without myelopathy               Plan:   1.  Hypertension: Stable today continue metoprolol Succinate.    Educated patient on exercising for at least 30 minutes a day for 2 to 3 days a week. Importance of controlling hypertension and blood pressure checkup on the regular basis has been explained. Hypertension as a silent killer has been discussed.  Risk reduction of the weight and regular exercises to control the hypertension has been explained.    2.  Dyslipidemia: Her PCP manages her cholesterol labs. ALT/AST on 11/26/2024 within normal limits.  , HDL 60, LDL 68 and triglycerides 80.  Continue statin.    Risk of the hyperlipidemia, importance of the treatment has been explained. Pros and cons of the statins has been explained. Regular blood workup as well as side effects including the liver failure, myelopathy death has been explained.    3.  CAD with CABG times 5 in 11/2022: Previous ischemic workup as above.  She states she has been having sharp chest pain with exertion. She has some shortness of breath worse with exertion.  Continue Plavix, beta-blockade, aspirin, atorvastatin, and Zetia.      It was explained to the patient that stress testing carries 85% specificity/sensitivity, and does not rule out future cardiac event.  Risks of the procedure were explained to the patient including shortness of breath, induction of myocardial infarction, and dizziness.  Patient is agreeable to proceeding with stress testing.       Risk reduction for the coronary artery disease, controlling the blood pressure, blood sugar management, cholesterol management, exercise, stress management, and proper compliance with medications and follow-up has been discussed.    4.  Cardiac clearance: She will need a stress and echo.      It was explained to the patient that stress testing carries 85% specificity/sensitivity, and does not rule out future cardiac event.  Risks of the procedure were explained to the patient including shortness of breath, induction of myocardial infarction, and dizziness.  Patient is agreeable to proceeding with stress testing.                No diagnosis found.    There are no diagnoses linked to this encounter.    COUNSELING: bettye Henry was given to patient for the following topics: diagnostic results, risk factor reductions,  impressions, risks and benefits of treatment options and importance of treatment compliance .       SMOKING COUNSELING: denies     -chest discomfort/shortness of breath and cardiac clearance: She will have a walking lexiscan, and echo.    Sincerely,   GUEVARA Burgess  [facility]  03/18/25  08:41 EDT    EMR Dragon/Transcription disclaimer: Dictated utilizing Dragon Dictation

## 2025-03-26 ENCOUNTER — HOSPITAL ENCOUNTER (OUTPATIENT)
Dept: CARDIOLOGY | Facility: HOSPITAL | Age: 75
Discharge: HOME | End: 2025-03-26
Payer: MEDICARE

## 2025-03-26 ENCOUNTER — HOSPITAL ENCOUNTER (OUTPATIENT)
Dept: CARDIOLOGY | Facility: HOSPITAL | Age: 75
Discharge: HOME | End: 2025-03-26
Admitting: NURSE PRACTITIONER
Payer: MEDICARE

## 2025-03-26 VITALS
BODY MASS INDEX: 33.8 KG/M2 | SYSTOLIC BLOOD PRESSURE: 137 MMHG | HEART RATE: 67 BPM | WEIGHT: 223 LBS | HEIGHT: 68 IN | DIASTOLIC BLOOD PRESSURE: 72 MMHG | OXYGEN SATURATION: 99 %

## 2025-03-26 VITALS
SYSTOLIC BLOOD PRESSURE: 137 MMHG | HEART RATE: 69 BPM | DIASTOLIC BLOOD PRESSURE: 69 MMHG | BODY MASS INDEX: 33.75 KG/M2 | OXYGEN SATURATION: 95 % | WEIGHT: 222.66 LBS | HEIGHT: 68 IN

## 2025-03-26 DIAGNOSIS — R51.9 HEADACHE AROUND THE EYES: Primary | ICD-10-CM

## 2025-03-26 PROCEDURE — 78452 HT MUSCLE IMAGE SPECT MULT: CPT

## 2025-03-26 PROCEDURE — 93017 CV STRESS TEST TRACING ONLY: CPT

## 2025-03-26 PROCEDURE — 34310000005 TECHNETIUM SESTAMIBI: Performed by: INTERNAL MEDICINE

## 2025-03-26 PROCEDURE — 93306 TTE W/DOPPLER COMPLETE: CPT | Performed by: INTERNAL MEDICINE

## 2025-03-26 PROCEDURE — 93306 TTE W/DOPPLER COMPLETE: CPT

## 2025-03-26 PROCEDURE — 25510000001 PERFLUTREN 6.52 MG/ML SUSPENSION 2 ML VIAL: Performed by: NURSE PRACTITIONER

## 2025-03-26 PROCEDURE — 25010000002 REGADENOSON 0.4 MG/5ML SOLUTION: Performed by: INTERNAL MEDICINE

## 2025-03-26 PROCEDURE — A9500 TC99M SESTAMIBI: HCPCS | Performed by: INTERNAL MEDICINE

## 2025-03-26 RX ORDER — REGADENOSON 0.08 MG/ML
0.4 INJECTION, SOLUTION INTRAVENOUS
Status: COMPLETED | OUTPATIENT
Start: 2025-03-26 | End: 2025-03-26

## 2025-03-26 RX ADMIN — SODIUM CHLORIDE 8 ML: 9 INJECTION INTRAMUSCULAR; INTRAVENOUS; SUBCUTANEOUS at 08:18

## 2025-03-26 RX ADMIN — TECHNETIUM TC 99M SESTAMIBI 1 DOSE: 1 INJECTION INTRAVENOUS at 09:05

## 2025-03-26 RX ADMIN — REGADENOSON 0.4 MG: 0.08 INJECTION, SOLUTION INTRAVENOUS at 09:05

## 2025-03-26 RX ADMIN — TECHNETIUM TC 99M SESTAMIBI 1 DOSE: 1 INJECTION INTRAVENOUS at 07:08

## 2025-03-27 LAB
BH CV REST NUCLEAR ISOTOPE DOSE: 10.9 MCI
BH CV STRESS BP STAGE 1: NORMAL
BH CV STRESS COMMENTS STAGE 1: NORMAL
BH CV STRESS DOSE REGADENOSON STAGE 1: 0.4
BH CV STRESS DURATION MIN STAGE 1: 4
BH CV STRESS DURATION SEC STAGE 1: 0
BH CV STRESS GRADE STAGE 1: 0
BH CV STRESS HR STAGE 1: 88
BH CV STRESS METS STAGE 1: 2.3
BH CV STRESS NUCLEAR ISOTOPE DOSE: 32.9 MCI
BH CV STRESS PROTOCOL 1: NORMAL
BH CV STRESS RECOVERY BP: NORMAL MMHG
BH CV STRESS RECOVERY HR: 75 BPM
BH CV STRESS RECOVERY O2: 99 %
BH CV STRESS SPEED STAGE 1: 1.2
BH CV STRESS STAGE 1: 1
MAXIMAL PREDICTED HEART RATE: 146 BPM
PERCENT MAX PREDICTED HR: 60.27 %
SPECT HRT GATED+EF W RNC IV: 45 %
STRESS BASELINE BP: NORMAL MMHG
STRESS BASELINE HR: 74 BPM
STRESS O2 SAT REST: 99 %
STRESS PERCENT HR: 71 %
STRESS POST ESTIMATED WORKLOAD: 2.3 METS
STRESS POST EXERCISE DUR MIN: 4 MIN
STRESS POST EXERCISE DUR SEC: 0 SEC
STRESS POST PEAK BP: NORMAL MMHG
STRESS POST PEAK HR: 88 BPM
STRESS TARGET HR: 124 BPM

## 2025-03-28 LAB
AORTIC DIMENSIONLESS INDEX: 0.54 (DI)
ASCENDING AORTA: 2.9 CM
AV MEAN PRESS GRAD SYS DOP V1V2: 8.1 MMHG
AV VMAX SYS DOP: 195.5 CM/SEC
BH CV ECHO MEAS - AO MAX PG: 15.3 MMHG
BH CV ECHO MEAS - AO V2 VTI: 40.3 CM
BH CV ECHO MEAS - AVA(I,D): 1.42 CM2
BH CV ECHO MEAS - EDV(CUBED): 88.7 ML
BH CV ECHO MEAS - EDV(MOD-SP2): 57 ML
BH CV ECHO MEAS - EDV(MOD-SP4): 102 ML
BH CV ECHO MEAS - EF(MOD-SP2): 50.9 %
BH CV ECHO MEAS - EF(MOD-SP4): 52 %
BH CV ECHO MEAS - ESV(CUBED): 35 ML
BH CV ECHO MEAS - ESV(MOD-SP2): 28 ML
BH CV ECHO MEAS - ESV(MOD-SP4): 49 ML
BH CV ECHO MEAS - FS: 26.6 %
BH CV ECHO MEAS - IVS/LVPW: 1.13 CM
BH CV ECHO MEAS - IVSD: 1.4 CM
BH CV ECHO MEAS - LAT PEAK E' VEL: 5.1 CM/SEC
BH CV ECHO MEAS - LV DIASTOLIC VOL/BSA (35-75): 47.6 CM2
BH CV ECHO MEAS - LV MASS(C)D: 225 GRAMS
BH CV ECHO MEAS - LV MAX PG: 3.8 MMHG
BH CV ECHO MEAS - LV MEAN PG: 2.08 MMHG
BH CV ECHO MEAS - LV SYSTOLIC VOL/BSA (12-30): 22.9 CM2
BH CV ECHO MEAS - LV V1 MAX: 97.1 CM/SEC
BH CV ECHO MEAS - LV V1 VTI: 21.7 CM
BH CV ECHO MEAS - LVIDD: 4.5 CM
BH CV ECHO MEAS - LVIDS: 3.3 CM
BH CV ECHO MEAS - LVOT AREA: 2.6 CM2
BH CV ECHO MEAS - LVOT DIAM: 1.83 CM
BH CV ECHO MEAS - LVPWD: 1.24 CM
BH CV ECHO MEAS - MED PEAK E' VEL: 4.2 CM/SEC
BH CV ECHO MEAS - MV A DUR: 0.16 SEC
BH CV ECHO MEAS - MV A MAX VEL: 107.4 CM/SEC
BH CV ECHO MEAS - MV DEC SLOPE: 229.7 CM/SEC2
BH CV ECHO MEAS - MV DEC TIME: 0.27 SEC
BH CV ECHO MEAS - MV E MAX VEL: 61.4 CM/SEC
BH CV ECHO MEAS - MV E/A: 0.57
BH CV ECHO MEAS - MV MAX PG: 6 MMHG
BH CV ECHO MEAS - MV MEAN PG: 2.09 MMHG
BH CV ECHO MEAS - MV P1/2T: 100.2 MSEC
BH CV ECHO MEAS - MV V2 VTI: 37.3 CM
BH CV ECHO MEAS - MVA(P1/2T): 2.2 CM2
BH CV ECHO MEAS - MVA(VTI): 1.54 CM2
BH CV ECHO MEAS - PA ACC TIME: 0.14 SEC
BH CV ECHO MEAS - PA V2 MAX: 84.4 CM/SEC
BH CV ECHO MEAS - PULM A REVS DUR: 0.1 SEC
BH CV ECHO MEAS - PULM A REVS VEL: 27.2 CM/SEC
BH CV ECHO MEAS - PULM DIAS VEL: 38 CM/SEC
BH CV ECHO MEAS - PULM S/D: 1.17
BH CV ECHO MEAS - PULM SYS VEL: 44.5 CM/SEC
BH CV ECHO MEAS - QP/QS: 0.72
BH CV ECHO MEAS - RV MAX PG: 1.15 MMHG
BH CV ECHO MEAS - RV V1 MAX: 53.6 CM/SEC
BH CV ECHO MEAS - RV V1 VTI: 10.9 CM
BH CV ECHO MEAS - RVOT DIAM: 2.2 CM
BH CV ECHO MEAS - SV(LVOT): 57.3 ML
BH CV ECHO MEAS - SV(MOD-SP2): 29 ML
BH CV ECHO MEAS - SV(MOD-SP4): 53 ML
BH CV ECHO MEAS - SV(RVOT): 41.4 ML
BH CV ECHO MEAS - SVI(LVOT): 26.8 ML/M2
BH CV ECHO MEAS - SVI(MOD-SP2): 13.5 ML/M2
BH CV ECHO MEAS - SVI(MOD-SP4): 24.8 ML/M2
BH CV ECHO MEAS - TAPSE (>1.6): 0.95 CM
BH CV ECHO MEASUREMENTS AVERAGE E/E' RATIO: 13.2
BH CV XLRA - RV BASE: 2.8 CM
BH CV XLRA - RV LENGTH: 5.6 CM
BH CV XLRA - RV MID: 2.04 CM
BH CV XLRA - TDI S': 7.2 CM/SEC
LEFT ATRIUM VOLUME INDEX: 23.6 ML/M2
LV EF BIPLANE MOD: 50.2 %
SINUS: 2.23 CM
STJ: 1.93 CM

## 2025-03-31 ENCOUNTER — OFFICE VISIT (OUTPATIENT)
Dept: CARDIOLOGY | Facility: CLINIC | Age: 75
End: 2025-03-31
Payer: MEDICARE

## 2025-03-31 VITALS
SYSTOLIC BLOOD PRESSURE: 168 MMHG | DIASTOLIC BLOOD PRESSURE: 82 MMHG | WEIGHT: 224 LBS | HEIGHT: 68 IN | HEART RATE: 68 BPM | BODY MASS INDEX: 33.95 KG/M2 | OXYGEN SATURATION: 99 %

## 2025-03-31 DIAGNOSIS — Z95.1 S/P CABG X 5: ICD-10-CM

## 2025-03-31 DIAGNOSIS — I10 ESSENTIAL HYPERTENSION: ICD-10-CM

## 2025-03-31 DIAGNOSIS — E78.5 HYPERLIPIDEMIA, UNSPECIFIED HYPERLIPIDEMIA TYPE: Primary | ICD-10-CM

## 2025-03-31 DIAGNOSIS — R06.02 SHORTNESS OF BREATH: ICD-10-CM

## 2025-03-31 DIAGNOSIS — R94.30 ABNORMAL CARDIAC FUNCTION TEST: ICD-10-CM

## 2025-03-31 PROCEDURE — 99214 OFFICE O/P EST MOD 30 MIN: CPT | Performed by: INTERNAL MEDICINE

## 2025-03-31 PROCEDURE — 3079F DIAST BP 80-89 MM HG: CPT | Performed by: INTERNAL MEDICINE

## 2025-03-31 PROCEDURE — 3077F SYST BP >= 140 MM HG: CPT | Performed by: INTERNAL MEDICINE

## 2025-03-31 NOTE — PROGRESS NOTES
Subjective:        Mayra Hirsch is a 74 y.o. female who here for follow up    CC  SOB, CP FOR MONTHS  TIGHT AND SHARP  HPI  74-year-old female with hypertension hyperlipidemia CABG needs clearance for teeth removal denies any chest pains or tightness in the chest    Patient recently underwent the stress test which is abnormal     Problems Addressed this Visit          Cardiac and Vasculature    Hyperlipidemia - Primary    Essential hypertension    S/P CABG x 5    Abnormal cardiac function test       Pulmonary and Pneumonias    Shortness of breath     Diagnoses         Codes Comments      Hyperlipidemia, unspecified hyperlipidemia type    -  Primary ICD-10-CM: E78.5  ICD-9-CM: 272.4       S/P CABG x 5     ICD-10-CM: Z95.1  ICD-9-CM: V45.81       Shortness of breath     ICD-10-CM: R06.02  ICD-9-CM: 786.05       Essential hypertension     ICD-10-CM: I10  ICD-9-CM: 401.9       Abnormal cardiac function test     ICD-10-CM: R94.30  ICD-9-CM: 794.30           .    The following portions of the patient's history were reviewed and updated as appropriate: allergies, current medications, past family history, past medical history, past social history, past surgical history and problem list.    Past Medical History:   Diagnosis Date    Aortic valve replaced     Coronary artery disease     Depression     Diabetes mellitus     Disease of thyroid gland     Dyslipidemia 04/28/2017    Fibrocystic breast     Heart murmur     Heart valve disease 11-7-2022    Hypertension     Hyperthyroidism     Hypothyroidism     PONV (postoperative nausea and vomiting)     STEMI (ST elevation myocardial infarction) 11/07/2022    Type 2 diabetes mellitus      reports that she has never smoked. She has never been exposed to tobacco smoke. She has never used smokeless tobacco. She reports that she does not drink alcohol and does not use drugs.   Family History   Problem Relation Age of Onset    Coronary artery disease Mother     Alzheimer's disease  "Mother     Hypertension Mother     Heart attack Mother     Coronary artery disease Father     Cancer Father         Bladder cancer    Thyroid disease Sister     Malig Hyperthermia Neg Hx        Review of Systems  Constitutional: No wt loss, fever, fatigue  Gastrointestinal: No nausea, abdominal pain  Behavioral/Psych: No insomnia or anxiety   Cardiovascular no chest pains or tightness in the chest  Objective:       Physical Exam  /82   Pulse 68   Ht 172.7 cm (67.99\")   Wt 102 kg (224 lb)   SpO2 99%   BMI 34.07 kg/m²   General appearance: No acute changes   Neck: Trachea midline; NECK, supple, no thyromegaly or lymphadenopathy   Lungs: Normal size and shape, normal breath sounds, equal distribution of air, no rales and rhonchi   CV: S1-S2 regular, no murmurs, no rub, no gallop   Abdomen: Soft, nontender; no masses , no abnormal abdominal sounds   Extremities: No deformity , normal color , no peripheral edema   Skin: Normal temperature, turgor and texture; no rash, ulcers          Procedures      Echocardiogram:    Results for orders placed in visit on 03/18/25    Adult Transthoracic Echo Complete W/ Cont if Necessary Per Protocol    Interpretation Summary    Left ventricular ejection fraction appears to be 46 - 50%.    The following left ventricular wall segments are hypokinetic: mid anterior, mid inferoseptal and mid anteroseptal.    Left ventricular diastolic function is consistent with (grade I) impaired relaxation.    Mild to moderate aortic valve stenosis is present.    LIMA to LAD, SVG to RCA, SVG to OM1, SVG to OM 2, SVG to diagonal and LAD.   Interpretation Summary         Findings consistent with an equivocal ECG stress test.    Impressions are consistent with a high risk study.    Left ventricular ejection fraction is mildly reduced (Calculated EF = 45%).    Myocardial perfusion imaging indicates a moderate-sized infarct located in the anterior wall and lateral wall with moderate nadia-infarct " ischemia.  nterpretation Summary         Findings consistent with an equivocal ECG stress test.    Impressions are consistent with a high risk study.    Left ventricular ejection fraction is mildly reduced (Calculated EF = 45%).    Myocardial perfusion imaging indicates a moderate-sized infarct located in the anterior wall and lateral wall with moderate nadia-infarct ischemia.    Current Outpatient Medications:     aspirin 81 MG EC tablet, Take 1 tablet by mouth Daily., Disp: 30 tablet, Rfl: 5    atorvastatin (LIPITOR) 40 MG tablet, Take 1 tablet by mouth Every Night., Disp: 90 tablet, Rfl: 0    BD Pen Needle Ban 2nd Gen 32G X 4 MM misc, Use 1 per day, Disp: 90 each, Rfl: 3    clopidogrel (PLAVIX) 75 MG tablet, TAKE 1 TABLET BY MOUTH EVERY DAY, Disp: 90 tablet, Rfl: 2    Continuous Glucose Sensor (FreeStyle Gene 2 Sensor) misc, 1 each by Other route Every 14 (Fourteen) Days., Disp: 6 each, Rfl: 3    dapagliflozin Propanediol (Farxiga) 10 MG tablet, Take 10 mg by mouth Every Morning., Disp: 90 tablet, Rfl: 1    ezetimibe (ZETIA) 10 MG tablet, TAKE 1 TABLET DAILY, Disp: 90 tablet, Rfl: 0    furosemide (LASIX) 40 MG tablet, Take 0.5 tablets by mouth Daily., Disp: 30 tablet, Rfl: 5    Insulin Pen Needle (BD Pen Needle Micro U/F) 32G X 6 MM misc, USE ONE PER DAY FOR DAILY INSULIN INJECTION, E11.9, Disp: 100 each, Rfl: 3    Januvia 100 MG tablet, Take 1 tablet by mouth Daily., Disp: 90 tablet, Rfl: 3    levothyroxine (SYNTHROID, LEVOTHROID) 200 MCG tablet, TAKE 1 TABLET BY MOUTH EVERY DAY, Disp: 90 tablet, Rfl: 1    lisinopril (PRINIVIL,ZESTRIL) 20 MG tablet, Take 1 tablet by mouth Daily., Disp: , Rfl:     metoprolol succinate XL (TOPROL-XL) 50 MG 24 hr tablet, TAKE 1 TABLET BY MOUTH EVERY DAY, Disp: 90 tablet, Rfl: 4    PARoxetine (PAXIL) 40 MG tablet, , Disp: , Rfl:     Toujeo SoloStar 300 UNIT/ML solution pen-injector injection, Inject 36 Units under the skin into the appropriate area as directed Daily., Disp: 9 mL,  Rfl: 3    vitamin D (ERGOCALCIFEROL) 1.25 MG (88905 UT) capsule capsule, TAKE ONE CAPSULE BY MOUTH ONCE WEEKLY, Disp: 12 capsule, Rfl: 0   Assessment:                Plan:          ICD-10-CM ICD-9-CM   1. Hyperlipidemia, unspecified hyperlipidemia type  E78.5 272.4   2. S/P CABG x 5  Z95.1 V45.81   3. Shortness of breath  R06.02 786.05   4. Essential hypertension  I10 401.9   5. Abnormal cardiac function test  R94.30 794.30     1. Hyperlipidemia, unspecified hyperlipidemia type  Continue current treatment    2. S/P CABG x 5  Needs further evaluation    3. Shortness of breath  Multifactorial    4. Essential hypertension  Patient understands importance of blood pressure check at home which patient does regularly and the blood pressures are well under control to the level of less than 140/90      5. Abnormal cardiac function test  Needs further evaluation      OK TO EXTRACT TEETH    NEEDS CATH    Procedure, risks and options of cardiac cath explained to pt INCLUDING BUT NOT LIMITED TO MI, STROKE, DEATH, INFECTION HAEMORRHAGE, . Pt understands well and agrees with no further questions.    COUNSELING:    Mayra Henry was given to patient for the following topics: diagnostic results, risk factor reductions, impressions, risks and benefits of treatment options and importance of treatment compliance .       SMOKING COUNSELING:        Dictated using Dragon dictation

## 2025-03-31 NOTE — H&P (VIEW-ONLY)
Subjective:        Mayra Hirsch is a 74 y.o. female who here for follow up    CC  SOB, CP FOR MONTHS  TIGHT AND SHARP  HPI  74-year-old female with hypertension hyperlipidemia CABG needs clearance for teeth removal denies any chest pains or tightness in the chest    Patient recently underwent the stress test which is abnormal     Problems Addressed this Visit          Cardiac and Vasculature    Hyperlipidemia - Primary    Essential hypertension    S/P CABG x 5    Abnormal cardiac function test       Pulmonary and Pneumonias    Shortness of breath     Diagnoses         Codes Comments      Hyperlipidemia, unspecified hyperlipidemia type    -  Primary ICD-10-CM: E78.5  ICD-9-CM: 272.4       S/P CABG x 5     ICD-10-CM: Z95.1  ICD-9-CM: V45.81       Shortness of breath     ICD-10-CM: R06.02  ICD-9-CM: 786.05       Essential hypertension     ICD-10-CM: I10  ICD-9-CM: 401.9       Abnormal cardiac function test     ICD-10-CM: R94.30  ICD-9-CM: 794.30           .    The following portions of the patient's history were reviewed and updated as appropriate: allergies, current medications, past family history, past medical history, past social history, past surgical history and problem list.    Past Medical History:   Diagnosis Date    Aortic valve replaced     Coronary artery disease     Depression     Diabetes mellitus     Disease of thyroid gland     Dyslipidemia 04/28/2017    Fibrocystic breast     Heart murmur     Heart valve disease 11-7-2022    Hypertension     Hyperthyroidism     Hypothyroidism     PONV (postoperative nausea and vomiting)     STEMI (ST elevation myocardial infarction) 11/07/2022    Type 2 diabetes mellitus      reports that she has never smoked. She has never been exposed to tobacco smoke. She has never used smokeless tobacco. She reports that she does not drink alcohol and does not use drugs.   Family History   Problem Relation Age of Onset    Coronary artery disease Mother     Alzheimer's disease  "Mother     Hypertension Mother     Heart attack Mother     Coronary artery disease Father     Cancer Father         Bladder cancer    Thyroid disease Sister     Malig Hyperthermia Neg Hx        Review of Systems  Constitutional: No wt loss, fever, fatigue  Gastrointestinal: No nausea, abdominal pain  Behavioral/Psych: No insomnia or anxiety   Cardiovascular no chest pains or tightness in the chest  Objective:       Physical Exam  /82   Pulse 68   Ht 172.7 cm (67.99\")   Wt 102 kg (224 lb)   SpO2 99%   BMI 34.07 kg/m²   General appearance: No acute changes   Neck: Trachea midline; NECK, supple, no thyromegaly or lymphadenopathy   Lungs: Normal size and shape, normal breath sounds, equal distribution of air, no rales and rhonchi   CV: S1-S2 regular, no murmurs, no rub, no gallop   Abdomen: Soft, nontender; no masses , no abnormal abdominal sounds   Extremities: No deformity , normal color , no peripheral edema   Skin: Normal temperature, turgor and texture; no rash, ulcers          Procedures      Echocardiogram:    Results for orders placed in visit on 03/18/25    Adult Transthoracic Echo Complete W/ Cont if Necessary Per Protocol    Interpretation Summary    Left ventricular ejection fraction appears to be 46 - 50%.    The following left ventricular wall segments are hypokinetic: mid anterior, mid inferoseptal and mid anteroseptal.    Left ventricular diastolic function is consistent with (grade I) impaired relaxation.    Mild to moderate aortic valve stenosis is present.    LIMA to LAD, SVG to RCA, SVG to OM1, SVG to OM 2, SVG to diagonal and LAD.   Interpretation Summary         Findings consistent with an equivocal ECG stress test.    Impressions are consistent with a high risk study.    Left ventricular ejection fraction is mildly reduced (Calculated EF = 45%).    Myocardial perfusion imaging indicates a moderate-sized infarct located in the anterior wall and lateral wall with moderate nadia-infarct " ischemia.  nterpretation Summary         Findings consistent with an equivocal ECG stress test.    Impressions are consistent with a high risk study.    Left ventricular ejection fraction is mildly reduced (Calculated EF = 45%).    Myocardial perfusion imaging indicates a moderate-sized infarct located in the anterior wall and lateral wall with moderate nadia-infarct ischemia.    Current Outpatient Medications:     aspirin 81 MG EC tablet, Take 1 tablet by mouth Daily., Disp: 30 tablet, Rfl: 5    atorvastatin (LIPITOR) 40 MG tablet, Take 1 tablet by mouth Every Night., Disp: 90 tablet, Rfl: 0    BD Pen Needle Ban 2nd Gen 32G X 4 MM misc, Use 1 per day, Disp: 90 each, Rfl: 3    clopidogrel (PLAVIX) 75 MG tablet, TAKE 1 TABLET BY MOUTH EVERY DAY, Disp: 90 tablet, Rfl: 2    Continuous Glucose Sensor (FreeStyle Gene 2 Sensor) misc, 1 each by Other route Every 14 (Fourteen) Days., Disp: 6 each, Rfl: 3    dapagliflozin Propanediol (Farxiga) 10 MG tablet, Take 10 mg by mouth Every Morning., Disp: 90 tablet, Rfl: 1    ezetimibe (ZETIA) 10 MG tablet, TAKE 1 TABLET DAILY, Disp: 90 tablet, Rfl: 0    furosemide (LASIX) 40 MG tablet, Take 0.5 tablets by mouth Daily., Disp: 30 tablet, Rfl: 5    Insulin Pen Needle (BD Pen Needle Micro U/F) 32G X 6 MM misc, USE ONE PER DAY FOR DAILY INSULIN INJECTION, E11.9, Disp: 100 each, Rfl: 3    Januvia 100 MG tablet, Take 1 tablet by mouth Daily., Disp: 90 tablet, Rfl: 3    levothyroxine (SYNTHROID, LEVOTHROID) 200 MCG tablet, TAKE 1 TABLET BY MOUTH EVERY DAY, Disp: 90 tablet, Rfl: 1    lisinopril (PRINIVIL,ZESTRIL) 20 MG tablet, Take 1 tablet by mouth Daily., Disp: , Rfl:     metoprolol succinate XL (TOPROL-XL) 50 MG 24 hr tablet, TAKE 1 TABLET BY MOUTH EVERY DAY, Disp: 90 tablet, Rfl: 4    PARoxetine (PAXIL) 40 MG tablet, , Disp: , Rfl:     Toujeo SoloStar 300 UNIT/ML solution pen-injector injection, Inject 36 Units under the skin into the appropriate area as directed Daily., Disp: 9 mL,  Rfl: 3    vitamin D (ERGOCALCIFEROL) 1.25 MG (51973 UT) capsule capsule, TAKE ONE CAPSULE BY MOUTH ONCE WEEKLY, Disp: 12 capsule, Rfl: 0   Assessment:                Plan:          ICD-10-CM ICD-9-CM   1. Hyperlipidemia, unspecified hyperlipidemia type  E78.5 272.4   2. S/P CABG x 5  Z95.1 V45.81   3. Shortness of breath  R06.02 786.05   4. Essential hypertension  I10 401.9   5. Abnormal cardiac function test  R94.30 794.30     1. Hyperlipidemia, unspecified hyperlipidemia type  Continue current treatment    2. S/P CABG x 5  Needs further evaluation    3. Shortness of breath  Multifactorial    4. Essential hypertension  Patient understands importance of blood pressure check at home which patient does regularly and the blood pressures are well under control to the level of less than 140/90      5. Abnormal cardiac function test  Needs further evaluation      OK TO EXTRACT TEETH    NEEDS CATH    Procedure, risks and options of cardiac cath explained to pt INCLUDING BUT NOT LIMITED TO MI, STROKE, DEATH, INFECTION HAEMORRHAGE, . Pt understands well and agrees with no further questions.    COUNSELING:    Mayra Henry was given to patient for the following topics: diagnostic results, risk factor reductions, impressions, risks and benefits of treatment options and importance of treatment compliance .       SMOKING COUNSELING:        Dictated using Dragon dictation

## 2025-04-02 ENCOUNTER — TELEPHONE (OUTPATIENT)
Dept: CARDIOLOGY | Facility: CLINIC | Age: 75
End: 2025-04-02

## 2025-04-02 PROBLEM — R94.30 ABNORMAL CARDIAC FUNCTION TEST: Status: ACTIVE | Noted: 2025-04-02

## 2025-04-07 NOTE — TELEPHONE ENCOUNTER
PATIENT WAS TOLD TO CALL AND LET YOU KNOW WHEN HER DENTAL PROCEDURE WAS SCHEDULED SO YOU COULD GET THE CATH SCHEDULED???    PLEASE REACH OUT TO THE PATIENT

## 2025-04-21 ENCOUNTER — LAB (OUTPATIENT)
Dept: LAB | Facility: HOSPITAL | Age: 75
End: 2025-04-21
Payer: MEDICARE

## 2025-04-21 DIAGNOSIS — R94.30 ABNORMAL CARDIAC FUNCTION TEST: ICD-10-CM

## 2025-04-21 DIAGNOSIS — R06.02 SHORTNESS OF BREATH: ICD-10-CM

## 2025-04-21 DIAGNOSIS — Z95.1 S/P CABG X 5: ICD-10-CM

## 2025-04-21 LAB
ANION GAP SERPL CALCULATED.3IONS-SCNC: 11.2 MMOL/L (ref 5–15)
APTT PPP: 25.2 SECONDS (ref 22.7–35.4)
BASOPHILS # BLD AUTO: 0.11 10*3/MM3 (ref 0–0.2)
BASOPHILS NFR BLD AUTO: 1.5 % (ref 0–1.5)
BUN SERPL-MCNC: 22 MG/DL (ref 8–23)
BUN/CREAT SERPL: 18.5 (ref 7–25)
CALCIUM SPEC-SCNC: 9.5 MG/DL (ref 8.6–10.5)
CHLORIDE SERPL-SCNC: 107 MMOL/L (ref 98–107)
CO2 SERPL-SCNC: 21.8 MMOL/L (ref 22–29)
CREAT SERPL-MCNC: 1.19 MG/DL (ref 0.57–1)
DEPRECATED RDW RBC AUTO: 47.5 FL (ref 37–54)
EGFRCR SERPLBLD CKD-EPI 2021: 48.1 ML/MIN/1.73
EOSINOPHIL # BLD AUTO: 0.16 10*3/MM3 (ref 0–0.4)
EOSINOPHIL NFR BLD AUTO: 2.2 % (ref 0.3–6.2)
ERYTHROCYTE [DISTWIDTH] IN BLOOD BY AUTOMATED COUNT: 14.3 % (ref 12.3–15.4)
GLUCOSE SERPL-MCNC: 149 MG/DL (ref 65–99)
HCT VFR BLD AUTO: 42.3 % (ref 34–46.6)
HGB BLD-MCNC: 13.7 G/DL (ref 12–15.9)
IMM GRANULOCYTES # BLD AUTO: 0.02 10*3/MM3 (ref 0–0.05)
IMM GRANULOCYTES NFR BLD AUTO: 0.3 % (ref 0–0.5)
INR PPP: 1.19 (ref 0.9–1.1)
LYMPHOCYTES # BLD AUTO: 2.21 10*3/MM3 (ref 0.7–3.1)
LYMPHOCYTES NFR BLD AUTO: 29.8 % (ref 19.6–45.3)
MCH RBC QN AUTO: 29.5 PG (ref 26.6–33)
MCHC RBC AUTO-ENTMCNC: 32.4 G/DL (ref 31.5–35.7)
MCV RBC AUTO: 91.2 FL (ref 79–97)
MONOCYTES # BLD AUTO: 0.67 10*3/MM3 (ref 0.1–0.9)
MONOCYTES NFR BLD AUTO: 9 % (ref 5–12)
NEUTROPHILS NFR BLD AUTO: 4.24 10*3/MM3 (ref 1.7–7)
NEUTROPHILS NFR BLD AUTO: 57.2 % (ref 42.7–76)
NRBC BLD AUTO-RTO: 0 /100 WBC (ref 0–0.2)
PLATELET # BLD AUTO: 240 10*3/MM3 (ref 140–450)
PMV BLD AUTO: 10.5 FL (ref 6–12)
POTASSIUM SERPL-SCNC: 4.8 MMOL/L (ref 3.5–5.2)
PROTHROMBIN TIME: 15 SECONDS (ref 11.7–14.2)
RBC # BLD AUTO: 4.64 10*6/MM3 (ref 3.77–5.28)
SODIUM SERPL-SCNC: 140 MMOL/L (ref 136–145)
WBC NRBC COR # BLD AUTO: 7.41 10*3/MM3 (ref 3.4–10.8)

## 2025-04-21 PROCEDURE — 36415 COLL VENOUS BLD VENIPUNCTURE: CPT

## 2025-04-21 PROCEDURE — 85610 PROTHROMBIN TIME: CPT

## 2025-04-21 PROCEDURE — 80048 BASIC METABOLIC PNL TOTAL CA: CPT

## 2025-04-21 PROCEDURE — 85025 COMPLETE CBC W/AUTO DIFF WBC: CPT

## 2025-04-21 PROCEDURE — 85730 THROMBOPLASTIN TIME PARTIAL: CPT

## 2025-04-25 ENCOUNTER — HOSPITAL ENCOUNTER (OUTPATIENT)
Facility: HOSPITAL | Age: 75
Setting detail: HOSPITAL OUTPATIENT SURGERY
Discharge: HOME | End: 2025-04-25
Attending: INTERNAL MEDICINE | Admitting: INTERNAL MEDICINE
Payer: MEDICARE

## 2025-04-25 VITALS
TEMPERATURE: 96.9 F | HEART RATE: 64 BPM | RESPIRATION RATE: 18 BRPM | BODY MASS INDEX: 31.83 KG/M2 | HEIGHT: 68 IN | DIASTOLIC BLOOD PRESSURE: 61 MMHG | WEIGHT: 210 LBS | OXYGEN SATURATION: 97 % | SYSTOLIC BLOOD PRESSURE: 133 MMHG

## 2025-04-25 DIAGNOSIS — Z95.1 S/P CABG X 5: ICD-10-CM

## 2025-04-25 DIAGNOSIS — R94.30 ABNORMAL CARDIAC FUNCTION TEST: ICD-10-CM

## 2025-04-25 DIAGNOSIS — R06.02 SHORTNESS OF BREATH: ICD-10-CM

## 2025-04-25 LAB — GLUCOSE BLDC GLUCOMTR-MCNC: 114 MG/DL (ref 70–130)

## 2025-04-25 PROCEDURE — 93459 L HRT ART/GRFT ANGIO: CPT | Performed by: INTERNAL MEDICINE

## 2025-04-25 PROCEDURE — C1760 CLOSURE DEV, VASC: HCPCS | Performed by: INTERNAL MEDICINE

## 2025-04-25 PROCEDURE — 25010000002 FENTANYL CITRATE (PF) 50 MCG/ML SOLUTION: Performed by: INTERNAL MEDICINE

## 2025-04-25 PROCEDURE — 25010000002 MIDAZOLAM PER 1 MG: Performed by: INTERNAL MEDICINE

## 2025-04-25 PROCEDURE — C1894 INTRO/SHEATH, NON-LASER: HCPCS | Performed by: INTERNAL MEDICINE

## 2025-04-25 PROCEDURE — 25810000003 SODIUM CHLORIDE 0.9 % SOLUTION: Performed by: INTERNAL MEDICINE

## 2025-04-25 PROCEDURE — 82948 REAGENT STRIP/BLOOD GLUCOSE: CPT

## 2025-04-25 PROCEDURE — 25010000002 LIDOCAINE 2% SOLUTION: Performed by: INTERNAL MEDICINE

## 2025-04-25 PROCEDURE — 25510000001 IOPAMIDOL PER 1 ML: Performed by: INTERNAL MEDICINE

## 2025-04-25 PROCEDURE — C1769 GUIDE WIRE: HCPCS | Performed by: INTERNAL MEDICINE

## 2025-04-25 RX ORDER — ACETAMINOPHEN 325 MG/1
650 TABLET ORAL ONCE
Status: COMPLETED | OUTPATIENT
Start: 2025-04-25 | End: 2025-04-25

## 2025-04-25 RX ORDER — CYANOCOBALAMIN/FOLIC ACID 1MG-400MCG
1 TABLET, SUBLINGUAL SUBLINGUAL DAILY
COMMUNITY

## 2025-04-25 RX ORDER — IOPAMIDOL 755 MG/ML
INJECTION, SOLUTION INTRAVASCULAR
Status: DISCONTINUED | OUTPATIENT
Start: 2025-04-25 | End: 2025-04-25 | Stop reason: HOSPADM

## 2025-04-25 RX ORDER — ACETAMINOPHEN 325 MG/1
650 TABLET ORAL EVERY 4 HOURS PRN
Status: CANCELLED | OUTPATIENT
Start: 2025-04-25

## 2025-04-25 RX ORDER — LIDOCAINE HYDROCHLORIDE 20 MG/ML
INJECTION, SOLUTION INFILTRATION; PERINEURAL
Status: DISCONTINUED | OUTPATIENT
Start: 2025-04-25 | End: 2025-04-25 | Stop reason: HOSPADM

## 2025-04-25 RX ORDER — SODIUM CHLORIDE 9 MG/ML
100 INJECTION, SOLUTION INTRAVENOUS ONCE AS NEEDED
Status: ACTIVE | OUTPATIENT
Start: 2025-04-25 | End: 2025-04-25

## 2025-04-25 RX ORDER — FENTANYL CITRATE 50 UG/ML
INJECTION, SOLUTION INTRAMUSCULAR; INTRAVENOUS
Status: DISCONTINUED | OUTPATIENT
Start: 2025-04-25 | End: 2025-04-25 | Stop reason: HOSPADM

## 2025-04-25 RX ORDER — SODIUM CHLORIDE 9 MG/ML
100 INJECTION, SOLUTION INTRAVENOUS CONTINUOUS
Status: DISCONTINUED | OUTPATIENT
Start: 2025-04-26 | End: 2025-04-25 | Stop reason: HOSPADM

## 2025-04-25 RX ORDER — MIDAZOLAM HYDROCHLORIDE 1 MG/ML
INJECTION, SOLUTION INTRAMUSCULAR; INTRAVENOUS
Status: DISCONTINUED | OUTPATIENT
Start: 2025-04-25 | End: 2025-04-25 | Stop reason: HOSPADM

## 2025-04-25 RX ORDER — NITROGLYCERIN 0.4 MG/1
0.4 TABLET SUBLINGUAL
Status: CANCELLED | OUTPATIENT
Start: 2025-04-25

## 2025-04-25 RX ADMIN — SODIUM CHLORIDE 100 ML/HR: 9 INJECTION, SOLUTION INTRAVENOUS at 07:39

## 2025-04-25 RX ADMIN — ACETAMINOPHEN 650 MG: 325 TABLET, FILM COATED ORAL at 11:33

## 2025-04-25 NOTE — DISCHARGE INSTRUCTIONS
[facility]  [address] [place] [address]      Coronary Angiogram (Femoral Approach) After Care     Refer to this sheet in the next few weeks. These instructions provide you with information on caring for yourself after your procedure. Your health care provider may also give you more specific instructions. Your treatment has been planned according to current medical practices, but problems sometimes occur. Call your health care provider if you have any problems or questions after your procedure.      What to Expect After the Procedure:  After your procedure, it is typical to have the following sensations:  Minor discomfort or tenderness and a small bump at the catheter insertion site. The bump should usually decrease in size and tenderness within 1 to 2 weeks.  Any bruising will usually fade within 2 to 4 weeks.    Home Care Instructions:  Do not apply powder or lotion to the site.  Do not take baths, swim, or use a hot tub until your health care provider approves and the site is completely healed.  Do not bend, squat, or lift anything over 20 lb (9 kg) or as directed by your health care provider. However, we recommend lifting nothing heavier than a gallon of milk.    You may shower 24 hours after the procedure. Remove the bandage (dressing) and gently wash the site with plain soap and water. Gently pat the site dry. You may apply a band aid daily for 2 days if desired.    Inspect the site at least twice daily.  Increase your fluid intake for the next 2 days.    Limit your activity for the first 48 hours. .    Avoid strenuous activity for 1 week or as advised by your physician.    Follow instructions about when you can drive or return to work as directed by your physician.    Hold direct pressure over the site when you cough, sneeze, laugh or change positions.  Do this for the next 2 days.    Do not operate machinery or power tools for 24 hours.  A responsible adult should be with you for the  first 24 hours after you arrive home. Do not make any important legal decisions or sign legal papers for 24 hours.  Do not drink alcohol for 24 hours.  Metformin or any medications containing Metformin should not be taken for 48 hours after your procedure.      Call Your Doctor If:  You have drainage (other than a small amount of blood on the dressing).  You have chills or a fever > 101.  You have redness, warmth, swelling(larger than a walnut), or pain at the insertion site  You develop chest pain or shortness of breath, feel faint, or pass out.  You develop pain, discoloration, coldness, numbness, tingling, or severe bruising in the leg that held the catheter.  You develop bleeding from any other place, such as the bowels.  You have heavy bleeding from the site, hold pressure on the site for 20 minutes.  If the bleeding stops, apply a fresh bandage and call your cardiologist.  However, if you continue to have bleeding, call 911.  You have any symptoms of a stroke.  Remember BE FAST  B-balance. Sudden trouble walking or loss of balance.  E-eyes.  Sudden changes in how you see or a sudden onset of a very bad headache.   F-face. Sudden weakness or loss of feeling of the face or facial droop on one side.   A-arms Sudden weakness or numbness in one arm.  One arm drifts down if they are both held out in front of you. This happens suddenly and usually on one side of the body.  S-speech.  Sudden trouble speaking, slurred speech or trouble understanding what people are saying.   T-time  Time to call emergency services.  Write down the symptoms and the time they started.        Make Sure You:  Understand these instructions.  Will watch your condition.  Will get help right away if you are not doing well or get worse.

## 2025-04-25 NOTE — CONSULTS
Pt previously attended cardiac rehab here in 2023 following STEMI.  Staff have reviewed the chart, and the patient does not have a qualifying diagnosis for Phase II Cardiac Rehab at this time. Staff available if further consultation is needed.

## 2025-05-05 ENCOUNTER — OFFICE VISIT (OUTPATIENT)
Dept: CARDIOLOGY | Facility: CLINIC | Age: 75
End: 2025-05-05
Payer: MEDICARE

## 2025-05-05 VITALS
DIASTOLIC BLOOD PRESSURE: 74 MMHG | SYSTOLIC BLOOD PRESSURE: 143 MMHG | HEART RATE: 71 BPM | WEIGHT: 225 LBS | BODY MASS INDEX: 34.1 KG/M2 | HEIGHT: 68 IN

## 2025-05-05 DIAGNOSIS — E78.5 HYPERLIPIDEMIA, UNSPECIFIED HYPERLIPIDEMIA TYPE: ICD-10-CM

## 2025-05-05 DIAGNOSIS — Z95.1 S/P CABG X 5: Primary | ICD-10-CM

## 2025-05-05 DIAGNOSIS — R06.02 SHORTNESS OF BREATH: ICD-10-CM

## 2025-05-05 DIAGNOSIS — I10 ESSENTIAL HYPERTENSION: ICD-10-CM

## 2025-05-05 NOTE — PROGRESS NOTES
S/P  CATH   Subjective:        Mayra Hirsch is a 74 y.o. female who here for follow up    CC  Follow-up hypertension CABG shortness of breath  HPI  74-year-old female with CABG hypertension hyperlipidemia shortness of breath here for the follow-up after the heart catheterization denies any chest pains or tightness in the chest     Problems Addressed this Visit          Cardiac and Vasculature    Hyperlipidemia    Essential hypertension    S/P CABG x 5 - Primary       Pulmonary and Pneumonias    Shortness of breath     Diagnoses         Codes Comments      S/P CABG x 5    -  Primary ICD-10-CM: Z95.1  ICD-9-CM: V45.81       Shortness of breath     ICD-10-CM: R06.02  ICD-9-CM: 786.05       Essential hypertension     ICD-10-CM: I10  ICD-9-CM: 401.9       Hyperlipidemia, unspecified hyperlipidemia type     ICD-10-CM: E78.5  ICD-9-CM: 272.4           .    The following portions of the patient's history were reviewed and updated as appropriate: allergies, current medications, past family history, past medical history, past social history, past surgical history and problem list.    Past Medical History:   Diagnosis Date    Aortic valve replaced     Coronary artery disease     Depression     Diabetes mellitus     Disease of thyroid gland     Dyslipidemia 04/28/2017    Fibrocystic breast     Heart murmur     Heart valve disease 11-7-2022    Hyperlipidemia     Hypertension     Hyperthyroidism     Hypothyroidism     PONV (postoperative nausea and vomiting)     STEMI (ST elevation myocardial infarction) 11/07/2022    Type 2 diabetes mellitus      reports that she has never smoked. She has never been exposed to tobacco smoke. She has never used smokeless tobacco. She reports that she does not drink alcohol and does not use drugs.   Family History   Problem Relation Age of Onset    Coronary artery disease Mother     Alzheimer's disease Mother     Hypertension Mother     Heart attack Mother     Coronary artery disease Father      "Cancer Father         Bladder cancer    Thyroid disease Sister     Malig Hyperthermia Neg Hx        Review of Systems  Constitutional: No wt loss, fever, fatigue  Gastrointestinal: No nausea, abdominal pain  Behavioral/Psych: No insomnia or anxiety   Cardiovascular no chest pains or tightness in the chest  Objective:       Physical Exam  /74   Pulse 71   Ht 172.7 cm (68\")   Wt 102 kg (225 lb)   BMI 34.21 kg/m²   General appearance: No acute changes   Neck: Trachea midline; NECK, supple, no thyromegaly or lymphadenopathy   Lungs: Normal size and shape, normal breath sounds, equal distribution of air, no rales and rhonchi   CV: S1-S2 regular, no murmurs, no rub, no gallop   Abdomen: Soft, nontender; no masses , no abnormal abdominal sounds   Extremities: No deformity , normal color , no peripheral edema   Skin: Normal temperature, turgor and texture; no rash, ulcers          Procedures      Echocardiogram:    Results for orders placed in visit on 03/18/25    Adult Transthoracic Echo Complete W/ Cont if Necessary Per Protocol    Interpretation Summary    Left ventricular ejection fraction appears to be 46 - 50%.    The following left ventricular wall segments are hypokinetic: mid anterior, mid inferoseptal and mid anteroseptal.    Left ventricular diastolic function is consistent with (grade I) impaired relaxation.    Mild to moderate aortic valve stenosis is present.          Current Outpatient Medications:     aspirin 81 MG EC tablet, Take 1 tablet by mouth Daily., Disp: 30 tablet, Rfl: 5    atorvastatin (LIPITOR) 40 MG tablet, Take 1 tablet by mouth Every Night., Disp: 90 tablet, Rfl: 0    BD Pen Needle Ban 2nd Gen 32G X 4 MM misc, Use 1 per day, Disp: 90 each, Rfl: 3    clopidogrel (PLAVIX) 75 MG tablet, TAKE 1 TABLET BY MOUTH EVERY DAY, Disp: 90 tablet, Rfl: 2    Cobalamin Combinations (B-12) 1000-400 MCG sublingual tablet, Place 1 tablet under the tongue Daily., Disp: , Rfl:     Continuous Glucose " Sensor (FreeStyle Gene 2 Sensor) misc, 1 each by Other route Every 14 (Fourteen) Days., Disp: 6 each, Rfl: 3    dapagliflozin Propanediol (Farxiga) 10 MG tablet, Take 10 mg by mouth Every Morning., Disp: 90 tablet, Rfl: 1    ezetimibe (ZETIA) 10 MG tablet, TAKE 1 TABLET DAILY, Disp: 90 tablet, Rfl: 0    furosemide (LASIX) 40 MG tablet, Take 0.5 tablets by mouth Daily., Disp: 30 tablet, Rfl: 5    Insulin Pen Needle (BD Pen Needle Micro U/F) 32G X 6 MM misc, USE ONE PER DAY FOR DAILY INSULIN INJECTION, E11.9, Disp: 100 each, Rfl: 3    Januvia 100 MG tablet, Take 1 tablet by mouth Daily., Disp: 90 tablet, Rfl: 3    levothyroxine (SYNTHROID, LEVOTHROID) 200 MCG tablet, TAKE 1 TABLET BY MOUTH EVERY DAY, Disp: 90 tablet, Rfl: 1    lisinopril (PRINIVIL,ZESTRIL) 20 MG tablet, Take 1 tablet by mouth Daily., Disp: , Rfl:     metoprolol succinate XL (TOPROL-XL) 50 MG 24 hr tablet, TAKE 1 TABLET BY MOUTH EVERY DAY, Disp: 90 tablet, Rfl: 4    PARoxetine (PAXIL) 40 MG tablet, , Disp: , Rfl:     Toujeo SoloStar 300 UNIT/ML solution pen-injector injection, Inject 36 Units under the skin into the appropriate area as directed Daily., Disp: 9 mL, Rfl: 3    vitamin D (ERGOCALCIFEROL) 1.25 MG (71493 UT) capsule capsule, TAKE ONE CAPSULE BY MOUTH ONCE WEEKLY, Disp: 12 capsule, Rfl: 0   Assessment:                Plan:          ICD-10-CM ICD-9-CM   1. S/P CABG x 5  Z95.1 V45.81   2. Shortness of breath  R06.02 786.05   3. Essential hypertension  I10 401.9   4. Hyperlipidemia, unspecified hyperlipidemia type  E78.5 272.4     1. S/P CABG x 5  No angina pectoris    2. Shortness of breath  Multifactorial    3. Essential hypertension  Patient understands importance of blood pressure check at home which patient does regularly and the blood pressures are well under control to the level of less than 140/90      4. Hyperlipidemia, unspecified hyperlipidemia type  Continue current treatment      Mayra Hirsch here for the follow-up post heart  catheter, being treated medically.Mayra Hirsch has no complications.  Access site has been examined shows no complications.  Patient has been advised to contact us with any further issues and questions related to the heart catheter    1 yr  COUNSELING:    Mayra Henry was given to patient for the following topics: diagnostic results, risk factor reductions, impressions, risks and benefits of treatment options and importance of treatment compliance .       SMOKING COUNSELING:        Dictated using Dragon dictation

## 2025-05-07 RX ORDER — CLOPIDOGREL BISULFATE 75 MG/1
75 TABLET ORAL DAILY
Qty: 90 TABLET | Refills: 4 | Status: SHIPPED | OUTPATIENT
Start: 2025-05-07

## 2025-05-15 DIAGNOSIS — Z79.4 TYPE 2 DIABETES MELLITUS WITH HYPERGLYCEMIA, WITH LONG-TERM CURRENT USE OF INSULIN: ICD-10-CM

## 2025-05-15 DIAGNOSIS — E11.65 TYPE 2 DIABETES MELLITUS WITH HYPERGLYCEMIA, WITH LONG-TERM CURRENT USE OF INSULIN: ICD-10-CM

## 2025-05-15 LAB
ALBUMIN SERPL-MCNC: 4.1 G/DL (ref 3.5–5.2)
ALBUMIN/GLOB SERPL: 1.4 G/DL
ALP SERPL-CCNC: 104 U/L (ref 39–117)
ALT SERPL-CCNC: 18 U/L (ref 1–33)
AST SERPL-CCNC: 18 U/L (ref 1–32)
BILIRUB SERPL-MCNC: 0.5 MG/DL (ref 0–1.2)
BUN SERPL-MCNC: 15 MG/DL (ref 8–23)
BUN/CREAT SERPL: 16 (ref 7–25)
CALCIUM SERPL-MCNC: 9.3 MG/DL (ref 8.6–10.5)
CHLORIDE SERPL-SCNC: 106 MMOL/L (ref 98–107)
CO2 SERPL-SCNC: 24.9 MMOL/L (ref 22–29)
CREAT SERPL-MCNC: 0.94 MG/DL (ref 0.57–1)
EGFRCR SERPLBLD CKD-EPI 2021: 63.8 ML/MIN/1.73
GLOBULIN SER CALC-MCNC: 2.9 GM/DL
GLUCOSE SERPL-MCNC: 112 MG/DL (ref 65–99)
HBA1C MFR BLD: 7.9 % (ref 4.8–5.6)
POTASSIUM SERPL-SCNC: 4.5 MMOL/L (ref 3.5–5.2)
PROT SERPL-MCNC: 7 G/DL (ref 6–8.5)
SODIUM SERPL-SCNC: 143 MMOL/L (ref 136–145)
TSH SERPL DL<=0.005 MIU/L-ACNC: 2.91 UIU/ML (ref 0.27–4.2)

## 2025-05-21 DIAGNOSIS — E11.65 TYPE 2 DIABETES MELLITUS WITH HYPERGLYCEMIA, WITH LONG-TERM CURRENT USE OF INSULIN: ICD-10-CM

## 2025-05-21 DIAGNOSIS — Z79.4 TYPE 2 DIABETES MELLITUS WITH HYPERGLYCEMIA, WITH LONG-TERM CURRENT USE OF INSULIN: ICD-10-CM

## 2025-05-21 RX ORDER — DAPAGLIFLOZIN 10 MG/1
1 TABLET, FILM COATED ORAL EVERY MORNING
Qty: 90 TABLET | Refills: 0 | Status: SHIPPED | OUTPATIENT
Start: 2025-05-21

## 2025-05-21 NOTE — TELEPHONE ENCOUNTER
Rx Refill Note  Requested Prescriptions     Pending Prescriptions Disp Refills    Farxiga 10 MG tablet [Pharmacy Med Name: FARXIGA TABS 10MG] 90 tablet 3     Sig: TAKE 1 TABLET EVERY MORNING      Last office visit with prescribing clinician: 12/3/2024   Last telemedicine visit with prescribing clinician: Visit date not found   Next office visit with prescribing clinician: 6/3/2025                         Would you like a call back once the refill request has been completed: [] Yes [] No    If the office needs to give you a call back, can they leave a voicemail: [] Yes [] No  Molly Judd MA  5/21/2025  07:48 EDT

## 2025-05-22 DIAGNOSIS — E11.65 TYPE 2 DIABETES MELLITUS WITH HYPERGLYCEMIA, WITH LONG-TERM CURRENT USE OF INSULIN: ICD-10-CM

## 2025-05-22 DIAGNOSIS — Z79.4 TYPE 2 DIABETES MELLITUS WITH HYPERGLYCEMIA, WITH LONG-TERM CURRENT USE OF INSULIN: ICD-10-CM

## 2025-05-22 RX ORDER — INSULIN GLARGINE 300 U/ML
36 INJECTION, SOLUTION SUBCUTANEOUS DAILY
Qty: 12 ML | Refills: 0 | Status: SHIPPED | OUTPATIENT
Start: 2025-05-22

## 2025-05-22 NOTE — TELEPHONE ENCOUNTER
Rx Refill Note  Requested Prescriptions     Pending Prescriptions Disp Refills    Toujeo SoloStar 300 UNIT/ML solution pen-injector injection [Pharmacy Med Name: TOZENON QUEENAR PEN 1.5ML 3'S 300U/ML] 9 mL 3     Sig: INJECT 36 UNITS UNDER THE SKIN INTO THE APPROPRIATE AREA AS DIRECTED DAILY      Last office visit with prescribing clinician: 12/3/2024   Last telemedicine visit with prescribing clinician: Visit date not found   Next office visit with prescribing clinician: 6/3/2025                         Would you like a call back once the refill request has been completed: [] Yes [] No    If the office needs to give you a call back, can they leave a voicemail: [] Yes [] No  Molly Judd MA  5/22/2025  07:42 EDT

## 2025-06-03 ENCOUNTER — OFFICE VISIT (OUTPATIENT)
Dept: ENDOCRINOLOGY | Age: 75
End: 2025-06-03
Payer: MEDICARE

## 2025-06-03 VITALS
SYSTOLIC BLOOD PRESSURE: 124 MMHG | WEIGHT: 222.8 LBS | BODY MASS INDEX: 33.77 KG/M2 | HEIGHT: 68 IN | DIASTOLIC BLOOD PRESSURE: 74 MMHG | HEART RATE: 73 BPM | OXYGEN SATURATION: 99 %

## 2025-06-03 DIAGNOSIS — E11.65 TYPE 2 DIABETES MELLITUS WITH HYPERGLYCEMIA, WITH LONG-TERM CURRENT USE OF INSULIN: Primary | ICD-10-CM

## 2025-06-03 DIAGNOSIS — Z79.4 TYPE 2 DIABETES MELLITUS WITH HYPERGLYCEMIA, WITH LONG-TERM CURRENT USE OF INSULIN: Primary | ICD-10-CM

## 2025-06-03 PROCEDURE — 1159F MED LIST DOCD IN RCRD: CPT | Performed by: INTERNAL MEDICINE

## 2025-06-03 PROCEDURE — 1160F RVW MEDS BY RX/DR IN RCRD: CPT | Performed by: INTERNAL MEDICINE

## 2025-06-03 PROCEDURE — 3078F DIAST BP <80 MM HG: CPT | Performed by: INTERNAL MEDICINE

## 2025-06-03 PROCEDURE — 3074F SYST BP LT 130 MM HG: CPT | Performed by: INTERNAL MEDICINE

## 2025-06-03 PROCEDURE — 95251 CONT GLUC MNTR ANALYSIS I&R: CPT | Performed by: INTERNAL MEDICINE

## 2025-06-03 PROCEDURE — 3051F HG A1C>EQUAL 7.0%<8.0%: CPT | Performed by: INTERNAL MEDICINE

## 2025-06-03 PROCEDURE — 99214 OFFICE O/P EST MOD 30 MIN: CPT | Performed by: INTERNAL MEDICINE

## 2025-06-03 NOTE — PROGRESS NOTES
Chief complaint/Reason for consult: T2DM    HPI:   - 74 year old female here for management of diabetes mellitus type 2  - Last seen in 12/2024  - She is having some SOA which she is being evaluated for  - Has had diabetes for over 10 years  - Complications include CAD status post CABG  - No known complications to date  - Is currently taking Tresiba 36 units daily, Farxiga 10 mg daily, Januvia 100 mg daily  - Denies hypoglycemia  - Is also on atorvastatin 40 mg daily and levothyroxine 200 mcg daily    The following portions of the patient's history were reviewed and updated as appropriate: allergies, current medications, past family history, past medical history, past social history, past surgical history, and problem list.      Objective     Vitals:    06/03/25 1023   BP: 124/74   Pulse: 73   SpO2: 99%        Physical Exam  Vitals reviewed.   Constitutional:       Appearance: Normal appearance. She is obese.   HENT:      Head: Normocephalic and atraumatic.   Eyes:      General: No scleral icterus.  Pulmonary:      Effort: Pulmonary effort is normal. No respiratory distress.   Neurological:      Mental Status: She is alert.      Gait: Gait normal.   Psychiatric:         Mood and Affect: Mood normal.         Behavior: Behavior normal.         Thought Content: Thought content normal.         Judgment: Judgment normal.     CGM interpretation  Dates reviewed:  5/21-6/3/24  Data:  Avg of 170, 6% very high, 34% high, 60% in target  Interpretation:  Posprandial hyperglycemia    Assessment & Plan   1., T2DM, controlled  - Cont. Tresiba 36 units daily, Farxiga 10 mg daily  - Stop Januvia 100 mg daily  - Add Ozempic 0.25 mg weekly     2. Hypothyroidism  - Cont. Levothyroxine 200 mcg daily     3. Hyperlipidemia  - On atrovastatin 40 mg daily     - Return to clinic in 6 months

## 2025-06-06 RX ORDER — METOPROLOL SUCCINATE 50 MG/1
50 TABLET, EXTENDED RELEASE ORAL DAILY
Qty: 90 TABLET | Refills: 4 | Status: SHIPPED | OUTPATIENT
Start: 2025-06-06

## 2025-08-03 DIAGNOSIS — E11.65 TYPE 2 DIABETES MELLITUS WITH HYPERGLYCEMIA, WITH LONG-TERM CURRENT USE OF INSULIN: ICD-10-CM

## 2025-08-03 DIAGNOSIS — Z79.4 TYPE 2 DIABETES MELLITUS WITH HYPERGLYCEMIA, WITH LONG-TERM CURRENT USE OF INSULIN: ICD-10-CM

## 2025-08-04 RX ORDER — LEVOTHYROXINE SODIUM 200 UG/1
200 TABLET ORAL DAILY
Qty: 90 TABLET | Refills: 0 | Status: SHIPPED | OUTPATIENT
Start: 2025-08-04

## 2025-08-05 DIAGNOSIS — E11.65 TYPE 2 DIABETES MELLITUS WITH HYPERGLYCEMIA, WITH LONG-TERM CURRENT USE OF INSULIN: ICD-10-CM

## 2025-08-05 DIAGNOSIS — Z79.4 TYPE 2 DIABETES MELLITUS WITH HYPERGLYCEMIA, WITH LONG-TERM CURRENT USE OF INSULIN: ICD-10-CM

## 2025-08-05 RX ORDER — INSULIN GLARGINE 300 U/ML
36 INJECTION, SOLUTION SUBCUTANEOUS DAILY
Qty: 9 ML | Refills: 0 | Status: SHIPPED | OUTPATIENT
Start: 2025-08-05

## 2025-08-14 DIAGNOSIS — E11.65 TYPE 2 DIABETES MELLITUS WITH HYPERGLYCEMIA, WITH LONG-TERM CURRENT USE OF INSULIN: ICD-10-CM

## 2025-08-14 DIAGNOSIS — Z79.4 TYPE 2 DIABETES MELLITUS WITH HYPERGLYCEMIA, WITH LONG-TERM CURRENT USE OF INSULIN: ICD-10-CM

## 2025-08-14 RX ORDER — DAPAGLIFLOZIN 10 MG/1
10 TABLET, FILM COATED ORAL DAILY
Qty: 90 TABLET | Refills: 0 | Status: SHIPPED | OUTPATIENT
Start: 2025-08-14

## 2025-08-14 RX ORDER — METOPROLOL SUCCINATE 50 MG/1
TABLET, EXTENDED RELEASE ORAL
Qty: 90 TABLET | Refills: 3 | Status: SHIPPED | OUTPATIENT
Start: 2025-08-14

## 2025-08-14 RX ORDER — FUROSEMIDE 40 MG/1
TABLET ORAL
Qty: 90 TABLET | Refills: 3 | Status: SHIPPED | OUTPATIENT
Start: 2025-08-14

## 2025-08-14 RX ORDER — CLOPIDOGREL BISULFATE 75 MG/1
TABLET ORAL
Qty: 90 TABLET | Refills: 3 | Status: SHIPPED | OUTPATIENT
Start: 2025-08-14

## 2025-08-14 RX ORDER — ACYCLOVIR 800 MG/1
TABLET ORAL
Qty: 1 EACH | Refills: 11 | OUTPATIENT
Start: 2025-08-14

## 2025-08-18 DIAGNOSIS — Z79.4 TYPE 2 DIABETES MELLITUS WITH HYPERGLYCEMIA, WITH LONG-TERM CURRENT USE OF INSULIN: ICD-10-CM

## 2025-08-18 DIAGNOSIS — E11.65 TYPE 2 DIABETES MELLITUS WITH HYPERGLYCEMIA, WITH LONG-TERM CURRENT USE OF INSULIN: ICD-10-CM

## 2025-08-18 RX ORDER — PEN NEEDLE, DIABETIC 32GX 5/32"
NEEDLE, DISPOSABLE MISCELLANEOUS
Qty: 90 EACH | Refills: 3 | Status: SHIPPED | OUTPATIENT
Start: 2025-08-18

## 2025-08-18 RX ORDER — INSULIN GLARGINE 300 U/ML
36 INJECTION, SOLUTION SUBCUTANEOUS DAILY
Qty: 12 ML | Refills: 0 | Status: SHIPPED | OUTPATIENT
Start: 2025-08-18

## 2025-08-18 RX ORDER — HYDROCHLOROTHIAZIDE 12.5 MG/1
CAPSULE ORAL
Qty: 6 EACH | Refills: 3 | Status: SHIPPED | OUTPATIENT
Start: 2025-08-18

## 2025-08-19 DIAGNOSIS — E11.65 TYPE 2 DIABETES MELLITUS WITH HYPERGLYCEMIA, WITH LONG-TERM CURRENT USE OF INSULIN: ICD-10-CM

## 2025-08-19 DIAGNOSIS — Z79.4 TYPE 2 DIABETES MELLITUS WITH HYPERGLYCEMIA, WITH LONG-TERM CURRENT USE OF INSULIN: ICD-10-CM

## 2025-08-19 RX ORDER — SEMAGLUTIDE 0.68 MG/ML
INJECTION, SOLUTION SUBCUTANEOUS
Qty: 3 ML | Refills: 11 | OUTPATIENT
Start: 2025-08-19

## 2025-08-19 RX ORDER — DAPAGLIFLOZIN 10 MG/1
1 TABLET, FILM COATED ORAL EVERY MORNING
Qty: 90 TABLET | Refills: 3 | OUTPATIENT
Start: 2025-08-19

## (undated) DEVICE — SYR LUERLOK 20CC BX/50

## (undated) DEVICE — GW EMR FIX EXCHG J STD .035 3MM 260CM

## (undated) DEVICE — Device

## (undated) DEVICE — SOL IRR H2O BTL 1000ML STRL

## (undated) DEVICE — CATH DIAG IMPULSE FR4 5F 100CM

## (undated) DEVICE — RUNTHROUGH NS EXTRA FLOPPY PTCA GUIDEWIRE: Brand: RUNTHROUGH

## (undated) DEVICE — PK PERFUS W/TB CUST ADLT

## (undated) DEVICE — TR BAND RADIAL ARTERY COMPRESSION DEVICE: Brand: TR BAND

## (undated) DEVICE — MEDICINE CUP, GRADUATED, STER: Brand: MEDLINE

## (undated) DEVICE — PK CATH CARD 40

## (undated) DEVICE — BLOWER/MISTER AXIOUS OPCAB W/TBG

## (undated) DEVICE — BIOPATCH™ ANTIMICROBIAL DRESSING WITH CHLORHEXIDINE GLUCONATE IS A HYDROPHILLIC POLYURETHANE ABSORPTIVE FOAM WITH CHLORHEXIDINE GLUCONATE (CHG) WHICH INHIBITS BACTERIAL GROWTH UNDER THE DRESSING. THE DRESSING IS INTENDED TO BE USED TO ABSORB EXUDATE, COVER A WOUND CAUSED BY VASCULAR AND NONVASCULAR PERCUTANEOUS MEDICAL DEVICES DURING SURGERY, AS WELL AS REDUCE LOCAL INFECTION AND COLONIZATION OF MICROORGANISMS.: Brand: BIOPATCH

## (undated) DEVICE — SENSR CERBRL O2 PK/2

## (undated) DEVICE — GLIDESHEATH SLENDER STAINLESS STEEL KIT: Brand: GLIDESHEATH SLENDER

## (undated) DEVICE — PERCLOSE PROGLIDE™ SUTURE-MEDIATED CLOSURE SYSTEM: Brand: PERCLOSE PROGLIDE™

## (undated) DEVICE — CONN STR 1/2INX3/8IN

## (undated) DEVICE — GLV SURG SIGNATURE ESSENTIAL PF LTX SZ7.5

## (undated) DEVICE — TBG INSUFFLATION LUER LOCK: Brand: MEDLINE INDUSTRIES, INC.

## (undated) DEVICE — CORONARY ARTERY BYPASS GRAFT MARKERS, STAINLESS STEEL, DISTAL, WITHOUT HOLDER: Brand: ANASTOMARK CORONARY ARTERY BYPASS GRAFT MARKERS, STAINLESS STEEL, DISTAL

## (undated) DEVICE — DRESSING, SURESITE SELECT, 2.8'X3.50': Brand: MEDLINE

## (undated) DEVICE — 28 FR RIGHT ANGLE – SOFT PVC CATHETER: Brand: PVC THORACIC CATHETERS

## (undated) DEVICE — DGW .035 FC J3MM 260CM TEF: Brand: EMERALD

## (undated) DEVICE — ST. SORBAVIEW ULTIMATE IJ SYSTEM A,C: Brand: CENTURION

## (undated) DEVICE — PK HEART OPN 40

## (undated) DEVICE — NEEDLE, QUINCKE, 20GX3.5": Brand: MEDLINE

## (undated) DEVICE — GLV SURG BIOGEL M LTX PF 7 1/2

## (undated) DEVICE — PK PERFUS CUST W/CARDIOPLEGIA

## (undated) DEVICE — GLV SURG BIOGEL SENSR LTX PF SZ7.5

## (undated) DEVICE — DRSNG WND GEL FIBR OPTICELL AG PLS W/SLV LF 4X5IN  STRL

## (undated) DEVICE — PICO 7 10CM X 30CM: Brand: PICO™ 7

## (undated) DEVICE — CVR PROB 96IN LF STRL

## (undated) DEVICE — CATH DIAG IMPULSE IMT 5F 100CM

## (undated) DEVICE — KT MANIFLD CARDIAC

## (undated) DEVICE — DEV INDEFLATOR P/N 580289

## (undated) DEVICE — SYS VASOVIEW HEMOPRO ENDOSCOPIC HARVST VESL

## (undated) DEVICE — CATH DIAG IMPULSE FL4 5F 100CM

## (undated) DEVICE — PINNACLE INTRODUCER SHEATH: Brand: PINNACLE

## (undated) DEVICE — SUT VIC 0 CT1 CR8 27IN JJ41G

## (undated) DEVICE — PK ATS CUST W CARDIOTOMY RESEVOIR

## (undated) DEVICE — ROTATING SURGICAL PUNCHES, 1 PER POUCH: Brand: A&E MEDICAL / ROTATING SURGICAL PUNCHES

## (undated) DEVICE — PK SUT OPN HEART POLLOCK CUST

## (undated) DEVICE — SUT PROLN MO.5 7/0 DBLARM BV175 6 2X30 BX/12

## (undated) DEVICE — DRP SLUSH WARMR MACH CIR 44X44IN

## (undated) DEVICE — ADHS SKIN SURG TISS VISC PREMIERPRO EXOFIN HI/VISC FAST/DRY

## (undated) DEVICE — BG TRANSF W/COUPLER SPK 600ML

## (undated) DEVICE — TOWEL,OR,DSP,ST,WHITE,DLX,4/PK,20PK/CS: Brand: MEDLINE

## (undated) DEVICE — Device: Brand: LEVEL 1

## (undated) DEVICE — AVID DUAL STAGE VENOUS DRAINAGE CANNULA: Brand: AVID DUAL STAGE VENOUS DRAINAGE CANNULA

## (undated) DEVICE — SOL IRR NACL 0.9PCT BT 1000ML

## (undated) DEVICE — 28 FR STRAIGHT – SOFT PVC CATHETER: Brand: PVC THORACIC CATHETERS

## (undated) DEVICE — SUT SILK 0 CT1 CR8 18IN C021D

## (undated) DEVICE — HARMONIC SYNERGY DISSECTING HOOK WITH TORQUE WRENCH. FOR USE WITH BLUE HAND PIECE ONLY: Brand: HARMONIC SYNERGY

## (undated) DEVICE — DECANTER BAG 9": Brand: MEDLINE INDUSTRIES, INC.

## (undated) DEVICE — CATH DIAG IMPULSE FL3.5 5F 100CM

## (undated) DEVICE — SYS PERFUS SEP PLATLT W TIPS CUST

## (undated) DEVICE — SOL ISO/ALC RUB 70PCT 4OZ

## (undated) DEVICE — 12 FOOT DISPOSABLE EXTENSION CABLE WITH SAFE CONNECT / SCREW-DOWN

## (undated) DEVICE — JACKSON-PRATT 100CC BULB RESERVOIR: Brand: CARDINAL HEALTH

## (undated) DEVICE — BLAKE SILICONE DRAINS CARDIO CONNECTOR 2:1: Brand: BLAKE

## (undated) DEVICE — GUIDE CATHETER: Brand: MACH1™

## (undated) DEVICE — LOU OPEN HEART DR POLLOCK: Brand: MEDLINE INDUSTRIES, INC.

## (undated) DEVICE — CATH DIAG IMPULSE MPA2 SH 5F 100CM

## (undated) DEVICE — CANN ART EOPA 3D NV W/CONN 20F

## (undated) DEVICE — LABEL SHEET CUSTOM 2X2 YELLOW: Brand: MEDLINE INDUSTRIES, INC.

## (undated) DEVICE — MINI TREK CORONARY DILATATION CATHETER 2.0 MM X 12 MM / RAPID-EXCHANGE: Brand: MINI TREK

## (undated) DEVICE — OASIS DRAIN, SINGLE, INLINE & ATS COMPATIBLE: Brand: OASIS